# Patient Record
Sex: MALE | Race: WHITE | Employment: OTHER | URBAN - METROPOLITAN AREA
[De-identification: names, ages, dates, MRNs, and addresses within clinical notes are randomized per-mention and may not be internally consistent; named-entity substitution may affect disease eponyms.]

---

## 2017-08-17 PROBLEM — R42 DIZZINESS: Status: ACTIVE | Noted: 2017-08-17

## 2019-05-03 ENCOUNTER — HOSPITAL ENCOUNTER (OUTPATIENT)
Dept: LAB | Age: 69
Discharge: HOME | End: 2019-05-03
Payer: MEDICARE

## 2019-05-03 LAB
APPEARANCE FLD: NORMAL
BASOPHILS # BLD: 0 K/UL (ref 0–0.2)
BASOPHILS NFR BLD: 0 % (ref 0–2)
COLOR FLD: NORMAL
CRP SERPL-MCNC: 2.3 MG/DL (ref 0–0.9)
DIFFERENTIAL METHOD BLD: ABNORMAL
EOSINOPHIL # BLD: 0 K/UL (ref 0–0.8)
EOSINOPHIL NFR BLD: 1 % (ref 0.5–7.8)
ERYTHROCYTE [DISTWIDTH] IN BLOOD BY AUTOMATED COUNT: 14.3 % (ref 11.9–14.6)
ERYTHROCYTE [SEDIMENTATION RATE] IN BLOOD: 28 MM/HR (ref 0–20)
HCT VFR BLD AUTO: 42.7 % (ref 41.1–50.3)
HGB BLD-MCNC: 13.8 G/DL (ref 13.6–17.2)
IMM GRANULOCYTES # BLD AUTO: 0 K/UL (ref 0–0.5)
IMM GRANULOCYTES NFR BLD AUTO: 0 % (ref 0–5)
LYMPHOCYTES # BLD: 1.5 K/UL (ref 0.5–4.6)
LYMPHOCYTES NFR BLD: 30 % (ref 13–44)
LYMPHOCYTES NFR FLD: 3 %
MCH RBC QN AUTO: 25.1 PG (ref 26.1–32.9)
MCHC RBC AUTO-ENTMCNC: 32.3 G/DL (ref 31.4–35)
MCV RBC AUTO: 77.8 FL (ref 79.6–97.8)
MONOCYTES # BLD: 0.4 K/UL (ref 0.1–1.3)
MONOCYTES NFR BLD: 9 % (ref 4–12)
MONOS+MACROS NFR FLD: 2 %
NEUTROPHILS NFR FLD: 95 %
NEUTS SEG # BLD: 2.9 K/UL (ref 1.7–8.2)
NEUTS SEG NFR BLD: 60 % (ref 43–78)
NRBC # BLD: 0 K/UL (ref 0–0.2)
NUC CELL # FLD: NORMAL /CU MM
PLATELET # BLD AUTO: 257 K/UL (ref 150–450)
PMV BLD AUTO: 9.5 FL (ref 9.4–12.3)
RBC # BLD AUTO: 5.49 M/UL (ref 4.23–5.6)
RBC # FLD: NORMAL /CU MM
SPECIMEN SOURCE FLD: NORMAL
WBC # BLD AUTO: 4.8 K/UL (ref 4.3–11.1)

## 2019-05-03 PROCEDURE — 85025 COMPLETE CBC W/AUTO DIFF WBC: CPT

## 2019-05-03 PROCEDURE — 87205 SMEAR GRAM STAIN: CPT

## 2019-05-03 PROCEDURE — 87075 CULTR BACTERIA EXCEPT BLOOD: CPT

## 2019-05-03 PROCEDURE — 85652 RBC SED RATE AUTOMATED: CPT

## 2019-05-03 PROCEDURE — 86140 C-REACTIVE PROTEIN: CPT

## 2019-05-03 PROCEDURE — 89060 EXAM SYNOVIAL FLUID CRYSTALS: CPT

## 2019-05-03 PROCEDURE — 89050 BODY FLUID CELL COUNT: CPT

## 2019-05-03 PROCEDURE — 36415 COLL VENOUS BLD VENIPUNCTURE: CPT

## 2019-05-05 LAB
BACTERIA SPEC CULT: NORMAL
GRAM STN SPEC: NORMAL
GRAM STN SPEC: NORMAL
SERVICE CMNT-IMP: NORMAL

## 2019-05-06 LAB
BODY FLD TYPE: NORMAL
CRYSTALS FLD MICRO: NORMAL

## 2019-05-10 LAB
BACTERIA SPEC CULT: NORMAL
SERVICE CMNT-IMP: NORMAL

## 2019-09-30 ENCOUNTER — HOSPITAL ENCOUNTER (EMERGENCY)
Age: 69
Discharge: HOME | End: 2019-09-30
Attending: EMERGENCY MEDICINE
Payer: MEDICARE

## 2019-09-30 ENCOUNTER — APPOINTMENT (OUTPATIENT)
Dept: GENERAL RADIOLOGY | Age: 69
End: 2019-09-30
Attending: EMERGENCY MEDICINE
Payer: MEDICARE

## 2019-09-30 VITALS
RESPIRATION RATE: 16 BRPM | HEART RATE: 68 BPM | TEMPERATURE: 98.9 F | DIASTOLIC BLOOD PRESSURE: 77 MMHG | OXYGEN SATURATION: 99 % | SYSTOLIC BLOOD PRESSURE: 171 MMHG

## 2019-09-30 DIAGNOSIS — M25.562 PAIN AND SWELLING OF LEFT KNEE: Primary | ICD-10-CM

## 2019-09-30 DIAGNOSIS — M25.462 PAIN AND SWELLING OF LEFT KNEE: Primary | ICD-10-CM

## 2019-09-30 LAB
ALBUMIN SERPL-MCNC: 3.8 G/DL (ref 3.2–4.6)
ALBUMIN/GLOB SERPL: 0.9 {RATIO} (ref 1.2–3.5)
ALP SERPL-CCNC: 75 U/L (ref 50–136)
ALT SERPL-CCNC: 17 U/L (ref 12–65)
ANION GAP SERPL CALC-SCNC: 7 MMOL/L (ref 7–16)
AST SERPL-CCNC: 15 U/L (ref 15–37)
BASOPHILS # BLD: 0 K/UL (ref 0–0.2)
BASOPHILS NFR BLD: 0 % (ref 0–2)
BILIRUB SERPL-MCNC: 0.5 MG/DL (ref 0.2–1.1)
BUN SERPL-MCNC: 14 MG/DL (ref 8–23)
CALCIUM SERPL-MCNC: 9.4 MG/DL (ref 8.3–10.4)
CHLORIDE SERPL-SCNC: 105 MMOL/L (ref 98–107)
CO2 SERPL-SCNC: 29 MMOL/L (ref 21–32)
CREAT SERPL-MCNC: 1.44 MG/DL (ref 0.8–1.5)
DIFFERENTIAL METHOD BLD: ABNORMAL
EOSINOPHIL # BLD: 0.1 K/UL (ref 0–0.8)
EOSINOPHIL NFR BLD: 1 % (ref 0.5–7.8)
ERYTHROCYTE [DISTWIDTH] IN BLOOD BY AUTOMATED COUNT: 15.6 % (ref 11.9–14.6)
GLOBULIN SER CALC-MCNC: 4.1 G/DL (ref 2.3–3.5)
GLUCOSE SERPL-MCNC: 132 MG/DL (ref 65–100)
HCT VFR BLD AUTO: 35.7 % (ref 41.1–50.3)
HGB BLD-MCNC: 11.3 G/DL (ref 13.6–17.2)
IMM GRANULOCYTES # BLD AUTO: 0.1 K/UL (ref 0–0.5)
IMM GRANULOCYTES NFR BLD AUTO: 1 % (ref 0–5)
LYMPHOCYTES # BLD: 1.5 K/UL (ref 0.5–4.6)
LYMPHOCYTES NFR BLD: 16 % (ref 13–44)
MCH RBC QN AUTO: 25.6 PG (ref 26.1–32.9)
MCHC RBC AUTO-ENTMCNC: 31.7 G/DL (ref 31.4–35)
MCV RBC AUTO: 81 FL (ref 79.6–97.8)
MONOCYTES # BLD: 0.6 K/UL (ref 0.1–1.3)
MONOCYTES NFR BLD: 6 % (ref 4–12)
NEUTS SEG # BLD: 7.2 K/UL (ref 1.7–8.2)
NEUTS SEG NFR BLD: 76 % (ref 43–78)
NRBC # BLD: 0 K/UL (ref 0–0.2)
PLATELET # BLD AUTO: 286 K/UL (ref 150–450)
PMV BLD AUTO: 9.1 FL (ref 9.4–12.3)
POTASSIUM SERPL-SCNC: 3.9 MMOL/L (ref 3.5–5.1)
PROT SERPL-MCNC: 7.9 G/DL (ref 6.3–8.2)
RBC # BLD AUTO: 4.41 M/UL (ref 4.23–5.6)
SODIUM SERPL-SCNC: 141 MMOL/L (ref 136–145)
WBC # BLD AUTO: 9.4 K/UL (ref 4.3–11.1)

## 2019-09-30 PROCEDURE — 96376 TX/PRO/DX INJ SAME DRUG ADON: CPT | Performed by: EMERGENCY MEDICINE

## 2019-09-30 PROCEDURE — 99284 EMERGENCY DEPT VISIT MOD MDM: CPT | Performed by: EMERGENCY MEDICINE

## 2019-09-30 PROCEDURE — 80053 COMPREHEN METABOLIC PANEL: CPT

## 2019-09-30 PROCEDURE — 96375 TX/PRO/DX INJ NEW DRUG ADDON: CPT | Performed by: EMERGENCY MEDICINE

## 2019-09-30 PROCEDURE — 96374 THER/PROPH/DIAG INJ IV PUSH: CPT | Performed by: EMERGENCY MEDICINE

## 2019-09-30 PROCEDURE — 74011250636 HC RX REV CODE- 250/636: Performed by: EMERGENCY MEDICINE

## 2019-09-30 PROCEDURE — 85025 COMPLETE CBC W/AUTO DIFF WBC: CPT

## 2019-09-30 PROCEDURE — 73562 X-RAY EXAM OF KNEE 3: CPT

## 2019-09-30 RX ORDER — HYDROMORPHONE HYDROCHLORIDE 2 MG/1
2 TABLET ORAL
Qty: 15 TAB | Refills: 0 | Status: SHIPPED | OUTPATIENT
Start: 2019-09-30 | End: 2019-10-03

## 2019-09-30 RX ORDER — ONDANSETRON 2 MG/ML
4 INJECTION INTRAMUSCULAR; INTRAVENOUS
Status: COMPLETED | OUTPATIENT
Start: 2019-09-30 | End: 2019-09-30

## 2019-09-30 RX ORDER — HYDROMORPHONE HYDROCHLORIDE 1 MG/ML
0.5 INJECTION, SOLUTION INTRAMUSCULAR; INTRAVENOUS; SUBCUTANEOUS
Status: COMPLETED | OUTPATIENT
Start: 2019-09-30 | End: 2019-09-30

## 2019-09-30 RX ORDER — HYDROMORPHONE HYDROCHLORIDE 1 MG/ML
1 INJECTION, SOLUTION INTRAMUSCULAR; INTRAVENOUS; SUBCUTANEOUS
Status: COMPLETED | OUTPATIENT
Start: 2019-09-30 | End: 2019-09-30

## 2019-09-30 RX ADMIN — HYDROMORPHONE HYDROCHLORIDE 0.5 MG: 1 INJECTION, SOLUTION INTRAMUSCULAR; INTRAVENOUS; SUBCUTANEOUS at 22:29

## 2019-09-30 RX ADMIN — ONDANSETRON 4 MG: 2 INJECTION INTRAMUSCULAR; INTRAVENOUS at 21:40

## 2019-09-30 RX ADMIN — HYDROMORPHONE HYDROCHLORIDE 1 MG: 1 INJECTION, SOLUTION INTRAMUSCULAR; INTRAVENOUS; SUBCUTANEOUS at 21:40

## 2019-09-30 NOTE — ED TRIAGE NOTES
Pt arrives via EMS with L knee pain. Pt had knee replaced 2 weeks ago. Pt states pain started this afternoon, unsure cause. Denies any new injury or trauma. Has his knee replaced in [place] due to staph infection complication. Swelling noted, no increased warmth or redness. Pt already had artificial knee prior.

## 2019-10-01 NOTE — ED PROVIDER NOTES
Per nurse's notes: \"Pt arrives via EMS with L knee pain. Pt had knee replaced 2 weeks ago. Pt states pain started this afternoon, unsure cause. Denies any new injury or trauma. Has his knee replaced in [place] due to staph infection complication. Swelling noted, no increased warmth or redness. Pt already had artificial knee prior. \"    Patient states he was just sitting on the couch when it spontaneously started to hurt and swell and felt like something was pouring into it. Patient is on multiple blood thinners for prior stroke and sick sinus syndrome. The history is provided by the patient and the spouse. Knee Pain    This is a new problem. The current episode started 3 to 5 hours ago. The problem occurs constantly. The problem has not changed since onset. The pain is present in the left knee. The quality of the pain is described as aching. The pain is severe. Associated symptoms include limited range of motion and stiffness. Pertinent negatives include no numbness, no tingling, no itching, no back pain and no neck pain. The symptoms are aggravated by movement and palpation. He has tried rest for the symptoms. The treatment provided no relief. There has been no history of extremity trauma. Past Medical History:   Diagnosis Date    Acute kidney failure, unspecified ([address] 9/17/2010    Anxiety state, unspecified 11/14/2013    Arteriosclerosis of bypass graft of coronary artery 8/27/2015    CAD (coronary artery disease)     Cath on 3- showed 5/5 patent bypass grafts with LV EF=60% and a 90% stenosis in native LAD distal to LIMA graft anastomosis. He received a Xience ZAK (2.25 x 18) to LAD.  Carotid artery disease ([address] 03/28/2019    Bilateral ICA:50-69% on carotid ultrasound.     Carotid artery stenosis without cerebral infarction 07/07/2008    CVA (cerebral infarction) 5/25/2011    Diabetes ([address]     Diabetes mellitus ([address] 5/4/2009    Dilated Cardiomyopathy,Ischemic 2/26/2010    Dyslipidemia 5/4/2009    Elevated prostate specific antigen (PSA) 11/14/2013    Essential hypertension 11/14/2013    GERD (gastroesophageal reflux disease)     Hypertension     Nocturia     Other and unspecified hyperlipidemia     Pacemaker 6/21/2014    Palpitations 2006    Paroxysmal atrial fibrillation ([address] 11/30/2010    Paroxysmal ventricular tachycardia ([address] 11/30/2010    Post PTCA 5/4/2009    stent to left main     Psychiatric disorder     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    S/P CABG (coronary artery bypass graft) 5/4/2009    LIMA TO LAD, SV TO OM, SV TO RCA, SV TO DIAG     S/P PTCA (percutaneous transluminal coronary angioplasty) 3/11/2016    Sick sinus syndrome ([address] 09/06/2007    Snoring, Possible sleep apnea 2/26/2010    SSS (sick sinus syndrome) ([address] 6/21/2014    Stroke [facility]cerebrum[facility])     TIA 2/2011    Stroke, 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    Syncope and collapse 8/19/2011    Unspecified malignant neoplasm of skin, site unspecified        Past Surgical History:   Procedure Laterality Date    CARDIAC SURG PROCEDURE UNLIST      bypass x 5; 2 stents    COLONOSCOPY  1990    EGD  2010    esophageal ulcer    HX CHOLECYSTECTOMY      biliary dyskinesia    HX CORONARY ARTERY BYPASS GRAFT      HX CORONARY STENT PLACEMENT      HX HEART CATHETERIZATION  4/30/2013    no intervention    HX HEART CATHETERIZATION  6/23/2014    no intervention    HX HEART CATHETERIZATION      MULTIPLE ([place])    HX ORTHOPAEDIC      knee surgery x 2 left    HX OTHER SURGICAL      nerve in back    HX PACEMAKER      OH LEFT HEART CATH,PERCUTANEOUS  11/30/2010    native circ x 1         Family History:   Problem Relation Age of Onset    Diabetes Mother     Heart Disease Mother     Heart Disease Sister     Cancer Brother         Eye    Heart Disease Brother     Migraines Brother        Social History     Socioeconomic History    Marital status:      Spouse name: Not on file    Number of children: Not on file    Years of education: Not on file    Highest education level: Not on file   Occupational History    Not on file   Social Needs    Financial resource strain: Not on file    Food insecurity:     Worry: Not on file     Inability: Not on file    Transportation needs:     Medical: Not on file     Non-medical: Not on file   Tobacco Use    Smoking status: Former Smoker     Packs/day: 3.00     Years: 40.00     Pack years: 120.00     Last attempt to quit: 1999     Years since quittin.7    Smokeless tobacco: Never Used   Substance and Sexual Activity    Alcohol use: No    Drug use: No    Sexual activity: Not on file   Lifestyle    Physical activity:     Days per week: Not on file     Minutes per session: Not on file    Stress: Not on file   Relationships    Social connections:     Talks on phone: Not on file     Gets together: Not on file     Attends Evangelical service: Not on file     Active member of club or organization: Not on file     Attends meetings of clubs or organizations: Not on file     Relationship status: Not on file    Intimate partner violence:     Fear of current or ex partner: Not on file     Emotionally abused: Not on file     Physically abused: Not on file     Forced sexual activity: Not on file   Other Topics Concern    Caffeine Concern Not Asked    Back Care Not Asked    Exercise Not Asked    Occupational Exposure Not Asked    Sleep Concern Not Asked    Stress Concern Not Asked    Weight Concern Not Asked   Social History Narrative    Not on file         ALLERGIES: Beta-blockers (beta-adrenergic blocking agts); Shellfish derived; and Xanax [alprazolam]    Review of Systems   Constitutional: Negative for chills and fever. Musculoskeletal: Positive for arthralgias and stiffness. Negative for back pain and neck pain. Skin: Negative for itching. Neurological: Negative for tingling and numbness.    All other systems reviewed and are negative. There were no vitals filed for this visit. Physical Exam   Constitutional: He is oriented to person, place, and time. He appears well-developed and well-nourished. He appears distressed. HENT:   Head: Normocephalic and atraumatic. Right Ear: External ear normal.   Left Ear: External ear normal.   Mouth/Throat: Oropharynx is clear and moist.   Eyes: Pupils are equal, round, and reactive to light. Conjunctivae and EOM are normal.   Neck: Normal range of motion. Neck supple. Cardiovascular: Normal rate, regular rhythm, normal heart sounds and intact distal pulses. Pulmonary/Chest: Effort normal and breath sounds normal.   Musculoskeletal: He exhibits no edema. Left knee: He exhibits decreased range of motion, swelling, effusion and laceration (Surgical incision site appears to be healing without evidence of infection. ). He exhibits no ecchymosis and no erythema. Tenderness (Generalized) found. Neurological: He is alert and oriented to person, place, and time. Skin: Skin is warm and dry. Capillary refill takes less than 2 seconds. He is not diaphoretic. Psychiatric: He has a normal mood and affect. Nursing note and vitals reviewed.        MDM  Number of Diagnoses or Management Options     Amount and/or Complexity of Data Reviewed  Clinical lab tests: ordered and reviewed  Tests in the radiology section of CPT®: ordered and reviewed  Review and summarize past medical records: yes    Risk of Complications, Morbidity, and/or Mortality  Presenting problems: moderate  Diagnostic procedures: moderate  Management options: moderate    Patient Progress  Patient progress: improved         Procedures    Results Reviewed:      Recent Results (from the past 24 hour(s))   CBC WITH AUTOMATED DIFF    Collection Time: 09/30/19  7:00 PM   Result Value Ref Range    WBC 9.4 4.3 - 11.1 K/uL    RBC 4.41 4.23 - 5.6 M/uL    HGB 11.3 (L) 13.6 - 17.2 g/dL    HCT 35.7 (L) 41.1 - 50.3 % MCV 81.0 79.6 - 97.8 FL    MCH 25.6 (L) 26.1 - 32.9 PG    MCHC 31.7 31.4 - 35.0 g/dL    RDW 15.6 (H) 11.9 - 14.6 %    PLATELET 774 072 - 562 K/uL    MPV 9.1 (L) 9.4 - 12.3 FL    ABSOLUTE NRBC 0.00 0.0 - 0.2 K/uL    DF AUTOMATED      NEUTROPHILS 76 43 - 78 %    LYMPHOCYTES 16 13 - 44 %    MONOCYTES 6 4.0 - 12.0 %    EOSINOPHILS 1 0.5 - 7.8 %    BASOPHILS 0 0.0 - 2.0 %    IMMATURE GRANULOCYTES 1 0.0 - 5.0 %    ABS. NEUTROPHILS 7.2 1.7 - 8.2 K/UL    ABS. LYMPHOCYTES 1.5 0.5 - 4.6 K/UL    ABS. MONOCYTES 0.6 0.1 - 1.3 K/UL    ABS. EOSINOPHILS 0.1 0.0 - 0.8 K/UL    ABS. BASOPHILS 0.0 0.0 - 0.2 K/UL    ABS. IMM. GRANS. 0.1 0.0 - 0.5 K/UL   METABOLIC PANEL, COMPREHENSIVE    Collection Time: 09/30/19  7:00 PM   Result Value Ref Range    Sodium 141 136 - 145 mmol/L    Potassium 3.9 3.5 - 5.1 mmol/L    Chloride 105 98 - 107 mmol/L    CO2 29 21 - 32 mmol/L    Anion gap 7 7 - 16 mmol/L    Glucose 132 (H) 65 - 100 mg/dL    BUN 14 8 - 23 MG/DL    Creatinine 1.44 0.8 - 1.5 MG/DL    GFR est AA >60 >60 ml/min/1.73m2    GFR est non-AA 52 (L) >60 ml/min/1.73m2    Calcium 9.4 8.3 - 10.4 MG/DL    Bilirubin, total 0.5 0.2 - 1.1 MG/DL    ALT (SGPT) 17 12 - 65 U/L    AST (SGOT) 15 15 - 37 U/L    Alk. phosphatase 75 50 - 136 U/L    Protein, total 7.9 6.3 - 8.2 g/dL    Albumin 3.8 3.2 - 4.6 g/dL    Globulin 4.1 (H) 2.3 - 3.5 g/dL    A-G Ratio 0.9 (L) 1.2 - 3.5       XR KNEE LT 3 V   Final Result   Impression:      Total knee prosthesis in place with joint effusion. I discussed the results of all labs, procedures, radiographs, and treatments with the patient and available family. Treatment plan is agreed upon and the patient is ready for discharge. All voiced understanding of the discharge plan and medication instructions or changes as appropriate. Questions about treatment in the ED were answered. All were encouraged to return should symptoms worsen or new problems develop.

## 2019-10-01 NOTE — DISCHARGE INSTRUCTIONS

## 2019-10-01 NOTE — ED NOTES
I have reviewed discharge instructions with the patient. The patient verbalized understanding. Patient left ED via Discharge Method: wheelchair to Home with (insert name of family/friend, self, transport family). Opportunity for questions and clarification provided. Patient given 1 scripts. To continue your aftercare when you leave the hospital, you may receive an automated call from our care team to check in on how you are doing. This is a free service and part of our promise to provide the best care and service to meet your aftercare needs.  If you have questions, or wish to unsubscribe from this service please call [phone]. Thank you for Choosing our [address] [place] [address] Emergency Department.

## 2020-01-23 PROBLEM — R00.1 BRADYCARDIA: Status: ACTIVE | Noted: 2020-01-23

## 2020-04-10 PROBLEM — R06.09 DOE (DYSPNEA ON EXERTION): Status: ACTIVE | Noted: 2020-04-10

## 2020-04-10 PROBLEM — R53.82 CHRONIC FATIGUE: Status: ACTIVE | Noted: 2020-04-10

## 2020-04-21 PROBLEM — N18.30 CHRONIC RENAL FAILURE, STAGE 3 (MODERATE) (HCC): Status: ACTIVE | Noted: 2020-04-21

## 2020-08-03 ENCOUNTER — HOSPITAL ENCOUNTER (OUTPATIENT)
Dept: LAB | Age: 70
Discharge: HOME | End: 2020-08-03
Payer: MEDICARE

## 2020-08-03 DIAGNOSIS — Z95.1 S/P CABG (CORONARY ARTERY BYPASS GRAFT): Chronic | ICD-10-CM

## 2020-08-03 DIAGNOSIS — N18.30 CHRONIC RENAL FAILURE, STAGE 3 (MODERATE) (HCC): ICD-10-CM

## 2020-08-03 DIAGNOSIS — I10 ESSENTIAL HYPERTENSION: ICD-10-CM

## 2020-08-03 DIAGNOSIS — I42.0 DILATED CARDIOMYOPATHY (HCC): ICD-10-CM

## 2020-08-03 DIAGNOSIS — R06.09 DOE (DYSPNEA ON EXERTION): ICD-10-CM

## 2020-08-03 DIAGNOSIS — I25.810 CORONARY ARTERY DISEASE INVOLVING AUTOLOGOUS ARTERY CORONARY BYPASS GRAFT, ANGINA PRESENCE UNSPECIFIED: Chronic | ICD-10-CM

## 2020-08-03 DIAGNOSIS — R07.9 CHEST PAIN, UNSPECIFIED TYPE: ICD-10-CM

## 2020-08-03 DIAGNOSIS — R53.82 CHRONIC FATIGUE: ICD-10-CM

## 2020-08-03 LAB
ANION GAP SERPL CALC-SCNC: 5 MMOL/L (ref 7–16)
BASOPHILS # BLD: 0 K/UL (ref 0–0.2)
BASOPHILS NFR BLD: 0 % (ref 0–2)
BNP SERPL-MCNC: 111 PG/ML (ref 5–125)
BUN SERPL-MCNC: 24 MG/DL (ref 8–23)
CALCIUM SERPL-MCNC: 9.2 MG/DL (ref 8.3–10.4)
CHLORIDE SERPL-SCNC: 111 MMOL/L (ref 98–107)
CO2 SERPL-SCNC: 25 MMOL/L (ref 21–32)
CREAT SERPL-MCNC: 1.63 MG/DL (ref 0.8–1.5)
DIFFERENTIAL METHOD BLD: ABNORMAL
EOSINOPHIL # BLD: 0.1 K/UL (ref 0–0.8)
EOSINOPHIL NFR BLD: 1 % (ref 0.5–7.8)
ERYTHROCYTE [DISTWIDTH] IN BLOOD BY AUTOMATED COUNT: 14.7 % (ref 11.9–14.6)
GLUCOSE SERPL-MCNC: 342 MG/DL (ref 65–100)
HCT VFR BLD AUTO: 37.3 % (ref 41.1–50.3)
HGB BLD-MCNC: 12.1 G/DL (ref 13.6–17.2)
IMM GRANULOCYTES # BLD AUTO: 0 K/UL (ref 0–0.5)
IMM GRANULOCYTES NFR BLD AUTO: 0 % (ref 0–5)
LYMPHOCYTES # BLD: 1.3 K/UL (ref 0.5–4.6)
LYMPHOCYTES NFR BLD: 26 % (ref 13–44)
MCH RBC QN AUTO: 27.8 PG (ref 26.1–32.9)
MCHC RBC AUTO-ENTMCNC: 32.4 G/DL (ref 31.4–35)
MCV RBC AUTO: 85.6 FL (ref 79.6–97.8)
MONOCYTES # BLD: 0.4 K/UL (ref 0.1–1.3)
MONOCYTES NFR BLD: 7 % (ref 4–12)
NEUTS SEG # BLD: 3.3 K/UL (ref 1.7–8.2)
NEUTS SEG NFR BLD: 66 % (ref 43–78)
NRBC # BLD: 0 K/UL (ref 0–0.2)
PLATELET # BLD AUTO: 180 K/UL (ref 150–450)
PMV BLD AUTO: 10.7 FL (ref 9.4–12.3)
POTASSIUM SERPL-SCNC: 4.5 MMOL/L (ref 3.5–5.1)
RBC # BLD AUTO: 4.36 M/UL (ref 4.23–5.6)
SODIUM SERPL-SCNC: 141 MMOL/L (ref 136–145)
WBC # BLD AUTO: 5 K/UL (ref 4.3–11.1)

## 2020-08-03 PROCEDURE — 85025 COMPLETE CBC W/AUTO DIFF WBC: CPT

## 2020-08-03 PROCEDURE — 83880 ASSAY OF NATRIURETIC PEPTIDE: CPT

## 2020-08-03 PROCEDURE — 36415 COLL VENOUS BLD VENIPUNCTURE: CPT

## 2020-08-03 PROCEDURE — 80048 BASIC METABOLIC PNL TOTAL CA: CPT

## 2020-08-07 NOTE — PROGRESS NOTES
Do you currently have any signs or symptoms of respiratory infection, such as fever, cough, shortness of breath, or sore throat? no    In the last 14 days have you had contact with someone with confirmed or presumptive diagnosis of COVID-19 or someone under investigation of COVID-19?  no    In the last 14 days have you traveled or have someone in your home who has traveled to [place], [facility], [facility], [place], [place], [place], [place], or [place]? no

## 2020-08-07 NOTE — PROGRESS NOTES
Patient pre-assessment complete for Jaspreet Melendez scheduled for [facility], arrival time 0600. Patient verified using . Patient instructed to bring all home medications in labeled bottles on the day of procedure. NPO status reinforced. Patient informed to take a full dose aspirin 325mg  or 81 mg x 4 on the day of procedure. Patient instructed to take plavix 75mg the morning of the procedure. Patient instructed to [facility] starting saturday. Instructed they can take all other medications excluding vitamins & supplements. Patient verbalizes understanding of all instructions & denies any questions at this time.

## 2020-08-10 ENCOUNTER — APPOINTMENT (OUTPATIENT)
Dept: CT IMAGING | Age: 70
End: 2020-08-10
Attending: NURSE PRACTITIONER
Payer: MEDICARE

## 2020-08-10 ENCOUNTER — HOSPITAL ENCOUNTER (OUTPATIENT)
Dept: CARDIAC CATH/INVASIVE PROCEDURES | Age: 70
Setting detail: OBSERVATION
Discharge: HOME HEALTH | End: 2020-08-13
Attending: INTERNAL MEDICINE | Admitting: INTERNAL MEDICINE
Payer: MEDICARE

## 2020-08-10 ENCOUNTER — APPOINTMENT (OUTPATIENT)
Dept: CT IMAGING | Age: 70
End: 2020-08-10
Attending: INTERNAL MEDICINE
Payer: MEDICARE

## 2020-08-10 DIAGNOSIS — R55 SYNCOPE AND COLLAPSE: ICD-10-CM

## 2020-08-10 DIAGNOSIS — I25.810 ATHEROSCLEROSIS OF CORONARY ARTERY BYPASS GRAFT WITHOUT ANGINA PECTORIS, UNSPECIFIED WHETHER NATIVE OR TRANSPLANTED HEART: ICD-10-CM

## 2020-08-10 DIAGNOSIS — N18.30 CHRONIC RENAL FAILURE, STAGE 3 (MODERATE) (HCC): ICD-10-CM

## 2020-08-10 DIAGNOSIS — R29.90 STROKE-LIKE SYMPTOMS: Primary | ICD-10-CM

## 2020-08-10 DIAGNOSIS — I25.810 CORONARY ARTERY DISEASE INVOLVING AUTOLOGOUS ARTERY CORONARY BYPASS GRAFT, ANGINA PRESENCE UNSPECIFIED: Chronic | ICD-10-CM

## 2020-08-10 LAB
ANION GAP SERPL CALC-SCNC: 7 MMOL/L (ref 7–16)
ATRIAL RATE: 97 BPM
BUN SERPL-MCNC: 14 MG/DL (ref 8–23)
CALCIUM SERPL-MCNC: 9 MG/DL (ref 8.3–10.4)
CALCULATED P AXIS, ECG09: 67 DEGREES
CALCULATED R AXIS, ECG10: -88 DEGREES
CALCULATED T AXIS, ECG11: 92 DEGREES
CHLORIDE SERPL-SCNC: 107 MMOL/L (ref 98–107)
CO2 SERPL-SCNC: 26 MMOL/L (ref 21–32)
CREAT SERPL-MCNC: 1.14 MG/DL (ref 0.8–1.5)
DIAGNOSIS, 93000: NORMAL
ERYTHROCYTE [DISTWIDTH] IN BLOOD BY AUTOMATED COUNT: 14.2 % (ref 11.9–14.6)
GLUCOSE BLD STRIP.AUTO-MCNC: 132 MG/DL (ref 65–100)
GLUCOSE BLD STRIP.AUTO-MCNC: 140 MG/DL (ref 65–100)
GLUCOSE BLD STRIP.AUTO-MCNC: 248 MG/DL (ref 65–100)
GLUCOSE BLD STRIP.AUTO-MCNC: 292 MG/DL (ref 65–100)
GLUCOSE SERPL-MCNC: 319 MG/DL (ref 65–100)
HCT VFR BLD AUTO: 37.2 % (ref 41.1–50.3)
HGB BLD-MCNC: 12 G/DL (ref 13.6–17.2)
INR PPP: 1
MAGNESIUM SERPL-MCNC: 2 MG/DL (ref 1.8–2.4)
MCH RBC QN AUTO: 27.2 PG (ref 26.1–32.9)
MCHC RBC AUTO-ENTMCNC: 32.3 G/DL (ref 31.4–35)
MCV RBC AUTO: 84.4 FL (ref 79.6–97.8)
NRBC # BLD: 0 K/UL (ref 0–0.2)
P-R INTERVAL, ECG05: 228 MS
P2Y12 PLT RESPONSE,PPPR: 227 PRU
PLATELET # BLD AUTO: 158 K/UL (ref 150–450)
PMV BLD AUTO: 10.5 FL (ref 9.4–12.3)
POTASSIUM SERPL-SCNC: 4.3 MMOL/L (ref 3.5–5.1)
PROTHROMBIN TIME: 13.8 SEC (ref 12–14.7)
Q-T INTERVAL, ECG07: 336 MS
QRS DURATION, ECG06: 128 MS
QTC CALCULATION (BEZET), ECG08: 426 MS
RBC # BLD AUTO: 4.41 M/UL (ref 4.23–5.6)
SODIUM SERPL-SCNC: 140 MMOL/L (ref 136–145)
TROPONIN-HIGH SENSITIVITY: 21.9 PG/ML (ref 0–14)
VENTRICULAR RATE, ECG03: 97 BPM
WBC # BLD AUTO: 7.2 K/UL (ref 4.3–11.1)

## 2020-08-10 PROCEDURE — 93005 ELECTROCARDIOGRAM TRACING: CPT | Performed by: INTERNAL MEDICINE

## 2020-08-10 PROCEDURE — 74011000258 HC RX REV CODE- 258: Performed by: EMERGENCY MEDICINE

## 2020-08-10 PROCEDURE — 74011000258 HC RX REV CODE- 258: Performed by: INTERNAL MEDICINE

## 2020-08-10 PROCEDURE — 36415 COLL VENOUS BLD VENIPUNCTURE: CPT

## 2020-08-10 PROCEDURE — 99221 1ST HOSP IP/OBS SF/LOW 40: CPT | Performed by: PHYSICAL MEDICINE & REHABILITATION

## 2020-08-10 PROCEDURE — 80048 BASIC METABOLIC PNL TOTAL CA: CPT

## 2020-08-10 PROCEDURE — 74011636320 HC RX REV CODE- 636/320: Performed by: INTERNAL MEDICINE

## 2020-08-10 PROCEDURE — 74011250637 HC RX REV CODE- 250/637: Performed by: INTERNAL MEDICINE

## 2020-08-10 PROCEDURE — 74011250636 HC RX REV CODE- 250/636: Performed by: INTERNAL MEDICINE

## 2020-08-10 PROCEDURE — 99218 HC RM OBSERVATION: CPT

## 2020-08-10 PROCEDURE — 74011250636 HC RX REV CODE- 250/636: Performed by: EMERGENCY MEDICINE

## 2020-08-10 PROCEDURE — 95816 EEG AWAKE AND DROWSY: CPT | Performed by: INTERNAL MEDICINE

## 2020-08-10 PROCEDURE — 82962 GLUCOSE BLOOD TEST: CPT

## 2020-08-10 PROCEDURE — 85027 COMPLETE CBC AUTOMATED: CPT

## 2020-08-10 PROCEDURE — 70450 CT HEAD/BRAIN W/O DYE: CPT

## 2020-08-10 PROCEDURE — 96376 TX/PRO/DX INJ SAME DRUG ADON: CPT

## 2020-08-10 PROCEDURE — 87635 SARS-COV-2 COVID-19 AMP PRB: CPT

## 2020-08-10 PROCEDURE — 85576 BLOOD PLATELET AGGREGATION: CPT

## 2020-08-10 PROCEDURE — 70496 CT ANGIOGRAPHY HEAD: CPT

## 2020-08-10 PROCEDURE — 96374 THER/PROPH/DIAG INJ IV PUSH: CPT

## 2020-08-10 PROCEDURE — 74011636637 HC RX REV CODE- 636/637: Performed by: INTERNAL MEDICINE

## 2020-08-10 PROCEDURE — 99285 EMERGENCY DEPT VISIT HI MDM: CPT

## 2020-08-10 PROCEDURE — 74011250637 HC RX REV CODE- 250/637: Performed by: NURSE PRACTITIONER

## 2020-08-10 PROCEDURE — 84484 ASSAY OF TROPONIN QUANT: CPT

## 2020-08-10 PROCEDURE — 96375 TX/PRO/DX INJ NEW DRUG ADDON: CPT

## 2020-08-10 PROCEDURE — 83735 ASSAY OF MAGNESIUM: CPT

## 2020-08-10 PROCEDURE — 77030040361 HC SLV COMPR DVT MDII -B

## 2020-08-10 PROCEDURE — 85610 PROTHROMBIN TIME: CPT

## 2020-08-10 RX ORDER — INSULIN LISPRO 100 [IU]/ML
15 INJECTION, SOLUTION INTRAVENOUS; SUBCUTANEOUS
Status: DISCONTINUED | OUTPATIENT
Start: 2020-08-10 | End: 2020-08-10

## 2020-08-10 RX ORDER — TRAZODONE HYDROCHLORIDE 50 MG/1
100 TABLET ORAL
Status: DISCONTINUED | OUTPATIENT
Start: 2020-08-10 | End: 2020-08-13 | Stop reason: HOSPADM

## 2020-08-10 RX ORDER — RANOLAZINE 500 MG/1
500 TABLET, EXTENDED RELEASE ORAL 2 TIMES DAILY
Status: DISCONTINUED | OUTPATIENT
Start: 2020-08-10 | End: 2020-08-13 | Stop reason: HOSPADM

## 2020-08-10 RX ORDER — DIPHENHYDRAMINE HYDROCHLORIDE 50 MG/ML
50 INJECTION, SOLUTION INTRAMUSCULAR; INTRAVENOUS ONCE
Status: DISCONTINUED | OUTPATIENT
Start: 2020-08-10 | End: 2020-08-10

## 2020-08-10 RX ORDER — NALOXONE HYDROCHLORIDE 0.4 MG/ML
0.4 INJECTION, SOLUTION INTRAMUSCULAR; INTRAVENOUS; SUBCUTANEOUS ONCE
Status: COMPLETED | OUTPATIENT
Start: 2020-08-10 | End: 2020-08-10

## 2020-08-10 RX ORDER — SODIUM CHLORIDE 0.9 % (FLUSH) 0.9 %
5-40 SYRINGE (ML) INJECTION EVERY 8 HOURS
Status: DISCONTINUED | OUTPATIENT
Start: 2020-08-10 | End: 2020-08-13 | Stop reason: HOSPADM

## 2020-08-10 RX ORDER — ATORVASTATIN CALCIUM 40 MG/1
40 TABLET, FILM COATED ORAL DAILY
Status: DISCONTINUED | OUTPATIENT
Start: 2020-08-10 | End: 2020-08-13 | Stop reason: HOSPADM

## 2020-08-10 RX ORDER — ISOSORBIDE MONONITRATE 60 MG/1
60 TABLET, EXTENDED RELEASE ORAL 2 TIMES DAILY
Status: DISCONTINUED | OUTPATIENT
Start: 2020-08-10 | End: 2020-08-13 | Stop reason: HOSPADM

## 2020-08-10 RX ORDER — INSULIN LISPRO 100 [IU]/ML
INJECTION, SOLUTION INTRAVENOUS; SUBCUTANEOUS
Status: DISCONTINUED | OUTPATIENT
Start: 2020-08-10 | End: 2020-08-13 | Stop reason: HOSPADM

## 2020-08-10 RX ORDER — HYDROCODONE BITARTRATE AND ACETAMINOPHEN 10; 325 MG/1; MG/1
1 TABLET ORAL
Status: DISCONTINUED | OUTPATIENT
Start: 2020-08-10 | End: 2020-08-10

## 2020-08-10 RX ORDER — PANTOPRAZOLE SODIUM 40 MG/1
40 TABLET, DELAYED RELEASE ORAL
Status: DISCONTINUED | OUTPATIENT
Start: 2020-08-11 | End: 2020-08-13 | Stop reason: HOSPADM

## 2020-08-10 RX ORDER — SODIUM CHLORIDE 9 MG/ML
75 INJECTION, SOLUTION INTRAVENOUS CONTINUOUS
Status: DISCONTINUED | OUTPATIENT
Start: 2020-08-10 | End: 2020-08-12

## 2020-08-10 RX ORDER — GUAIFENESIN 100 MG/5ML
324 LIQUID (ML) ORAL
Status: DISCONTINUED | OUTPATIENT
Start: 2020-08-10 | End: 2020-08-10

## 2020-08-10 RX ORDER — GUAIFENESIN 100 MG/5ML
81 LIQUID (ML) ORAL DAILY
Status: DISCONTINUED | OUTPATIENT
Start: 2020-08-11 | End: 2020-08-12

## 2020-08-10 RX ORDER — INSULIN GLARGINE 100 [IU]/ML
30 INJECTION, SOLUTION SUBCUTANEOUS DAILY
Status: DISCONTINUED | OUTPATIENT
Start: 2020-08-10 | End: 2020-08-13 | Stop reason: HOSPADM

## 2020-08-10 RX ORDER — FINASTERIDE 5 MG/1
5 TABLET, FILM COATED ORAL DAILY
Status: DISCONTINUED | OUTPATIENT
Start: 2020-08-10 | End: 2020-08-13 | Stop reason: HOSPADM

## 2020-08-10 RX ORDER — ACETAMINOPHEN 650 MG/1
650 SUPPOSITORY RECTAL
Status: DISCONTINUED | OUTPATIENT
Start: 2020-08-10 | End: 2020-08-13 | Stop reason: HOSPADM

## 2020-08-10 RX ORDER — LOSARTAN POTASSIUM 50 MG/1
50 TABLET ORAL DAILY
Status: DISCONTINUED | OUTPATIENT
Start: 2020-08-10 | End: 2020-08-13 | Stop reason: HOSPADM

## 2020-08-10 RX ORDER — PREGABALIN 100 MG/1
300 CAPSULE ORAL 2 TIMES DAILY
Status: DISCONTINUED | OUTPATIENT
Start: 2020-08-10 | End: 2020-08-13 | Stop reason: HOSPADM

## 2020-08-10 RX ORDER — ONDANSETRON 2 MG/ML
4 INJECTION INTRAMUSCULAR; INTRAVENOUS ONCE
Status: COMPLETED | OUTPATIENT
Start: 2020-08-10 | End: 2020-08-10

## 2020-08-10 RX ORDER — HYDROCORTISONE SODIUM SUCCINATE 100 MG/2ML
100 INJECTION, POWDER, FOR SOLUTION INTRAMUSCULAR; INTRAVENOUS ONCE
Status: DISCONTINUED | OUTPATIENT
Start: 2020-08-10 | End: 2020-08-10

## 2020-08-10 RX ORDER — HYDROCODONE BITARTRATE AND ACETAMINOPHEN 10; 325 MG/1; MG/1
1 TABLET ORAL
Status: DISCONTINUED | OUTPATIENT
Start: 2020-08-10 | End: 2020-08-13 | Stop reason: HOSPADM

## 2020-08-10 RX ORDER — CLOPIDOGREL BISULFATE 75 MG/1
75 TABLET ORAL DAILY
Status: DISCONTINUED | OUTPATIENT
Start: 2020-08-10 | End: 2020-08-12

## 2020-08-10 RX ORDER — SODIUM CHLORIDE 0.9 % (FLUSH) 0.9 %
10 SYRINGE (ML) INJECTION
Status: COMPLETED | OUTPATIENT
Start: 2020-08-10 | End: 2020-08-10

## 2020-08-10 RX ORDER — NITROGLYCERIN 0.4 MG/1
0.4 TABLET SUBLINGUAL AS NEEDED
Status: DISCONTINUED | OUTPATIENT
Start: 2020-08-10 | End: 2020-08-13 | Stop reason: HOSPADM

## 2020-08-10 RX ORDER — SODIUM CHLORIDE 0.9 % (FLUSH) 0.9 %
5-40 SYRINGE (ML) INJECTION AS NEEDED
Status: DISCONTINUED | OUTPATIENT
Start: 2020-08-10 | End: 2020-08-13 | Stop reason: HOSPADM

## 2020-08-10 RX ORDER — TAMSULOSIN HYDROCHLORIDE 0.4 MG/1
0.4 CAPSULE ORAL DAILY
Status: DISCONTINUED | OUTPATIENT
Start: 2020-08-10 | End: 2020-08-13 | Stop reason: HOSPADM

## 2020-08-10 RX ADMIN — SODIUM CHLORIDE 1000 MG: 900 INJECTION, SOLUTION INTRAVENOUS at 10:13

## 2020-08-10 RX ADMIN — TRAZODONE HYDROCHLORIDE 100 MG: 50 TABLET ORAL at 21:56

## 2020-08-10 RX ADMIN — SODIUM CHLORIDE 100 ML: 900 INJECTION, SOLUTION INTRAVENOUS at 09:25

## 2020-08-10 RX ADMIN — NITROGLYCERIN 0.4 MG: 0.4 TABLET SUBLINGUAL at 06:45

## 2020-08-10 RX ADMIN — INSULIN GLARGINE 30 UNITS: 100 INJECTION, SOLUTION SUBCUTANEOUS at 13:06

## 2020-08-10 RX ADMIN — Medication 10 ML: at 09:25

## 2020-08-10 RX ADMIN — HYDROCODONE BITARTRATE AND ACETAMINOPHEN 1 TABLET: 10; 325 TABLET ORAL at 20:40

## 2020-08-10 RX ADMIN — ACETAMINOPHEN 650 MG: 650 SUPPOSITORY RECTAL at 13:08

## 2020-08-10 RX ADMIN — SODIUM CHLORIDE 75 ML/HR: 900 INJECTION, SOLUTION INTRAVENOUS at 12:42

## 2020-08-10 RX ADMIN — ONDANSETRON 4 MG: 2 INJECTION INTRAMUSCULAR; INTRAVENOUS at 06:55

## 2020-08-10 RX ADMIN — SODIUM CHLORIDE 1000 MG: 900 INJECTION, SOLUTION INTRAVENOUS at 21:57

## 2020-08-10 RX ADMIN — IOPAMIDOL 50 ML: 755 INJECTION, SOLUTION INTRAVENOUS at 09:25

## 2020-08-10 RX ADMIN — Medication 10 ML: at 21:58

## 2020-08-10 RX ADMIN — INSULIN LISPRO 4 UNITS: 100 INJECTION, SOLUTION INTRAVENOUS; SUBCUTANEOUS at 13:00

## 2020-08-10 RX ADMIN — NALOXONE HYDROCHLORIDE 0.4 MG: 0.4 INJECTION, SOLUTION INTRAMUSCULAR; INTRAVENOUS; SUBCUTANEOUS at 06:52

## 2020-08-10 NOTE — PROGRESS NOTES
Axillary temperature 101. PRN Tylenol 650 mg suppository given per MD order. Will continue to monitor.

## 2020-08-10 NOTE — PROGRESS NOTES
Patient received to [address] [place] [address] room # 11  Ambulatory from [facility]. Patient scheduled for [facility] today with Dr Jovani Cedillo. Will prep patient per orders. The patient has a fraility score of 5-MILDLY FRAIL, based on use of wheelchair and unable to bear own weight. Upon arrival patient disoriented with delayed responses. Pt states chest pain 8/10. 1 SL nitro given. Pt states chest pain 2/10. Son at bedside and states patient is acting differently when he picked him up this AM. SQBS 292 at [id number]. Code S called.

## 2020-08-10 NOTE — PROGRESS NOTES
Problem: Falls - Risk of  Goal: *Absence of Falls  Description: Document Kelsie Church Fall Risk and appropriate interventions in the flowsheet. Outcome: Progressing Towards Goal  Note: Fall Risk Interventions:  Mobility Interventions: OT consult for ADLs, Patient to call before getting OOB, PT Consult for mobility concerns         Medication Interventions: Assess postural VS orthostatic hypotension, Bed/chair exit alarm, Patient to call before getting OOB, Teach patient to arise slowly, Utilize gait belt for transfers/ambulation    Elimination Interventions: Bed/chair exit alarm, Call light in reach, Toileting schedule/hourly rounds, Urinal in reach    History of Falls Interventions: Bed/chair exit alarm, Door open when patient unattended, Room close to nurse's station         Problem: Patient Education: Go to Patient Education Activity  Goal: Patient/Family Education  Outcome: Progressing Towards Goal     Problem: Risk for Spread of Infection  Goal: Prevent transmission of infectious organism to others  Description: Prevent the transmission of infectious organisms to other patients, staff members, and visitors.   Outcome: Progressing Towards Goal     Problem: Patient Education:  Go to Education Activity  Goal: Patient/Family Education  Outcome: Progressing Towards Goal

## 2020-08-10 NOTE — PROGRESS NOTES
TRANSFER - IN REPORT:    Verbal report received from [facility] [email or web address] (name) on Josiane Coronado  being received from ED (unit) for routine progression of care      Report consisted of patients Situation, Background, Assessment and   Recommendations(SBAR). Information from the following report(s) SBAR, Kardex, ED Summary, Intake/Output, MAR and Recent Results was reviewed with the receiving nurse. Opportunity for questions and clarification was provided. Assessment completed upon patients arrival to unit and care assumed.

## 2020-08-10 NOTE — H&P
HOSPITALIST H&P/CONSULT  NAME:  Sloan Quintana   Age:  79 y.o.  :   1950   MRN:   [id number]  PCP: Marvin Prasad MD  Consulting MD:  Treatment Team: Attending Provider: Kathia Sweeney DO; Consulting Provider: Fior Cruz NP; Consulting Provider: Rosalie Niño MD; Consulting Provider: Antionette Arauz MD; Speech Language Pathologist: GARY Palma  HPI:   78 yo CM with past history of SSS s/p PPM, 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 Patient was supposed to have a scheduled outpatient cardiac cath this morning. His son, at bedside, came to pick him up this morning and says Stiven Hole was just off, I saw that his balance was off and he wasn't acting himself. \" Patient went to bed last night in his usual state of health. He did not take \"my long-acting insulin or my Eliquis because of my heart cath. I was supposed to take 4 baby aspirin but I forgot to take it today. \" When he got to cath lab he was noted to be encephalopathic, his blood sugar was ~300. He was given Narcan with no improvement. Code S called. ED Course: Code S called. CT head unremarkable. STAT teleneurology consulted, concern for seizure-like activity. He was loaded with Keppra. Deemed not a candidate for thrombolytics. Improved interval left-sided weakness afterwards. Hospitalist called for admission. Complete ROS done and is as stated in HPI or otherwise negative  Past Medical History:   Diagnosis Date    Acute kidney failure, unspecified ([address] 2010    Anxiety state, unspecified 2013    Arteriosclerosis of bypass graft of coronary artery 2015    CAD (coronary artery disease)     Cath on 3- showed 5/5 patent bypass grafts with LV EF=60% and a 90% stenosis in native LAD distal to LIMA graft anastomosis. He received a Xience ZAK (2.25 x 18) to LAD.     Carotid artery disease ([address] 03/28/2019    Bilateral ICA:50-69% on carotid ultrasound.     Carotid artery stenosis without cerebral infarction 07/07/2008    CVA (cerebral infarction) 5/25/2011    Diabetes ([address]     Diabetes mellitus ([address] 5/4/2009    Dilated Cardiomyopathy,Ischemic 2/26/2010    Dyslipidemia 5/4/2009    Elevated prostate specific antigen (PSA) 11/14/2013    Essential hypertension 11/14/2013    GERD (gastroesophageal reflux disease)     Hypertension     Nocturia     Other and unspecified hyperlipidemia     Pacemaker 6/21/2014    Palpitations 2006    Paroxysmal atrial fibrillation ([address] 11/30/2010    Paroxysmal ventricular tachycardia ([address] 11/30/2010    Post PTCA 5/4/2009    stent to left main     Psychiatric disorder     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    S/P CABG (coronary artery bypass graft) 5/4/2009    LIMA TO LAD, SV TO OM, SV TO RCA, SV TO DIAG     S/P PTCA (percutaneous transluminal coronary angioplasty) 3/11/2016    Sick sinus syndrome ([address] 09/06/2007    Snoring, Possible sleep apnea 2/26/2010    SSS (sick sinus syndrome) ([address] 6/21/2014    Stroke [facility]cerebrum) [facility])     TIA 2/2011    Stroke, embolic ([address] 0/47/9207    Syncope and collapse 8/19/2011    Unspecified malignant neoplasm of skin, site unspecified       Past Surgical History:   Procedure Laterality Date    CARDIAC SURG PROCEDURE UNLIST      bypass x 5; 2 stents    COLONOSCOPY  1990    EGD  2010    esophageal ulcer    HX CHOLECYSTECTOMY      biliary dyskinesia    HX CORONARY ARTERY BYPASS GRAFT      HX CORONARY STENT PLACEMENT      HX HEART CATHETERIZATION  4/30/2013    no intervention    HX HEART CATHETERIZATION  6/23/2014    no intervention    HX HEART CATHETERIZATION      MULTIPLE ([address])    HX ORTHOPAEDIC      knee surgery x 2 left    HX OTHER SURGICAL      nerve in back    HX PACEMAKER      AK LEFT HEART CATH,PERCUTANEOUS  11/30/2010    native circ x 1      Prior to Admission Medications   Prescriptions Last Dose Informant Patient Reported? Taking? HYDROcodone-acetaminophen (NORCO)  mg tablet 2020 at Unknown time  Yes Yes   Sig: Take 1 Tab by mouth four (4) times daily as needed. apixaban (ELIQUIS) 5 mg tablet 2020 at pm  No Yes   Sig: Take 1 Tab by mouth two (2) times a day. carvediloL (Coreg) 12.5 mg tablet 2020 at Unknown time  No Yes   Sig: Take 1 Tab by mouth two (2) times daily (with meals). clopidogreL (PLAVIX) 75 mg tab 2020 at Unknown time  No Yes   Sig: Take 1 Tab by mouth daily. cyclobenzaprine (FLEXERIL) 10 mg tablet 2020 at Unknown time  Yes Yes   Sig: Take 10 mg by mouth two (2) times daily as needed for Muscle Spasm(s). esomeprazole (NEXIUM) 40 mg capsule 2020 at Unknown time  Yes Yes   Sig: Take  by mouth daily. finasteride (PROSCAR) 5 mg tablet 2020 at Unknown time  No Yes   Sig: TAKE 1 TABLET BY MOUTH DAILY   fluticasone (FLONASE) 50 mcg/actuation nasal spray 2020 at Unknown time  Yes Yes   Si Sprays by Both Nostrils route once. insulin aspart (NOVOLOG) 100 unit/mL injection   Yes No   Si Units by SubCUTAneous route Before breakfast, lunch, and dinner. insulin detemir U-100 (Levemir U-100 Insulin) 100 unit/mL injection   Yes No   Si Units by SubCUTAneous route two (2) times a day. isosorbide mononitrate ER (Imdur) 60 mg CR tablet 2020 at Unknown time  No Yes   Sig: Take 1 Tab by mouth two (2) times a day. losartan (COZAAR) 50 mg tablet 2020 at Unknown time  No Yes   Sig: Take 1 Tab by mouth daily. nitroglycerin (NITROSTAT) 0.4 mg SL tablet   No No   Si Tab by SubLINGual route every five (5) minutes as needed for Chest Pain. Up to 3 doses. pravastatin (PRAVACHOL) 80 mg tablet 2020 at Unknown time  No Yes   Sig: Take 1 Tab by mouth nightly. pregabalin (LYRICA) 300 mg capsule 2020 at Unknown time  Yes Yes   Sig: Take 300 mg by mouth two (2) times a day.    ranolazine ER (RANEXA) 500 mg SR tablet 2020 at Unknown time  No Yes   Sig: Take 1 Tab by mouth two (2) times a day. tamsulosin (FLOMAX) 0.4 mg capsule 2020 at Unknown time  No Yes   Sig: TAKE 1 CAPSULE BY MOUTH DAILY   trazodone (DESYREL) 100 mg tablet 2020 at Unknown time Self Yes Yes   Sig: take 100 mg by mouth nightly. Facility-Administered Medications: None     Allergies   Allergen Reactions    Beta-Blockers (Beta-Adrenergic Blocking Agts) Other (comments)     \"Very low blood pressures with every one they tried\"    Shellfish Derived Angioedema     Only shrimp causes reaction. Can eat all other shellfish    Xanax [Alprazolam] Other (comments)     Totally changes his personality      Social History     Tobacco Use    Smoking status: Former Smoker     Packs/day: 3.00     Years: 40.00     Pack years: 120.00     Last attempt to quit: 1999     Years since quittin.6    Smokeless tobacco: Never Used   Substance Use Topics    Alcohol use: No      Family History   Problem Relation Age of Onset    Diabetes Mother     Heart Disease Mother     Heart Disease Sister     Cancer Brother         Eye    Heart Disease Brother     Migraines Brother       Objective:     Visit Vitals  /63 (BP 1 Location: Right arm, BP Patient Position: At rest)   Pulse 72   Temp 98.2 °F (36.8 °C)   Resp 18   Ht 5' 7\" (1.702 m)   Wt 100.2 kg (221 lb)   SpO2 97%   BMI 34.61 kg/m²      Temp (24hrs), Av.6 °F (37.6 °C), Min:98.2 °F (36.8 °C), Max:101 °F (38.3 °C)    Oxygen Therapy  O2 Sat (%): 97 % (08/10/20 1530)  Pulse via Oximetry: 74 beats per minute (08/10/20 1030)  O2 Device: Nasal cannula (08/10/20 0654)  O2 Flow Rate (L/min): 2 l/min (08/10/20 0654)  Physical Exam:  General:    Alert, cooperative, no distress, appears stated age. Head:   Normocephalic, without obvious abnormality, atraumatic. Nose:  Nares normal. No drainage or sinus tenderness. Lungs:   Clear to auscultation bilaterally.   No Wheezing or Rhonchi. No rales. Heart:   Regular rate and rhythm,  no murmur, rub or gallop. Abdomen:   Soft, non-tender. Not distended. Bowel sounds normal.   Extremities: No cyanosis. No edema. No clubbing  Skin:     Texture, turgor normal. No rashes or lesions. Not Jaundiced  Neurologic: +4/5 muscle strength of LUE and LLE compared to contralateral side. Decreased sensation to light touch of LLE. CN II-XII intact. No pronator drift.    Data Review:   Recent Results (from the past 24 hour(s))   GLUCOSE, POC    Collection Time: 08/10/20  6:16 AM   Result Value Ref Range    Glucose (POC) 292 (H) 65 - 100 mg/dL   EKG, 12 LEAD, INITIAL    Collection Time: 08/10/20  6:33 AM   Result Value Ref Range    Ventricular Rate 97 BPM    Atrial Rate 97 BPM    P-R Interval 228 ms    QRS Duration 128 ms    Q-T Interval 336 ms    QTC Calculation (Bezet) 426 ms    Calculated P Axis 67 degrees    Calculated R Axis -88 degrees    Calculated T Axis 92 degrees    Diagnosis       Atrial-sensed ventricular-paced rhythm with prolonged AV conduction  Abnormal ECG  When compared with ECG of 09-AUG-2020 10:07,  Current rhythm paced  Confirmed by Areli Rodríguez MD (), [place] ([id number]) on 8/10/2020 11:00:27 AM     CBC W/O DIFF    Collection Time: 08/10/20  6:49 AM   Result Value Ref Range    WBC 7.2 4.3 - 11.1 K/uL    RBC 4.41 4.23 - 5.6 M/uL    HGB 12.0 (L) 13.6 - 17.2 g/dL    HCT 37.2 (L) 41.1 - 50.3 %    MCV 84.4 79.6 - 97.8 FL    MCH 27.2 26.1 - 32.9 PG    MCHC 32.3 31.4 - 35.0 g/dL    RDW 14.2 11.9 - 14.6 %    PLATELET 357 242 - 195 K/uL    MPV 10.5 9.4 - 12.3 FL    ABSOLUTE NRBC 0.00 0.0 - 0.2 K/uL   MAGNESIUM    Collection Time: 08/10/20  6:49 AM   Result Value Ref Range    Magnesium 2.0 1.8 - 2.4 mg/dL   METABOLIC PANEL, BASIC    Collection Time: 08/10/20  6:49 AM   Result Value Ref Range    Sodium 140 136 - 145 mmol/L    Potassium 4.3 3.5 - 5.1 mmol/L    Chloride 107 98 - 107 mmol/L    CO2 26 21 - 32 mmol/L    Anion gap 7 7 - 16 mmol/L    Glucose 319 (H) 65 - 100 mg/dL    BUN 14 8 - 23 MG/DL    Creatinine 1.14 0.8 - 1.5 MG/DL    GFR est AA >60 >60 ml/min/1.73m2    GFR est non-AA >60 >60 ml/min/1.73m2    Calcium 9.0 8.3 - 10.4 MG/DL   PROTHROMBIN TIME + INR    Collection Time: 08/10/20  6:49 AM   Result Value Ref Range    Prothrombin time 13.8 12.0 - 14.7 sec    INR 1.0     TROPONIN-HIGH SENSITIVITY    Collection Time: 08/10/20  6:49 AM   Result Value Ref Range    Troponin-High Sensitivity 21.9 (H) 0 - 14 pg/mL   SARS-COV-2    Collection Time: 08/10/20 11:15 AM   Result Value Ref Range    Specimen source Nasopharyngeal      SARS CoV-2 PENDING    GLUCOSE, POC    Collection Time: 08/10/20 12:17 PM   Result Value Ref Range    Glucose (POC) 248 (H) 65 - 100 mg/dL   PLATELET FUNCTION, VERIFY NOW P2Y12    Collection Time: 08/10/20  1:43 PM   Result Value Ref Range    P2Y12 Plt response 227 PRU     Imaging /Procedures /Studies     Assessment and Plan:      Active Hospital Problems    Diagnosis Date Noted    Stroke-like symptoms 08/10/2020    Chronic renal failure, stage 3 (moderate) (HCC) 04/21/2020    Diabetes ([address]     GERD (gastroesophageal reflux disease)     Arteriosclerosis of bypass graft of coronary artery 08/27/2015    Paroxysmal atrial fibrillation ([address] 11/30/2010    Dilated Cardiomyopathy,Ischemic 02/26/2010    CAD (coronary artery disease) 05/04/2009    Dyslipidemia 05/04/2009    Diabetes mellitus ([address] 05/04/2009    Sick sinus syndrome ([address] 09/06/2007       PLAN    # Stroke-like symptoms in patient with previous CVAs at very high risk for ischemic stroke  - patient has been holding his Eliquis for the past 3 days in anticipation for elective LHC  - not a candidate for tPA  - will continue with Plavix and check platelet function test  - start on ASA  - continue with Plavix  - CT head and CTA head both negative for acute intracranial process  - CTA does show very significant atherosclerotic burden in the great vessels, will consult vascular surgery to see if patient is surgical candidate for intervention  - cannot get MRI of head because pacemaker is not compatible  - reconsult neurology tomorrow, ?should patient restart anticoagulation  - check lipid panel and HgbA1c  - switch from pravastatin to high-intensity atorvastatin 40 mg po qdaily  - PT/OT/speech therapy consults  - ordered telemetry  - ordered EEG  - continue with Keppra now, defer to neurology regarding whether to continue or not  - ordered TTE    # MVCAD s/p CABG  - patient scheduled for [facility]  - will consult cardiology for continuity and for timing of procedure  - start ASA as above  - continue Plavix  - statin as above  - continue with Ranexa  - continue Imdur  - documented allergy to beta-blockers    # Bilateral upper lobe opacities on CTA imaging  - more than likely this is aspiration as opposed to viral etiology (COVID); however, given atypical presentations will swab patient and place on contact plus precautions    # Paroxysmal Afib  - holding Eliquis as above  - will need input from both cardiology and from neurology regarding recommendations for restarting anticoagulation in patient with indeterminate infarct   - currently rate controlled    # DM type II  - decrease basal/bolus regimen given lethargy  - switch from scheduled Humalog to SSI   - continue with serial CBGs  - goal CBGs are 140-180 while inpatient     # GERD  - continue PPI    F/E/N: IVF infusion, replete electrolytes as needed, NPO pending speech evaluation    Ppx: SCDs for VTE    Code Status: FULL CODE    Disposition: Admit to Inpatient to COVID rule-out unit with plan as above. PT/OT consults as condition improves. Discussed with patient and son at bedside. All questions answered.      Signed By: Elsa Quiñones DO     August 10, 2020

## 2020-08-10 NOTE — CONSULTS
Nikole Gonzales   [facility], [address] [place] [address]. . k 125 FAX: [phone]    Vascular Surgery Consult    Subjective:      Woody Schirmer is a 79 y.o. male who presented to the hospital for a cardiac cath. Per documentation per the pt's son he wasn't acting like himself. He is c/o left sided weakness, however, this his LUE weakness has been present for some time, however, he states it is worse. He denies any new medications. Apparently his pupils were pinpoint upon arrival and he was lethargic. He is on Eliquis but did not take today d/t cardiac cath. He was supposed to take 4 ASA but forgot to take them today. ED Course: Code S called. CT head unremarkable. STAT teleneurology consulted, concern for seizure-like activity. He was loaded with Keppra. Deemed not a candidate for thrombolytics. Improved interval left-sided weakness afterwards. Hospitalist called for admission. PMH: CAD, HLD, CM, DM, A.Fib, CKD, GERD, CVA, HTN, GERD, pacemaker placement, nocturia, SSS, ventricular tachycardia and embolic stroke. Past Medical History:   Diagnosis Date    Acute kidney failure, unspecified ([address] 9/17/2010    Anxiety state, unspecified 11/14/2013    Arteriosclerosis of bypass graft of coronary artery 8/27/2015    CAD (coronary artery disease)     Cath on 3- showed 5/5 patent bypass grafts with LV EF=60% and a 90% stenosis in native LAD distal to LIMA graft anastomosis. He received a Xience ZAK (2.25 x 18) to LAD.  Carotid artery disease ([address] 03/28/2019    Bilateral ICA:50-69% on carotid ultrasound.     Carotid artery stenosis without cerebral infarction 07/07/2008    CVA (cerebral infarction) 5/25/2011    Diabetes ([address]     Diabetes mellitus ([address] 5/4/2009    Dilated Cardiomyopathy,Ischemic 2/26/2010    Dyslipidemia 5/4/2009    Elevated prostate specific antigen (PSA) 11/14/2013    Essential hypertension 11/14/2013    GERD (gastroesophageal reflux disease)     Hypertension  Nocturia     Other and unspecified hyperlipidemia     Pacemaker 6/21/2014    Palpitations 2006    Paroxysmal atrial fibrillation ([address] 11/30/2010    Paroxysmal ventricular tachycardia ([address] 11/30/2010    Post PTCA 5/4/2009    stent to left main     Psychiatric disorder     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    S/P CABG (coronary artery bypass graft) 5/4/2009    LIMA TO LAD, SV TO OM, SV TO RCA, SV TO DIAG     S/P PTCA (percutaneous transluminal coronary angioplasty) 3/11/2016    Sick sinus syndrome ([address] 09/06/2007    Snoring, Possible sleep apnea 2/26/2010    SSS (sick sinus syndrome) ([facility] 6/21/2014    Stroke [facility]cerebrum) [facility]     TIA 2/2011    Stroke, embolic ([facility] 5/58/9807    Syncope and collapse 8/19/2011    Unspecified malignant neoplasm of skin, site unspecified      Past Surgical History:   Procedure Laterality Date    CARDIAC SURG PROCEDURE UNLIST      bypass x 5; 2 stents    COLONOSCOPY  1990    EGD  2010    esophageal ulcer    HX CHOLECYSTECTOMY      biliary dyskinesia    HX CORONARY ARTERY BYPASS GRAFT      HX CORONARY STENT PLACEMENT      HX HEART CATHETERIZATION  4/30/2013    no intervention    HX HEART CATHETERIZATION  6/23/2014    no intervention    HX HEART CATHETERIZATION      MULTIPLE ([place])    HX ORTHOPAEDIC      knee surgery x 2 left    HX OTHER SURGICAL      nerve in back    HX PACEMAKER      DE LEFT HEART CATH,PERCUTANEOUS  11/30/2010    native circ x 1      Family History   Problem Relation Age of Onset    Diabetes Mother     Heart Disease Mother     Heart Disease Sister     Cancer Brother         Eye    Heart Disease Brother     Migraines Brother      Social History     Socioeconomic History    Marital status:      Spouse name: Not on file    Number of children: Not on file    Years of education: Not on file    Highest education level: Not on file   Tobacco Use    Smoking status: Former Smoker Packs/day: 3.00     Years: 40.00     Pack years: 120.00     Last attempt to quit: 1999     Years since quittin.6    Smokeless tobacco: Never Used   Substance and Sexual Activity    Alcohol use: No    Drug use: No   Other Topics Concern      Current Facility-Administered Medications   Medication Dose Route Frequency    0.9% sodium chloride infusion  75 mL/hr IntraVENous CONTINUOUS    famotidine (PF) (PEPCID) 20 mg in 0.9% sodium chloride 10 mL injection  20 mg IntraVENous ONCE    hydrocortisone Sod Succ (PF) (SOLU-CORTEF) injection 100 mg  100 mg IntraVENous ONCE    diphenhydrAMINE (BENADRYL) injection 50 mg  50 mg IntraVENous ONCE    nitroglycerin (NITROSTAT) tablet 0.4 mg  0.4 mg SubLINGual PRN    levETIRAcetam (KEPPRA) 1,000 mg in 0.9% sodium chloride 100 mL IVPB  1,000 mg IntraVENous Q12H    sodium chloride (NS) flush 5-40 mL  5-40 mL IntraVENous Q8H    sodium chloride (NS) flush 5-40 mL  5-40 mL IntraVENous PRN    clopidogreL (PLAVIX) tablet 75 mg  75 mg Oral DAILY    [START ON 2020] pantoprazole (PROTONIX) tablet 40 mg  40 mg Oral ACB    finasteride (PROSCAR) tablet 5 mg  5 mg Oral DAILY    insulin glargine (LANTUS) injection 30 Units  30 Units SubCUTAneous DAILY    isosorbide mononitrate ER (IMDUR) tablet 60 mg  60 mg Oral BID    losartan (COZAAR) tablet 50 mg  50 mg Oral DAILY    atorvastatin (LIPITOR) tablet 40 mg  40 mg Oral DAILY    pregabalin (LYRICA) capsule 300 mg  300 mg Oral BID    ranolazine ER (RANEXA) tablet 500 mg  500 mg Oral BID    tamsulosin (FLOMAX) capsule 0.4 mg  0.4 mg Oral DAILY    traZODone (DESYREL) tablet 100 mg  100 mg Oral QHS    insulin lispro (HUMALOG) injection   SubCUTAneous TIDAC    acetaminophen (TYLENOL) suppository 650 mg  650 mg Rectal Q4H PRN    [START ON 2020] aspirin chewable tablet 81 mg  81 mg Oral DAILY      Allergies   Allergen Reactions    Beta-Blockers (Beta-Adrenergic Blocking Agts) Other (comments)     \"Very low blood pressures with every one they tried\"    Shellfish Derived Angioedema     Only shrimp causes reaction. Can eat all other shellfish    Xanax [Alprazolam] Other (comments)     Totally changes his personality       Review of Systems:  A comprehensive review of systems was negative except for that written in the History of Present Illness. Objective:     Patient Vitals for the past 8 hrs:   BP Temp Pulse Resp SpO2   08/10/20 1530 120/63 98.2 °F (36.8 °C) 72 18 97 %   08/10/20 1135 122/67 (!) 101 °F (38.3 °C) 76 18 94 %   08/10/20 1053   69 18    08/10/20 1030 101/55    94 %   08/10/20 1013   76 15 94 %   08/10/20 1000 110/56  78 23 93 %   08/10/20 0912   80 17 93 %     Temp (24hrs), Av.6 °F (37.6 °C), Min:98.2 °F (36.8 °C), Max:101 °F (38.3 °C)      Physical Exam:  GENERAL: cooperative, LUNG: clear to auscultation bilaterally, HEART: regular rate and rhythm, S1, S2 normal, no murmur, click, rub or gallop, EXTREMITIES:  extremities normal, atraumatic, no cyanosis or edema    Title:  CT arteriogram of the neck and head.       Indication: Unresponsive. Altered mental status. .     Technique: Axial images of the neck and head were obtained after the uneventful   administration of intravenous iodinated contrast media. Contrast was used to  best identify the arterial structures. Images were reviewed on a separate, free  standing, three-dimensional workstation as per the referring physicians request.        All stenosis percentages derived by comparing the narrowest segment with the  distal Internal Carotid Artery luminal diameter, as described in the [employer]  American Symptomatic Carotid Endarterectomy Trial (NASCET) criteria.      All CT scans at this facility are performed using dose reduction/dose modulation  techniques, as appropriate the performed exam, including the following:   Automated Exposure Control;  Adjustment of the mA and/or kV according to patient  size (this includes techniques or standardized protocols for targeted exams  where dose is matched to indication/reason for exam); and Use of Iterative  Reconstruction Technique. Comparison: CTA 2013. .     Findings:      Lungs:  Patchy groundglass opacities in the bilateral upper lobes, right much  greater than left. No pleural effusions. No pneumothorax. No suspicious  pulmonary nodules. .    Bones:  No osseous destruction. . Mild degenerative changes in the cervical  spine. Paranasal sinuses:  Paranasal sinuses are clear. .    Brain:  No midline shift. No enhancing mass lesion or hydrocephalus. .    Soft tissues: The esophagus appears patulous. Small amount of debris present  within the esophagus. Other: Prior median sternotomy.     Dural venous sinuses:  Patent.      Aortic arch:  Mild to moderate stenosis at the origin of the right innominate  artery. .    Right brachiocephalic artery:  No significant stenosis or occlusion. .  . Right subclavian artery:  No significant stenosis or occlusion. .  . Left subclavian artery:  No significant stenosis or occlusion. .  .       Right common carotid artery:  There is a 60% stenosis of the distal right common  carotid artery as it approaches the carotid bulb. .  . Right external carotid artery:  No significant stenosis or occlusion. .  . Right internal carotid artery:  There is an approximately 50% stenosis at the  origin of the right internal carotid artery with associated dense calcific  atherosclerosis. Vascular calcifications are present along the carotid siphon. .   .      Left common carotid artery: Moderate to high-grade stenosis at the origin of the  left common carotid artery. The degree of stenosis is difficult to quantify due  to the presence of dense calcifications. Left external carotid artery:  No significant stenosis or occlusion. .  . Left internal carotid artery:  Mild, approximate 40-50%, stenosis at the origin  of the left internal carotid artery.  Vascular ossification along the carotid  siphon. .       Right vertebral artery:  Mild stenosis at the origin of the right vertebral  artery. .. Left vertebral artery:  There is a 50% stenosis at the origin of the left  vertebral artery. . Left vertebral artery is dominant. Basilar artery:  No significant stenosis, occlusion, or aneurysm. .       Right middle cerebral artery:  No significant stenosis, occlusion, or aneurysm. Right anterior cerebral artery:  No significant stenosis, occlusion, or  aneurysm. Indra Rook Anterior communicating artery: No significant stenosis, occlusion, or aneurysm. .        Left middle cerebral artery:  No significant stenosis, occlusion, or aneurysm. .      Left anterior cerebral artery:  No significant stenosis, occlusion, or  aneurysm. .       Right posterior communicating artery: No significant stenosis, occlusion, or  aneurysm. Indra Rook Left posterior communicating artery:  No significant stenosis, occlusion, or  aneurysm. .       Right posterior cerebral artery:  No significant stenosis, occlusion, or  aneurysm. Indra Rook Left posterior cerebral artery:  No significant stenosis, occlusion, or  aneurysm. .       IMPRESSION  Impression:      1. No acute large vessel occlusion. 2.  Moderate to high-grade stenosis at the origin of the left common carotid  artery. The degree of stenosis is difficult to quantify due to the presence of  dense calcifications. 3.  Bilateral ICA origin stenoses, approximately 50%. 4.  There is a 60% stenosis of the distal right common carotid artery   5. There is a 50% stenosis at the origin of the left vertebral artery. Mild  stenosis at the origin of the right vertebral artery. Left vertebral artery is  dominant. 6.  Mild to moderate stenosis at the origin of the right innominate artery. 7.  Patchy groundglass opacities in the bilateral upper lobes, right much  greater than left. These findings are favored to represent aspiration. A small  amount of debris is present within the esophagus. Viral pneumonia (COVID) is not  excluded.     Findings were called to Dr. Jl Colbert by Dr. Alisha Maddox on 8/10/2020 at 1003 hours  via telephone    Head CT     INDICATION: Difficulty speaking, back pain     Multiple axial images obtained through the brain without intravenous contrast.   Radiation dose reduction techniques were used for this study: All CT scans  performed at this facility use one or all of the following: Automated exposure  control, adjustment of the mA and/or kVp according to patient's size, iterative  reconstruction.     FINDINGS: There is intracranial atherosclerosis. There is no CT evidence of  acute hemorrhage or infarction. The ventricles are normal in size. There are no  extra-axial fluid collections. No masses are seen. The sinuses are clear. There  are no bony lesions.     IMPRESSION  IMPRESSION: No CT evidence of acute intracranial abnormality.       Assessment/Plan:      Patient is a 79year old male who presented for a cardiac cath, however d/t acting \"off\" a code stroke was called. CTA 1. No acute large vessel occlusion. 2.  Moderate to high-grade stenosis at the origin of the left common carotid  artery. The degree of stenosis is difficult to quantify due to the presence of  dense calcifications. 3.  Bilateral ICA origin stenoses, approximately 50%. 4.  There is a 60% stenosis of the distal right common carotid artery   5. There is a 50% stenosis at the origin of the left vertebral artery. Mild  stenosis at the origin of the right vertebral artery. Left vertebral artery is  Dominant. 6.  Mild to moderate stenosis at the origin of the right innominate artery. 7.  Patchy groundglass opacities in the bilateral upper lobes, right much  greater than left. These findings are favored to represent aspiration. A small  amount of debris is present within the esophagus. Viral pneumonia (COVID) is not  Excluded.  When talking with the patient it is hard to tell if he is having any new, lateralizing neurological symptoms. Will await neurology recommendations. Continue ASA, Plavix and atorvastatin. We will continue to follow along with you. Patient seen and examined with Dr. Shahid Wynne who is in agreement with the current treatment plan. Signed By: Seda Hoffman NP     August 10, 2020       Elements of this note have been dictated using speech recognition software. As a result, errors of speech recognition may have occurred.

## 2020-08-10 NOTE — PROGRESS NOTES
Washington Ruggiero at bedside - medication list reviewed, 0.4mg narcan ordered and given with zofran 4mg. No response to narcan. Pt taken to CT with RNs.

## 2020-08-10 NOTE — ED PROVIDER NOTES
51-year-old  male presents the emergency department from cardiac Cath Lab. Apparently the patient was scheduled for cardiac catheterization this morning, stated he felt poorly all night long, and when his son arrived to pick him up he was having of a lot of difficulty getting to the car but did ambulate there on his own. In route on the 20-minute drive to the hospital, the son noted an acute mental status change as well as a difficulty with speech for the patient. When he arrived he had to put the patient in a wheelchair and wheeled him into the cardiac cath preop. There he was found to be a bit altered, and a code stroke was called. Patient was given a dose of Narcan in cardiac Cath Lab with mild improvement in his mental status, but continues to complain of weakness and not feeling right. Patient does have a history of a stroke in the past, denies residual weakness. The history is provided by the patient and a relative. The history is limited by the condition of the patient. Altered mental status    This is a new problem. The current episode started less than 1 hour ago. The problem has been gradually improving. Associated symptoms include confusion and weakness. Pertinent negatives include no somnolence, no seizures, no unresponsiveness, no agitation, no delusions, no hallucinations, no self-injury, no violence, no tingling and no numbness. Mental status baseline is normal.  His past medical history is significant for diabetes, CVA, hypertension and heart disease. His past medical history does not include seizures, liver disease, AIDS, COPD, depression, dementia, psychotropic medication treatment or head trauma.         Past Medical History:   Diagnosis Date    Acute kidney failure, unspecified ([facility] [address] 9/17/2010    Anxiety state, unspecified 11/14/2013    Arteriosclerosis of bypass graft of coronary artery 8/27/2015    CAD (coronary artery disease)     Cath on 3- showed 5/5 patent bypass grafts with LV EF=60% and a 90% stenosis in native LAD distal to LIMA graft anastomosis. He received a Xience ZAK (2.25 x 18) to LAD.  Carotid artery disease ([address] 03/28/2019    Bilateral ICA:50-69% on carotid ultrasound.     Carotid artery stenosis without cerebral infarction 07/07/2008    CVA (cerebral infarction) 5/25/2011    Diabetes ([address]     Diabetes mellitus ([address] 5/4/2009    Dilated Cardiomyopathy,Ischemic 2/26/2010    Dyslipidemia 5/4/2009    Elevated prostate specific antigen (PSA) 11/14/2013    Essential hypertension 11/14/2013    GERD (gastroesophageal reflux disease)     Hypertension     Nocturia     Other and unspecified hyperlipidemia     Pacemaker 6/21/2014    Palpitations 2006    Paroxysmal atrial fibrillation ([address] 11/30/2010    Paroxysmal ventricular tachycardia ([address] 11/30/2010    Post PTCA 5/4/2009    stent to left main     Psychiatric disorder     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    S/P CABG (coronary artery bypass graft) 5/4/2009    LIMA TO LAD, SV TO OM, SV TO RCA, SV TO DIAG     S/P PTCA (percutaneous transluminal coronary angioplasty) 3/11/2016    Sick sinus syndrome ([address] 09/06/2007    Snoring, Possible sleep apnea 2/26/2010    SSS (sick sinus syndrome) ([address] 6/21/2014    Stroke (cerebrum) ([address]     TIA 2/2011    Stroke, embolic ([address] 8/94/8267    Syncope and collapse 8/19/2011    Unspecified malignant neoplasm of skin, site unspecified        Past Surgical History:   Procedure Laterality Date    CARDIAC SURG PROCEDURE UNLIST      bypass x 5; 2 stents    COLONOSCOPY  1990    EGD  2010    esophageal ulcer    HX CHOLECYSTECTOMY      biliary dyskinesia    HX CORONARY ARTERY BYPASS GRAFT      HX CORONARY STENT PLACEMENT      HX HEART CATHETERIZATION  4/30/2013    no intervention    HX HEART CATHETERIZATION  6/23/2014    no intervention    HX HEART CATHETERIZATION      MULTIPLE ([facility])    HX ORTHOPAEDIC knee surgery x 2 left    HX OTHER SURGICAL      nerve in back    HX PACEMAKER      ND LEFT HEART CATH,PERCUTANEOUS  2010    native circ x 1         Family History:   Problem Relation Age of Onset    Diabetes Mother     Heart Disease Mother     Heart Disease Sister     Cancer Brother         Eye    Heart Disease Brother     Migraines Brother        Social History     Socioeconomic History    Marital status:      Spouse name: Not on file    Number of children: Not on file    Years of education: Not on file    Highest education level: Not on file   Occupational History    Not on file   Social Needs    Financial resource strain: Not on file    Food insecurity     Worry: Not on file     Inability: Not on file    Transportation needs     Medical: Not on file     Non-medical: Not on file   Tobacco Use    Smoking status: Former Smoker     Packs/day: 3.00     Years: 40.00     Pack years: 120.00     Last attempt to quit: 1999     Years since quittin.6    Smokeless tobacco: Never Used   Substance and Sexual Activity    Alcohol use: No    Drug use: No    Sexual activity: Not on file   Lifestyle    Physical activity     Days per week: Not on file     Minutes per session: Not on file    Stress: Not on file   Relationships    Social connections     Talks on phone: Not on file     Gets together: Not on file     Attends Islam service: Not on file     Active member of club or organization: Not on file     Attends meetings of clubs or organizations: Not on file     Relationship status: Not on file    Intimate partner violence     Fear of current or ex partner: Not on file     Emotionally abused: Not on file     Physically abused: Not on file     Forced sexual activity: Not on file   Other Topics Concern    Caffeine Concern Not Asked    Back Care Not Asked    Exercise Not Asked    Occupational Exposure Not Asked    Sleep Concern Not Asked    Stress Concern Not Asked    Weight Concern Not Asked   Social History Narrative    Not on file         ALLERGIES: Beta-blockers (beta-adrenergic blocking agts); Shellfish derived; and Xanax [alprazolam]    Review of Systems   Unable to perform ROS: Acuity of condition   Neurological: Positive for weakness. Negative for tingling, seizures and numbness. Psychiatric/Behavioral: Positive for confusion. Negative for agitation, hallucinations and self-injury. Vitals:    08/10/20 0654 08/10/20 0700 08/10/20 0714 08/10/20 0718   BP: 151/71 152/80  162/75   Pulse:   95 95   Resp:   16 19   SpO2: 97%  95% 94%   Weight:       Height:                Physical Exam  Vitals signs and nursing note reviewed. Constitutional:       General: He is not in acute distress. Appearance: He is well-developed. HENT:      Head: Normocephalic and atraumatic. Right Ear: External ear normal.      Left Ear: External ear normal.      Nose: Nose normal.      Mouth/Throat:      Mouth: Mucous membranes are moist.   Eyes:      Extraocular Movements: Extraocular movements intact. Conjunctiva/sclera: Conjunctivae normal.      Pupils: Pupils are equal, round, and reactive to light. Neck:      Musculoskeletal: Normal range of motion and neck supple. Cardiovascular:      Rate and Rhythm: Normal rate and regular rhythm. Heart sounds: Normal heart sounds. Pulmonary:      Effort: Pulmonary effort is normal.      Breath sounds: Normal breath sounds. Abdominal:      General: Bowel sounds are normal.      Palpations: Abdomen is soft. Tenderness: There is no abdominal tenderness. Musculoskeletal: Normal range of motion. Skin:     General: Skin is warm and dry. Capillary Refill: Capillary refill takes less than 2 seconds. Neurological:      Mental Status: He is oriented to person, place, and time. He is lethargic. Comments: Minimal left facial droop noted. Does not include the forehead.   Strength 4/5 left upper and 3/5 lower extremity, 5/5 right upper extremity and 4/5 right lower extremity. Patient with left-sided homonymous hemianopsia   Psychiatric:         Mood and Affect: Mood is depressed. Speech: Speech normal.         Behavior: Behavior is slowed. Thought Content: Thought content normal.         Cognition and Memory: Cognition and memory normal.          MDM  Number of Diagnoses or Management Options     Amount and/or Complexity of Data Reviewed  Clinical lab tests: reviewed and ordered  Tests in the radiology section of CPT®: reviewed and ordered  Tests in the medicine section of CPT®: reviewed  Review and summarize past medical records: yes  Discuss the patient with other providers: yes  Independent visualization of images, tracings, or specimens: yes    Risk of Complications, Morbidity, and/or Mortality  Presenting problems: high  Diagnostic procedures: high  Management options: high    Critical Care  Total time providing critical care: (CRITICAL CARE Documentation: This patient is critically ill and there is a high probability of of imminent or life threatening deterioration in the patient's condition without immediate management. The nature of the patient's clinical problem is: Acute mental status change, CVA versus TIA versus seizure    I have spent 45 minutes in direct patient care, documentation, review of labs/xrays/old records, discussion with Family, Staff, Colleague, Nursing . The time involved in the performance of separately reportable procedures was not counted toward critical care time. Leslye Anne MD; 8/10/2020 @8:50 AM    )    Patient Progress  Patient progress: improved    ED Course as of Aug 10 0849   Mon Aug 10, 2020   [id number] Neurologist evaluated patient, much concerned about shaking and his loss of consciousness may be related to seizure. Recommends Keppra 1 g IV, admission for repeat CT tomorrow as well as EEG and carotid Dopplers.     [BB]      ED Course User Index  [BB] Addy Verito Vernon MD       Procedures      The patient was observed in the ED. Symptoms slowly improving during his stay here in the emergency department. Case discussed with the hospitalist who will admit.     Results Reviewed:      Recent Results (from the past 24 hour(s))   GLUCOSE, POC    Collection Time: 08/10/20  6:16 AM   Result Value Ref Range    Glucose (POC) 292 (H) 65 - 100 mg/dL   EKG, 12 LEAD, INITIAL    Collection Time: 08/10/20  6:33 AM   Result Value Ref Range    Ventricular Rate 97 BPM    Atrial Rate 97 BPM    P-R Interval 228 ms    QRS Duration 128 ms    Q-T Interval 336 ms    QTC Calculation (Bezet) 426 ms    Calculated P Axis 67 degrees    Calculated R Axis -88 degrees    Calculated T Axis 92 degrees    Diagnosis       Atrial-sensed ventricular-paced rhythm with prolonged AV conduction  Abnormal ECG  When compared with ECG of 09-AUG-2020 10:07,  Electronic ventricular pacemaker has replaced Sinus rhythm     CBC W/O DIFF    Collection Time: 08/10/20  6:49 AM   Result Value Ref Range    WBC 7.2 4.3 - 11.1 K/uL    RBC 4.41 4.23 - 5.6 M/uL    HGB 12.0 (L) 13.6 - 17.2 g/dL    HCT 37.2 (L) 41.1 - 50.3 %    MCV 84.4 79.6 - 97.8 FL    MCH 27.2 26.1 - 32.9 PG    MCHC 32.3 31.4 - 35.0 g/dL    RDW 14.2 11.9 - 14.6 %    PLATELET 339 472 - 722 K/uL    MPV 10.5 9.4 - 12.3 FL    ABSOLUTE NRBC 0.00 0.0 - 0.2 K/uL   MAGNESIUM    Collection Time: 08/10/20  6:49 AM   Result Value Ref Range    Magnesium 2.0 1.8 - 2.4 mg/dL   METABOLIC PANEL, BASIC    Collection Time: 08/10/20  6:49 AM   Result Value Ref Range    Sodium 140 136 - 145 mmol/L    Potassium 4.3 3.5 - 5.1 mmol/L    Chloride 107 98 - 107 mmol/L    CO2 26 21 - 32 mmol/L    Anion gap 7 7 - 16 mmol/L    Glucose 319 (H) 65 - 100 mg/dL    BUN 14 8 - 23 MG/DL    Creatinine 1.14 0.8 - 1.5 MG/DL    GFR est AA >60 >60 ml/min/1.73m2    GFR est non-AA >60 >60 ml/min/1.73m2    Calcium 9.0 8.3 - 10.4 MG/DL   PROTHROMBIN TIME + INR    Collection Time: 08/10/20  6:49 AM   Result Value Ref Range    Prothrombin time 13.8 12.0 - 14.7 sec    INR 1.0     TROPONIN-HIGH SENSITIVITY    Collection Time: 08/10/20  6:49 AM   Result Value Ref Range    Troponin-High Sensitivity 21.9 (H) 0 - 14 pg/mL       CT CODE NEURO HEAD WO CONTRAST   Final Result   IMPRESSION: No CT evidence of acute intracranial abnormality.          CTA CODE NEURO HEAD AND NECK W CONT    (Results Pending)

## 2020-08-10 NOTE — ED NOTES
TRANSFER - OUT REPORT:    Verbal report given to Andrea Castorena on [facility]  being transferred to  8th floor for routine progression of care       Report consisted of patients Situation, Background, Assessment and   Recommendations(SBAR). Information from the following report(s) ED Summary was reviewed with the receiving nurse. Lines:   Peripheral IV 08/10/20 Right Antecubital (Active)   Site Assessment Clean, dry, & intact 08/10/20 0742   Phlebitis Assessment 0 08/10/20 0742   Infiltration Assessment 0 08/10/20 0742   Dressing Status Clean, dry, & intact 08/10/20 0742   Dressing Type Transparent 08/10/20 0742   Hub Color/Line Status Pink 08/10/20 3238        Opportunity for questions and clarification was provided.       Patient transported with:   [email or web address]

## 2020-08-10 NOTE — PROGRESS NOTES
Received pt from ED in stable condition. Admission assessment complete, see flows sheets. NIH assessment also complete, see flow sheets for full assessment. Total NIH score 6 with left sided deficits noted. IV patent and capped. All needs met at this time. Safety measures in place, call light within reach, side rails x3. Will continue to monitor.

## 2020-08-10 NOTE — CONSULTS
Teresa Louis MD  Medical Director  [address] [facility], [address] [facility]  Tel: [phone]       Physical Medicine & Rehabilitation Consult    Subjective:     Date of Consultation:  August 10, 2020  Referring Physician: Dr Clarkeie Idania is a 79 y.o. male who is being evaluated at the request of the Hospitalist service for rehabilitation evaluation following admission for suspected CVA    HPI: Mr. Caro Cope is a 69yo right hand dominant gentleman with a PMH of CAD, HLD, ischemic dilated CM, DM2, arteriosclerosis of bypass graft of CAD, paroximal Afib, CKD stg 3, GERD who was scheduled for a cardiac cath this morning who arrived via son. Son reported that when he picked his father up, he was not acting normally. Staff in cathlab state pt was unresponsive in a wheelchair when got to him. They had to pick him up and put him in a bed. Pt slowly started to respond by the time he got to the ER after going to CT first. Pt was given 0.4mg Narcan and 4mg Zofran by cathlab staff. Pts  by cathlab staff. Son at bedside stated pt has had multple strokes but was left with no deficits. Pt was to have a heart cath due to increased shortness of breath with any exertion especially ambulating which he has had to start using a cane. By the time on the way back to the ER after CT pt was able to say his name and his age and that he started to feel bad around 0400 this AM. Head CT neg for acute changes. CTA incidentally noted groundglass opacities bilateral upper lobes, R> L. The patient was swabbed for covid with these findings since COVID could not be ruled out by imaging and sent. Pt noted to have 60% stenosis of the distal right common carotid artery, 50% stenosis at the origin of the right ICA with atherosclerosis. Hi grade stenosis of the Left Common carotid, L ICA stenosis with 60% stenosis. 50% stenosis of the Left vertebral artery as well.    Echo: Mild LV dysfunction, Mild MR, AR, TR and LA enlargment  EKG: Paced  Lipid profile pending. PT/OT/ST pending. Admitted to 8th floor for Covid r/o . Neuro consult pending      Principal Problem:    Stroke-like symptoms (8/10/2020)    Active Problems:    CAD (coronary artery disease) (5/4/2009)      Dyslipidemia (5/4/2009)      Diabetes mellitus ([address] (5/4/2009)      Dilated Cardiomyopathy,Ischemic (2/26/2010)      Paroxysmal atrial fibrillation ([address] (11/30/2010)      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)      Diabetes ([address] ()      GERD (gastroesophageal reflux disease) ()      Sick sinus syndrome ([address] (9/6/2007)      Chronic renal failure, stage 3 (moderate) ([address] (4/21/2020)      Past Medical History:   Diagnosis Date    Acute kidney failure, unspecified ([address] 9/17/2010    Anxiety state, unspecified 11/14/2013    Arteriosclerosis of bypass graft of coronary artery 8/27/2015    CAD (coronary artery disease)     Cath on 3- showed 5/5 patent bypass grafts with LV EF=60% and a 90% stenosis in native LAD distal to LIMA graft anastomosis. He received a Xience ZAK (2.25 x 18) to LAD.  Carotid artery disease ([address] 03/28/2019    Bilateral ICA:50-69% on carotid ultrasound.     Carotid artery stenosis without cerebral infarction 07/07/2008    CVA (cerebral infarction) 5/25/2011    Diabetes ([address]     Diabetes mellitus ([address] 5/4/2009    Dilated Cardiomyopathy,Ischemic 2/26/2010    Dyslipidemia 5/4/2009    Elevated prostate specific antigen (PSA) 11/14/2013    Essential hypertension 11/14/2013    GERD (gastroesophageal reflux disease)     Hypertension     Nocturia     Other and unspecified hyperlipidemia     Pacemaker 6/21/2014    Palpitations 2006    Paroxysmal atrial fibrillation ([address] 11/30/2010    Paroxysmal ventricular tachycardia ([address] 11/30/2010    Post PTCA 5/4/2009    stent to left main     Psychiatric disorder     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    S/P CABG (coronary artery bypass graft) 2009    LIMA TO LAD, SV TO OM, SV TO RCA, SV TO DIAG     S/P PTCA (percutaneous transluminal coronary angioplasty) 3/11/2016    Sick [facility] syndrome ([facility] 2007    Snoring, Possible sleep apnea 2010    SSS (sick sinus syndrome) ([facility] 2014    Stroke (cerebrum) ([facility])     TIA 2011    Stroke, embolic ([facility]     Syncope and collapse 2011    Unspecified malignant neoplasm of skin, site unspecified       Past Surgical History:   Procedure Laterality Date    CARDIAC SURG PROCEDURE UNLIST      bypass x 5; 2 stents    COLONOSCOPY      EGD      esophageal ulcer    HX CHOLECYSTECTOMY      biliary dyskinesia    HX CORONARY ARTERY BYPASS GRAFT      HX CORONARY STENT PLACEMENT      HX HEART CATHETERIZATION  2013    no intervention    HX HEART CATHETERIZATION  2014    no intervention    HX HEART CATHETERIZATION      MULTIPLE (Tonya Alvarado)    HX ORTHOPAEDIC      knee surgery x 2 left    HX OTHER SURGICAL      nerve in back    HX PACEMAKER      DC LEFT HEART CATH,PERCUTANEOUS  2010    native circ x 1      Family History   Problem Relation Age of Onset    Diabetes Mother     Heart Disease Mother     Heart Disease Sister     Cancer Brother         Eye    Heart Disease Brother     Migraines Brother       Social History     Tobacco Use    Smoking status: Former Smoker     Packs/day: 3.00     Years: 40.00     Pack years: 120.00     Last attempt to quit: 1999     Years since quittin.6    Smokeless tobacco: Never Used   Substance Use Topics    Alcohol use: No     Prior to Admission medications    Medication Sig Start Date End Date Taking? Authorizing Provider   isosorbide mononitrate ER (Imdur) 60 mg CR tablet Take 1 Tab by mouth two (2) times a day. 6/3/20  Yes Shukri Doll MD   pravastatin (PRAVACHOL) 80 mg tablet Take 1 Tab by mouth nightly.  20  Yes Shukri Doll MD losartan (COZAAR) 50 mg tablet Take 1 Tab by mouth daily. 4/21/20  Yes Zachery Machado MD   carvediloL (Coreg) 12.5 mg tablet Take 1 Tab by mouth two (2) times daily (with meals). 3/19/20  Yes Zachery Machado MD   clopidogreL (PLAVIX) 75 mg tab Take 1 Tab by mouth daily. 3/19/20  Yes Zachery Machado MD   ranolazine ER (RANEXA) 500 mg SR tablet Take 1 Tab by mouth two (2) times a day. 11/7/19  Yes Zachery Machado MD   apixaban (ELIQUIS) 5 mg tablet Take 1 Tab by mouth two (2) times a day. 8/23/19  Yes Zachery Machado MD   tamsulosin (FLOMAX) 0.4 mg capsule TAKE 1 CAPSULE BY MOUTH DAILY 11/5/18  Yes Ximena Shah MD   finasteride (PROSCAR) 5 mg tablet TAKE 1 TABLET BY MOUTH DAILY 11/5/18  Yes Ximena Shah MD   cyclobenzaprine (FLEXERIL) 10 mg tablet Take 10 mg by mouth two (2) times daily as needed for Muscle Spasm(s). Yes Provider, Historical   esomeprazole (NEXIUM) 40 mg capsule Take  by mouth daily. Yes Provider, Historical   pregabalin (LYRICA) 300 mg capsule Take 300 mg by mouth two (2) times a day. Yes Provider, Historical   fluticasone (FLONASE) 50 mcg/actuation nasal spray 2 Sprays by Both Nostrils route once. Yes Provider, Historical   HYDROcodone-acetaminophen (NORCO)  mg tablet Take 1 Tab by mouth four (4) times daily as needed. Yes Provider, Historical   trazodone (DESYREL) 100 mg tablet take 100 mg by mouth nightly. Yes Other, MD Ana   nitroglycerin (NITROSTAT) 0.4 mg SL tablet 1 Tab by SubLINGual route every five (5) minutes as needed for Chest Pain. Up to 3 doses. 6/10/20   Zachery Machado MD   insulin detemir U-100 (Levemir U-100 Insulin) 100 unit/mL injection 60 Units by SubCUTAneous route two (2) times a day. Provider, Historical   insulin aspart (NOVOLOG) 100 unit/mL injection 25 Units by SubCUTAneous route Before breakfast, lunch, and dinner.     Provider, Historical     Allergies   Allergen Reactions    Beta-Blockers (Beta-Adrenergic Blocking Agts) Other (comments)     \"Very low blood pressures with every one they tried\"    Shellfish Derived Angioedema     Only shrimp causes reaction. Can eat all other shellfish    Xanax [Alprazolam] Other (comments)     Totally changes his personality        Review of Systems:  Review of systems not obtained due to patient factors. Very drowsy but does note back pain  Objective:     Vitals:  Blood pressure 122/67, pulse 76, temperature (!) 101 °F (38.3 °C), resp. rate 18, height 5' 7\" (1.702 m), weight 221 lb (100.2 kg), SpO2 94 %. Temp (24hrs), Av °F (38.3 °C), Min:101 °F (38.3 °C), Max:101 °F (38.3 °C)      Intake and Output:  No intake/output data recorded. Physical Exam:  Drowsy, speech not slurred or dysarthric  CV rrr no m  LUNGS cta b  ABD soft ntnd bs +  EXT no edema   NEURO;  symm, prox>distal weakness, not full effort  Sensation intact.    Face symm, PERRLA, EOMI  No pathological reflexes    Labs/Studies:  Recent Results (from the past 72 hour(s))   GLUCOSE, POC    Collection Time: 08/10/20  6:16 AM   Result Value Ref Range    Glucose (POC) 292 (H) 65 - 100 mg/dL   EKG, 12 LEAD, INITIAL    Collection Time: 08/10/20  6:33 AM   Result Value Ref Range    Ventricular Rate 97 BPM    Atrial Rate 97 BPM    P-R Interval 228 ms    QRS Duration 128 ms    Q-T Interval 336 ms    QTC Calculation (Bezet) 426 ms    Calculated P Axis 67 degrees    Calculated R Axis -88 degrees    Calculated T Axis 92 degrees    Diagnosis       Atrial-sensed ventricular-paced rhythm with prolonged AV conduction  Abnormal ECG  When compared with ECG of 09-AUG-2020 10:07,  Current rhythm paced  Confirmed by Annabella Escudero MD (), CONRADO ([id number]) on 8/10/2020 11:00:27 AM     CBC W/O DIFF    Collection Time: 08/10/20  6:49 AM   Result Value Ref Range    WBC 7.2 4.3 - 11.1 K/uL    RBC 4.41 4.23 - 5.6 M/uL    HGB 12.0 (L) 13.6 - 17.2 g/dL    HCT 37.2 (L) 41.1 - 50.3 %    MCV 84.4 79.6 - 97.8 FL    MCH 27.2 26.1 - 32.9 PG    MCHC 32.3 31.4 - 35.0 g/dL RDW 14.2 11.9 - 14.6 %    PLATELET 126 121 - 058 K/uL    MPV 10.5 9.4 - 12.3 FL    ABSOLUTE NRBC 0.00 0.0 - 0.2 K/uL   MAGNESIUM    Collection Time: 08/10/20  6:49 AM   Result Value Ref Range    Magnesium 2.0 1.8 - 2.4 mg/dL   METABOLIC PANEL, BASIC    Collection Time: 08/10/20  6:49 AM   Result Value Ref Range    Sodium 140 136 - 145 mmol/L    Potassium 4.3 3.5 - 5.1 mmol/L    Chloride 107 98 - 107 mmol/L    CO2 26 21 - 32 mmol/L    Anion gap 7 7 - 16 mmol/L    Glucose 319 (H) 65 - 100 mg/dL    BUN 14 8 - 23 MG/DL    Creatinine 1.14 0.8 - 1.5 MG/DL    GFR est AA >60 >60 ml/min/1.73m2    GFR est non-AA >60 >60 ml/min/1.73m2    Calcium 9.0 8.3 - 10.4 MG/DL   PROTHROMBIN TIME + INR    Collection Time: 08/10/20  6:49 AM   Result Value Ref Range    Prothrombin time 13.8 12.0 - 14.7 sec    INR 1.0     TROPONIN-HIGH SENSITIVITY    Collection Time: 08/10/20  6:49 AM   Result Value Ref Range    Troponin-High Sensitivity 21.9 (H) 0 - 14 pg/mL   SARS-COV-2    Collection Time: 08/10/20 11:15 AM   Result Value Ref Range    Specimen source Nasopharyngeal      SARS CoV-2 PENDING    GLUCOSE, POC    Collection Time: 08/10/20 12:17 PM   Result Value Ref Range    Glucose (POC) 248 (H) 65 - 100 mg/dL   PLATELET FUNCTION, VERIFY NOW P2Y12    Collection Time: 08/10/20  1:43 PM   Result Value Ref Range    P2Y12 Plt response 227 PRU           Speech Assessment:  Dysphagia Screening  Vocal Quality/Secretions: Normal  History of Dysphagia: No  O2 Saturation: Normal  Alertness: (!) Abnormal  Pre-Swallow Assessment Score: 1                Ambulation:  Activity and Safety  Activity Level: Bed Rest  Ambulate: No (Comment)  Activity: In bed  Activity Assistance: Complete care  Weight Bearing Status: WBAT (Weight Bearing as Tolerated)  Mode of Transportation: Wheelchair  Repositioned: Head of bed elevated (degrees)  Patient Turned: Turns self  Head of Bed Elevated: Self regulated  Safety Measures: Bed/Chair alarm on, Bed/Chair-Wheels locked, Bed in low position, Call light within reach, Fall prevention (comment), Gripper socks, Parent at bedside, Side rails X 3     Impression / Assessment:     Principal Problem:    Stroke-like symptoms (8/10/2020)    Active Problems:    CAD (coronary artery disease) (5/4/2009)      Dyslipidemia (5/4/2009)      Diabetes mellitus ([facility] (5/4/2009)      Dilated Cardiomyopathy,Ischemic (2/26/2010)      Paroxysmal atrial fibrillation ([facility] (11/30/2010)      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)      Diabetes (HCC) ()      GERD (gastroesophageal reflux disease) ()      Sick sinus syndrome ([facility] (9/6/2007)      Chronic renal failure, stage 3 (moderate) ([facility] (4/21/2020)    Multiple risk factors for stroke with noted CV and cerebrovascular dz  With fever and abl finding on CTA noted in upper chest    Plan / Recommendations / Medical Decision Making:     Recommendations:   Continue Acute Rehab Program  Coordination of rehab / medical care  Counseling of PM&R care issues management  Monitoring and management of medical conditions per plan of care / orders  -pt febrile. T 101. BP and HR nl. WBC nl. Covid pending  -PT/OT/ST pending  -pt currently under OBS status  -cont ASA, statin, plavix  -hgbA1c, lipid panel pending    Discussion with Family / Caregiver / Staff  Reviewed Therapies / Debbie Dill / Yaima Guzmán / Records      Thank you very much for this referral. We appreciate the opportunity to participate in this patient's care. We will continue to follow.   Raquel Pichardo MD

## 2020-08-10 NOTE — CONSULTS
[facility] Cardiology Consult                Date of  Admission: 8/10/2020  6:15 AM     Primary Care Physician: Gail Pantoja MD  Primary Cardiologist: Dr Jose Luis Santillan  Referring Physician: Dr Jeremy Hope Physician: Dr Zaire Kaba    CC/Reason for consult: Lore Schneider is a 79 y.o. male with hx of CAD s/p CABG, HTN, ICM, DCM, HLD, SSS s/p PPM, GERD, CKD stage III,CVA, and elevated PSA. The patient arrived this am with his Son for a plan LHC by Dr Zaire Kaba. Per his son the last time he was acting normal was last night and this morning he was not acting himself. When he came into [facility] he had to be helped to the bed and was none verbal.  His son stated this is an even greater changes than from when he picked him up this morning. A code S was called by staff. He had pin point pupils at the time and was given narcan IV with BGL of 292. CT of the head showed no acute abnormality. CTA showed high grade stenosis of the left common carotid, bilat ICA stenosis, 60% stenosis of the left carotid, 50% of the left vertebral, with patchy ground glass opacities found bilaterally upper lobes. The patient was swabbed for covid with these findings since COVID could not be ruled out by imaging and sent. Recent Cardiac Synopsis w/ Labs  LHC/NST: 2016 Successful percutaneous coronary intervention and stenting of  the distal left anterior descending via the LIMA graft with a 2.25 x 18  mm Xience Alpine drug-eluting stent. Echo: Mild LV dysfunction, Mild MR, AR, TR and LA enlargment  EKG: Paced  PPM Interrogation:     Past Medical History:   Diagnosis Date    Acute kidney failure, unspecified ([facility] 9/17/2010    Anxiety state, unspecified 11/14/2013    Arteriosclerosis of bypass graft of coronary artery 8/27/2015    CAD (coronary artery disease)     Cath on 3- showed 5/5 patent bypass grafts with LV EF=60% and a 90% stenosis in native LAD distal to LIMA graft anastomosis. He received a Xience ZAK (2.25 x 18) to LAD.  Carotid artery disease ([address] 03/28/2019    Bilateral ICA:50-69% on carotid ultrasound.     Carotid artery stenosis without cerebral infarction 07/07/2008    CVA (cerebral infarction) 5/25/2011    Diabetes ([address]     Diabetes mellitus ([address] 5/4/2009    Dilated Cardiomyopathy,Ischemic 2/26/2010    Dyslipidemia 5/4/2009    Elevated prostate specific antigen (PSA) 11/14/2013    Essential hypertension 11/14/2013    GERD (gastroesophageal reflux disease)     Hypertension     Nocturia     Other and unspecified hyperlipidemia     Pacemaker 6/21/2014    Palpitations 2006    Paroxysmal atrial fibrillation ([address] 11/30/2010    Paroxysmal ventricular tachycardia ([address] 11/30/2010    Post PTCA 5/4/2009    stent to left main     Psychiatric disorder     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    S/P CABG (coronary artery bypass graft) 5/4/2009    LIMA TO LAD, SV TO OM, SV TO RCA, SV TO DIAG     S/P PTCA (percutaneous transluminal coronary angioplasty) 3/11/2016    Sick sinus syndrome ([address] 09/06/2007    Snoring, Possible sleep apnea 2/26/2010    SSS (sick sinus syndrome) ([address] 6/21/2014    Stroke (cerebrum) ([address]     TIA 2/2011    Stroke, embolic ([address] 2/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    Syncope and collapse 8/19/2011    Unspecified malignant neoplasm of skin, site unspecified       Past Surgical History:   Procedure Laterality Date    CARDIAC SURG PROCEDURE UNLIST      bypass x 5; 2 stents    COLONOSCOPY  1990    EGD  2010    esophageal ulcer    HX CHOLECYSTECTOMY      biliary dyskinesia    HX CORONARY ARTERY BYPASS GRAFT      HX CORONARY STENT PLACEMENT      HX HEART CATHETERIZATION  4/30/2013    no intervention    HX HEART CATHETERIZATION  6/23/2014    no intervention    HX HEART CATHETERIZATION      MULTIPLE ([address]    HX ORTHOPAEDIC      knee surgery x 2 left    HX OTHER SURGICAL      nerve in back    HX PACEMAKER      TX LEFT HEART CATH,PERCUTANEOUS  11/30/2010 native circ x 1     Allergies   Allergen Reactions    Beta-Blockers (Beta-Adrenergic Blocking Agts) Other (comments)     \"Very low blood pressures with every one they tried\"    Shellfish Derived Angioedema     Only shrimp causes reaction.   Can eat all other shellfish    Xanax [Alprazolam] Other (comments)     Totally changes his personality      Family History   Problem Relation Age of Onset    Diabetes Mother     Heart Disease Mother     Heart Disease Sister     Cancer Brother         Eye    Heart Disease Brother     Migraines Brother       Social History     Socioeconomic History    Marital status:      Spouse name: Not on file    Number of children: Not on file    Years of education: Not on file    Highest education level: Not on file   Occupational History    Not on file   Social Needs    Financial resource strain: Not on file    Food insecurity     Worry: Not on file     Inability: Not on file    Transportation needs     Medical: Not on file     Non-medical: Not on file   Tobacco Use    Smoking status: Former Smoker     Packs/day: 3.00     Years: 40.00     Pack years: 120.00     Last attempt to quit: 1999     Years since quittin.6    Smokeless tobacco: Never Used   Substance and Sexual Activity    Alcohol use: No    Drug use: No    Sexual activity: Not on file   Lifestyle    Physical activity     Days per week: Not on file     Minutes per session: Not on file    Stress: Not on file   Relationships    Social connections     Talks on phone: Not on file     Gets together: Not on file     Attends Voodoo service: Not on file     Active member of club or organization: Not on file     Attends meetings of clubs or organizations: Not on file     Relationship status: Not on file    Intimate partner violence     Fear of current or ex partner: Not on file     Emotionally abused: Not on file     Physically abused: Not on file     Forced sexual activity: Not on file   Other Topics Concern    Caffeine Concern Not Asked    Back Care Not Asked    Exercise Not Asked    Occupational Exposure Not Asked    Sleep Concern Not Asked    Stress Concern Not Asked    Weight Concern Not Asked   Social History Narrative    Not on file       Current Facility-Administered Medications   Medication Dose Route Frequency    0.9% sodium chloride infusion  75 mL/hr IntraVENous CONTINUOUS    aspirin chewable tablet 324 mg  324 mg Oral NOW    famotidine (PF) (PEPCID) 20 mg in 0.9% sodium chloride 10 mL injection  20 mg IntraVENous ONCE    hydrocortisone Sod Succ (PF) (SOLU-CORTEF) injection 100 mg  100 mg IntraVENous ONCE    diphenhydrAMINE (BENADRYL) injection 50 mg  50 mg IntraVENous ONCE    nitroglycerin (NITROSTAT) tablet 0.4 mg  0.4 mg SubLINGual PRN    levETIRAcetam (KEPPRA) 1,000 mg in 0.9% sodium chloride 100 mL IVPB  1,000 mg IntraVENous Q12H    sodium chloride (NS) flush 5-40 mL  5-40 mL IntraVENous Q8H    sodium chloride (NS) flush 5-40 mL  5-40 mL IntraVENous PRN    clopidogreL (PLAVIX) tablet 75 mg  75 mg Oral DAILY    [START ON 8/11/2020] pantoprazole (PROTONIX) tablet 40 mg  40 mg Oral ACB    finasteride (PROSCAR) tablet 5 mg  5 mg Oral DAILY    insulin glargine (LANTUS) injection 30 Units  30 Units SubCUTAneous DAILY    isosorbide mononitrate ER (IMDUR) tablet 60 mg  60 mg Oral BID    losartan (COZAAR) tablet 50 mg  50 mg Oral DAILY    atorvastatin (LIPITOR) tablet 40 mg  40 mg Oral DAILY    pregabalin (LYRICA) capsule 300 mg  300 mg Oral BID    ranolazine ER (RANEXA) tablet 500 mg  500 mg Oral BID    tamsulosin (FLOMAX) capsule 0.4 mg  0.4 mg Oral DAILY    traZODone (DESYREL) tablet 100 mg  100 mg Oral QHS    insulin lispro (HUMALOG) injection   SubCUTAneous TIDAC    acetaminophen (TYLENOL) suppository 650 mg  650 mg Rectal Q4H PRN       Review of Systems   Unable to perform ROS: acuity of condition        Physical Exam  Vitals:    08/10/20 1013 08/10/20 1030 08/10/20 1053 08/10/20 1135   BP:  101/55  122/67   Pulse: 76  69 76   Resp: 15  18 18   Temp:    (!) 101 °F (38.3 °C)   SpO2: 94% 94%  94%   Weight:       Height:           Last 3 Recorded Weights in this Encounter    08/07/20 1410   Weight: 100.2 kg (221 lb)         Physical Exam:  General: Well Developed, Well Nourished, No Acute Distress  HEENT: pupils equal and round, no abnormalities noted  Neck: supple, no JVD, no carotid bruits  Heart: S1S2 with RRR without murmurs or gallops  Lungs: Clear throughout auscultation bilaterally without adventitious sounds  Abd: soft, nontender, nondistended, with good bowel sounds  Ext: warm, no edema, calves supple/nontender, pulses 2+ bilaterally  Skin: warm and dry  Psychiatric: Normal mood and affect  Neurologic: Follows simple commands, weak but equal  strength, pin point pupils,no facial droop, forced left sided leg drop but free drop of left hand. Labs:   Recent Labs     08/10/20  0649      K 4.3   MG 2.0   BUN 14   CREA 1.14   *   WBC 7.2   HGB 12.0*   HCT 37.2*      INR 1.0       Cta Code Neuro Head And Neck W Cont    Result Date: 8/10/2020  Impression: 1. No acute large vessel occlusion. 2.  Moderate to high-grade stenosis at the origin of the left common carotid artery. The degree of stenosis is difficult to quantify due to the presence of dense calcifications. 3.  Bilateral ICA origin stenoses, approximately 50%. 4.  There is a 60% stenosis of the distal right common carotid artery 5. There is a 50% stenosis at the origin of the left vertebral artery. Mild stenosis at the origin of the right vertebral artery. Left vertebral artery is dominant. 6.  Mild to moderate stenosis at the origin of the right innominate artery. 7.  Patchy groundglass opacities in the bilateral upper lobes, right much greater than left. These findings are favored to represent aspiration. A small amount of debris is present within the esophagus.  Viral pneumonia (COVID) is not excluded. Findings were called to Dr. Sayra Gimenez by Dr. Jocelyn Harley on 8/10/2020 at 1003 hours via telephone    Ct Code Neuro Head Wo Contrast    Result Date: 8/10/2020  IMPRESSION: No CT evidence of acute intracranial abnormality. Assessment/Plan:     Assessment:      Principal Problem:    Stroke-like symptoms (8/10/2020) Per Primary Team Code S Called further work up per primary team    Active Problems:    CAD (coronary artery disease) (5/4/2009) Paced EKG, Trop Elevated but with Covid like CT findings,  Covid swab pending, and CVA workup pending. Now High Temp 101. Pt will continue statin, ARB, BB, renexa, Plavix, consider adding ASA if ok with Neurology while off Eliquis. Further cardiac workup pending COVID and CVA findings. Dyslipidemia (5/4/2009) see above      Diabetes mellitus ([facility] (5/4/2009) Per Primary Team      Dilated Cardiomyopathy,Ischemic (2/26/2010)      Paroxysmal atrial fibrillation ([facility] (11/30/2010) Pt is on Eliquis at home, will defer to Neurology at this time. 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) [facility] but will defer until after COVID and CVA workup. See above      Diabetes [facility]) Per Primary Team      GERD (gastroesophageal reflux disease) Per Primary Team      Sick sinus syndrome ([facility] (9/6/2007) s/p PPM that is not MRI compatable      Chronic renal failure, stage 3 (moderate) ([facility] [facility] (4/21/2020) Per Primary Team    Thank you very much for this referral. We appreciate the opportunity to participate in this patient's care. We will follow along with above stated plan.     Shireen Mora, NP AGACNP-BC  Consulting MD: Dr Zan Tomlinson

## 2020-08-10 NOTE — PROGRESS NOTES
The implanted [employer] pacemaker is not MRI compatible per the representative Lm. Pending MRI order will be discontinued.

## 2020-08-10 NOTE — ED NOTES
CODE S called on this pt in cathlab prep and recovery as pt came in for an outpatient cath. Son who was with pt states when he picked up pt was not acting his normal self. Staff in cathlab state pt was unresponsive in a wheelchair when got to him. They had to pick him up and put him in a bed. Pt slowly started to respond by the time he got to the ER after going to CT first. Pt was givne 0.4mg Narcan and 4mg Zofran by cathlab staff. Pts  by cathlab staff. Son at bedside states pt has had multple strokes but was left with no deficits. Pt having cath due to increased shortness of breath with any exertion especially ambulating which he has had to start using a cane. By the time on the way back to the ER after CT pt was able to say his name and his age and that he started to feel back around 0400 this AM. Pt complains of significant back pain at this time.

## 2020-08-10 NOTE — PROGRESS NOTES
SPEECH PATHOLOGY NOTE:    Speech therapy consult received and appreciated. Dysphagia evaluation attempted, but too drowsy for participation. Will continue to follow for evaluation of swallow function once alertness improves.      Orvis Hashimoto, [address] CCC-SLP

## 2020-08-11 LAB
ANION GAP SERPL CALC-SCNC: 6 MMOL/L (ref 7–16)
BUN SERPL-MCNC: 14 MG/DL (ref 8–23)
CALCIUM SERPL-MCNC: 8.6 MG/DL (ref 8.3–10.4)
CHLORIDE SERPL-SCNC: 111 MMOL/L (ref 98–107)
CHOLEST SERPL-MCNC: 125 MG/DL
CO2 SERPL-SCNC: 28 MMOL/L (ref 21–32)
CREAT SERPL-MCNC: 0.93 MG/DL (ref 0.8–1.5)
ERYTHROCYTE [DISTWIDTH] IN BLOOD BY AUTOMATED COUNT: 14.5 % (ref 11.9–14.6)
EST. AVERAGE GLUCOSE BLD GHB EST-MCNC: 214 MG/DL
GLUCOSE BLD STRIP.AUTO-MCNC: 141 MG/DL (ref 65–100)
GLUCOSE BLD STRIP.AUTO-MCNC: 182 MG/DL (ref 65–100)
GLUCOSE BLD STRIP.AUTO-MCNC: 199 MG/DL (ref 65–100)
GLUCOSE BLD STRIP.AUTO-MCNC: 216 MG/DL (ref 65–100)
GLUCOSE SERPL-MCNC: 120 MG/DL (ref 65–100)
HBA1C MFR BLD: 9.1 % (ref 4.8–6)
HCT VFR BLD AUTO: 33 % (ref 41.1–50.3)
HDLC SERPL-MCNC: 43 MG/DL (ref 40–60)
HDLC SERPL: 2.9 {RATIO}
HGB BLD-MCNC: 10.6 G/DL (ref 13.6–17.2)
LDLC SERPL CALC-MCNC: 64.2 MG/DL
LIPID PROFILE,FLP: NORMAL
MCH RBC QN AUTO: 27.2 PG (ref 26.1–32.9)
MCHC RBC AUTO-ENTMCNC: 32.1 G/DL (ref 31.4–35)
MCV RBC AUTO: 84.8 FL (ref 79.6–97.8)
NRBC # BLD: 0 K/UL (ref 0–0.2)
PLATELET # BLD AUTO: 146 K/UL (ref 150–450)
PMV BLD AUTO: 10.5 FL (ref 9.4–12.3)
POTASSIUM SERPL-SCNC: 3.8 MMOL/L (ref 3.5–5.1)
RBC # BLD AUTO: 3.89 M/UL (ref 4.23–5.6)
SARS COV-2, XPGCVT: NEGATIVE
SODIUM SERPL-SCNC: 145 MMOL/L (ref 136–145)
SOURCE, COVRS: NORMAL
TRIGL SERPL-MCNC: 89 MG/DL (ref 35–150)
VLDLC SERPL CALC-MCNC: 17.8 MG/DL (ref 6–23)
WBC # BLD AUTO: 8.7 K/UL (ref 4.3–11.1)

## 2020-08-11 PROCEDURE — 85027 COMPLETE CBC AUTOMATED: CPT

## 2020-08-11 PROCEDURE — 99232 SBSQ HOSP IP/OBS MODERATE 35: CPT | Performed by: INTERNAL MEDICINE

## 2020-08-11 PROCEDURE — 96376 TX/PRO/DX INJ SAME DRUG ADON: CPT

## 2020-08-11 PROCEDURE — 95819 EEG AWAKE AND ASLEEP: CPT | Performed by: PSYCHIATRY & NEUROLOGY

## 2020-08-11 PROCEDURE — 74011000258 HC RX REV CODE- 258: Performed by: EMERGENCY MEDICINE

## 2020-08-11 PROCEDURE — 74011250636 HC RX REV CODE- 250/636: Performed by: INTERNAL MEDICINE

## 2020-08-11 PROCEDURE — 99218 HC RM OBSERVATION: CPT

## 2020-08-11 PROCEDURE — 74011250636 HC RX REV CODE- 250/636: Performed by: EMERGENCY MEDICINE

## 2020-08-11 PROCEDURE — 92610 EVALUATE SWALLOWING FUNCTION: CPT

## 2020-08-11 PROCEDURE — 80048 BASIC METABOLIC PNL TOTAL CA: CPT

## 2020-08-11 PROCEDURE — 82962 GLUCOSE BLOOD TEST: CPT

## 2020-08-11 PROCEDURE — 74011250637 HC RX REV CODE- 250/637: Performed by: INTERNAL MEDICINE

## 2020-08-11 PROCEDURE — 74011636637 HC RX REV CODE- 636/637: Performed by: INTERNAL MEDICINE

## 2020-08-11 PROCEDURE — 83036 HEMOGLOBIN GLYCOSYLATED A1C: CPT

## 2020-08-11 PROCEDURE — 80061 LIPID PANEL: CPT

## 2020-08-11 RX ADMIN — ATORVASTATIN CALCIUM 40 MG: 40 TABLET, FILM COATED ORAL at 10:18

## 2020-08-11 RX ADMIN — Medication 10 ML: at 05:56

## 2020-08-11 RX ADMIN — ISOSORBIDE MONONITRATE 60 MG: 60 TABLET, EXTENDED RELEASE ORAL at 17:25

## 2020-08-11 RX ADMIN — TRAZODONE HYDROCHLORIDE 100 MG: 50 TABLET ORAL at 21:05

## 2020-08-11 RX ADMIN — PANTOPRAZOLE SODIUM 40 MG: 40 TABLET, DELAYED RELEASE ORAL at 10:18

## 2020-08-11 RX ADMIN — PREGABALIN 300 MG: 150 CAPSULE ORAL at 10:18

## 2020-08-11 RX ADMIN — SODIUM CHLORIDE 75 ML/HR: 900 INJECTION, SOLUTION INTRAVENOUS at 21:05

## 2020-08-11 RX ADMIN — FINASTERIDE 5 MG: 5 TABLET, FILM COATED ORAL at 10:18

## 2020-08-11 RX ADMIN — RANOLAZINE 500 MG: 500 TABLET, FILM COATED, EXTENDED RELEASE ORAL at 17:27

## 2020-08-11 RX ADMIN — Medication 10 ML: at 13:01

## 2020-08-11 RX ADMIN — ISOSORBIDE MONONITRATE 60 MG: 60 TABLET, EXTENDED RELEASE ORAL at 10:36

## 2020-08-11 RX ADMIN — HYDROCODONE BITARTRATE AND ACETAMINOPHEN 1 TABLET: 10; 325 TABLET ORAL at 10:36

## 2020-08-11 RX ADMIN — PREGABALIN 300 MG: 150 CAPSULE ORAL at 17:25

## 2020-08-11 RX ADMIN — HYDROCODONE BITARTRATE AND ACETAMINOPHEN 1 TABLET: 10; 325 TABLET ORAL at 17:25

## 2020-08-11 RX ADMIN — CLOPIDOGREL BISULFATE 75 MG: 75 TABLET ORAL at 10:17

## 2020-08-11 RX ADMIN — Medication 10 ML: at 21:06

## 2020-08-11 RX ADMIN — SODIUM CHLORIDE 1000 MG: 900 INJECTION, SOLUTION INTRAVENOUS at 10:38

## 2020-08-11 RX ADMIN — SODIUM CHLORIDE 1000 MG: 900 INJECTION, SOLUTION INTRAVENOUS at 21:05

## 2020-08-11 RX ADMIN — HYDROCODONE BITARTRATE AND ACETAMINOPHEN 1 TABLET: 10; 325 TABLET ORAL at 04:21

## 2020-08-11 RX ADMIN — ASPIRIN 81 MG: 81 TABLET, CHEWABLE ORAL at 10:17

## 2020-08-11 RX ADMIN — TAMSULOSIN HYDROCHLORIDE 0.4 MG: 0.4 CAPSULE ORAL at 10:18

## 2020-08-11 RX ADMIN — INSULIN GLARGINE 30 UNITS: 100 INJECTION, SOLUTION SUBCUTANEOUS at 10:36

## 2020-08-11 RX ADMIN — RANOLAZINE 500 MG: 500 TABLET, FILM COATED, EXTENDED RELEASE ORAL at 10:18

## 2020-08-11 RX ADMIN — INSULIN LISPRO 2 UNITS: 100 INJECTION, SOLUTION INTRAVENOUS; SUBCUTANEOUS at 17:25

## 2020-08-11 RX ADMIN — INSULIN LISPRO 2 UNITS: 100 INJECTION, SOLUTION INTRAVENOUS; SUBCUTANEOUS at 13:00

## 2020-08-11 RX ADMIN — LOSARTAN POTASSIUM 50 MG: 50 TABLET, FILM COATED ORAL at 10:18

## 2020-08-11 NOTE — PROGRESS NOTES
MSN, CM:  Spoke with patient this AM about discharge planning. Patient lives alone in a camper on his daughter's \"[place] property right behind her home. Patient also has two grandchildren Comfort (20 y/o) and \"Dorys\" (26 y/o) that are lives in [facility]. Patient states they all will help if needed. Patient also has a son \"Burak\" that lives at [place]. Patient is independent with all ADL's but requires the use of a cane in the last few mts. Patient denies any home oxygen, HH or rehab in the past.  Patient confirms PCP is Dr. Castillo Beasley and his son Conrad Mas drives him to all appointments. PT and OT consulted for evaluation and recommendations. Case Management will continue to follow for discharge needs. Care Management Interventions  PCP Verified by CM: Yes(Dr. Castillo Beasley)  Mode of Transport at Discharge: Other (see comment)(family to transport)  Transition of Care Consult (CM Consult): Discharge Planning  Physical Therapy Consult: Yes  Occupational Therapy Consult: Yes  Current Support Network:  Other(patient lives in a camper )  Confirm Follow Up Transport: Family  Freedom of Choice List was Provided with Basic Dialogue that Supports the Patient's Individualized Plan of Care/Goals, Treatment Preferences and Shares the Quality Data Associated with the Providers?: Yes  Discharge Location  Discharge Placement: Home

## 2020-08-11 NOTE — PROCEDURES
Routine EEG Report    Reason for EEG: Weakness and altered mental status      Current Facility-Administered Medications:     0.9% sodium chloride infusion, 75 mL/hr, IntraVENous, CONTINUOUS, Haylee Rea MD, Last Rate: 75 mL/hr at 08/10/20 1242, 75 mL/hr at 08/10/20 1242    nitroglycerin (NITROSTAT) tablet 0.4 mg, 0.4 mg, SubLINGual, PRN, Alexia MORENO NP, 0.4 mg at 08/10/20 0645    levETIRAcetam (KEPPRA) 1,000 mg in 0.9% sodium chloride 100 mL IVPB, 1,000 mg, IntraVENous, Q12H, Leo Daly MD, 1,000 mg at 08/11/20 1038    sodium chloride (NS) flush 5-40 mL, 5-40 mL, IntraVENous, Q8H, Ciesco, Cecilio, DO, 10 mL at 08/11/20 0556    sodium chloride (NS) flush 5-40 mL, 5-40 mL, IntraVENous, PRN, Ciesco, Cecilio, DO    clopidogreL (PLAVIX) tablet 75 mg, 75 mg, Oral, DAILY, Ciesco, Cecilio, DO, 75 mg at 08/11/20 1017    pantoprazole (PROTONIX) tablet 40 mg, 40 mg, Oral, ACB, Ciesco, Cecilio, DO, 40 mg at 08/11/20 1018    finasteride (PROSCAR) tablet 5 mg, 5 mg, Oral, DAILY, Ciesco, Cecilio, DO, 5 mg at 08/11/20 1018    insulin glargine (LANTUS) injection 30 Units, 30 Units, SubCUTAneous, DAILY, Ciesco, Cecilio, DO, 30 Units at 08/11/20 1036    isosorbide mononitrate ER (IMDUR) tablet 60 mg, 60 mg, Oral, BID, Ciesco, Cecilio, DO, 60 mg at 08/11/20 1036    losartan (COZAAR) tablet 50 mg, 50 mg, Oral, DAILY, Ciesco, Cecilio, DO, 50 mg at 08/11/20 1018    atorvastatin (LIPITOR) tablet 40 mg, 40 mg, Oral, DAILY, Ciesco, Cecilio, DO, 40 mg at 08/11/20 1018    pregabalin (LYRICA) capsule 300 mg, 300 mg, Oral, BID, Ciesco, Cecilio, DO, 300 mg at 08/11/20 1018    ranolazine ER (RANEXA) tablet 500 mg, 500 mg, Oral, BID, Ciesco, Cecilio, DO, 500 mg at 08/11/20 1018    tamsulosin (FLOMAX) capsule 0.4 mg, 0.4 mg, Oral, DAILY, Cirita, Cecilio, DO, 0.4 mg at 08/11/20 1018    traZODone (DESYREL) tablet 100 mg, 100 mg, Oral, QHS, Ciesco, Cecilio, DO, 100 mg at 08/10/20 2156    insulin lispro (HUMALOG) injection, , SubCUTAneous, TIDAC, [place], George eWlch at 08/10/20 1651    acetaminophen (TYLENOL) suppository 650 mg, 650 mg, Rectal, Q4H PRN, Ciesco, Cecilio, DO, 650 mg at 08/10/20 1308    aspirin chewable tablet 81 mg, 81 mg, Oral, DAILY, Ciesco, Cecilio, DO, 81 mg at 08/11/20 1017    HYDROcodone-acetaminophen (NORCO)  mg tablet 1 Tab, 1 Tab, Oral, Q6H PRN, Karina More MD, 1 Tab at 08/11/20 1036      Technical Summary: This EEG was performed with a 32-channel digital EEG machine with electrodes placed according to the international 10-20 system of placement. Background:   During the maximal awake state a posterior dominant rhythm of 10 Hz was seen. It was well regulated and well sustained, symmetric, and reactive to eye opening. Anterior background consisted of medium amplitude activity in the alpha range with occasional intermixed beta frequencies. Hyperventilation: Hyperventilation was not performed due to medical condition    Photic Stimulation: Photic stimulation was not performed due to medical condition    Sleep: Stage II non-REM sleep was characterized by symmetric vertex sharp transients and symmetric sleep spindles. Impression: This EEG is normal in the awake and sleep states. No interictal epileptiform discharges or lateralizing features were seen.

## 2020-08-11 NOTE — PROGRESS NOTES
[facility] CARDIOLOGY PROGRESS NOTE           8/11/2020 10:55 AM    Admit Date: 8/10/2020      Subjective:   Patient has persistent mild CP and dyspnea which has been present for months. No change. Mental status improved but notes mild left sided weakness. Had EEG earlier today. ROS:  Cardiovascular:  As noted above    Objective:      Vitals:    08/10/20 2317 08/11/20 0053 08/11/20 0332 08/11/20 0727   BP: 91/54 101/59 115/65 119/58   Pulse: 70  71 70   Resp: 14  18 17   Temp: 98.9 °F (37.2 °C)  97.7 °F (36.5 °C) 97.5 °F (36.4 °C)   SpO2: 93%  93% 94%   Weight:       Height:           Physical Exam:  General-No Acute Distress  Neck- supple, no JVD  CV- regular rate and rhythm no MRG  Lung- clear bilaterally  Abd- soft, nontender, nondistended  Ext- no edema bilaterally. Skin- warm and dry      Data Review:   Recent Labs     08/11/20  0432 08/10/20  0649    140   K 3.8 4.3   MG  --  2.0   BUN 14 14   CREA 0.93 1.14   * 319*   WBC 8.7 7.2   HGB 10.6* 12.0*   HCT 33.0* 37.2*   * 158   INR  --  1.0   TRIGL 89  --    HDL 43  --       No results found for: Alec Olguin, TNIPOC    Assessment/Plan:     Principal Problem:    Stroke-like symptoms (8/10/2020)    Neurology evaluation ongoing. Active Problems:    CAD (coronary artery disease) (5/4/2009)    Chest pain and dyspnea have been chronic compliants. Originally presented for cardiac cath as outpatient but this has been postponed due to neurologic event. Would plan to reschedule in 4-6 weeks if had recent CVA. Will await neurologic evaluation. Dyslipidemia (5/4/2009)    Continue Lipitor. Diabetes mellitus ([facility] (5/4/2009)    Continue medications. Paroxysmal atrial fibrillation (HCC) (11/30/2010)    Resume Eliquis when OK with neurology. Diabetes [facility]) ()    Defer management to primary team.        Sick sinus syndrome ([facility] (9/6/2007)    S/P pacemaker.                      Mal Billow MD Rona  8/11/2020 10:55 AM

## 2020-08-11 NOTE — PROGRESS NOTES
SPEECH LANGUAGE PATHOLOGY: DYSPHAGIA- Initial Assessment    NAME/AGE/GENDER: Skyler Sommer is a 79 y.o. male  DATE: 8/11/2020  PRIMARY DIAGNOSIS: Chest pain [R07.9]  Stroke-like symptoms [R29.90]      ICD-10: Treatment Diagnosis: R13.12 Dysphagia, Oropharyngeal Phase    RECOMMENDATIONS   DIET:    PO:  Regular   Liquids:  regular thin    MEDICATIONS: With liquid     ASPIRATION PRECAUTIONS  · Slow rate of intake  · Small bites/sips  · Upright at 90 degrees during meal     COMPENSATORY STRATEGIES/MODIFICATIONS  · None     EDUCATION:  · Recommendations discussed with Nursing  · Patient     RECOMMENDATIONS for CONTINUED SPEECH THERAPY:   YES: Anticipate need for ongoing speech therapy during this hospitalization. ASSESSMENT   Patient presents with oropharyngeal swallow function that is within normal limits. No s/sx of airway compromise observed. Recommend regular diet/thin liquids. Medications whole with liquid wash. No additional dysphagia treatment indicated. Patient with suspected new CVA. He reports he is independent at baseline including medication and money management, and driving. He denies any apparent issues with cognitive-linguistic function aside from decreased recall of yesterdays events. Plan for full cognitive-linguistic evaluation at next visit. REHABILITATION POTENTIAL FOR STATED GOALS: Excellent    PLAN    FREQUENCY/DURATION: Speech therapy to follow up for assessment of cognitive-linguistic function.     - Recommendations for next treatment session: Next treatment will address cognitive-linguistic function    SUBJECTIVE   Patient alert. Disoriented to time.    History of Present Injury/Illness: Mr. Da Villa  has a past medical history of Acute kidney failure, unspecified ([facility] [address] (9/17/2010), Anxiety state, unspecified (11/14/2013), 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), CAD (coronary artery disease), Carotid artery disease ([facility] [address] (03/28/2019), Carotid artery stenosis without cerebral infarction (07/07/2008), CVA (cerebral infarction) (5/25/2011), Diabetes ([facility] [address] Diabetes mellitus ([address] (5/4/2009), Dilated Cardiomyopathy,Ischemic (2/26/2010), Dyslipidemia (5/4/2009), Elevated prostate specific antigen (PSA) (11/14/2013), Essential hypertension (11/14/2013), GERD (gastroesophageal reflux disease), Hypertension, Nocturia, Other and unspecified hyperlipidemia, Pacemaker (6/21/2014), Palpitations (2006), Paroxysmal atrial fibrillation ([address] (11/30/2010), Paroxysmal ventricular tachycardia ([address] (11/30/2010), Post PTCA (5/4/2009), Psychiatric disorder, 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 Evelia Monsalve He also  has a past surgical history that includes hx pacemaker; hx orthopaedic; egd (2010); hx cholecystectomy; colonoscopy (1990); pr cardiac surg procedure unlist; pr left heart cath,percutaneous (11/30/2010); hx heart catheterization (4/30/2013); hx heart catheterization (6/23/2014); hx heart catheterization; hx other surgical; hx coronary stent placement; and hx coronary artery bypass graft. Problem List:  (Impairments causing functional limitations):  1. Oropharyngeal dysphagia- No symptoms identified  2. Previous Dysphagia: YES He endorses dysphagia following prior stroke ~15 years ago. He reports all symptoms have since resolved.  Regular diet/thin liquids at prior to arrival.   Diet Prior to Evaluation: NPO    Orientation:   Person  Place    Pain: Pain Scale 1: Numeric (0 - 10)  Pain Intensity 1: 0  Pain Location 1: Back  Pain Intervention(s) 1: Medication (see MAR)    Cognitive-Linguistic Screen:   Speech Production:   o WNL   Expressive Language:  o Needs further assessment   o Possible mild hesitation with word finding   Receptive Language:  o Needs further assessment   Cognition:   o Needs further assessment      Patient independent at baseline. Denies any acute changes aside from decreased recall of yesterdays events. CT negative for acute changes, but unable to participate in MRI. Will plan for cognitive-linguistic evaluation at next visit. OBJECTIVE   Oral Motor:   · Labial: No impairment  · Dentition: Intact  · Oral Hygiene: Adequate  · Lingual: No impairment    Swallow evaluation:  Patient presented with thin liquid via cup and straw, puree, mixed, and solid consistencies. Appropriate oral prep with all textures. Timely swallow initiation, and single swallows upon palpation. Adequate oral clearing. No overt signs or symptoms of airway compromise observed with liquid or solid textures. INTERDISCIPLINARY COLLABORATION: Registered Nurse  PRECAUTIONS/ALLERGIES: Beta-blockers (beta-adrenergic blocking agts);  Shellfish derived; and Xanax [alprazolam]     Tool Used: Dysphagia Outcome and Severity Scale (SHOBHA)    Score Comments   Normal Diet  [] 7 With no strategies or extra time needed   Functional Swallow  [] 6 May have mild oral or pharyngeal delay   Mild Dysphagia  [] 5 Which may require one diet consistency restricted    Mild-Moderate Dysphagia  [] 4 With 1-2 diet consistencies restricted   Moderate Dysphagia  [] 3 With 2 or more diet consistencies restricted   Moderate-Severe Dysphagia  [] 2 With partial PO strategies (trials with ST only)   Severe Dysphagia  [] 1 With inability to tolerate any PO safely      Score:  Initial: 7 Most Recent: x (Date 08/11/20 )   Interpretation of Tool: The Dysphagia Outcome and Severity Scale (SHOBHA) is a simple, easy-to-use, 7-point scale developed to systematically rate the functional severity of dysphagia based on objective assessment and make recommendations for diet level, independence level, and type of nutrition. Current Medications:   No current facility-administered medications on file prior to encounter. Current Outpatient Medications on File Prior to Encounter   Medication Sig Dispense Refill    isosorbide mononitrate ER (Imdur) 60 mg CR tablet Take 1 Tab by mouth two (2) times a day. 180 Tab 3    pravastatin (PRAVACHOL) 80 mg tablet Take 1 Tab by mouth nightly. 90 Tab 3    losartan (COZAAR) 50 mg tablet Take 1 Tab by mouth daily. 30 Tab 5    carvediloL (Coreg) 12.5 mg tablet Take 1 Tab by mouth two (2) times daily (with meals). 180 Tab 3    clopidogreL (PLAVIX) 75 mg tab Take 1 Tab by mouth daily. 90 Tab 3    ranolazine ER (RANEXA) 500 mg SR tablet Take 1 Tab by mouth two (2) times a day. 180 Tab 3    apixaban (ELIQUIS) 5 mg tablet Take 1 Tab by mouth two (2) times a day. 180 Tab 3    tamsulosin (FLOMAX) 0.4 mg capsule TAKE 1 CAPSULE BY MOUTH DAILY 90 Cap 11    finasteride (PROSCAR) 5 mg tablet TAKE 1 TABLET BY MOUTH DAILY 90 Tab 11    cyclobenzaprine (FLEXERIL) 10 mg tablet Take 10 mg by mouth two (2) times daily as needed for Muscle Spasm(s).  esomeprazole (NEXIUM) 40 mg capsule Take  by mouth daily.  pregabalin (LYRICA) 300 mg capsule Take 300 mg by mouth two (2) times a day.  fluticasone (FLONASE) 50 mcg/actuation nasal spray 2 Sprays by Both Nostrils route once.  HYDROcodone-acetaminophen (NORCO)  mg tablet Take 1 Tab by mouth four (4) times daily as needed.  trazodone (DESYREL) 100 mg tablet take 100 mg by mouth nightly.  nitroglycerin (NITROSTAT) 0.4 mg SL tablet 1 Tab by SubLINGual route every five (5) minutes as needed for Chest Pain. Up to 3 doses. 25 Tab 2    insulin detemir U-100 (Levemir U-100 Insulin) 100 unit/mL injection 60 Units by SubCUTAneous route two (2) times a day.       insulin aspart (NOVOLOG) 100 unit/mL injection 25 Units by SubCUTAneous route Before breakfast, lunch, and dinner.          After treatment position/precautions:  · Upright in bed  · RN notified    Total Treatment Duration:   Time In: 2415  Time Out: [address], Alejandra Cad[address] 43., CCC-SLP

## 2020-08-11 NOTE — ACP (ADVANCE CARE PLANNING)
Received consult for Advance Care Plan forms. Attempted contact with patient by phone, as he is in droplet isolation. There was no answer.  will leave a copy of the [address] [place] [address] form for patient with the nursing staff to take to patient. Follow-up, as needed.     Carmen Rodney MDiv, [address] [place] [address]

## 2020-08-11 NOTE — PROGRESS NOTES
PT Note:  Evaluation attempted this AM but pt getting EEG. Will re-attempt pending schedule and pt status.   Thanks,  Jose M Berman, PT, DPT

## 2020-08-11 NOTE — ACP (ADVANCE CARE PLANNING)
Advance Care Planning     Advance Care Planning Activator (Inpatient)  Conversation Note      Date of ACP Conversation: 08/11/20     Conversation Conducted with:   Patient with Decision Making Capacity    ACP Activator: Jose Mccoy RN    *When Decision Maker makes decisions on behalf of the incapacitated patient: Decision Maker is asked to consider and make decisions based on patient values, known preferences, or best interests. Health Care Decision Maker:    Current Designated Health Care Decision Maker:   (If there is a valid [place] named in the [address] [place] [address] Makers\" box in the ACP activity, but it is not visible above, be sure to open that field and then select the health care decision maker relationship (ie \"primary\") in the blank space to the right of the name.) Validate  this information as still accurate & up-to-date; edit [place] field as needed.)    Note: Assess and validate information in current ACP documents, as indicated. If no Decision Maker listed above or available through scanned documents, then:    If no Authorized Decision Maker has previously been identified, then patient chooses [place]:  \"Who would you like to name as your primary health care decision-maker? \"    Name: Pepe Cunningham   Relationship: Son Phone number: [phone]  Yoly Radha this person be reached easily? \" YES  \"Who would you like to name as your back-up decision maker? \"   Name: Mando Rodriguez  Relationship: Daughter Phone number: [phone]  Yoly Radha this person be reached easily? \" YES    Note: If the relationship of these Decision-Makers to the patient does NOT follow your state's Next of Kin hierarchy, recommend that patient complete ACP document that meets state-specific requirements to allow them to act on the patient's behalf when appropriate. Care Preferences    Ventilation:   \"If you were in your present state of health and suddenly became very ill and were unable to breathe on your own, what would your preference be about the use of a ventilator (breathing machine) if it were available to you? \"      If patient would desire the use of a ventilator (breathing machine), answer \"yes\", if not \"no\":yes    \"If your health worsens and it becomes clear that your chance of recovery is unlikely, what would your preference be about the use of a ventilator (breathing machine) if it were available to you? \"     Would the patient desire the use of a ventilator (breathing machine)? YES      Resuscitation  \"CPR works best to restart the heart when there is a sudden event, like a heart attack, in someone who is otherwise healthy. Unfortunately, CPR does not typically restart the heart for people who have serious health conditions or who are very sick. \"    \"In the event your heart stopped as a result of an underlying serious health condition, would you want attempts to be made to restart your heart (answer \"yes\" for attempt to resuscitate) or would you prefer a natural death (answer \"no\" for do not attempt to resuscitate)? \" yes      NOTE: If the patient has a valid advance directive AND now provides care preference(s) that are inconsistent with that prior directive, advise the patient to consider either: creating a new advance directive that complies with state-specific requirements; or, if that is not possible, orally revoking that prior directive in accordance with state-specific requirements, which must be documented in the EHR. [x] Yes  [] No   Educated Patient / Dean Guerin regarding differences between Advance Directives and portable DNR orders.     Length of ACP Conversation in minutes:      Conversation Outcomes:  [x] ACP discussion completed  [] Existing advance directive reviewed with patient; no changes to patient's previously recorded wishes     [] New Advance Directive completed   [] Portable Do Not Resuscitate prepared for Provider review and signature  [] POLST/POST/MOLST/MOST prepared for Provider review and signature      Follow-up plan:    [] Schedule follow-up conversation to continue planning  [] Referred individual to Provider for additional questions/concerns   [] Advised patient/agent/surrogate to review completed ACP document and update if needed with changes in condition, patient preferences or care setting     [x] This note routed to one or more involved healthcare providers

## 2020-08-11 NOTE — PROGRESS NOTES
In beginning of shift pt was yelling at staff for his pain medication. He states he had been asking for it all day and his back pain was 10/10. Pt was calling his daughter and son asking them to come get him because he was not getting his pain medicine here in the hospital. Patients son, Margarita Gerard called nurses station stating his father was upset and needed his scheduled home pain medicine. Pt A/O x 4. STAND was performed and pt had no difficulties swallowing. MD notified and stated it was ok to reorder home pain medicine.

## 2020-08-11 NOTE — PROGRESS NOTES
Interdisciplinary team rounds were held 8/11/2020 with the following team members:Care Management, Outcomes Management and Physician  Plan of care discussed. See clinical pathway and/or care plan for interventions and desired outcomes.

## 2020-08-11 NOTE — PROGRESS NOTES
Received consult for Advance Care Plan forms. Attempted contact with patient by phone, as he is in droplet isolation. There was no answer.  will leave a copy of the [address] form for patient with the nursing staff to take to patient. Follow-up, as needed.     Suki Temple MDiv, [address] [place] [address]

## 2020-08-11 NOTE — PROGRESS NOTES
Hospitalist Note     Admit Date:  8/10/2020  6:15 AM   Name:  Shady Mcconnell   Age:  79 y.o.  :  1950   MRN:  [id number]   PCP:  Jvaan Win MD  Treatment Team: Attending Provider: Holley Way MD; Consulting Provider: Shade Marrufo NP; Consulting Provider: Tonya Trejo MD; Consulting Provider: Cherelle Peres MD; Utilization Review: Felix Ordonez RN; Physical Therapist: Yadi Dye PT, DPT; Care Manager: Nathaniel Ramirez RN    HPI/Subjective:   78 yo CM with past history of SSS s/p PPM, prior CVAs with no residual deficits, MVCAD s/p CABG, carotid artery stenosis, DM type II, HTN, HLD, GERD who presented to ED after code S in the cath lab. Patient was supposed to have a scheduled outpatient cardiac cath 8/10. Reported to be off balance and unlike himself by his son . When patient arrived to cath lab he was noted to be encephalopathic, his blood sugar was ~300. He was given Narcan with no improvement. Of note patient held home dose Eliquis and insulin due to heart cath, and was supposed to take 4 baby aspirin which she forgot to take. Code S called. CT head was unremarkable. Stat tele-neurology consulted for concern for seizure-like activity and patient was loaded with Keppra. He was not deemed a candidate for thrombolytics. Patient has ongoing left-sided weakness worse in his lower extremity.    Patient seen and examined at bedside in no acute distress. Patient reports  Increased strength in his left upper extremity. He reports he cannot move his left lower extremity at all. He reports decreased sensation as well.     Objective:     Patient Vitals for the past 24 hrs:   Temp Pulse Resp BP SpO2   20 0727 97.5 °F (36.4 °C) 70 17 119/58 94 %   20 0332 97.7 °F (36.5 °C) 71 18 115/65 93 %   20 0053    101/59    08/10/20 2317 98.9 °F (37.2 °C) 70 14 91/54 93 %   08/10/20 2000 99.2 °F (37.3 °C) 77 20 109/52 94 %   08/10/20 1530 98.2 °F (36.8 °C) 72 18 120/63 97 %     Oxygen Therapy  O2 Sat (%): 94 % (08/11/20 0727)  Pulse via Oximetry: 74 beats per minute (08/10/20 1030)  O2 Device: Nasal cannula (08/10/20 0654)  O2 Flow Rate (L/min): 2 l/min (08/10/20 0654)    Estimated body mass index is 34.61 kg/m² as calculated from the following:    Height as of this encounter: 5' 7\" (1.702 m). Weight as of this encounter: 100.2 kg (221 lb). Intake/Output Summary (Last 24 hours) at 8/11/2020 1211  Last data filed at 8/11/2020 0810  Gross per 24 hour   Intake 1270.3 ml   Output 800 ml   Net 470.3 ml       *Note that automatically entered I/Os may not be accurate; dependent on patient compliance with collection and accurate  by techs. General:    Male in no acute distress  CV:   RRR. No murmur, rub, or gallop. Lungs:   CTAB. No wheezing, rhonchi, or rales. Abdomen:   Soft, nontender, nondistended. Extremities: Warm and dry. No cyanosis or edema. Skin:     No rashes or jaundice.    Neuro:  Decreased  strength LUE.  0/5 strength LLE; decreased sensation LLE    Data Review:  I have reviewed all labs, meds, and studies from the last 24 hours:    Recent Results (from the past 24 hour(s))   GLUCOSE, POC    Collection Time: 08/10/20 12:17 PM   Result Value Ref Range    Glucose (POC) 248 (H) 65 - 100 mg/dL   PLATELET FUNCTION, VERIFY NOW P2Y12    Collection Time: 08/10/20  1:43 PM   Result Value Ref Range    P2Y12 Plt response 227 PRU   GLUCOSE, POC    Collection Time: 08/10/20  4:49 PM   Result Value Ref Range    Glucose (POC) 140 (H) 65 - 100 mg/dL   GLUCOSE, POC    Collection Time: 08/10/20  9:38 PM   Result Value Ref Range    Glucose (POC) 132 (H) 65 - 100 mg/dL   LIPID PANEL    Collection Time: 08/11/20  4:32 AM   Result Value Ref Range    LIPID PROFILE          Cholesterol, total 125 <200 MG/DL    Triglyceride 89 35 - 150 MG/DL    HDL Cholesterol 43 40 - 60 MG/DL    LDL, calculated 64.2 <100 MG/DL    VLDL, calculated 17.8 6.0 - 23.0 MG/DL    CHOL/HDL Ratio 2.9     HEMOGLOBIN A1C WITH EAG    Collection Time: 08/11/20  4:32 AM   Result Value Ref Range    Hemoglobin A1c 9.1 (H) 4.8 - 6.0 %    Est. average glucose 214 mg/dL   CBC W/O DIFF    Collection Time: 08/11/20  4:32 AM   Result Value Ref Range    WBC 8.7 4.3 - 11.1 K/uL    RBC 3.89 (L) 4.23 - 5.6 M/uL    HGB 10.6 (L) 13.6 - 17.2 g/dL    HCT 33.0 (L) 41.1 - 50.3 %    MCV 84.8 79.6 - 97.8 FL    MCH 27.2 26.1 - 32.9 PG    MCHC 32.1 31.4 - 35.0 g/dL    RDW 14.5 11.9 - 14.6 %    PLATELET 786 (L) 918 - 450 K/uL    MPV 10.5 9.4 - 12.3 FL    ABSOLUTE NRBC 0.00 0.0 - 0.2 K/uL   METABOLIC PANEL, BASIC    Collection Time: 08/11/20  4:32 AM   Result Value Ref Range    Sodium 145 136 - 145 mmol/L    Potassium 3.8 3.5 - 5.1 mmol/L    Chloride 111 (H) 98 - 107 mmol/L    CO2 28 21 - 32 mmol/L    Anion gap 6 (L) 7 - 16 mmol/L    Glucose 120 (H) 65 - 100 mg/dL    BUN 14 8 - 23 MG/DL    Creatinine 0.93 0.8 - 1.5 MG/DL    GFR est AA >60 >60 ml/min/1.73m2    GFR est non-AA >60 >60 ml/min/1.73m2    Calcium 8.6 8.3 - 10.4 MG/DL   GLUCOSE, POC    Collection Time: 08/11/20  7:20 AM   Result Value Ref Range    Glucose (POC) 141 (H) 65 - 100 mg/dL   GLUCOSE, POC    Collection Time: 08/11/20 11:35 AM   Result Value Ref Range    Glucose (POC) 182 (H) 65 - 100 mg/dL        All Micro Results     Procedure Component Value Units Date/Time    EMERGENT DISEASE PANEL [[id number]] Collected:  08/10/20 1115    Order Status:  Canceled           No results found for this visit on 08/10/20.     Current Meds:  Current Facility-Administered Medications   Medication Dose Route Frequency    0.9% sodium chloride infusion  75 mL/hr IntraVENous CONTINUOUS    nitroglycerin (NITROSTAT) tablet 0.4 mg  0.4 mg SubLINGual PRN    levETIRAcetam (KEPPRA) 1,000 mg in 0.9% sodium chloride 100 mL IVPB  1,000 mg IntraVENous Q12H    sodium chloride (NS) flush 5-40 mL  5-40 mL IntraVENous Q8H    sodium chloride (NS) flush 5-40 mL  5-40 mL IntraVENous PRN    clopidogreL (PLAVIX) tablet 75 mg  75 mg Oral DAILY    pantoprazole (PROTONIX) tablet 40 mg  40 mg Oral ACB    finasteride (PROSCAR) tablet 5 mg  5 mg Oral DAILY    insulin glargine (LANTUS) injection 30 Units  30 Units SubCUTAneous DAILY    isosorbide mononitrate ER (IMDUR) tablet 60 mg  60 mg Oral BID    losartan (COZAAR) tablet 50 mg  50 mg Oral DAILY    atorvastatin (LIPITOR) tablet 40 mg  40 mg Oral DAILY    pregabalin (LYRICA) capsule 300 mg  300 mg Oral BID    ranolazine ER (RANEXA) tablet 500 mg  500 mg Oral BID    tamsulosin (FLOMAX) capsule 0.4 mg  0.4 mg Oral DAILY    traZODone (DESYREL) tablet 100 mg  100 mg Oral QHS    insulin lispro (HUMALOG) injection   SubCUTAneous TIDAC    acetaminophen (TYLENOL) suppository 650 mg  650 mg Rectal Q4H PRN    aspirin chewable tablet 81 mg  81 mg Oral DAILY    HYDROcodone-acetaminophen (NORCO)  mg tablet 1 Tab  1 Tab Oral Q6H PRN       Other Studies (last 24 hours):  No results found.     Assessment and Plan:     Hospital Problems as of 8/11/2020 Date Reviewed: 8/3/2020          Codes Class Noted - Resolved POA    * (Principal) Stroke-like symptoms ICD-10-CM: R29.90  ICD-9-CM: 781.99  8/10/2020 - Present Unknown        Chronic renal failure, stage 3 (moderate) (HCC) ICD-10-CM: N18.3  ICD-9-CM: 585.3  4/21/2020 - Present Yes        Diabetes ([facility] ICD-10-CM: E11.9  ICD-9-CM: 250.00  Unknown - Present Yes        GERD (gastroesophageal reflux disease) ICD-10-CM: K21.9  ICD-9-CM: 530.81  Unknown - Present Yes        Arteriosclerosis of bypass graft of coronary artery ICD-10-CM: I25.810  ICD-9-CM: 414.04  8/27/2015 - Present Yes        Paroxysmal atrial fibrillation (HCC) (Chronic) ICD-10-CM: I48.0  ICD-9-CM: 427.31  11/30/2010 - Present Yes        Dilated Cardiomyopathy,Ischemic ICD-10-CM: I42.0  ICD-9-CM: 425.4  2/26/2010 - Present Yes        CAD (coronary artery disease) (Chronic) ICD-10-CM: I25.10  ICD-9-CM: 414.00  5/4/2009 - Present Yes        Dyslipidemia (Chronic) ICD-10-CM: E78.5  ICD-9-CM: 272.4  5/4/2009 - Present Yes        Diabetes mellitus (HCC) (Chronic) ICD-10-CM: E11.9  ICD-9-CM: 250.00  5/4/2009 - Present Yes        Sick sinus syndrome (HCC) ICD-10-CM: I49.5  ICD-9-CM: 427.81  9/6/2007 - Present Yes              CVA/stroke/carotid artery stenosis/seizure-like activity  Symptoms persist    CT head: No acute abnormality  CTA head and neck: Moderate to high-grade stenosis of the origin of the left common carotid artery; bilateral ICA origin stenosis approximately 50%  MRI not possible secondary to incompatible pacemaker  EEG: No abnormality seen  Echo: Pending    Tele-neurology evaluated patient: Not a candidate for TPA  Vascular surgery: Continue ASA, Plavix, and atorvastatin; await neurology recommendations  Cardiology: Resume Eliquis when cleared by neurology    HgbA1c: 9.1    Plan:  -Continue Keppra  -Follow-up neurology recommendations on anticoagulation  -Continue ASA, Plavix, and atorvastatin  -Telemetry monitoring    Bilateral upper lobe opacities on CT imaging  CTA head and neck: Patchy groundglass opacities in bilateral upper lobes, most likely aspiration, COVID not excluded  COVID-19: Pending  8/10 T-max 101  WBC: 8.7    Plan:  -Follow-up COVID-19 testing  -Will hold on antibiotics at this time unless repeat fever    CAD s/p CABG/chest pain  Was originally scheduled for outpatient [facility]  Cardiology following: Reschedule [facility] in 4 to 6 weeks  Documented allergy to beta-blockers    Plan:  -ASA  -Continue Plavix, statin, Ranexa, Imdur    Paroxysmal atrial Fibrillation  Currently Rate Controlled   Anticoagulated with Eliquis which is currently on hold    Plan:  -Resume Eliquis when cleared by neurology  -Continue home medications    Diabetes mellitus  -Hold home medications  -POC before meals and at bedtime  -Insulin sliding scale  -Long-acting insulin    GERD  -Continue home medications      DC planning/Dispo: Pending hospital course      Diet:  DIET REGULAR  DVT ppx: SCDs    Signed:  Edwin Quezada MD

## 2020-08-12 ENCOUNTER — APPOINTMENT (OUTPATIENT)
Dept: CT IMAGING | Age: 70
End: 2020-08-12
Attending: PSYCHIATRY & NEUROLOGY
Payer: MEDICARE

## 2020-08-12 LAB
ANION GAP SERPL CALC-SCNC: 5 MMOL/L (ref 7–16)
BASOPHILS # BLD: 0 K/UL (ref 0–0.2)
BASOPHILS NFR BLD: 0 % (ref 0–2)
BUN SERPL-MCNC: 12 MG/DL (ref 8–23)
CALCIUM SERPL-MCNC: 8.4 MG/DL (ref 8.3–10.4)
CHLORIDE SERPL-SCNC: 109 MMOL/L (ref 98–107)
CO2 SERPL-SCNC: 27 MMOL/L (ref 21–32)
CREAT SERPL-MCNC: 0.92 MG/DL (ref 0.8–1.5)
DIFFERENTIAL METHOD BLD: ABNORMAL
EOSINOPHIL # BLD: 0 K/UL (ref 0–0.8)
EOSINOPHIL NFR BLD: 1 % (ref 0.5–7.8)
ERYTHROCYTE [DISTWIDTH] IN BLOOD BY AUTOMATED COUNT: 14 % (ref 11.9–14.6)
GLUCOSE BLD STRIP.AUTO-MCNC: 145 MG/DL (ref 65–100)
GLUCOSE BLD STRIP.AUTO-MCNC: 237 MG/DL (ref 65–100)
GLUCOSE BLD STRIP.AUTO-MCNC: 268 MG/DL (ref 65–100)
GLUCOSE SERPL-MCNC: 131 MG/DL (ref 65–100)
HCT VFR BLD AUTO: 31.5 % (ref 41.1–50.3)
HGB BLD-MCNC: 10.5 G/DL (ref 13.6–17.2)
IMM GRANULOCYTES # BLD AUTO: 0.1 K/UL (ref 0–0.5)
IMM GRANULOCYTES NFR BLD AUTO: 1 % (ref 0–5)
LYMPHOCYTES # BLD: 0.9 K/UL (ref 0.5–4.6)
LYMPHOCYTES NFR BLD: 15 % (ref 13–44)
MCH RBC QN AUTO: 28.2 PG (ref 26.1–32.9)
MCHC RBC AUTO-ENTMCNC: 33.3 G/DL (ref 31.4–35)
MCV RBC AUTO: 84.5 FL (ref 79.6–97.8)
MONOCYTES # BLD: 0.5 K/UL (ref 0.1–1.3)
MONOCYTES NFR BLD: 8 % (ref 4–12)
NEUTS SEG # BLD: 4.7 K/UL (ref 1.7–8.2)
NEUTS SEG NFR BLD: 76 % (ref 43–78)
NRBC # BLD: 0 K/UL (ref 0–0.2)
PLATELET # BLD AUTO: 150 K/UL (ref 150–450)
PMV BLD AUTO: 10.7 FL (ref 9.4–12.3)
POTASSIUM SERPL-SCNC: 3.8 MMOL/L (ref 3.5–5.1)
RBC # BLD AUTO: 3.73 M/UL (ref 4.23–5.6)
SODIUM SERPL-SCNC: 141 MMOL/L (ref 136–145)
WBC # BLD AUTO: 6.2 K/UL (ref 4.3–11.1)

## 2020-08-12 PROCEDURE — 85025 COMPLETE CBC W/AUTO DIFF WBC: CPT

## 2020-08-12 PROCEDURE — 82962 GLUCOSE BLOOD TEST: CPT

## 2020-08-12 PROCEDURE — 96376 TX/PRO/DX INJ SAME DRUG ADON: CPT

## 2020-08-12 PROCEDURE — 74011250637 HC RX REV CODE- 250/637: Performed by: INTERNAL MEDICINE

## 2020-08-12 PROCEDURE — 74011250636 HC RX REV CODE- 250/636: Performed by: INTERNAL MEDICINE

## 2020-08-12 PROCEDURE — 97166 OT EVAL MOD COMPLEX 45 MIN: CPT

## 2020-08-12 PROCEDURE — 97112 NEUROMUSCULAR REEDUCATION: CPT

## 2020-08-12 PROCEDURE — 99218 HC RM OBSERVATION: CPT

## 2020-08-12 PROCEDURE — 99231 SBSQ HOSP IP/OBS SF/LOW 25: CPT | Performed by: PHYSICAL MEDICINE & REHABILITATION

## 2020-08-12 PROCEDURE — 99213 OFFICE O/P EST LOW 20 MIN: CPT | Performed by: PSYCHIATRY & NEUROLOGY

## 2020-08-12 PROCEDURE — 74011000250 HC RX REV CODE- 250: Performed by: INTERNAL MEDICINE

## 2020-08-12 PROCEDURE — 36415 COLL VENOUS BLD VENIPUNCTURE: CPT

## 2020-08-12 PROCEDURE — 80048 BASIC METABOLIC PNL TOTAL CA: CPT

## 2020-08-12 PROCEDURE — 99232 SBSQ HOSP IP/OBS MODERATE 35: CPT | Performed by: INTERNAL MEDICINE

## 2020-08-12 PROCEDURE — 70450 CT HEAD/BRAIN W/O DYE: CPT

## 2020-08-12 PROCEDURE — 97535 SELF CARE MNGMENT TRAINING: CPT

## 2020-08-12 PROCEDURE — 74011636637 HC RX REV CODE- 636/637: Performed by: INTERNAL MEDICINE

## 2020-08-12 PROCEDURE — 97161 PT EVAL LOW COMPLEX 20 MIN: CPT

## 2020-08-12 PROCEDURE — C8929 TTE W OR WO FOL WCON,DOPPLER: HCPCS

## 2020-08-12 PROCEDURE — 74011000258 HC RX REV CODE- 258: Performed by: INTERNAL MEDICINE

## 2020-08-12 PROCEDURE — 92522 EVALUATE SPEECH PRODUCTION: CPT

## 2020-08-12 RX ORDER — LEVETIRACETAM 500 MG/1
500 TABLET ORAL 2 TIMES DAILY
Status: DISCONTINUED | OUTPATIENT
Start: 2020-08-13 | End: 2020-08-13 | Stop reason: HOSPADM

## 2020-08-12 RX ORDER — LEVETIRACETAM 500 MG/1
1000 TABLET ORAL EVERY 12 HOURS
Status: DISCONTINUED | OUTPATIENT
Start: 2020-08-12 | End: 2020-08-12

## 2020-08-12 RX ADMIN — SODIUM CHLORIDE 1000 MG: 900 INJECTION, SOLUTION INTRAVENOUS at 08:13

## 2020-08-12 RX ADMIN — INSULIN LISPRO 6 UNITS: 100 INJECTION, SOLUTION INTRAVENOUS; SUBCUTANEOUS at 17:09

## 2020-08-12 RX ADMIN — PERFLUTREN 1 ML: 6.52 INJECTION, SUSPENSION INTRAVENOUS at 10:15

## 2020-08-12 RX ADMIN — Medication 5 ML: at 21:41

## 2020-08-12 RX ADMIN — TRAZODONE HYDROCHLORIDE 100 MG: 50 TABLET ORAL at 21:38

## 2020-08-12 RX ADMIN — ISOSORBIDE MONONITRATE 60 MG: 60 TABLET, EXTENDED RELEASE ORAL at 08:11

## 2020-08-12 RX ADMIN — TAMSULOSIN HYDROCHLORIDE 0.4 MG: 0.4 CAPSULE ORAL at 08:11

## 2020-08-12 RX ADMIN — INSULIN GLARGINE 30 UNITS: 100 INJECTION, SOLUTION SUBCUTANEOUS at 08:11

## 2020-08-12 RX ADMIN — ISOSORBIDE MONONITRATE 60 MG: 60 TABLET, EXTENDED RELEASE ORAL at 17:09

## 2020-08-12 RX ADMIN — Medication 5 ML: at 12:41

## 2020-08-12 RX ADMIN — Medication 10 ML: at 05:27

## 2020-08-12 RX ADMIN — ATORVASTATIN CALCIUM 40 MG: 40 TABLET, FILM COATED ORAL at 08:11

## 2020-08-12 RX ADMIN — CLOPIDOGREL BISULFATE 75 MG: 75 TABLET ORAL at 08:11

## 2020-08-12 RX ADMIN — HYDROCODONE BITARTRATE AND ACETAMINOPHEN 1 TABLET: 10; 325 TABLET ORAL at 18:44

## 2020-08-12 RX ADMIN — ASPIRIN 81 MG: 81 TABLET, CHEWABLE ORAL at 08:11

## 2020-08-12 RX ADMIN — APIXABAN 5 MG: 5 TABLET, FILM COATED ORAL at 16:53

## 2020-08-12 RX ADMIN — PREGABALIN 300 MG: 150 CAPSULE ORAL at 08:11

## 2020-08-12 RX ADMIN — INSULIN LISPRO 4 UNITS: 100 INJECTION, SOLUTION INTRAVENOUS; SUBCUTANEOUS at 11:54

## 2020-08-12 RX ADMIN — HYDROCODONE BITARTRATE AND ACETAMINOPHEN 1 TABLET: 10; 325 TABLET ORAL at 00:12

## 2020-08-12 RX ADMIN — RANOLAZINE 500 MG: 500 TABLET, FILM COATED, EXTENDED RELEASE ORAL at 16:51

## 2020-08-12 RX ADMIN — HYDROCODONE BITARTRATE AND ACETAMINOPHEN 1 TABLET: 10; 325 TABLET ORAL at 06:30

## 2020-08-12 RX ADMIN — RANOLAZINE 500 MG: 500 TABLET, FILM COATED, EXTENDED RELEASE ORAL at 08:11

## 2020-08-12 RX ADMIN — PANTOPRAZOLE SODIUM 40 MG: 40 TABLET, DELAYED RELEASE ORAL at 08:11

## 2020-08-12 RX ADMIN — LOSARTAN POTASSIUM 50 MG: 50 TABLET, FILM COATED ORAL at 08:11

## 2020-08-12 RX ADMIN — PREGABALIN 300 MG: 150 CAPSULE ORAL at 16:51

## 2020-08-12 RX ADMIN — SODIUM CHLORIDE 75 ML/HR: 900 INJECTION, SOLUTION INTRAVENOUS at 10:19

## 2020-08-12 RX ADMIN — FINASTERIDE 5 MG: 5 TABLET, FILM COATED ORAL at 08:11

## 2020-08-12 NOTE — PROGRESS NOTES
Problem: Self Care Deficits Care Plan (Adult)  Goal: *Acute Goals and Plan of Care (Insert Text)  Description:   1. Patient will complete lower body bathing and dressing with SBA and adaptive equipment as needed. 2. Patient will complete toileting and toilet transfer with Min A and adaptive equipment as needed. 3. Patient will tolerate 23 minutes of OT treatment with 1-2 rest breaks to increase activity tolerance for ADLs. 4. Patient will complete bed mobility with SBA and no verbal cues for safe transfer technique. 5. Patient will complete functional transfers with Min A and adaptive equipment as needed. 6. Patient will complete seated ADL for at least 5 minutes with good static and good dynamic seated balance. 7. Patient will complete BUE exercises to increase strength and ROM in BUE for performance of ADLs and functional transfers. 8. Patient will complete transfer from bed to chair/BSC with Mod A and fair dynamic standing balance. Timeframe: 7 visits     Outcome: Progressing Towards Goal    OCCUPATIONAL THERAPY: Initial Assessment, Daily Note, and AM 8/12/2020  OBSERVATION: OT Visit Days: 1  Payor: [address] [address],9D / Plan: [address] / Product Type: Managed Care Medicare /      NAME/AGE/GENDER: Katya Garcia is a 79 y.o. male   PRIMARY DIAGNOSIS:  Chest pain [R07.9]  Stroke-like symptoms [R29.90] Stroke-like symptoms Stroke-like symptoms      ICD-10: Treatment Diagnosis:    · Generalized Muscle Weakness (M62.81)  · Difficulty in walking, Not elsewhere classified (R26.2)  · Other abnormalities of gait and mobility (R26.89)   Precautions/Allergies:    Falls Beta-blockers (beta-adrenergic blocking agts); Shellfish derived; and Xanax [alprazolam]      ASSESSMENT:     Mr. Jessa Mary is a 79 y.o. male  admitted for Chest pain and stroke-like symptoms. At baseline, patient lives alone in one story camper on his daughter's property.  Pt is typically Mod I to independent for ADLs and IADLs. Pt uses SPC for functional mobility for household and community distances. Pt with prior CVA history but reports that he had gained most functional use and had full use of his LLE prior to admission. Pt found supine in bed. Alert and agreeable to initial OT/PT evaluation to increase activity tolerance and likelihood of return to baseline. OT worked on self care while PT facilitated functional transfers and LLE exercises. Pt reports no pain prior to or following functional mobility. Patient completes supine to sit with Min A x 2. Seated edge of bed, pt with fair static and fair dynamic seated balance. RUE generally decreased yet functional for ROM, strength, and coordination. LUE WFL for ROM and generally decreased yet functional for strength and coordination. Pt reports R hand dominance. Pt requires Mod A for sock management with pt able to don R sock but requiring Total A to don left sock secondary to decreased ROM and strength in LLE. Pt requires Max A x 2 with use of SPC to complete sit to stand. Upon standing, pt with fair static and fair dynamic standing balance. Pt requires Max A x 2 to \"walk\" from bed to chair with pt sliding LLE. Pt requires Mod A x 2 to complete stand to sit. Once in seated, pt requires SBA to brush teeth. Pt left seated recliner chair with all needs met and within reach. RN notified. At this time, patient is appropriate for Co-treatment with physical therapy due to patient's decreased overall endurance/tolerance levels, as well as need for high level skilled assistance to complete functional transfers/mobility and functional tasks. Kit Salgado is appropriate for a multidisciplinary co-treatment of PT and OT to address goals of both disciplines. Pt is currently functioning below baseline for ADLs and functional mobility and would benefit from acute OT to increase independence and safety with ADLs and functional mobility.  Will follow for duration of hospital stay to address the above goals. Patient was educated on role and plan of care of occupational therapy. This section established at most recent assessment   PROBLEM LIST (Impairments causing functional limitations):  1. Decreased Strength  2. Decreased ADL/Functional Activities  3. Decreased Transfer Abilities  4. Decreased Ambulation Ability/Technique  5. Decreased Balance  6. Decreased Activity Tolerance  7. Increased Fatigue  8. Decreased Flexibility/Joint Mobility  9. Decreased [place] with Home Exercise Program   INTERVENTIONS PLANNED: (Benefits and precautions of occupational therapy have been discussed with the patient.)  1. Activities of daily living training  2. Adaptive equipment training  3. Balance training  4. Clothing management  5. Neuromuscular re-eduation  6. Re-evaluation  7. Therapeutic activity  8. Therapeutic exercise     TREATMENT PLAN: Frequency/Duration: Follow patient 3x/week to address above goals. Rehabilitation Potential For Stated Goals: [facility] (at time of discharge pending progress):    Placement: It is my opinion, based on this patient's performance to date, that Mr. Jessa Mary may benefit from intensive therapy at an [address] [place] [address] after discharge due to a probable need for close medical supervision by a rehab physician, a probable need for multiple therapy disciplines, and potential to make ongoing and sustainable functional improvement that is of practical value. .  Equipment:    TBD              OCCUPATIONAL PROFILE AND HISTORY:   History of Present Injury/Illness (Reason for Referral):  See H & P  Past Medical History/Comorbidities:   Mr. Jessa Mary  has a past medical history of Acute kidney failure, unspecified ([address] (9/17/2010), Anxiety state, unspecified (11/14/2013), 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), CAD (coronary artery disease), Carotid artery disease ([address] (03/28/2019), Carotid artery stenosis without cerebral infarction (07/07/2008), CVA (cerebral infarction) (5/25/2011), Diabetes ([address] Diabetes mellitus ([address] (5/4/2009), Dilated Cardiomyopathy,Ischemic (2/26/2010), Dyslipidemia (5/4/2009), Elevated prostate specific antigen (PSA) (11/14/2013), Essential hypertension (11/14/2013), GERD (gastroesophageal reflux disease), Hypertension, Nocturia, Other and unspecified hyperlipidemia, Pacemaker (6/21/2014), Palpitations (2006), Paroxysmal atrial fibrillation ([address] (11/30/2010), Paroxysmal ventricular tachycardia ([address] (11/30/2010), Post PTCA (5/4/2009), Psychiatric disorder, 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 Mr. Sherlyn Horton  has a past surgical history that includes hx pacemaker; hx orthopaedic; egd (2010); hx cholecystectomy; colonoscopy (1990); pr cardiac surg procedure unlist; pr left heart cath,percutaneous (11/30/2010); hx heart catheterization (4/30/2013); hx heart catheterization (6/23/2014); hx heart catheterization; hx other surgical; hx coronary stent placement; and hx coronary artery bypass graft. Social History/Living Environment:   Home Environment: Other (comment)(Camper on daughter's property)  One/Two Story Residence: One story  Living Alone: Yes  Support Systems: Family member(s)  Patient Expects to be Discharged to[de-identified] Rehabilitation facility  Current DME Used/Available at Home: Cane, straight  Tub or Shower Type: Shower  Prior Level of Function/Work/Activity:  At baseline, patient lives alone in one story camper on his daughter's property.  Pt is typically Mod I to independent for ADLs and IADLs. Pt uses SPC for functional mobility for household and community distances. Pt with prior CVA history but reports that he had gained most functional use and had full use of his LLE prior to admission. Personal Factors:          Sex:  male        Age:  79 y.o. Number of Personal Factors/Comorbidities that affect the Plan of Care: Extensive review of physical, cognitive, and psychosocial performance (3+):  HIGH COMPLEXITY   ASSESSMENT OF OCCUPATIONAL PERFORMANCE[de-identified]   Activities of Daily Living:   Basic ADLs (From Assessment) Complex ADLs (From Assessment)   Feeding: Modified independent  Oral Facial Hygiene/Grooming: Stand-by assistance  Bathing: Moderate assistance  Upper Body Dressing: Stand-by assistance  Lower Body Dressing: Maximum assistance  Toileting: Maximum assistance Instrumental ADL  Meal Preparation: Total assistance  Homemaking: Total assistance   Grooming/Bathing/Dressing Activities of Daily Living   Grooming  Grooming Assistance: Stand-by assistance  Position Performed: Seated in chair  Brushing Teeth: Stand-by assistance                       Lower Body Dressing Assistance  Socks:  Moderate assistance(Assist for L sock only) Bed/Mat Mobility  Rolling: Minimum assistance;Assist x2  Supine to Sit: Minimum assistance;Assist x2  Sit to Supine: Minimum assistance;Assist x2  Sit to Stand: Maximum assistance  Stand to Sit: Maximum assistance;Assist x2  Bed to Chair: Maximum assistance;Assist x2  Scooting: Minimum assistance;Assist x2     Most Recent Physical Functioning:   Gross Assessment:  AROM: Generally decreased, functional(Decreased R > L)  Strength: Generally decreased, functional(Decreased L > R)  Coordination: Within functional limits               Posture:  Posture (WDL): Exceptions to WDL  Posture Assessment: Cervical, Forward head  Balance:  Sitting: Impaired  Sitting - Static: Fair (occasional)  Sitting - Dynamic: Fair (occasional)  Standing: Impaired  Standing - Static: Poor  Standing - Dynamic : Poor Bed Mobility:  Rolling: Minimum assistance;Assist x2  Supine to Sit: Minimum assistance;Assist x2  Sit to Supine: Minimum assistance;Assist x2  Scooting: Minimum assistance;Assist x2  Wheelchair Mobility:     Transfers:  Sit to Stand: Maximum assistance  Stand to Sit: Maximum assistance;Assist x2  Bed to Chair: Maximum assistance;Assist x2            Patient Vitals for the past 6 hrs:   BP BP Patient Position SpO2 Pulse   20 0707 125/64 At rest 95 % 70   20 0800    71   20 1054 101/55 At rest 95 % 70   20 1200    70       Mental Status  Neurologic State: Alert  Orientation Level: Oriented X4  Cognition: Follows commands, Appropriate decision making, Appropriate safety awareness, Appropriate for age attention/concentration                          Physical Skills Involved:  1. Range of Motion  2. Balance  3. Strength  4. Activity Tolerance  5. Gross Motor Control Cognitive Skills Affected (resulting in the inability to perform in a timely and safe manner):  1. Perception  2. Sustained Attention Psychosocial Skills Affected:  1. Habits/Routines  2. Environmental Adaptation  3. Social Interaction   Number of elements that affect the Plan of Care: 5+:  HIGH COMPLEXITY   CLINICAL DECISION MAKIN [facility] [address] [id number] AM-PAC 6 Clicks   Daily Activity Inpatient Short Form  How much help from another person does the patient currently need. .. Total A Lot A Little None   1. Putting on and taking off regular lower body clothing? [] 1   [x] 2   [] 3   [] 4   2. Bathing (including washing, rinsing, drying)? [] 1   [x] 2   [] 3   [] 4   3. Toileting, which includes using toilet, bedpan or urinal?   [] 1   [x] 2   [] 3   [] 4   4. Putting on and taking off regular upper body clothing? [] 1   [] 2   [x] 3   [] 4   5. Taking care of personal grooming such as brushing teeth? [] 1   [] 2   [x] 3   [] 4   6. Eating meals? [] 1   [] 2   [] 3   [x] 4   © 2007, Trustees of [address] [place] [address] [address] [id number], under license to [facility]. All rights reserved      Score:  Initial: 16, completed, 8/12/2020 Most Recent: X (Date: -- )    Interpretation of Tool:  Represents activities that are increasingly more difficult (i.e. Bed mobility, Transfers, Gait). Medical Necessity:     · Skilled intervention continues to be required due to decreased independence, safety, balance, strength, ROM, and activity tolerance for performance of ADLs and functional mobility. Reason for Services/Other Comments:  · Patient continues to require skilled intervention due to   · medical complications and patient unable to attend/participate in therapy as expected  · . Use of outcome tool(s) and clinical judgement create a POC that gives a: MODERATE COMPLEXITY         TREATMENT:   (In addition to Assessment/Re-Assessment sessions the following treatments were rendered)     Pre-treatment Symptoms/Complaints:  Pt with no pain at rest.  Pain: Initial:   Pain Intensity 1: 0  Post Session:  Pt does report increased pain with functional mobility although he does not provide a rating. Today's treatment session addressed Decreased Strength, Decreased ADL/Functional Activities, Decreased Activity Tolerance, Increased Fatigue, and Decreased Flexibility/Joint Mobility to progress towards achieving goal(s) listed above. During this session, Physical Therapy addressed  Functional Transfers and LLE exercises to progress towards their discipline specific goal(s). Co-treatment was necessary to improve patient's cognitive participation, ability to follow higher level commands, ability to increase activity demands, and ability to return to normal functional activity. Self Care: (8 minutes): Procedure(s) utilized to improve and/or restore self-care/home management as related to dressing and grooming.  Required minimal visual and verbal cueing and minimal to moderate manual cueing to facilitate activities of daily living skills. Pt requires Mod A for sock management with pt able to don R sock but requiring Total A to don left sock secondary to decreased ROM and strength in LLE. Pt requires SBA to brush teeth in seated position. Braces/Orthotics/Lines/Etc:   · IV  · O2 Device: Room air  Treatment/Session Assessment:    · Response to Treatment:  Pt good participant. Presents with decreased ROM and strength in LLE and decreased strength in LUE. · Interdisciplinary Collaboration:   o Physical Therapist  o Occupational Therapist  o Registered Nurse  · After treatment position/precautions:   o Up in chair  o Bed/Chair-wheels locked  o Bed in low position  o Call light within reach  o RN notified  o Posey Chair Alarm in Place    · Compliance with Program/Exercises: Compliant most of the time, Will assess as treatment progresses. · Recommendations/Intent for next treatment session: \"Next visit will focus on advancements to more challenging activities and reduction in assistance provided\".   Total Treatment Duration:  OT Patient Time In/Time Out  Time In: 1020  Time Out: 1043     TRACY Johnston/L

## 2020-08-12 NOTE — CONSULTS
Consult    Patient: Alex Rucker MRN: [id number]     YOB: 1950  Age: 79 y.o. Sex: male      Subjective:      Alex Rucker is a 79 y.o. male who is being seen for left-sided weakness. The patient has already been seen during this hospitalization by neurology via the computer. The patient has multiple cardiovascular risk factors and significant arterial pathology. He has a history of paroxysmal atrial fibrillation. He was a code stroke in the Cath Lab. At that time, his blood sugar was around 300 and he was encephalopathic. He was taking Eliquis at home but this was held. Apparently the patient was loaded with Keppra because there was concern that he may have had a seizure. I do not know the details regarding the events that occurred and if the semiology of this event was consistent with a seizure. I did read an EEG earlier during his hospitalization which was normal.    During our encounter the patient reports left upper limb weakness and left lower limb paralysis as well as sensory loss. Past Medical History:   Diagnosis Date    Acute kidney failure, unspecified ([address] 9/17/2010    Anxiety state, unspecified 11/14/2013    Arteriosclerosis of bypass graft of coronary artery 8/27/2015    CAD (coronary artery disease)     Cath on 3- showed 5/5 patent bypass grafts with LV EF=60% and a 90% stenosis in native LAD distal to LIMA graft anastomosis. He received a Xience ZAK (2.25 x 18) to LAD.  Carotid artery disease ([address] 03/28/2019    Bilateral ICA:50-69% on carotid ultrasound.     Carotid artery stenosis without cerebral infarction 07/07/2008    CVA (cerebral infarction) 5/25/2011    Diabetes ([address]     Diabetes mellitus ([address] 5/4/2009    Dilated Cardiomyopathy,Ischemic 2/26/2010    Dyslipidemia 5/4/2009    Elevated prostate specific antigen (PSA) 11/14/2013    Essential hypertension 11/14/2013    GERD (gastroesophageal reflux disease)     Hypertension     Nocturia     Other and unspecified hyperlipidemia     Pacemaker 2014    Palpitations     Paroxysmal atrial fibrillation ([address] 2010    Paroxysmal ventricular tachycardia ([address] 2010    Post PTCA 2009    stent to left main     Psychiatric disorder     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     S/P CABG (coronary artery bypass graft) 2009    LIMA TO LAD, SV TO OM, SV TO RCA, SV TO DIAG     S/P PTCA (percutaneous transluminal coronary angioplasty) 3/11/2016    Sick sinus syndrome ([address] 2007    Snoring, Possible sleep apnea 2010    SSS (sick sinus syndrome) ([address] 2014    Stroke (cerebrum) ([facility]     TIA 2011    Stroke, embolic ([address]     Syncope and collapse 2011    Unspecified malignant neoplasm of skin, site unspecified      Past Surgical History:   Procedure Laterality Date    CARDIAC SURG PROCEDURE UNLIST      bypass x 5; 2 stents    COLONOSCOPY      EGD      esophageal ulcer    HX CHOLECYSTECTOMY      biliary dyskinesia    HX CORONARY ARTERY BYPASS GRAFT      HX CORONARY STENT PLACEMENT      HX HEART CATHETERIZATION  2013    no intervention    HX HEART CATHETERIZATION  2014    no intervention    HX HEART CATHETERIZATION      MULTIPLE ([place])    HX ORTHOPAEDIC      knee surgery x 2 left    HX OTHER SURGICAL      nerve in back    HX PACEMAKER      OK LEFT HEART CATH,PERCUTANEOUS  2010    native circ x 1      Family History   Problem Relation Age of Onset    Diabetes Mother     Heart Disease Mother     Heart Disease Sister     Cancer Brother         Eye    Heart Disease Brother     Migraines Brother      Social History     Tobacco Use    Smoking status: Former Smoker     Packs/day: 3.00     Years: 40.00     Pack years: 120.00     Last attempt to quit: 1999     Years since quittin.6    Smokeless tobacco: Never Used   Substance Use Topics    Alcohol use:  No Current Facility-Administered Medications   Medication Dose Route Frequency Provider Last Rate Last Dose    levETIRAcetam (KEPPRA) tablet 1,000 mg  1,000 mg Oral Q12H Miley Shaver MD        0.9% sodium chloride infusion  75 mL/hr IntraVENous CONTINUOUS Chuck Rea MD 75 mL/hr at 08/12/20 1019 75 mL/hr at 08/12/20 1019    nitroglycerin (NITROSTAT) tablet 0.4 mg  0.4 mg SubLINGual PRN Sami Ramirez NP   0.4 mg at 08/10/20 0645    sodium chloride (NS) flush 5-40 mL  5-40 mL IntraVENous Q8H Cecilio Donato, DO   10 mL at 08/12/20 0527    sodium chloride (NS) flush 5-40 mL  5-40 mL IntraVENous PRN Cecilio Donato, DO        clopidogreL (PLAVIX) tablet 75 mg  75 mg Oral DAILY Kinga Cecilio, DO   75 mg at 08/12/20 0811    pantoprazole (PROTONIX) tablet 40 mg  40 mg Oral ACB Ceciilo Donato, DO   40 mg at 08/12/20 0811    finasteride (PROSCAR) tablet 5 mg  5 mg Oral DAILY Matiasco Cecilio, DO   5 mg at 08/12/20 0811    insulin glargine (LANTUS) injection 30 Units  30 Units SubCUTAneous DAILY [place]iasco Cecilio, DO   30 Units at 08/12/20 9853    isosorbide mononitrate ER (IMDUR) tablet 60 mg  60 mg Oral BID Kinga Cecilio, DO   60 mg at 08/12/20 0811    losartan (COZAAR) tablet 50 mg  50 mg Oral DAILY Ciesco, Cecilio, DO   50 mg at 08/12/20 0811    atorvastatin (LIPITOR) tablet 40 mg  40 mg Oral DAILY Ciesco Cecilio, DO   40 mg at 08/12/20 0811    pregabalin (LYRICA) capsule 300 mg  300 mg Oral BID Ciesco Cecilio, DO   300 mg at 08/12/20 7579    ranolazine ER (RANEXA) tablet 500 mg  500 mg Oral BID Ciesco Cecilio, DO   500 mg at 08/12/20 0811    tamsulosin (FLOMAX) capsule 0.4 mg  0.4 mg Oral DAILY Meetaesco, Cecilio, DO   0.4 mg at 08/12/20 0811    traZODone (DESYREL) tablet 100 mg  100 mg Oral QHS Cecilio Donaot DO   100 mg at 08/11/20 2105    insulin lispro (HUMALOG) injection   SubCUTAneous TIDAC Cecilio Donaot DO   Stopped at 08/12/20 0730    acetaminophen (TYLENOL) suppository 650 mg  650 mg Rectal Q4H PRN Tanesha Donato DO 650 mg at 08/10/20 1308    aspirin chewable tablet 81 mg  81 mg Oral DAILY Cecilio Donato DO   81 mg at 08/12/20 4421    HYDROcodone-acetaminophen (NORCO)  mg tablet 1 Tab  1 Tab Oral Q6H PRN Cony Arreguin MD   1 Tab at 08/12/20 0630        Allergies   Allergen Reactions    Beta-Blockers (Beta-Adrenergic Blocking Agts) Other (comments)     \"Very low blood pressures with every one they tried\"    Shellfish Derived Angioedema     Only shrimp causes reaction. Can eat all other shellfish    Xanax [Alprazolam] Other (comments)     Totally changes his personality       Review of Systems:  CONSTITUTIONAL: No weight loss, fever, chills, but positive left-sided weakness. HEENT: Eyes: No visual loss, blurred vision, double vision or yellow sclerae. Ears, Nose, Throat: No hearing loss, sneezing, congestion, runny nose or sore throat. SKIN: No rash or itching. CARDIOVASCULAR: No chest pain, chest pressure or chest discomfort. No palpitations or edema. RESPIRATORY: No shortness of breath, cough or sputum. Objective:     Vitals:    08/12/20 0020 08/12/20 0331 08/12/20 0707 08/12/20 0800   BP: 136/60 110/56 125/64    Pulse: 70 69 70 71   Resp: 18 18 14    Temp: 98.6 °F (37 °C) 99 °F (37.2 °C) 98.8 °F (37.1 °C)    SpO2: 96% 95% 95%    Weight:       Height:            Physical Exam:  General - Well developed, well nourished, in no apparent distress. Pleasant and conversant. HEENT - Normocephalic, atraumatic. Neck - Supple without masses     Neurological examination - Comprehension, attention , memory and reasoning are intact. Language and speech are normal. On cranial nerve examination pupils are equal round and reactive to light. Visual acuity is adequate. Visual fields are full to finger confrontation. Extraocular motility is normal. Face is symmetric and sensation is intact to light touch. Hearing is intact to finger rustle bilaterally. Motor examination - There is normal muscle tone and bulk.  Power is full throughout on the right. The patient has 4/5 strength in the left upper limb and 1/5 strength in the left lower limb. Muscle stretch reflexes are diminished throughout. He has decreased sensation to light touch in the left lower limb, distally. Cerebellar examination is normal.  He cannot ambulate or stand. Lab Results   Component Value Date/Time    Cholesterol, total 125 08/11/2020 04:32 AM    HDL Cholesterol 43 08/11/2020 04:32 AM    LDL, calculated 64.2 08/11/2020 04:32 AM    VLDL, calculated 17.8 08/11/2020 04:32 AM    Triglyceride 89 08/11/2020 04:32 AM    CHOL/HDL Ratio 2.9 08/11/2020 04:32 AM        Lab Results   Component Value Date/Time    Hemoglobin A1c 9.1 (H) 08/11/2020 04:32 AM        Results for orders placed or performed during the hospital encounter of 08/10/20   EKG, 12 LEAD, INITIAL   Result Value Ref Range    Ventricular Rate 97 BPM    Atrial Rate 97 BPM    P-R Interval 228 ms    QRS Duration 128 ms    Q-T Interval 336 ms    QTC Calculation (Bezet) 426 ms    Calculated P Axis 67 degrees    Calculated R Axis -88 degrees    Calculated T Axis 92 degrees    Diagnosis       Atrial-sensed ventricular-paced rhythm with prolonged AV conduction  Abnormal ECG  When compared with ECG of 09-AUG-2020 10:07,  Current rhythm paced  Confirmed by Jefry Acevedo MD (), CONRADO ([id number]) on 8/10/2020 11:00:27 AM     Results for orders placed or performed in visit on 04/10/20   AMB POC EKG ROUTINE W/ 12 LEADS, INTER & REP    Impression    Electronic ventricular pacemaker  -possibly demand type   Pacemaker ECG, No further analysis   INSUFFICIENT DATA          Most recent CTA Personally Reviewed  Results from Hospital Encounter encounter on 08/10/20   CTA CODE NEURO HEAD AND NECK W CONT    Narrative Title:  CT arteriogram of the neck and head. Indication: Unresponsive. Altered mental status. .    Technique: Axial images of the neck and head were obtained after the uneventful   administration of intravenous iodinated contrast media. Contrast was used to  best identify the arterial structures. Images were reviewed on a separate, free  standing, three-dimensional workstation as per the referring physicians request.       All stenosis percentages derived by comparing the narrowest segment with the  distal Internal Carotid Artery luminal diameter, as described in the [place]  American Symptomatic Carotid Endarterectomy Trial (NASCET) criteria. All CT scans at this facility are performed using dose reduction/dose modulation  techniques, as appropriate the performed exam, including the following:   Automated Exposure Control; Adjustment of the mA and/or kV according to patient  size (this includes techniques or standardized protocols for targeted exams  where dose is matched to indication/reason for exam); and Use of Iterative  Reconstruction Technique. Comparison: CTA 2013. [facility] Findings:     Lungs:  Patchy groundglass opacities in the bilateral upper lobes, right much  greater than left. No pleural effusions. No pneumothorax. No suspicious  pulmonary nodules. .    Bones:  No osseous destruction. . Mild degenerative changes in the cervical  spine. Paranasal sinuses:  Paranasal sinuses are clear. .    Brain:  No midline shift. No enhancing mass lesion or hydrocephalus. .    Soft tissues: The esophagus appears patulous. Small amount of debris present  within the esophagus. Other: Prior median sternotomy. Dural venous sinuses:  Patent. Aortic arch:  Mild to moderate stenosis at the origin of the right innominate  artery. .    Right brachiocephalic artery:  No significant stenosis or occlusion. .  . Right subclavian artery:  No significant stenosis or occlusion. .  . Left subclavian artery:  No significant stenosis or occlusion. .  . Right common carotid artery:  There is a 60% stenosis of the distal right common  carotid artery as it approaches the carotid bulb. .  .     Right external carotid artery:  No significant stenosis or occlusion. .  . Right internal carotid artery:  There is an approximately 50% stenosis at the  origin of the right internal carotid artery with associated dense calcific  atherosclerosis. Vascular calcifications are present along the carotid siphon. .   . Left common carotid artery: Moderate to high-grade stenosis at the origin of the  left common carotid artery. The degree of stenosis is difficult to quantify due  to the presence of dense calcifications. Left external carotid artery:  No significant stenosis or occlusion. .  . Left internal carotid artery:  Mild, approximate 40-50%, stenosis at the origin  of the left internal carotid artery. Vascular ossification along the carotid  siphon. .      Right vertebral artery:  Mild stenosis at the origin of the right vertebral  artery. .. Left vertebral artery:  There is a 50% stenosis at the origin of the left  vertebral artery. . Left vertebral artery is dominant. Basilar artery:  No significant stenosis, occlusion, or aneurysm. .      Right middle cerebral artery:  No significant stenosis, occlusion, or aneurysm. Right anterior cerebral artery:  No significant stenosis, occlusion, or  aneurysm. Andrea Sachin Anterior communicating artery: No significant stenosis, occlusion, or aneurysm. Andrea Sachin Left middle cerebral artery:  No significant stenosis, occlusion, or aneurysm. Andrea Sachin Left anterior cerebral artery:  No significant stenosis, occlusion, or  aneurysm. .      Right posterior communicating artery: No significant stenosis, occlusion, or  aneurysm. Andrea Sachin Left posterior communicating artery:  No significant stenosis, occlusion, or  aneurysm. .      Right posterior cerebral artery:  No significant stenosis, occlusion, or  aneurysm. Andrea Sachin Left posterior cerebral artery:  No significant stenosis, occlusion, or  aneurysm. .        Impression Impression:     1. No acute large vessel occlusion.   2.  Moderate to high-grade stenosis at the origin of the left common carotid  artery. The degree of stenosis is difficult to quantify due to the presence of  dense calcifications. 3.  Bilateral ICA origin stenoses, approximately 50%. 4.  There is a 60% stenosis of the distal right common carotid artery   5. There is a 50% stenosis at the origin of the left vertebral artery. Mild  stenosis at the origin of the right vertebral artery. Left vertebral artery is  dominant. 6.  Mild to moderate stenosis at the origin of the right innominate artery. 7.  Patchy groundglass opacities in the bilateral upper lobes, right much  greater than left. These findings are favored to represent aspiration. A small  amount of debris is present within the esophagus. Viral pneumonia (COVID) is not  excluded. Findings were called to Dr. Patel  by Dr. Tiffany Bravo on 8/10/2020 at 1003 hours  via telephone           Assessment:     68-year-old man with multiple vascular risk factors including coronary artery disease and intracranial and extracranial atherosclerosis. Neurology has been consulted to help manage antithrombotics. Plan:     Repeat CT scan of the head (sensitivity increases with time and an infarct would be expected to be seen by now)     In regards to antithrombotics, we generally avoid using anticoagulation with more than one antiplatelet medication. We will repeat CT scan of the head first and if a small infarct is seen, or if no infarct is seen, then resume Eliquis and consider stopping either aspirin or clopidogrel. Cardiology should be made aware. Recommend lowering Keppra dose to 500 mg twice daily.   Can also consider discontinuing because if this was a seizure it was likely provoked in the setting of hyperglycemia    Signed By: Frankey Blackbird,      August 12, 2020

## 2020-08-12 NOTE — PROGRESS NOTES
[address]   [address] [place], [place] [address] [address] FAX: [phone]      Vascular Surgery Progress Note    Admit Date: 8/10/2020  POD * No surgery found *    Procedure:  * No surgery found *      Subjective:     Patient has no new complaints. Still c/o left leg weakness but states this has been present since his previous stroke. Mr. Santiago Sheth states, Oneyda Chirinos it a few days and it will be better. It always does. \"    Objective:     Blood pressure 135/68, pulse 69, temperature 97.9 °F (36.6 °C), resp. rate 14, height 5' 7\" (1.702 m), weight 221 lb (100.2 kg), SpO2 98 %.     Temp (24hrs), Av.6 °F (37 °C), Min:97.5 °F (36.4 °C), Max:99.9 °F (37.7 °C)      Physical Exam:  GENERAL: alert, cooperative, no distress, appears stated age, LUNG:  clear to auscultation bilaterally, HEART:  regular rate and rhythm, S1, S2 normal, no murmur, click, rub or gallop and EXTREMITIES:  Decreased sensation to LLE  Labs:   Recent Labs     20  0557   HGB 10.5*   WBC 6.2   K 3.8   *       Data Review: images and reports reviewed  Current Facility-Administered Medications   Medication Dose Route Frequency    [START ON 2020] levETIRAcetam (KEPPRA) tablet 500 mg  500 mg Oral BID    apixaban (ELIQUIS) tablet 5 mg  5 mg Oral Q12H    nitroglycerin (NITROSTAT) tablet 0.4 mg  0.4 mg SubLINGual PRN    sodium chloride (NS) flush 5-40 mL  5-40 mL IntraVENous Q8H    sodium chloride (NS) flush 5-40 mL  5-40 mL IntraVENous PRN    pantoprazole (PROTONIX) tablet 40 mg  40 mg Oral ACB    finasteride (PROSCAR) tablet 5 mg  5 mg Oral DAILY    insulin glargine (LANTUS) injection 30 Units  30 Units SubCUTAneous DAILY    isosorbide mononitrate ER (IMDUR) tablet 60 mg  60 mg Oral BID    losartan (COZAAR) tablet 50 mg  50 mg Oral DAILY    atorvastatin (LIPITOR) tablet 40 mg  40 mg Oral DAILY    pregabalin (LYRICA) capsule 300 mg  300 mg Oral BID    ranolazine ER (RANEXA) tablet 500 mg  500 mg Oral BID    tamsulosin (FLOMAX) capsule 0.4 mg  0.4 mg Oral DAILY    traZODone (DESYREL) tablet 100 mg  100 mg Oral QHS    insulin lispro (HUMALOG) injection   SubCUTAneous TIDAC    acetaminophen (TYLENOL) suppository 650 mg  650 mg Rectal Q4H PRN    HYDROcodone-acetaminophen (NORCO)  mg tablet 1 Tab  1 Tab Oral Q6H PRN     Assessment:     Principal Problem:    Stroke-like symptoms (8/10/2020)    Active Problems:    CAD (coronary artery disease) (5/4/2009)      Dyslipidemia (5/4/2009)      Diabetes mellitus ([facility] (5/4/2009)      Dilated Cardiomyopathy,Ischemic (2/26/2010)      Paroxysmal atrial fibrillation ([address] (11/30/2010)      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)      Diabetes (HCC) ()      GERD (gastroesophageal reflux disease) ()      Sick sinus syndrome ([address] (9/6/2007)      Chronic renal failure, stage 3 (moderate) ([facility] (4/21/2020)        Plan/Recommendations/Medical Decision Making:     Continue present treatment   Continue anticoagulation per primary team and neurology  No surgical intervention from a vascular standpoint at this time  Will need continued carotid surveillance as an outpatient      Signed By: Gustavo Altamirano NP     August 12, 2020

## 2020-08-12 NOTE — PROGRESS NOTES
End of Shift Note    Patient resting in bed, watching tv. Complained of lower back pain, rating it an 8/10. Administered PRN hydrocodone. On 2l/min oxygen via NC. Breathing even and unlabored. Bed in locked and low position, call light within reach. Preparing to give report to oncoming nurse.

## 2020-08-12 NOTE — PROGRESS NOTES
Pt complains of aching lower back pain rating the pain 10/10. Administered PRN hydrocodone. See MAR. Will continue to monitor.

## 2020-08-12 NOTE — PROGRESS NOTES
SBAR received from Eris Ramirez RN. Patient stable, lying in bed, in no apparent distress. Call light within reach. Елена Blanco

## 2020-08-12 NOTE — PROGRESS NOTES
Patient c/o pain in LLE when SCD's inflate. SCD's taken off patient. Dr. Fabian President notified of c/o LLE pain.

## 2020-08-12 NOTE — PROGRESS NOTES
[facility] CARDIOLOGY PROGRESS NOTE           8/12/2020 4:34 PM    Admit Date: 8/10/2020      Subjective:   A bit better but sx persists. ROS:  Cardiovascular:  As noted above    Objective:      Vitals:    08/12/20 0800 08/12/20 1054 08/12/20 1200 08/12/20 1453   BP:  101/55  135/68   Pulse: 71 70 70 69   Resp:  14  14   Temp:  97.5 °F (36.4 °C)  97.9 °F (36.6 °C)   SpO2:  95%  98%   Weight:       Height:           Physical Exam:  General-No Acute Distress    Data Review:   Recent Labs     08/12/20  0557 08/11/20  0432 08/10/20  0649    145 140   K 3.8 3.8 4.3   MG  --   --  2.0   BUN 12 14 14   CREA 0.92 0.93 1.14   * 120* 319*   WBC 6.2 8.7 7.2   HGB 10.5* 10.6* 12.0*   HCT 31.5* 33.0* 37.2*    146* 158   INR  --   --  1.0   CHOL  --  125  --    LDLC  --  64.2  --    HDL  --  43  --        Assessment/Plan:     Principal Problem:    Stroke-like symptoms (8/10/2020)        Active Problems:    CAD (coronary artery disease) (5/4/2009)          Dyslipidemia (5/4/2009)          Diabetes mellitus ([facility] (5/4/2009)          Dilated Cardiomyopathy,Ischemic (2/26/2010)          Paroxysmal atrial fibrillation (HCC) (11/30/2010)          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)          Diabetes (HCC) ()          GERD (gastroesophageal reflux disease) ()          Sick sinus syndrome ([facility] (9/6/2007)          Chronic renal failure, stage 3 (moderate) (HCC) (4/21/2020)      ////    Stop Plavix  Stop order for IVF  For rehab transfer.           Mendel Goldsmith MD  8/12/2020 4:34 PM

## 2020-08-12 NOTE — PROGRESS NOTES
Problem: Neurolinguistics Impaired (Adult)  Goal: *Acute Goals and Plan of Care (Insert Text)  Description: LTG: Pt will problem solve, monitor and evaluate solutions in everyday situations without assistance with 90% Accuracy across environments. STG: Pt will demonstrate functional problem solving strategies in various aspects of daily living without assistance with 90% accuracy during session. STG: Pt will increase short term memory skills for new information learned with minimal assistance 90% of the time across environments. STG: Pt will demonstrate increase in word retrieval skills without assistance with 90% accuracy across environments. STG: Pt will complete higher level cognitive assessment without assistance with 0 errors during session. Outcome: Progressing Towards Goal   SPEECH LANGUAGE PATHOLOGY: SPEECH-LANGUAGE/COGNITION: Initial Assessment    NAME/AGE/GENDER: Ayla Cordon is a 79 y.o. male  DATE: 8/12/2020  PRIMARY DIAGNOSIS: Chest pain [R07.9]  Stroke-like symptoms [R29.90]       ICD-10: Treatment Diagnosis: R41.841 Cognitive-Communication Deficit    RECOMMENDATIONS   TREATMENT RECOMMENDATIONS:   Higher level cognitive evaluation and treatment     STRATEGIES:   Provide extra time for responses if needed     EDUCATION:  Recommendations discussed with Patient     Continuation of Skilled Services/Medical Necessity:  Patient is expected to demonstrate progress in cognitive ability to decrease assistance required communication and increase independence with activities of daily living. Patient continues to require skilled intervention due to cognition. RECOMMENDATIONS for CONTINUED SPEECH THERAPY: YES: Anticipate need for ongoing speech therapy during this hospitalization. ASSESSMENT   Patient presents with mild cognitive impairments.      Recommend continued speech therapy services to assess and treat higher level cognitive abilities/functions secondary to prior level of function. REHABILITATION POTENTIAL FOR STATED GOALS: Good    PLAN    FREQUENCY/DURATION: Continue to follow patient 2 times a week for duration of hospital stay to address above goals. - Recommendations for next treatment session: Next treatment will address higher level cognition    SUBJECTIVE   alert    History of Present Injury/Illness: Mr. Tate Delgado  has a past medical history of Acute kidney failure, unspecified ([facility] (9/17/2010), Anxiety state, unspecified (11/14/2013), 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), CAD (coronary artery disease), Carotid artery disease ([facility] (03/28/2019), Carotid artery stenosis without cerebral infarction (07/07/2008), CVA (cerebral infarction) (5/25/2011), Diabetes ([address] Diabetes mellitus (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 [address] (4/75/2189), Syncope and collapse (8/19/2011), and Unspecified malignant neoplasm of skin, site unspecified. Gerri Hoover  He also  has a past surgical history that includes hx pacemaker; hx orthopaedic; egd (2010); hx cholecystectomy; colonoscopy (1990); pr cardiac surg procedure unlist; pr left heart cath,percutaneous (11/30/2010); hx heart catheterization (4/30/2013); hx heart catheterization (6/23/2014); hx heart catheterization; hx other surgical; hx coronary stent placement; and hx coronary artery bypass graft. Problem List:  (Impairments causing functional limitations):  cognition    Previous Speech Therapy NONE REPORTED    Orientation:   Person  Place  Situation    Pain: Pain Scale 1: Visual  Pain Intensity 1: 0  Pain Location 1: Back  Pain Intervention(s) 1: Repositioned    OBJECTIVE   Pt completed basic cognitive-linguistic evaluation with performance as follows: simple yes/no questions 5/5, complex yes/no questions 5/5, 1-2 step commands 10/10, 3 step commands 5/5, named 10 items per minute in food category, problem solving questions 4/5, reasoning questions 4/5 and recalled 1/3 items in short term memory task. Pt presents with noted cognitive-linguistic deficits in problem solving, reasoning, word retrieval, and memory. Pt would benefit from ST to address these cognitive-linguistic deficits and assess higher level cognitive abilities secondary to prior level of function. Tool Used: MODIFIED MILLY SCALE (mRS)   Score   No Symptoms  [] 0   No significant disability despite symptoms; able to carry out all usual duties and activities  [] 1   Slight disability; unable to carry out all previous activities but able to look after own affairs without assistance. [x] 2   Moderate disability; requiring some help but able to walk without assistance  [] 3   Moderately severe disability; unable to walk without assistance and unable to attend to own bodily needs without assistance  [] 4   Severe disability; bedridden, incontinent, and requiring constant nursing care and attention  [] 5      Score:  Initial: 2    Interpretation of Tool: The Modified [place] Scale is a 7-point scaled used to quantify level of disability as it relates to a patient's functional abilities.      Current Medications:   No current facility-administered medications on file prior to encounter. Current Outpatient Medications on File Prior to Encounter   Medication Sig Dispense Refill    isosorbide mononitrate ER (Imdur) 60 mg CR tablet Take 1 Tab by mouth two (2) times a day. 180 Tab 3    pravastatin (PRAVACHOL) 80 mg tablet Take 1 Tab by mouth nightly. 90 Tab 3    losartan (COZAAR) 50 mg tablet Take 1 Tab by mouth daily. 30 Tab 5    carvediloL (Coreg) 12.5 mg tablet Take 1 Tab by mouth two (2) times daily (with meals). 180 Tab 3    clopidogreL (PLAVIX) 75 mg tab Take 1 Tab by mouth daily. 90 Tab 3    ranolazine ER (RANEXA) 500 mg SR tablet Take 1 Tab by mouth two (2) times a day. 180 Tab 3    apixaban (ELIQUIS) 5 mg tablet Take 1 Tab by mouth two (2) times a day. 180 Tab 3    tamsulosin (FLOMAX) 0.4 mg capsule TAKE 1 CAPSULE BY MOUTH DAILY 90 Cap 11    finasteride (PROSCAR) 5 mg tablet TAKE 1 TABLET BY MOUTH DAILY 90 Tab 11    cyclobenzaprine (FLEXERIL) 10 mg tablet Take 10 mg by mouth two (2) times daily as needed for Muscle Spasm(s). esomeprazole (NEXIUM) 40 mg capsule Take  by mouth daily. pregabalin (LYRICA) 300 mg capsule Take 300 mg by mouth two (2) times a day. fluticasone (FLONASE) 50 mcg/actuation nasal spray 2 Sprays by Both Nostrils route once. HYDROcodone-acetaminophen (NORCO)  mg tablet Take 1 Tab by mouth four (4) times daily as needed. trazodone (DESYREL) 100 mg tablet take 100 mg by mouth nightly. nitroglycerin (NITROSTAT) 0.4 mg SL tablet 1 Tab by SubLINGual route every five (5) minutes as needed for Chest Pain. Up to 3 doses. 25 Tab 2    insulin detemir U-100 (Levemir U-100 Insulin) 100 unit/mL injection 60 Units by SubCUTAneous route two (2) times a day. insulin aspart (NOVOLOG) 100 unit/mL injection 25 Units by SubCUTAneous route Before breakfast, lunch, and dinner. INTERDISCIPLINARY COLLABORATION: Registered Nurse  PRECAUTIONS/ALLERGIES: Beta-blockers (beta-adrenergic blocking agts);  Shellfish derived; and Xanax [alprazolam]     SAFETY:  After treatment position/precautions:  Upright in bed  Call light within reach    Total Treatment Duration:     Time In: 0905  Time Out: [address] MA/CCC/SLP

## 2020-08-12 NOTE — PROGRESS NOTES
Hospitalist Note     Admit Date:  8/10/2020  6:15 AM   Name:  Shady Mcconnell   Age:  79 y.o.  :  1950   MRN:  [id number]   PCP:  Javan Win MD  Treatment Team: Attending Provider: Holley Way MD; Consulting Provider: Shade Marrufo NP; Consulting Provider: Tonya Trejo MD; Consulting Provider: Cherelle Peres MD; Care Manager: Nathaniel Ramirez RN; Utilization Review: Felix Ordonez RN; Primary Nurse: Jordyn Summers; Primary Nurse: Kenneth Louis; Consulting Provider: Angy Jesus DO; Occupational Therapist: Jere Piña; Consulting Provider: Rui Carrera MD    HPI/Subjective:   80 yo CM with past history of SSS s/p PPM, prior CVAs with no residual deficits, MVCAD s/p CABG, carotid artery stenosis, DM type II, HTN, HLD, GERD who presented to ED after code S in the cath lab. Patient was supposed to have a scheduled outpatient cardiac cath 8/10. Reported to be off balance and unlike himself by his son . When patient arrived to cath lab he was noted to be encephalopathic, his blood sugar was ~300. He was given Narcan with no improvement. Of note patient held home dose Eliquis and insulin due to heart cath, and was supposed to take 4 baby aspirin which she forgot to take. Code S called. CT head was unremarkable. Stat tele-neurology consulted for concern for seizure-like activity and patient was loaded with Keppra. He was not deemed a candidate for thrombolytics. Patient has ongoing left-sided weakness worse in his lower extremity.    Patient seen and examined at bedside in no acute distress. Patient reports continued to decreased strength in his left upper extremity. He continues to report he cannot move his left lower extremity at all. He reports decreased sensation as well. Patient denies any chest pain or shortness of breath.     Objective:     Patient Vitals for the past 24 hrs:   Temp Pulse Resp BP SpO2   20 1200  70    08/12/20 1054 97.5 °F (36.4 °C) 70 14 101/55 95 %   08/12/20 0800  71      08/12/20 0707 98.8 °F (37.1 °C) 70 14 125/64 95 %   08/12/20 0331 99 °F (37.2 °C) 69 18 110/56 95 %   08/12/20 0020 98.6 °F (37 °C) 70 18 136/60 96 %   08/11/20 1938 99.9 °F (37.7 °C) 78 18 130/67 95 %   08/11/20 1553 98.1 °F (36.7 °C) 70 19 122/71 95 %     Oxygen Therapy  O2 Sat (%): 95 % (08/12/20 1054)  Pulse via Oximetry: 74 beats per minute (08/10/20 1030)  O2 Device: Room air (08/11/20 0810)  O2 Flow Rate (L/min): 2 l/min (08/10/20 0654)    Estimated body mass index is 34.61 kg/m² as calculated from the following:    Height as of this encounter: 5' 7\" (1.702 m). Weight as of this encounter: 100.2 kg (221 lb). Intake/Output Summary (Last 24 hours) at 8/12/2020 1409  Last data filed at 8/12/2020 0020  Gross per 24 hour   Intake    Output 1500 ml   Net -1500 ml       *Note that automatically entered I/Os may not be accurate; dependent on patient compliance with collection and accurate  by techs. General:    Male in no acute distress  CV:   RRR. No murmur, rub, or gallop. Lungs:   CTAB. No wheezing, rhonchi, or rales. Abdomen:   Soft, nontender, nondistended. Extremities: Warm and dry. No cyanosis or edema. Skin:     No rashes or jaundice.    Neuro:  Decreased  strength LUE.  0/5 strength LLE; decreased sensation LLE    Data Review:  I have reviewed all labs, meds, and studies from the last 24 hours:    Recent Results (from the past 24 hour(s))   GLUCOSE, POC    Collection Time: 08/11/20  3:46 PM   Result Value Ref Range    Glucose (POC) 199 (H) 65 - 100 mg/dL   GLUCOSE, POC    Collection Time: 08/11/20  9:07 PM   Result Value Ref Range    Glucose (POC) 216 (H) 65 - 100 mg/dL   CBC WITH AUTOMATED DIFF    Collection Time: 08/12/20  5:57 AM   Result Value Ref Range    WBC 6.2 4.3 - 11.1 K/uL    RBC 3.73 (L) 4.23 - 5.6 M/uL    HGB 10.5 (L) 13.6 - 17.2 g/dL    HCT 31.5 (L) 41.1 - 50.3 %    MCV 84.5 79.6 - 97.8 FL    MCH 28.2 26.1 - 32.9 PG    MCHC 33.3 31.4 - 35.0 g/dL    RDW 14.0 11.9 - 14.6 %    PLATELET 842 809 - 628 K/uL    MPV 10.7 9.4 - 12.3 FL    ABSOLUTE NRBC 0.00 0.0 - 0.2 K/uL    DF AUTOMATED      NEUTROPHILS 76 43 - 78 %    LYMPHOCYTES 15 13 - 44 %    MONOCYTES 8 4.0 - 12.0 %    EOSINOPHILS 1 0.5 - 7.8 %    BASOPHILS 0 0.0 - 2.0 %    IMMATURE GRANULOCYTES 1 0.0 - 5.0 %    ABS. NEUTROPHILS 4.7 1.7 - 8.2 K/UL    ABS. LYMPHOCYTES 0.9 0.5 - 4.6 K/UL    ABS. MONOCYTES 0.5 0.1 - 1.3 K/UL    ABS. EOSINOPHILS 0.0 0.0 - 0.8 K/UL    ABS. BASOPHILS 0.0 0.0 - 0.2 K/UL    ABS. IMM.  GRANS. 0.1 0.0 - 0.5 K/UL   METABOLIC PANEL, BASIC    Collection Time: 20  5:57 AM   Result Value Ref Range    Sodium 141 136 - 145 mmol/L    Potassium 3.8 3.5 - 5.1 mmol/L    Chloride 109 (H) 98 - 107 mmol/L    CO2 27 21 - 32 mmol/L    Anion gap 5 (L) 7 - 16 mmol/L    Glucose 131 (H) 65 - 100 mg/dL    BUN 12 8 - 23 MG/DL    Creatinine 0.92 0.8 - 1.5 MG/DL    GFR est AA >60 >60 ml/min/1.73m2    GFR est non-AA >60 >60 ml/min/1.73m2    Calcium 8.4 8.3 - 10.4 MG/DL   GLUCOSE, POC    Collection Time: 20  7:09 AM   Result Value Ref Range    Glucose (POC) 145 (H) 65 - 100 mg/dL   GLUCOSE, POC    Collection Time: 20 10:57 AM   Result Value Ref Range    Glucose (POC) 237 (H) 65 - 100 mg/dL        All Micro Results     Procedure Component Value Units Date/Time    EMERGENT DISEASE PANEL [[id number]] Collected:  08/10/20 1115    Order Status:  Canceled           Results for orders placed or performed during the hospital encounter of 08/10/20   2D ECHO COMPLETE ADULT (TTE) W OR [address] [facility]  [address] [facility], [facility]  [phone]    Transthoracic Echocardiogram  2D, M-mode, Doppler, and Color Doppler    Patient: Kodak Snyder  MR #: [id number]  : 1950  Age: 79 years  Gender: Male  Study date: 12-Aug-2020  Account #: [de-identified]  Height: 67 in  Weight: 220.4 lb  BSA: 2.11 mï¾²  Status:Routine  Location: 829  BP: 110/ 56    Allergies: BETA-BLOCKERS (BETA-ADRENERGIC BLOCKING AGTS), SHELLFISH DERIVED,  ALPRAZOLAM    Sonographer:  CYRUS Ray  Group:  [facility]  Referring Physician:  Angeli Jennings MD  Reading Physician:  Shira Cano MD    INDICATIONS: CVA  *Technically difficult study. Limited apical window. PROCEDURE: This was a routine study. A transthoracic echocardiogram was  performed. The study included complete 2D imaging, M-mode, complete spectral  Doppler, and color Doppler. Intravenous contrast (Definity) was administered. Intravenous contrast (agitated saline) was administered. This was a   technically  difficult study. LEFT VENTRICLE: Size was normal. Systolic function was normal. Ejection  fraction was estimated in the range of 60 % to 65 %. There were no regional  wall motion abnormalities. There was mild concentric hypertrophy. Doppler  parameters were consistent with diastolic dysfunction. Avg E/e': 16.8. RIGHT VENTRICLE: The ventricle was mildly dilated. Estimated peak pressure   was  in the range of 30-35 mmHg. LEFT ATRIUM: The atrium was mildly dilated. ATRIAL SEPTUM: There was no right-to-left shunt. Contrast injection was  performed. There was no right-to-left shunt, with provocative maneuvers to  increase right atrial pressure. RIGHT ATRIUM: Not well visualized. SYSTEMIC VEINS: IVC: The inferior vena cava was normal in size and course. The  respirophasic change in diameter was more than 50%. AORTIC VALVE: The valve was trileaflet. Leaflets exhibited mild sclerosis. There was no evidence for stenosis. There was mild regurgitation. MITRAL VALVE: Valve structure was normal. There was no evidence for stenosis. There was trivial regurgitation. TRICUSPID VALVE: The valve structure was normal. There was no evidence for  stenosis. There was mild regurgitation. PULMONIC VALVE: Not well visualized. There was no evidence for stenosis. There  was trivial regurgitation. PERICARDIUM: There was no pericardial effusion. AORTA: The root exhibited normal size. SUMMARY:    -  Left ventricle: Systolic function was normal. Ejection fraction was  estimated in the range of 60 % to 65 %. There were no regional wall motion  abnormalities. There was mild concentric hypertrophy.    -  Right ventricle: The ventricle was mildly dilated. -  Left atrium: The atrium was mildly dilated. -  Atrial septum: There was no right-to-left shunt. Contrast injection was  performed. There was no right-to-left shunt, with provocative maneuvers to  increase right atrial pressure. -  Inferior vena cava, hepatic veins: The respirophasic change in diameter   was  more than 50%. -  Tricuspid valve: There was mild regurgitation. SYSTEM MEASUREMENT TABLES    2D mode  AoR Diam (2D): 2.7 cm  LA Dimension (2D): 4 cm  Left Atrium Systolic Volume Index; Method of Disks, Biplane; 2D mode;: 51.7  ml/m2  IVS/LVPW (2D): 1.1  IVSd (2D): 1.5 cm  LVIDd (2D): 4.9 cm  LVIDs (2D): 3.2 cm  LVOT Area (2D): 2.8 cm2  LVPWd (2D): 1.4 cm  RVIDd (2D): 3.6 cm    Tissue Doppler Imaging  LV [address]; Lateral MA (TDI): 5.1 cm/s  LV [address]; Medial MA (TDI): 5.5 cm/s    Unspecified Scan Mode  Peak Grad; Mean; Antegrade Flow: 14 mm[Hg]  Vmax;  Antegrade Flow: 187 cm/s  LVOT Diam: 1.9 cm  MV [address]/LV [address] Tissue [address] E-Wave; Lateral MA: 17.4  MV Peak Lane/LV Peak Tissue [address] E-Wave; Medial MA: 16.1    Prepared and signed by    Yamileth Jon MD  Signed 12-Aug-2020 14:06:00         Current Meds:  Current Facility-Administered Medications   Medication Dose Route Frequency    0.9% sodium chloride infusion  75 mL/hr IntraVENous CONTINUOUS    nitroglycerin (NITROSTAT) tablet 0.4 mg  0.4 mg SubLINGual PRN    sodium chloride (NS) flush 5-40 mL  5-40 mL IntraVENous Q8H    sodium chloride (NS) flush 5-40 mL  5-40 mL IntraVENous PRN    clopidogreL (PLAVIX) tablet 75 mg  75 mg Oral DAILY    pantoprazole (PROTONIX) tablet 40 mg  40 mg Oral ACB    finasteride (PROSCAR) tablet 5 mg  5 mg Oral DAILY    insulin glargine (LANTUS) injection 30 Units  30 Units SubCUTAneous DAILY    isosorbide mononitrate ER (IMDUR) tablet 60 mg  60 mg Oral BID    losartan (COZAAR) tablet 50 mg  50 mg Oral DAILY    atorvastatin (LIPITOR) tablet 40 mg  40 mg Oral DAILY    pregabalin (LYRICA) capsule 300 mg  300 mg Oral BID    ranolazine ER (RANEXA) tablet 500 mg  500 mg Oral BID    tamsulosin (FLOMAX) capsule 0.4 mg  0.4 mg Oral DAILY    traZODone (DESYREL) tablet 100 mg  100 mg Oral QHS    insulin lispro (HUMALOG) injection   SubCUTAneous TIDAC    acetaminophen (TYLENOL) suppository 650 mg  650 mg Rectal Q4H PRN    aspirin chewable tablet 81 mg  81 mg Oral DAILY    HYDROcodone-acetaminophen (NORCO)  mg tablet 1 Tab  1 Tab Oral Q6H PRN       Other Studies (last 24 hours):  Ct Head Wo Cont    Result Date: 8/12/2020  Head CT INDICATION: Left-sided weakness Multiple axial images obtained through the brain without intravenous contrast. Radiation dose reduction techniques were used for this study: All CT scans performed at this facility use one or all of the following: Automated exposure control, adjustment of the mA and/or kVp according to patient's size, iterative reconstruction. FINDINGS: There is no significant change compared with 03/10/2020. Intracranial vascular calcification is again noted. There is no CT evidence of acute hemorrhage or infarction. The ventricles are normal in size. There are no extra-axial fluid collections. No masses are seen. The sinuses are clear. There are no bony lesions. IMPRESSION: No CT evidence of acute intracranial abnormality.        Assessment and Plan:     Hospital Problems as of 8/12/2020 Date Reviewed: 8/3/2020          Codes Class Noted - Resolved POA    * (Principal) Stroke-like symptoms ICD-10-CM: R29.90  ICD-9-CM: 781.99  8/10/2020 - Present Unknown        Chronic renal failure, stage 3 (moderate) (HCC) ICD-10-CM: N18.3  ICD-9-CM: 585.3  4/21/2020 - Present Yes        Diabetes ([facility] ICD-10-CM: E11.9  ICD-9-CM: 250.00  Unknown - Present Yes        GERD (gastroesophageal reflux disease) ICD-10-CM: K21.9  ICD-9-CM: 530.81  Unknown - Present Yes        Arteriosclerosis of bypass graft of coronary artery ICD-10-CM: I25.810  ICD-9-CM: 414.04  8/27/2015 - Present Yes        Paroxysmal atrial fibrillation (HCC) (Chronic) ICD-10-CM: I48.0  ICD-9-CM: 427.31  11/30/2010 - Present Yes        Dilated Cardiomyopathy,Ischemic ICD-10-CM: I42.0  ICD-9-CM: 425.4  2/26/2010 - Present Yes        CAD (coronary artery disease) (Chronic) ICD-10-CM: I25.10  ICD-9-CM: 414.00  5/4/2009 - Present Yes        Dyslipidemia (Chronic) ICD-10-CM: E78.5  ICD-9-CM: 272.4  5/4/2009 - Present Yes        Diabetes mellitus (HCC) (Chronic) ICD-10-CM: E11.9  ICD-9-CM: 250.00  5/4/2009 - Present Yes        Sick sinus syndrome ([facility] ICD-10-CM: I49.5  ICD-9-CM: 427.81  9/6/2007 - Present Yes              CVA/stroke/carotid artery stenosis/seizure-like activity  Symptoms persist    CT head: No acute abnormality  Repeat CT head 8/12: No acute abnormality noted  CTA head and neck: Moderate to high-grade stenosis of the origin of the left common carotid artery; bilateral ICA origin stenosis approximately 50%  MRI not possible secondary to incompatible pacemaker  EEG: No abnormality seen  Echo: Pending    Tele-neurology evaluated patient: Not a candidate for TPA  Vascular surgery: Continue ASA, Plavix, and atorvastatin; await neurology recommendations  Cardiology: Resume Eliquis when cleared by neurology  Neurology: Decrease Keppra to 500mg BID; resume eliquis and stop either aspirin or Plavix if no infarct seen on CT    HgbA1c: 9.1    Plan:  -Keppra 500mg BID  -D/C ASA  -Continue Plavix + Statin  -Resume Eliquis   -Telemetry monitoring    Bilateral upper lobe opacities on CT imaging  CTA head and neck: Patchy groundglass opacities in bilateral upper lobes, most likely aspiration, COVID not excluded  COVID-19: Negative  8/10 T-max 101  WBC: 6.2  Afebrile    Plan:  -Monitor  -Will hold on antibiotics at this time unless repeat fever    CAD s/p CABG/chest pain  Was originally scheduled for outpatient [facility]  Cardiology following: Reschedule [facility] in 4 to 6 weeks  Documented allergy to beta-blockers    Plan:  -Continue Plavix, statin, Ranexa, Imdur    Paroxysmal atrial Fibrillation  Currently Rate Controlled   Anticoagulated with Eliquis    Plan:  -Resume Eliquis  -Continue home medications    Diabetes mellitus  -Hold home medications  -POC before meals and at bedtime  -Insulin sliding scale  -Long-acting insulin    GERD  -Continue home medications    DC planning/Dispo: Acute inpatient rehabilitation      Diet:  DIET REGULAR  DVT ppx: SCDs    Signed:  Lupe Davis MD

## 2020-08-12 NOTE — PROGRESS NOTES
STG:  (1.)Mr. Tate Delgado will move from supine to sit and sit to supine , scoot up and down, and roll side to side with CGA x 1  within 4  treatment day(s). (2.)Mr. Tate Delgado will transfer from bed to chair and chair to bed with MOD A x 1 using the least restrictive device within 4 treatment day(s). (3.)Mr. Tate Delgado will ambulate with MOD ASSIST x 1 for 10 feet with the least restrictive device within 4 treatment day(s). LTG:  (1.)Mr. Tate Delgado will move from supine to sit and sit to supine , scoot up and down, and roll side to side in bed with MINIMAL ASSIST within 7 treatment day(s). (2.)Mr. Tate Delgado will transfer from bed to chair and chair to bed with MINIMAL ASSIST using the least restrictive device within 7 treatment day(s). (3.)Mr. Tate Delgado will ambulate with MINIMAL ASSIST for 20 feet with the least restrictive device within 7 treatment day(s). ________________________________________________________________________________________________      PHYSICAL THERAPY: Initial Assessment and AM 8/12/2020  OBSERVATION: PT Visit Days : 1  Payor: Fantasma Haynes / Plan: [address] / Product Type: [employer] /       NAME/AGE/GENDER: Candice Shaver is a 79 y.o. male   PRIMARY DIAGNOSIS: Chest pain [R07.9]  Stroke-like symptoms [R29.90] Stroke-like symptoms Stroke-like symptoms        ICD-10: Treatment Diagnosis:    · Generalized Muscle Weakness (M62.81)  · Other lack of cordination (R27.8)  · Difficulty in walking, Not elsewhere classified (R26.2)  · Other abnormalities of gait and mobility (R26.89)  · Unspecified Lack of Coordination (R27.9)   Precaution/Allergies:  Beta-blockers (beta-adrenergic blocking agts);  Shellfish derived; and Xanax [alprazolam]      ASSESSMENT:     Mr. Tate Delgado   is a pleasant elderly male with above diagnosis and prior CVA's who demonstrates with decreased transfers, ambulation and mobility below his prior functional baseline especially with left sided weakness. All transfers are currently limited at minimal assistance x 2 out of bed to sit and stand followed by ambulation with a single point cane for 5 feet with the deficits stated below. The left LE is limited in function and functional mobility with firing. Skilled PT is indicated for this patient's functional mobility deficits. Patient requires cues to perform exercises correctly for left LE with mostly PROM. Overall good progress towards physical therapy goals is anticipated. Goals listed above are appropriate. PT will continue efforts as patient is still below functional baseline. At this time, patient is appropriate for Co-treatment with occupational therapy due to patient's decreased overall endurance/tolerance levels, as well as need for high level skilled assistance to complete functional transfers/mobility and functional tasks. Savita Pinedatamikadahlia is appropriate for a multidisciplinary co-treatment of PT and OT to address goals of both disciplines. This section established at most recent assessment   PROBLEM LIST (Impairments causing functional limitations):  1. Decreased Strength  2. Decreased ADL/Functional Activities  3. Decreased Transfer Abilities  4. Decreased Ambulation Ability/Technique  5. Decreased Balance  6. Decreased Activity Tolerance  7. Decreased Pacing Skills  8. Decreased Work Simplification/Energy Conservation Techniques  9. Increased Fatigue  10. Decreased Flexibility/Joint Mobility   INTERVENTIONS PLANNED: (Benefits and precautions of physical therapy have been discussed with the patient.)  1. Balance Exercise  2. Bed Mobility  3. Manual Therapy  4. Neuromuscular Re-education/Strengthening  5. Range of Motion (ROM)  6. Therapeutic Activites  7. Therapeutic Exercise/Strengthening  8.  Transfer Training     TREATMENT PLAN: Frequency/Duration: 3 times a week for duration of hospital stay  Rehabilitation Potential For Stated Goals: Good     REHAB RECOMMENDATIONS (at time of discharge pending progress):    Placement: It is my opinion, based on this patient's performance to date, that Mr. Cornelia Gaytan may benefit from intensive therapy at an [address] [place] [address] after discharge due to a probable need for multiple therapy disciplines. .  Equipment:    Walkers, Type: Rolling Walker              HISTORY:   History of Present Injury/Illness (Reason for Referral):  PER MD H&P   78 yo CM with past history of SSS s/p PPM, 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 Patient was supposed to have a scheduled outpatient cardiac cath this morning. His son, at bedside, came to pick him up this morning and says Beth Dilling was just off, I saw that his balance was off and he wasn't acting himself. \" Patient went to bed last night in his usual state of health. He did not take \"my long-acting insulin or my Eliquis because of my heart cath. I was supposed to take 4 baby aspirin but I forgot to take it today. \" When he got to cath lab he was noted to be encephalopathic, his blood sugar was ~300. He was given Narcan with no improvement. Code S called.      ED Course: Code S called. CT head unremarkable. STAT teleneurology consulted, concern for seizure-like activity. He was loaded with Keppra. Deemed not a candidate for thrombolytics. Improved interval left-sided weakness afterwards.   Hospitalist called for admission.      Complete ROS done and is as stated in HPI or otherwise negative  Past Medical History/Comorbidities:   Mr. Cornelia Gaytan  has a past medical history of Acute kidney failure, unspecified ([address] (9/17/2010), Anxiety state, unspecified (11/14/2013), 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), CAD (coronary artery disease), Carotid artery disease ([address] (03/28/2019), Carotid artery stenosis without cerebral infarction (07/07/2008), CVA (cerebral infarction) (5/25/2011), Diabetes ([facility] Diabetes mellitus ([address] (5/4/2009), Dilated Cardiomyopathy,Ischemic (2/26/2010), Dyslipidemia (5/4/2009), Elevated prostate specific antigen (PSA) (11/14/2013), Essential hypertension (11/14/2013), GERD (gastroesophageal reflux disease), Hypertension, Nocturia, Other and unspecified hyperlipidemia, Pacemaker (6/21/2014), Palpitations (2006), Paroxysmal atrial fibrillation ([address] (11/30/2010), Paroxysmal ventricular tachycardia ([address] (11/30/2010), Post PTCA (5/4/2009), Psychiatric disorder, 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 Mr. Mame Rosa  has a past surgical history that includes hx pacemaker; hx orthopaedic; egd (2010); hx cholecystectomy; colonoscopy (1990); pr cardiac surg procedure unlist; pr left heart cath,percutaneous (11/30/2010); hx heart catheterization (4/30/2013); hx heart catheterization (6/23/2014); hx heart catheterization; hx other surgical; hx coronary stent placement; and hx coronary artery bypass graft. Social History/Living Environment:   Home Environment: Other (comment)([place] daughter's property)  One/Two Story Residence: One story  Living Alone: Yes  Support Systems: Family member(s)  Patient Expects to be Discharged to[de-identified] Rehabilitation facility  Current DME Used/Available at Home: Cane, straight  Tub or Shower Type: Shower  Prior Level of Function/Work/Activity:  Prior CVA's but had recovered to use a single point cane functionally.     Dominant Side:         RIGHT  Personal Factors:          Sex:  male        Age: 79 y.o. Number of Personal Factors/Comorbidities that affect the Plan of Care: 1-2: MODERATE COMPLEXITY   EXAMINATION:   Most Recent Physical Functioning:   Gross Assessment:  AROM: Grossly decreased, non-functional  Strength: Grossly decreased, non-functional(left LE)  Coordination: Grossly decreased, non-functional(left LE )               Posture:  Posture (WDL): Exceptions to WDL  Posture Assessment: Cervical, Forward head  Balance:  Sitting: Impaired  Sitting - Static: Fair (occasional)  Sitting - Dynamic: Fair (occasional)  Standing: Impaired  Standing - Static: Poor Bed Mobility:  Rolling: Minimum assistance;Assist x2  Supine to Sit: Minimum assistance;Assist x2  Sit to Supine: Minimum assistance;Assist x2  Scooting: Minimum assistance;Assist x2  Wheelchair Mobility:     Transfers:  Sit to Stand: Maximum assistance  Stand to Sit: Maximum assistance;Assist x2  Bed to Chair: Maximum assistance;Assist x2  Gait:     Base of Support: Center of gravity altered  Speed/Felicia: Delayed; Fluctuations; Pace decreased (<100 feet/min); Shuffled; Slow  Step Length: Left shortened;Right shortened  Swing Pattern: Left asymmetrical;Right asymmetrical  Stance: Left decreased;Right decreased;Time;Weight shift  Gait Abnormalities: Decreased step clearance  Distance (ft): 5 Feet (ft)  Assistive Device: Cane, straight(and BILATERAL MAX A x 2 hand held assistance)  Ambulation - Level of Assistance: Maximum assistance;Assist x2      Body Structures Involved:  1. Metabolic  2. Bones  3. Joints  4. Muscles  5. Ligaments Body Functions Affected:  1. Neuromusculoskeletal  2. Movement Related  3. Skin Related  4. Metobolic/Endocrine Activities and Participation Affected:  1. General Tasks and Demands  2. Communication  3. Mobility  4. Self Care  5.  Community, Social and [place]   Number of elements that affect the Plan of Care: 1-2: LOW COMPLEXITY   CLINICAL PRESENTATION:   Presentation: Evolving clinical presentation with changing clinical characteristics: MODERATE COMPLEXITY   CLINICAL DECISION MAKIN[address] [place] [id number] AM-PAC 6 Clicks   Basic Mobility Inpatient Short Form  How much difficulty does the patient currently have. .. Unable A Lot A Little None   1. Turning over in bed (including adjusting bedclothes, sheets and blankets)? [] 1   [] 2   [x] 3   [] 4   2. Sitting down on and standing up from a chair with arms ( e.g., wheelchair, bedside commode, etc.)   [] 1   [] 2   [x] 3   [] 4   3. Moving from lying on back to sitting on the side of the bed? [] 1   [] 2   [x] 3   [] 4   How much help from another person does the patient currently need. .. Total A Lot A Little None   4. Moving to and from a bed to a chair (including a wheelchair)? [] 1   [x] 2   [] 3   [] 4   5. Need to walk in hospital room? [] 1   [x] 2   [] 3   [] 4   6. Climbing 3-5 steps with a railing? [x] 1   [] 2   [] 3   [] 4   © , Trustees of [address] [place] [id number], under license to [employer]. All rights reserved      Score:  Initial: 14 Most Recent: X (Date: -- )    Interpretation of Tool:  Represents activities that are increasingly more difficult (i.e. Bed mobility, Transfers, Gait). Medical Necessity:     · Patient demonstrates good rehab potential due to higher previous functional level. Reason for Services/Other Comments:  · Patient continues to require skilled intervention due to medical complications, patient unable to attend/participate in therapy as expected and left sided weakness. Use of outcome tool(s) and clinical judgement create a POC that gives a: Clear prediction of patient's progress: LOW COMPLEXITY            TREATMENT:   (In addition to Assessment/Re-Assessment sessions the following treatments were rendered)   Pre-treatment Symptoms/Complaints:  0/10 no pain reported. Pain: Initial:   Pain Intensity 1: 0  Post Session:  0/10 no pain reported.     Today's treatment session addressed Decreased Strength, Decreased ADL/Functional Activities, Decreased Transfer Abilities and Decreased Ambulation Ability/Technique to progress towards achieving goal(s) 3. During this session, Occupational Therapy addressed ADLs to progress towards their discipline specific goal(s). Co-treatment was necessary to improve patient's cognitive participation, ability to follow higher level commands, ability to increase activity demands and ability to return to normal functional activity. Neuromuscular Re-education: ( 18 mins):  Exercise/activities to improve balance, coordination, kinesthetic sense, posture and proprioception. Required moderate visual, verbal, manual and tactile cues to promote coordination of bilateral, lower extremity(s) and promote motor control of bilateral, lower extremity(s). And Seated left LE PROM to slight AAROM to the left LE. Braces/Orthotics/Lines/Etc:   · IV  · O2 Device: Room air  Treatment/Session Assessment:    · Response to Treatment:  Limited left LE movement with weight bearing. · Interdisciplinary Collaboration:   o Physical Therapist  o Registered Nurse  · After treatment position/precautions:   o Up in chair  o Bed alarm/tab alert on  o Bed/Chair-wheels locked  o Bed in low position  o Call light within reach  o RN notified  o Side rails x 3   · Compliance with Program/Exercises: Will assess as treatment progresses  · Recommendations/Intent for next treatment session: \"Next visit will focus on advancements to more challenging activities, reduction in assistance provided and transfers, ambulation and mobility with neuro rehab for the left LE.   \".   Total Treatment Duration:  PT Patient Time In/Time Out  Time In: 1020  Time Out: [address], [place]

## 2020-08-12 NOTE — PROGRESS NOTES
Prakash Doan MD  Medical Director  [facility], [address] [facility]  Tel: [phone]       Physical Medicine & Rehab Consult Progress Note      Patient: Carla Morrison  Admit Date: 8/10/2020  Admit Diagnosis: Chest pain [R07.9]; Stroke-like symptoms [R29.90]    Recommendations:   Subacute Rehab, Continue acute rehabilitation program, Coordination of rehab / medical care, Counseling of PM&R care issues management  -follow up Head CT neg for stroke. Has a pacemaker that is not MRI compatible. Pt with risk factors for stroke, specifically with afib for which Eliquis was on hold for cardiac cath procedure with post proc left sided weakness. Was also hyperglycemic and encephalopathic. EEG neg for Sz. Pt also with hx of prior CVA and cerebrovascular disease.   -currently OBS status. With [facility] pt would require precert for either [facility] or SNF. Currently, no medical necessity for [facility] unfortunately, as the pt does have significant deficits. History / Subjective / Complaint:     Patient seen and examined, interim EMR reviewed. Continues to have left sided weakness, more so in LLE    Allergies   Allergen Reactions    Beta-Blockers (Beta-Adrenergic Blocking Agts) Other (comments)     \"Very low blood pressures with every one they tried\"    Shellfish Derived Angioedema     Only shrimp causes reaction.   Can eat all other shellfish    Xanax [Alprazolam] Other (comments)     Totally changes his personality     Current Facility-Administered Medications   Medication Dose Route Frequency    0.9% sodium chloride infusion  75 mL/hr IntraVENous CONTINUOUS    nitroglycerin (NITROSTAT) tablet 0.4 mg  0.4 mg SubLINGual PRN    sodium chloride (NS) flush 5-40 mL  5-40 mL IntraVENous Q8H    sodium chloride (NS) flush 5-40 mL  5-40 mL IntraVENous PRN    clopidogreL (PLAVIX) tablet 75 mg  75 mg Oral DAILY    pantoprazole (PROTONIX) tablet 40 mg  40 mg Oral ACB    finasteride (PROSCAR) tablet 5 mg  5 mg Oral DAILY    insulin glargine (LANTUS) injection 30 Units  30 Units SubCUTAneous DAILY    isosorbide mononitrate ER (IMDUR) tablet 60 mg  60 mg Oral BID    losartan (COZAAR) tablet 50 mg  50 mg Oral DAILY    atorvastatin (LIPITOR) tablet 40 mg  40 mg Oral DAILY    pregabalin (LYRICA) capsule 300 mg  300 mg Oral BID    ranolazine ER (RANEXA) tablet 500 mg  500 mg Oral BID    tamsulosin (FLOMAX) capsule 0.4 mg  0.4 mg Oral DAILY    traZODone (DESYREL) tablet 100 mg  100 mg Oral QHS    insulin lispro (HUMALOG) injection   SubCUTAneous TIDAC    acetaminophen (TYLENOL) suppository 650 mg  650 mg Rectal Q4H PRN    aspirin chewable tablet 81 mg  81 mg Oral DAILY    HYDROcodone-acetaminophen (NORCO)  mg tablet 1 Tab  1 Tab Oral Q6H PRN       Objective:     Vitals:  Patient Vitals for the past 8 hrs:   BP Temp Pulse Resp SpO2   08/12/20 1200   70     08/12/20 1054 101/55 97.5 °F (36.4 °C) 70 14 95 %   08/12/20 0800   71     08/12/20 0707 125/64 98.8 °F (37.1 °C) 70 14 95 %      Intake and Output:  08/10 1901 - 08/12 0700  In: 1270.3 [I.V.:1270.3]  Out: 2300 [Urine:2300]    Physical Exam:  WDWN obese WM, NAD  Speech clear and appropriate  No facial asymm.  EOMI, visual fields full  Nl muscle bulk and tone  Deficit in LUE strength, grossly 4/5  LLE grossly 1/5    Pain 1  Pain Scale 1: Numeric (0 - 10) (08/12/20 1100)  Pain Intensity 1: 0 (08/12/20 1100)  Patient Stated Pain Goal: 0 (08/12/20 0745)  Pain Reassessment 1: Yes (08/12/20 0745)  Pain Onset 1: chronic  (08/12/20 0715)  Pain Location 1: Back (08/12/20 0715)  Pain Orientation 1: Lower (08/12/20 0715)  Pain Description 1: Aching (08/12/20 0715)  Pain Intervention(s) 1: Repositioned (08/12/20 0715)     Functional Assessment:  Gross Assessment  AROM: Grossly decreased, non-functional (08/12/20 1100)  Strength: Grossly decreased, non-functional(left LE) (08/12/20 1100)  Coordination: Grossly decreased, non-functional(left LE ) (08/12/20 1100)     Gait  Base of Support: Center of gravity altered (08/12/20 1100)  Speed/Felicia: Delayed; Fluctuations; Pace decreased (<100 feet/min); Shuffled; Slow (08/12/20 1100)  Step Length: Left shortened;Right shortened (08/12/20 1100)  Swing Pattern: Left asymmetrical;Right asymmetrical (08/12/20 1100)  Stance: Left decreased;Right decreased;Time;Weight shift (08/12/20 1100)  Gait Abnormalities: Decreased step clearance (08/12/20 1100)  Ambulation - Level of Assistance: Maximum assistance;Assist x2 (08/12/20 1100)  Distance (ft): 5 Feet (ft) (08/12/20 1100)  Assistive Device: Cane, straight(and BILATERAL MAX A x 2 hand held assistance) (08/12/20 1100)     Bed Mobility  Rolling: Minimum assistance;Assist x2 (08/12/20 1100)  Supine to Sit: Minimum assistance;Assist x2 (08/12/20 1100)  Sit to Supine: Minimum assistance;Assist x2 (08/12/20 1100)  Scooting: Minimum assistance;Assist x2 (08/12/20 1100)     Balance  Sitting: Impaired (08/12/20 1100)  Sitting - Static: Fair (occasional) (08/12/20 1100)  Sitting - Dynamic: Fair (occasional) (08/12/20 1100)  Standing: Impaired (08/12/20 1100)  Standing - Static: Poor (08/12/20 1100)  Standing - Dynamic : Poor (08/12/20 1020)     Grooming  Grooming Assistance: Stand-by assistance (08/12/20 1020)  Position Performed: Seated in chair (08/12/20 1020)  Brushing Teeth: Stand-by assistance (08/12/20 1020)                 Bed/Mat Mobility  Rolling: Minimum assistance;Assist x2 (08/12/20 1100)  Supine to Sit: Minimum assistance;Assist x2 (08/12/20 1100)  Sit to Supine: Minimum assistance;Assist x2 (08/12/20 1100)  Sit to Stand: Maximum assistance (08/12/20 1100)  Stand to Sit: Maximum assistance;Assist x2 (08/12/20 1100)  Bed to Chair: Maximum assistance;Assist x2 (08/12/20 1100)  Scooting: Minimum assistance;Assist x2 (08/12/20 1100)     Labs/Studies:  Recent Results (from the past 72 hour(s))   GLUCOSE, POC    Collection Time: 08/10/20  6:16 AM Result Value Ref Range    Glucose (POC) 292 (H) 65 - 100 mg/dL   EKG, 12 LEAD, INITIAL    Collection Time: 08/10/20  6:33 AM   Result Value Ref Range    Ventricular Rate 97 BPM    Atrial Rate 97 BPM    P-R Interval 228 ms    QRS Duration 128 ms    Q-T Interval 336 ms    QTC Calculation (Bezet) 426 ms    Calculated P Axis 67 degrees    Calculated R Axis -88 degrees    Calculated T Axis 92 degrees    Diagnosis       Atrial-sensed ventricular-paced rhythm with prolonged AV conduction  Abnormal ECG  When compared with ECG of 09-AUG-2020 10:07,  Current rhythm paced  Confirmed by French Peres MD (), CONRADO ([id number]) on 8/10/2020 11:00:27 AM     CBC W/O DIFF    Collection Time: 08/10/20  6:49 AM   Result Value Ref Range    WBC 7.2 4.3 - 11.1 K/uL    RBC 4.41 4.23 - 5.6 M/uL    HGB 12.0 (L) 13.6 - 17.2 g/dL    HCT 37.2 (L) 41.1 - 50.3 %    MCV 84.4 79.6 - 97.8 FL    MCH 27.2 26.1 - 32.9 PG    MCHC 32.3 31.4 - 35.0 g/dL    RDW 14.2 11.9 - 14.6 %    PLATELET 311 517 - 536 K/uL    MPV 10.5 9.4 - 12.3 FL    ABSOLUTE NRBC 0.00 0.0 - 0.2 K/uL   MAGNESIUM    Collection Time: 08/10/20  6:49 AM   Result Value Ref Range    Magnesium 2.0 1.8 - 2.4 mg/dL   METABOLIC PANEL, BASIC    Collection Time: 08/10/20  6:49 AM   Result Value Ref Range    Sodium 140 136 - 145 mmol/L    Potassium 4.3 3.5 - 5.1 mmol/L    Chloride 107 98 - 107 mmol/L    CO2 26 21 - 32 mmol/L    Anion gap 7 7 - 16 mmol/L    Glucose 319 (H) 65 - 100 mg/dL    BUN 14 8 - 23 MG/DL    Creatinine 1.14 0.8 - 1.5 MG/DL    GFR est AA >60 >60 ml/min/1.73m2    GFR est non-AA >60 >60 ml/min/1.73m2    Calcium 9.0 8.3 - 10.4 MG/DL   PROTHROMBIN TIME + INR    Collection Time: 08/10/20  6:49 AM   Result Value Ref Range    Prothrombin time 13.8 12.0 - 14.7 sec    INR 1.0     TROPONIN-HIGH SENSITIVITY    Collection Time: 08/10/20  6:49 AM   Result Value Ref Range    Troponin-High Sensitivity 21.9 (H) 0 - 14 pg/mL   SARS-COV-2    Collection Time: 08/10/20 11:15 AM   Result Value Ref Range Specimen source Nasopharyngeal      SARS CoV-2 Negative Negative     GLUCOSE, POC    Collection Time: 08/10/20 12:17 PM   Result Value Ref Range    Glucose (POC) 248 (H) 65 - 100 mg/dL   PLATELET FUNCTION, VERIFY NOW P2Y12    Collection Time: 08/10/20  1:43 PM   Result Value Ref Range    P2Y12 Plt response 227 PRU   GLUCOSE, POC    Collection Time: 08/10/20  4:49 PM   Result Value Ref Range    Glucose (POC) 140 (H) 65 - 100 mg/dL   GLUCOSE, POC    Collection Time: 08/10/20  9:38 PM   Result Value Ref Range    Glucose (POC) 132 (H) 65 - 100 mg/dL   LIPID PANEL    Collection Time: 08/11/20  4:32 AM   Result Value Ref Range    LIPID PROFILE          Cholesterol, total 125 <200 MG/DL    Triglyceride 89 35 - 150 MG/DL    HDL Cholesterol 43 40 - 60 MG/DL    LDL, calculated 64.2 <100 MG/DL    VLDL, calculated 17.8 6.0 - 23.0 MG/DL    CHOL/HDL Ratio 2.9     HEMOGLOBIN A1C WITH EAG    Collection Time: 08/11/20  4:32 AM   Result Value Ref Range    Hemoglobin A1c 9.1 (H) 4.8 - 6.0 %    Est. average glucose 214 mg/dL   CBC W/O DIFF    Collection Time: 08/11/20  4:32 AM   Result Value Ref Range    WBC 8.7 4.3 - 11.1 K/uL    RBC 3.89 (L) 4.23 - 5.6 M/uL    HGB 10.6 (L) 13.6 - 17.2 g/dL    HCT 33.0 (L) 41.1 - 50.3 %    MCV 84.8 79.6 - 97.8 FL    MCH 27.2 26.1 - 32.9 PG    MCHC 32.1 31.4 - 35.0 g/dL    RDW 14.5 11.9 - 14.6 %    PLATELET 457 (L) 964 - 450 K/uL    MPV 10.5 9.4 - 12.3 FL    ABSOLUTE NRBC 0.00 0.0 - 0.2 K/uL   METABOLIC PANEL, BASIC    Collection Time: 08/11/20  4:32 AM   Result Value Ref Range    Sodium 145 136 - 145 mmol/L    Potassium 3.8 3.5 - 5.1 mmol/L    Chloride 111 (H) 98 - 107 mmol/L    CO2 28 21 - 32 mmol/L    Anion gap 6 (L) 7 - 16 mmol/L    Glucose 120 (H) 65 - 100 mg/dL    BUN 14 8 - 23 MG/DL    Creatinine 0.93 0.8 - 1.5 MG/DL    GFR est AA >60 >60 ml/min/1.73m2    GFR est non-AA >60 >60 ml/min/1.73m2    Calcium 8.6 8.3 - 10.4 MG/DL   GLUCOSE, POC    Collection Time: 08/11/20  7:20 AM   Result Value Ref Range    Glucose (POC) 141 (H) 65 - 100 mg/dL   GLUCOSE, POC    Collection Time: 08/11/20 11:35 AM   Result Value Ref Range    Glucose (POC) 182 (H) 65 - 100 mg/dL   GLUCOSE, POC    Collection Time: 08/11/20  3:46 PM   Result Value Ref Range    Glucose (POC) 199 (H) 65 - 100 mg/dL   GLUCOSE, POC    Collection Time: 08/11/20  9:07 PM   Result Value Ref Range    Glucose (POC) 216 (H) 65 - 100 mg/dL   CBC WITH AUTOMATED DIFF    Collection Time: 08/12/20  5:57 AM   Result Value Ref Range    WBC 6.2 4.3 - 11.1 K/uL    RBC 3.73 (L) 4.23 - 5.6 M/uL    HGB 10.5 (L) 13.6 - 17.2 g/dL    HCT 31.5 (L) 41.1 - 50.3 %    MCV 84.5 79.6 - 97.8 FL    MCH 28.2 26.1 - 32.9 PG    MCHC 33.3 31.4 - 35.0 g/dL    RDW 14.0 11.9 - 14.6 %    PLATELET 051 500 - 641 K/uL    MPV 10.7 9.4 - 12.3 FL    ABSOLUTE NRBC 0.00 0.0 - 0.2 K/uL    DF AUTOMATED      NEUTROPHILS 76 43 - 78 %    LYMPHOCYTES 15 13 - 44 %    MONOCYTES 8 4.0 - 12.0 %    EOSINOPHILS 1 0.5 - 7.8 %    BASOPHILS 0 0.0 - 2.0 %    IMMATURE GRANULOCYTES 1 0.0 - 5.0 %    ABS. NEUTROPHILS 4.7 1.7 - 8.2 K/UL    ABS. LYMPHOCYTES 0.9 0.5 - 4.6 K/UL    ABS. MONOCYTES 0.5 0.1 - 1.3 K/UL    ABS. EOSINOPHILS 0.0 0.0 - 0.8 K/UL    ABS. BASOPHILS 0.0 0.0 - 0.2 K/UL    ABS. IMM.  GRANS. 0.1 0.0 - 0.5 K/UL   METABOLIC PANEL, BASIC    Collection Time: 08/12/20  5:57 AM   Result Value Ref Range    Sodium 141 136 - 145 mmol/L    Potassium 3.8 3.5 - 5.1 mmol/L    Chloride 109 (H) 98 - 107 mmol/L    CO2 27 21 - 32 mmol/L    Anion gap 5 (L) 7 - 16 mmol/L    Glucose 131 (H) 65 - 100 mg/dL    BUN 12 8 - 23 MG/DL    Creatinine 0.92 0.8 - 1.5 MG/DL    GFR est AA >60 >60 ml/min/1.73m2    GFR est non-AA >60 >60 ml/min/1.73m2    Calcium 8.4 8.3 - 10.4 MG/DL   GLUCOSE, POC    Collection Time: 08/12/20  7:09 AM   Result Value Ref Range    Glucose (POC) 145 (H) 65 - 100 mg/dL   GLUCOSE, POC    Collection Time: 08/12/20 10:57 AM   Result Value Ref Range    Glucose (POC) 237 (H) 65 - 100 mg/dL        Assessment: Principal Problem:    Stroke-like symptoms (8/10/2020)    Active Problems:    CAD (coronary artery disease) (5/4/2009)      Dyslipidemia (5/4/2009)      Diabetes mellitus ([facility] (5/4/2009)      Dilated Cardiomyopathy,Ischemic (2/26/2010)      Paroxysmal atrial fibrillation ([facility] (11/30/2010)      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)      Diabetes (HCC) ()      GERD (gastroesophageal reflux disease) ()      Sick sinus syndrome ([facility] (9/6/2007)      Chronic renal failure, stage 3 (moderate) ([facility] (4/21/2020)        Plan / Recommendations / Medical Decision Making:     Recommendations:   Continue acute rehabilitation program  Coordination of rehab / medical care  Counseling of PM&R care issues management  Monitoring and management of medical conditions per plan of care / orders    Discussion with Family / Caregiver / Staff    Reviewed Therapies / Iris Loge / Medications / Records

## 2020-08-13 ENCOUNTER — HOME HEALTH ADMISSION (OUTPATIENT)
Dept: HOME HEALTH SERVICES | Facility: HOME HEALTH | Age: 70
End: 2020-08-13

## 2020-08-13 VITALS
TEMPERATURE: 98.3 F | WEIGHT: 221 LBS | DIASTOLIC BLOOD PRESSURE: 66 MMHG | RESPIRATION RATE: 20 BRPM | BODY MASS INDEX: 34.69 KG/M2 | OXYGEN SATURATION: 97 % | SYSTOLIC BLOOD PRESSURE: 141 MMHG | HEIGHT: 67 IN | HEART RATE: 70 BPM

## 2020-08-13 LAB
ANION GAP SERPL CALC-SCNC: 6 MMOL/L (ref 7–16)
BASOPHILS # BLD: 0 K/UL (ref 0–0.2)
BASOPHILS NFR BLD: 0 % (ref 0–2)
BUN SERPL-MCNC: 10 MG/DL (ref 8–23)
CALCIUM SERPL-MCNC: 8.9 MG/DL (ref 8.3–10.4)
CHLORIDE SERPL-SCNC: 113 MMOL/L (ref 98–107)
CO2 SERPL-SCNC: 25 MMOL/L (ref 21–32)
CREAT SERPL-MCNC: 0.9 MG/DL (ref 0.8–1.5)
DIFFERENTIAL METHOD BLD: ABNORMAL
EOSINOPHIL # BLD: 0 K/UL (ref 0–0.8)
EOSINOPHIL NFR BLD: 1 % (ref 0.5–7.8)
ERYTHROCYTE [DISTWIDTH] IN BLOOD BY AUTOMATED COUNT: 13.9 % (ref 11.9–14.6)
GLUCOSE BLD STRIP.AUTO-MCNC: 133 MG/DL (ref 65–100)
GLUCOSE BLD STRIP.AUTO-MCNC: 267 MG/DL (ref 65–100)
GLUCOSE SERPL-MCNC: 141 MG/DL (ref 65–100)
HCT VFR BLD AUTO: 32.9 % (ref 41.1–50.3)
HGB BLD-MCNC: 11 G/DL (ref 13.6–17.2)
IMM GRANULOCYTES # BLD AUTO: 0 K/UL (ref 0–0.5)
IMM GRANULOCYTES NFR BLD AUTO: 1 % (ref 0–5)
LYMPHOCYTES # BLD: 0.8 K/UL (ref 0.5–4.6)
LYMPHOCYTES NFR BLD: 18 % (ref 13–44)
MCH RBC QN AUTO: 28.2 PG (ref 26.1–32.9)
MCHC RBC AUTO-ENTMCNC: 33.4 G/DL (ref 31.4–35)
MCV RBC AUTO: 84.4 FL (ref 79.6–97.8)
MONOCYTES # BLD: 0.4 K/UL (ref 0.1–1.3)
MONOCYTES NFR BLD: 9 % (ref 4–12)
NEUTS SEG # BLD: 3.3 K/UL (ref 1.7–8.2)
NEUTS SEG NFR BLD: 72 % (ref 43–78)
NRBC # BLD: 0 K/UL (ref 0–0.2)
PLATELET # BLD AUTO: 152 K/UL (ref 150–450)
PMV BLD AUTO: 10 FL (ref 9.4–12.3)
POTASSIUM SERPL-SCNC: 3.5 MMOL/L (ref 3.5–5.1)
RBC # BLD AUTO: 3.9 M/UL (ref 4.23–5.6)
SODIUM SERPL-SCNC: 144 MMOL/L (ref 136–145)
WBC # BLD AUTO: 4.6 K/UL (ref 4.3–11.1)

## 2020-08-13 PROCEDURE — 99218 HC RM OBSERVATION: CPT

## 2020-08-13 PROCEDURE — 74011250637 HC RX REV CODE- 250/637: Performed by: INTERNAL MEDICINE

## 2020-08-13 PROCEDURE — 85025 COMPLETE CBC W/AUTO DIFF WBC: CPT

## 2020-08-13 PROCEDURE — 99231 SBSQ HOSP IP/OBS SF/LOW 25: CPT | Performed by: INTERNAL MEDICINE

## 2020-08-13 PROCEDURE — 80048 BASIC METABOLIC PNL TOTAL CA: CPT

## 2020-08-13 PROCEDURE — 82962 GLUCOSE BLOOD TEST: CPT

## 2020-08-13 PROCEDURE — 97530 THERAPEUTIC ACTIVITIES: CPT

## 2020-08-13 PROCEDURE — 74011636637 HC RX REV CODE- 636/637: Performed by: INTERNAL MEDICINE

## 2020-08-13 PROCEDURE — 99213 OFFICE O/P EST LOW 20 MIN: CPT | Performed by: PSYCHIATRY & NEUROLOGY

## 2020-08-13 PROCEDURE — 36415 COLL VENOUS BLD VENIPUNCTURE: CPT

## 2020-08-13 RX ORDER — LEVETIRACETAM 500 MG/1
500 TABLET ORAL 2 TIMES DAILY
Qty: 60 TAB | Refills: 0 | Status: SHIPPED | OUTPATIENT
Start: 2020-08-13 | End: 2021-06-11

## 2020-08-13 RX ADMIN — PANTOPRAZOLE SODIUM 40 MG: 40 TABLET, DELAYED RELEASE ORAL at 08:28

## 2020-08-13 RX ADMIN — FINASTERIDE 5 MG: 5 TABLET, FILM COATED ORAL at 09:00

## 2020-08-13 RX ADMIN — HYDROCODONE BITARTRATE AND ACETAMINOPHEN 1 TABLET: 10; 325 TABLET ORAL at 08:37

## 2020-08-13 RX ADMIN — RANOLAZINE 500 MG: 500 TABLET, FILM COATED, EXTENDED RELEASE ORAL at 08:28

## 2020-08-13 RX ADMIN — Medication 10 ML: at 14:00

## 2020-08-13 RX ADMIN — TAMSULOSIN HYDROCHLORIDE 0.4 MG: 0.4 CAPSULE ORAL at 08:28

## 2020-08-13 RX ADMIN — INSULIN LISPRO 6 UNITS: 100 INJECTION, SOLUTION INTRAVENOUS; SUBCUTANEOUS at 11:30

## 2020-08-13 RX ADMIN — HYDROCODONE BITARTRATE AND ACETAMINOPHEN 1 TABLET: 10; 325 TABLET ORAL at 01:05

## 2020-08-13 RX ADMIN — ISOSORBIDE MONONITRATE 60 MG: 60 TABLET, EXTENDED RELEASE ORAL at 08:28

## 2020-08-13 RX ADMIN — LEVETIRACETAM 500 MG: 500 TABLET ORAL at 08:28

## 2020-08-13 RX ADMIN — LOSARTAN POTASSIUM 50 MG: 50 TABLET, FILM COATED ORAL at 08:28

## 2020-08-13 RX ADMIN — Medication 10 ML: at 05:46

## 2020-08-13 RX ADMIN — ATORVASTATIN CALCIUM 40 MG: 40 TABLET, FILM COATED ORAL at 08:28

## 2020-08-13 RX ADMIN — INSULIN GLARGINE 30 UNITS: 100 INJECTION, SOLUTION SUBCUTANEOUS at 09:00

## 2020-08-13 RX ADMIN — PREGABALIN 300 MG: 150 CAPSULE ORAL at 08:28

## 2020-08-13 RX ADMIN — APIXABAN 5 MG: 5 TABLET, FILM COATED ORAL at 08:28

## 2020-08-13 NOTE — PROGRESS NOTES
[facility] CARDIOLOGY PROGRESS NOTE           8/13/2020 10:55 AM    Admit Date: 8/10/2020      Subjective:   Patient has persistent mild CP. Unchanged. Left arm now normal strength. Improving strength in left leg. Feels pain medication being withheld. BP controlled. ROS:  Cardiovascular:  As noted above    Objective:      Vitals:    08/12/20 1453 08/12/20 1947 08/12/20 2348 08/13/20 0351   BP: 135/68 140/64 134/64 121/73   Pulse: 69 74 70 72   Resp: 14 18 18 18   Temp: 97.9 °F (36.6 °C) 98.8 °F (37.1 °C) 98.1 °F (36.7 °C) 97.9 °F (36.6 °C)   SpO2: 98% 96% 94% 96%   Weight:       Height:           Physical Exam:  General-No Acute Distress  Neck- supple, no JVD  CV- regular rate and rhythm no MRG  Lung- clear bilaterally  Abd- soft, nontender, nondistended  Ext- no edema bilaterally. Skin- warm and dry      Data Review:   Recent Labs     08/12/20  0557 08/11/20  0432    145   K 3.8 3.8   BUN 12 14   CREA 0.92 0.93   * 120*   WBC 6.2 8.7   HGB 10.5* 10.6*   HCT 31.5* 33.0*    146*   TRIGL  --  89   HDL  --  43      No results found for: NI Schwab    Assessment/Plan:     Principal Problem:    Stroke-like symptoms (8/10/2020)    Neurology evaluation ongoing. Active Problems:    CAD (coronary artery disease) (5/4/2009)    Chest pain and dyspnea have been chronic compliants. Originally presented for cardiac cath as outpatient but this has been postponed due to neurologic event. Would plan to reschedule in 4-6 weeks. I will arrange follow up with Dr. Shady Hoskins in 2 weeks. Dyslipidemia (5/4/2009)    Continue Lipitor. Diabetes mellitus ([facility] (5/4/2009)    Continue medications. Paroxysmal atrial fibrillation ([facility] (11/30/2010)    Back on Eliquis. Diabetes [facility]) ()    Defer management to primary team.        Sick sinus syndrome ([facility] (9/6/2007)    S/P pacemaker. Cardiac condition stable. Will sign off.   Please call with any questions or clinical changes. Appreciate consultation.                 Leopoldo Manges, MD  8/13/2020 10:55 AM

## 2020-08-13 NOTE — PROGRESS NOTES
Care Management Interventions  PCP Verified by CM: Yes  Mode of Transport at Discharge: Other (see comment)(family to transport)  Transition of Care Consult (CM Consult): [address]: Yes  Physical Therapy Consult: Yes  Occupational Therapy Consult: Yes  Current Support Network: Other(patient lives in a camper )  Confirm Follow Up Transport: Family  The Plan for Transition of Care is Related to the Following Treatment Goals :  RN/PT  The Patient and/or Patient Representative was Provided with a Choice of Provider and Agrees with the Discharge Plan?: Yes  Name of the Patient Representative Who was Provided with a Choice of Provider and Agrees with the Discharge Plan: [address] [place] [address] [facility] [employer] Lizzette of Choice List was Provided with Basic Dialogue that Supports the Patient's Individualized Plan of Care/Goals, Treatment Preferences and Shares the Quality Data Associated with the Providers?: Yes   Resource Information Provided?: No  Discharge Location  Discharge Placement: Home with home health  Patient is discharging with [facility] RN/Pt.

## 2020-08-13 NOTE — DISCHARGE SUMMARY
Hospitalist Discharge Summary     Admit Date:  8/10/2020  6:15 AM   Name:  Rio Burgos   Age:  79 y.o.  :  1950   MRN:  [id number]   PCP:  Julio Flaherty MD  Treatment Team: Attending Provider: Julien Rivas MD; Consulting Provider: Kirsten Dukes MD; Care Manager: Francisco Houser RN; Consulting Provider: Jabari Armenta DO; Utilization Review: Colton Yadav RN; Physical Therapy Assistant: Amy Rubio PTA    Problem List for this Hospitalization:  Hospital Problems as of 2020 Date Reviewed: 8/3/2020          Codes Class Noted - Resolved POA    * (Principal) Stroke-like symptoms ICD-10-CM: R29.90  ICD-9-CM: 781.99  8/10/2020 - Present Unknown        Chronic renal failure, stage 3 (moderate) ([facility] ICD-10-CM: N18.3  ICD-9-CM: 585.3  2020 - Present Yes        Diabetes ([facility] ICD-10-CM: E11.9  ICD-9-CM: 250.00  Unknown - Present Yes        GERD (gastroesophageal reflux disease) ICD-10-CM: K21.9  ICD-9-CM: 530.81  Unknown - Present Yes        Arteriosclerosis of bypass graft of coronary artery ICD-10-CM: I25.810  ICD-9-CM: 414.04  2015 - Present Yes        Paroxysmal atrial fibrillation ([facility] (Chronic) ICD-10-CM: I48.0  ICD-9-CM: 427.31  2010 - Present Yes        Dilated Cardiomyopathy,Ischemic ICD-10-CM: I42.0  ICD-9-CM: 425.4  2010 - Present Yes        CAD (coronary artery disease) (Chronic) ICD-10-CM: I25.10  ICD-9-CM: 414.00  2009 - Present Yes        Dyslipidemia (Chronic) ICD-10-CM: E78.5  ICD-9-CM: 272.4  2009 - Present Yes        Diabetes mellitus ([facility] (Chronic) ICD-10-CM: E11.9  ICD-9-CM: 250.00  2009 - Present Yes        Sick sinus syndrome ([facility] ICD-10-CM: I49.5  ICD-9-CM: 427.81  2007 - Present Yes                Admission HPI from 8/10/2020:    \"69 yo CM with past history of SSS s/p PPM, 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 Patient was supposed to have a scheduled outpatient cardiac cath this morning. His son, at bedside, came to pick him up this morning and says Stiven Hole was just off, I saw that his balance was off and he wasn't acting himself. \" Patient went to bed last night in his usual state of health. He did not take \"my long-acting insulin or my Eliquis because of my heart cath. I was supposed to take 4 baby aspirin but I forgot to take it today. \" When he got to cath lab he was noted to be encephalopathic, his blood sugar was ~300. He was given Narcan with no improvement. Code S called.      ED Course: Code S called. CT head unremarkable. STAT teleneurology consulted, concern for seizure-like activity. He was loaded with Keppra. Deemed not a candidate for thrombolytics. Improved interval left-sided weakness afterwards. Hospitalist called for admission. \"    Hospital Course:  Patient admitted with CVA symptoms including left upper extremity weakness and left lower extremity weakness. Initial CT head showed no acute abnormality. Repeat CT head was done on 8/12 that also showed no acute abnormality. CTA head and neck was significant for moderate to high-grade stenosis of the origin of the left common carotid artery and bilateral ICA origin stenosis. Patient was evaluated by vascular surgery who recommended monitoring and outpatient follow-up. Patient was evaluated by neurology. They recommended restarting patient's Eliquis and following up with cardiology for antiplatelet agent therapy recommendations. Cardiology evaluated the patient and recommended stopping Plavix. Aspirin was discontinued earlier in hospital course. Patient is to follow-up outpatient with neurology and vascular surgery. In addition to CVA symptoms patient had an event of seizure-like activity. He underwent EEG that was negative for abnormalities. He is to be discharged on Keppra 500 mg twice daily.   He is to follow-up with neurology outpatient for recommendations of continuing or discontinuing antiseizure medication. Patient was initially scheduled for left heart cath when symptoms of CVA began. He complained of intermittent chest pain on and off during hospitalization. Cardiology has planned for rescheduling procedure in 4 to 6 weeks. He has follow-up scheduled with cardiology in 2 weeks. Patient underwent echocardiogram during hospitalization that showed an EF of 60 to 58% and diastolic dysfunction. Patient had patchy groundglass opacities in bilateral upper lobes on CTA head and neck. Findings were most consistent with aspiration. COVID-19 testing was negative. Patient was afebrile greater than 48 hours prior to discharge. He was not treated with antibiotics. Patient was assessed by physical therapy and found to be a candidate for acute inpatient rehab. Patient refusing placement at rehab facility. Discussed with patient in detail, dangers of ignoring recommendations including risk of fall, death, and permanent paralysis. At this time patient reports he will proceed with going home despite the risk. [place] [facility] [place]  Activity: Ambulate with assistance; recommend acute inpatient rehabilitation  Diet: DIET REGULAR  Code Status: Full Code      Follow up instructions, discharge meds at bottom of this note. Plan was discussed with patient. All questions answered. Patient was stable at time of discharge. Patient will call a physician or return if any concerns. Diagnostic Imaging/Tests:   Cta Code Neuro Head And Neck W Cont    Result Date: 8/10/2020  Title:  CT arteriogram of the neck and head. Indication: Unresponsive. Altered mental status. . Technique: Axial images of the neck and head were obtained after the uneventful administration of intravenous iodinated contrast media. Contrast was used to best identify the arterial structures.   Images were reviewed on a separate, free standing, three-dimensional workstation as per the referring physicians request.  All stenosis percentages derived by comparing the narrowest segment with the distal Internal Carotid Artery luminal diameter, as described in the [employer] Symptomatic Carotid Endarterectomy Trial (NASCET) criteria. All CT scans at this facility are performed using dose reduction/dose modulation techniques, as appropriate the performed exam, including the following: Automated Exposure Control; Adjustment of the mA and/or kV according to patient size (this includes techniques or standardized protocols for targeted exams where dose is matched to indication/reason for exam); and Use of Iterative Reconstruction Technique. Comparison: CTA 2013. Ming Rein Findings: Lungs:  Patchy groundglass opacities in the bilateral upper lobes, right much greater than left. No pleural effusions. No pneumothorax. No suspicious pulmonary nodules. .  Bones:  No osseous destruction. . Mild degenerative changes in the cervical spine. Paranasal sinuses:  Paranasal sinuses are clear. .  Brain:  No midline shift. No enhancing mass lesion or hydrocephalus. .  Soft tissues: The esophagus appears patulous. Small amount of debris present within the esophagus. Other: Prior median sternotomy. Dural venous sinuses:  Patent. Aortic arch:  Mild to moderate stenosis at the origin of the right innominate artery. .  Right brachiocephalic artery:  No significant stenosis or occlusion. .  . Right subclavian artery:  No significant stenosis or occlusion. .  . Left subclavian artery:  No significant stenosis or occlusion. .  . Right common carotid artery:  There is a 60% stenosis of the distal right common carotid artery as it approaches the carotid bulb. .  . Right external carotid artery:  No significant stenosis or occlusion. .  .   Right internal carotid artery:  There is an approximately 50% stenosis at the origin of the right internal carotid artery with associated dense calcific atherosclerosis. Vascular calcifications are present along the carotid siphon. . . Left common carotid artery: Moderate to high-grade stenosis at the origin of the left common carotid artery. The degree of stenosis is difficult to quantify due to the presence of dense calcifications. Left external carotid artery:  No significant stenosis or occlusion. .  . Left internal carotid artery:  Mild, approximate 40-50%, stenosis at the origin of the left internal carotid artery. Vascular ossification along the carotid siphon. .  Right vertebral artery:  Mild stenosis at the origin of the right vertebral artery. .. Left vertebral artery:  There is a 50% stenosis at the origin of the left vertebral artery. . Left vertebral artery is dominant. Basilar artery:  No significant stenosis, occlusion, or aneurysm. .  Right middle cerebral artery:  No significant stenosis, occlusion, or aneurysm. Right anterior cerebral artery:  No significant stenosis, occlusion, or aneurysm. [place] Octave Anterior communicating artery: No significant stenosis, occlusion, or aneurysm. [place] Octave Left middle cerebral artery:  No significant stenosis, occlusion, or aneurysm. [place] Octave Left anterior cerebral artery:  No significant stenosis, occlusion, or aneurysm. .  Right posterior communicating artery: No significant stenosis, occlusion, or aneurysm. [place]ave Left posterior communicating artery:  No significant stenosis, occlusion, or aneurysm. .  Right posterior cerebral artery:  No significant stenosis, occlusion, or aneurysm. [place] Octave Left posterior cerebral artery:  No significant stenosis, occlusion, or aneurysm. .      Impression: 1. No acute large vessel occlusion. 2.  Moderate to high-grade stenosis at the origin of the left common carotid artery. The degree of stenosis is difficult to quantify due to the presence of dense calcifications. 3.  Bilateral ICA origin stenoses, approximately 50%. 4.  There is a 60% stenosis of the distal right common carotid artery 5.   There is a 50% stenosis at the origin of the left vertebral artery. Mild stenosis at the origin of the right vertebral artery. Left vertebral artery is dominant. 6.  Mild to moderate stenosis at the origin of the right innominate artery. 7.  Patchy groundglass opacities in the bilateral upper lobes, right much greater than left. These findings are favored to represent aspiration. A small amount of debris is present within the esophagus. Viral pneumonia (COVID) is not excluded. Findings were called to Dr. Jl Colbert by Dr. Alisha Maddox on 8/10/2020 at 1003 hours via telephone    Ct Code Neuro Head Wo Contrast    Result Date: 8/10/2020  Head CT INDICATION: Difficulty speaking, back pain Multiple axial images obtained through the brain without intravenous contrast. Radiation dose reduction techniques were used for this study: All CT scans performed at this facility use one or all of the following: Automated exposure control, adjustment of the mA and/or kVp according to patient's size, iterative reconstruction. FINDINGS: There is intracranial atherosclerosis. There is no CT evidence of acute hemorrhage or infarction. The ventricles are normal in size. There are no extra-axial fluid collections. No masses are seen. The sinuses are clear. There are no bony lesions. IMPRESSION: No CT evidence of acute intracranial abnormality.        Echocardiogram results:  Results for orders placed or performed during the hospital encounter of 08/10/20   2D ECHO COMPLETE ADULT (TTE) W OR WO CONTR    Narrative    Graciela  [facility] [address], [facility] [address] [facility]  [phone]    Transthoracic Echocardiogram  2D, M-mode, Doppler, and Color Doppler    Patient: Chencho Posada  MR #: [id number]  : 1950  Age: 79 years  Gender: Male  Study date: 12-Aug-2020  Account #: [de-identified]  Height: 67 in  Weight: 220.4 lb  BSA: 2.11 mï¾²  Status:Routine  Location: 829  BP: 110/ 56    Allergies: BETA-BLOCKERS (BETA-ADRENERGIC BLOCKING AGTS), SHELLFISH DERIVED,  ALPRAZOLAM    Sonographer:  Jaime Schwab, RCS  Group:  [facility]  Referring Physician:  Angel Johnson MD  Reading Physician:  Magdaleno Medina MD    INDICATIONS: CVA  *Technically difficult study. Limited apical window. PROCEDURE: This was a routine study. A transthoracic echocardiogram was  performed. The study included complete 2D imaging, M-mode, complete spectral  Doppler, and color Doppler. Intravenous contrast (Definity) was administered. Intravenous contrast (agitated saline) was administered. This was a   technically  difficult study. LEFT VENTRICLE: Size was normal. Systolic function was normal. Ejection  fraction was estimated in the range of 60 % to 65 %. There were no regional  wall motion abnormalities. There was mild concentric hypertrophy. Doppler  parameters were consistent with diastolic dysfunction. Avg E/e': 16.8. RIGHT VENTRICLE: The ventricle was mildly dilated. Estimated peak pressure   was  in the range of 30-35 mmHg. LEFT ATRIUM: The atrium was mildly dilated. ATRIAL SEPTUM: There was no right-to-left shunt. Contrast injection was  performed. There was no right-to-left shunt, with provocative maneuvers to  increase right atrial pressure. RIGHT ATRIUM: Not well visualized. SYSTEMIC VEINS: IVC: The inferior vena cava was normal in size and course. The  respirophasic change in diameter was more than 50%. AORTIC VALVE: The valve was trileaflet. Leaflets exhibited mild sclerosis. There was no evidence for stenosis. There was mild regurgitation. MITRAL VALVE: Valve structure was normal. There was no evidence for stenosis. There was trivial regurgitation. TRICUSPID VALVE: The valve structure was normal. There was no evidence for  stenosis. There was mild regurgitation. PULMONIC VALVE: Not well visualized. There was no evidence for stenosis. There  was trivial regurgitation.     PERICARDIUM: There was no pericardial effusion. AORTA: The root exhibited normal size. SUMMARY:    -  Left ventricle: Systolic function was normal. Ejection fraction was  estimated in the range of 60 % to 65 %. There were no regional wall motion  abnormalities. There was mild concentric hypertrophy.    -  Right ventricle: The ventricle was mildly dilated. -  Left atrium: The atrium was mildly dilated. -  Atrial septum: There was no right-to-left shunt. Contrast injection was  performed. There was no right-to-left shunt, with provocative maneuvers to  increase right atrial pressure. -  Inferior vena cava, hepatic veins: The respirophasic change in diameter   was  more than 50%. -  Tricuspid valve: There was mild regurgitation. SYSTEM MEASUREMENT TABLES    2D mode  AoR Diam (2D): 2.7 cm  LA Dimension (2D): 4 cm  Left Atrium Systolic Volume Index; Method of Disks, Biplane; 2D mode;: 51.7  ml/m2  IVS/LVPW (2D): 1.1  IVSd (2D): 1.5 cm  LVIDd (2D): 4.9 cm  LVIDs (2D): 3.2 cm  LVOT Area (2D): 2.8 cm2  LVPWd (2D): 1.4 cm  RVIDd (2D): 3.6 cm    Tissue Doppler Imaging  LV Peak [address]; Lateral MA (TDI): 5.1 cm/s  LV Peak [address]; Medial MA (TDI): 5.5 cm/s    Unspecified Scan Mode  Peak Grad; Mean; Antegrade Flow: 14 mm[Hg]  Vmax;  Antegrade Flow: 187 cm/s  LVOT Diam: 1.9 cm  MV Peak Lane/LV Peak Tissue Lane E-Wave; Lateral MA: 17.4  MV Peak Lane/LV Peak Tissue Lane E-Wave; Medial MA: 16.1    Prepared and signed by    Lisbeth Blancas MD  Signed 12-Aug-2020 14:06:00         Procedures done this admission:  * No surgery found *    All Micro Results     Procedure Component Value Units Date/Time    EMERGENT DISEASE PANEL [[id number]] Collected:  08/10/20 1115    Order Status:  Canceled           Labs: Results:       BMP, Mg, Phos Recent Labs     08/13/20  0703 08/12/20  0557 08/11/20  0432    141 145   K 3.5 3.8 3.8   * 109* 111*   CO2 25 27 28   AGAP 6* 5* 6*   BUN 10 12 14   CREA 0.90 0.92 0.93   CA 8.9 8.4 8.6 * 131* 120*      CBC Recent Labs     08/13/20  0703 08/12/20  0557 08/11/20  0432   WBC 4.6 6.2 8.7   RBC 3.90* 3.73* 3.89*   HGB 11.0* 10.5* 10.6*   HCT 32.9* 31.5* 33.0*    150 146*   GRANS 72 76  --    LYMPH 18 15  --    EOS 1 1  --    MONOS 9 8  --    BASOS 0 0  --    IG 1 1  --    ANEU 3.3 4.7  --    ABL 0.8 0.9  --    DELMER 0.0 0.0  --    ABM 0.4 0.5  --    ABB 0.0 0.0  --    AIG 0.0 0.1  --       LFT No results for input(s): ALT, TBIL, AP, TP, ALB, GLOB, AGRAT in the last 72 hours.     No lab exists for component: SGOT, GPT   Cardiac Testing Lab Results   Component Value Date/Time    BNP 37 04/30/2013 02:40 AM    BNP 40 09/13/2011 05:01 PM    BNP 91 09/19/2010 05:35 AM    CK 54 10/17/2015 12:30 AM    CK 49 11/09/2012 09:15 PM    CK 37 11/09/2012 04:40 PM    CK - MB <0.5 05/05/2009 07:30 AM    CK - MB <0.5 05/04/2009 10:50 PM    CK - MB 0.0 12/29/2008 05:20 PM    CK-MB Index CANNOT BE CALCULATED 05/05/2009 07:30 AM    CK-MB Index CANNOT BE CALCULATED 05/04/2009 10:50 PM    CK-MB Index 0.0 12/29/2008 05:20 PM    Troponin-I <0.05 09/13/2011 05:01 PM    Troponin-I <0.05 05/25/2011 01:25 PM    Troponin-I, Qt. <0.02 (L) 02/22/2016 01:00 PM    Troponin-I, Qt. <0.02 (L) 10/17/2015 09:21 AM    Troponin-I, Qt. 0.02 10/17/2015 12:30 AM      Coagulation Tests Lab Results   Component Value Date/Time    Prothrombin time 13.8 08/10/2020 06:49 AM    Prothrombin time 11.1 02/25/2016 11:21 AM    Prothrombin time 17.2 (H) 10/18/2015 06:02 AM    INR 1.0 08/10/2020 06:49 AM    INR 1.0 02/25/2016 11:21 AM    INR 1.6 (H) 10/18/2015 06:02 AM    aPTT 26.6 02/25/2016 11:21 AM    aPTT 42.7 (H) 06/23/2014 07:00 AM    aPTT 43.9 (H) 06/23/2014 12:15 AM      A1c Lab Results   Component Value Date/Time    Hemoglobin A1c 9.1 (H) 08/11/2020 04:32 AM    Hemoglobin A1c 7.7 (H) 06/22/2014 03:13 AM    Hemoglobin A1c 7.0 (H) 03/01/2010 12:30 PM      Lipid Panel Lab Results   Component Value Date/Time    Cholesterol, total 125 08/11/2020 04:32 AM    HDL Cholesterol 43 08/11/2020 04:32 AM    LDL, calculated 64.2 08/11/2020 04:32 AM    VLDL, calculated 17.8 08/11/2020 04:32 AM    Triglyceride 89 08/11/2020 04:32 AM    CHOL/HDL Ratio 2.9 08/11/2020 04:32 AM      Thyroid Panel No results found for: TSH, T4, FT4, TT3, T3U, TSHEXT     Most Recent UA Lab Results   Component Value Date/Time    Color JAMES 05/28/2008 11:13 AM    Appearance CLEAR 05/28/2008 11:13 AM    pH (UA) 5.5 05/28/2008 11:13 AM    Protein 100 (A) 05/28/2008 11:13 AM    Glucose 500 (A) 05/28/2008 11:13 AM    Ketone NEGATIVE  05/28/2008 11:13 AM    Bilirubin SMALL (A) 05/28/2008 11:13 AM    Blood SMALL (A) 05/28/2008 11:13 AM    Urobilinogen 0.2 05/28/2008 11:13 AM    Nitrites NEGATIVE  05/28/2008 11:13 AM    Leukocyte Esterase NEGATIVE  05/28/2008 11:13 AM    WBC 0 05/28/2008 11:13 AM    RBC 3-5 05/28/2008 11:13 AM    Epithelial cells 0-3 05/28/2008 11:13 AM    Bacteria 2+ (H) 05/28/2008 11:13 AM    Casts 0 05/28/2008 11:13 AM    Crystals, urine 0 05/28/2008 11:13 AM    Mucus 2+ (H) 05/28/2008 11:13 AM        Allergies   Allergen Reactions    Beta-Blockers (Beta-Adrenergic Blocking Agts) Other (comments)     \"Very low blood pressures with every one they tried\"    Shellfish Derived Angioedema     Only shrimp causes reaction.   Can eat all other shellfish    Xanax [Alprazolam] Other (comments)     Totally changes his personality     Immunization History   Administered Date(s) Administered    (RETIRED) Pneumococcal Vaccine (Unspecified Type) 10/16/2008    Influenza Vaccine PF 10/02/2013    Influenza Vaccine Split 10/17/2008    Pneumococcal Polysaccharide (PPSV-23) 10/18/2015       All Labs from Last 24 Hrs:  Recent Results (from the past 24 hour(s))   GLUCOSE, POC    Collection Time: 08/12/20  4:17 PM   Result Value Ref Range    Glucose (POC) 268 (H) 65 - 100 mg/dL   GLUCOSE, POC    Collection Time: 08/13/20  6:49 AM   Result Value Ref Range    Glucose (POC) 133 (H) 65 - 100 mg/dL   CBC WITH AUTOMATED DIFF    Collection Time: 08/13/20  7:03 AM   Result Value Ref Range    WBC 4.6 4.3 - 11.1 K/uL    RBC 3.90 (L) 4.23 - 5.6 M/uL    HGB 11.0 (L) 13.6 - 17.2 g/dL    HCT 32.9 (L) 41.1 - 50.3 %    MCV 84.4 79.6 - 97.8 FL    MCH 28.2 26.1 - 32.9 PG    MCHC 33.4 31.4 - 35.0 g/dL    RDW 13.9 11.9 - 14.6 %    PLATELET 084 211 - 173 K/uL    MPV 10.0 9.4 - 12.3 FL    ABSOLUTE NRBC 0.00 0.0 - 0.2 K/uL    DF AUTOMATED      NEUTROPHILS 72 43 - 78 %    LYMPHOCYTES 18 13 - 44 %    MONOCYTES 9 4.0 - 12.0 %    EOSINOPHILS 1 0.5 - 7.8 %    BASOPHILS 0 0.0 - 2.0 %    IMMATURE GRANULOCYTES 1 0.0 - 5.0 %    ABS. NEUTROPHILS 3.3 1.7 - 8.2 K/UL    ABS. LYMPHOCYTES 0.8 0.5 - 4.6 K/UL    ABS. MONOCYTES 0.4 0.1 - 1.3 K/UL    ABS. EOSINOPHILS 0.0 0.0 - 0.8 K/UL    ABS. BASOPHILS 0.0 0.0 - 0.2 K/UL    ABS. IMM.  GRANS. 0.0 0.0 - 0.5 K/UL   METABOLIC PANEL, BASIC    Collection Time: 08/13/20  7:03 AM   Result Value Ref Range    Sodium 144 136 - 145 mmol/L    Potassium 3.5 3.5 - 5.1 mmol/L    Chloride 113 (H) 98 - 107 mmol/L    CO2 25 21 - 32 mmol/L    Anion gap 6 (L) 7 - 16 mmol/L    Glucose 141 (H) 65 - 100 mg/dL    BUN 10 8 - 23 MG/DL    Creatinine 0.90 0.8 - 1.5 MG/DL    GFR est AA >60 >60 ml/min/1.73m2    GFR est non-AA >60 >60 ml/min/1.73m2    Calcium 8.9 8.3 - 10.4 MG/DL   GLUCOSE, POC    Collection Time: 08/13/20 11:03 AM   Result Value Ref Range    Glucose (POC) 267 (H) 65 - 100 mg/dL       Current Med List in Hospital:   Current Facility-Administered Medications   Medication Dose Route Frequency    levETIRAcetam (KEPPRA) tablet 500 mg  500 mg Oral BID    apixaban (ELIQUIS) tablet 5 mg  5 mg Oral Q12H    nitroglycerin (NITROSTAT) tablet 0.4 mg  0.4 mg SubLINGual PRN    sodium chloride (NS) flush 5-40 mL  5-40 mL IntraVENous Q8H    sodium chloride (NS) flush 5-40 mL  5-40 mL IntraVENous PRN    pantoprazole (PROTONIX) tablet 40 mg  40 mg Oral ACB    finasteride (PROSCAR) tablet 5 mg  5 mg Oral DAILY  insulin glargine (LANTUS) injection 30 Units  30 Units SubCUTAneous DAILY    isosorbide mononitrate ER (IMDUR) tablet 60 mg  60 mg Oral BID    losartan (COZAAR) tablet 50 mg  50 mg Oral DAILY    atorvastatin (LIPITOR) tablet 40 mg  40 mg Oral DAILY    pregabalin (LYRICA) capsule 300 mg  300 mg Oral BID    ranolazine ER (RANEXA) tablet 500 mg  500 mg Oral BID    tamsulosin (FLOMAX) capsule 0.4 mg  0.4 mg Oral DAILY    traZODone (DESYREL) tablet 100 mg  100 mg Oral QHS    insulin lispro (HUMALOG) injection   SubCUTAneous TIDAC    acetaminophen (TYLENOL) suppository 650 mg  650 mg Rectal Q4H PRN    HYDROcodone-acetaminophen (NORCO)  mg tablet 1 Tab  1 Tab Oral Q6H PRN       Discharge Exam:  Patient Vitals for the past 24 hrs:   Temp Pulse Resp BP SpO2   08/13/20 0905 96.9 °F (36.1 °C) 74 20 149/69 96 %   08/13/20 0351 97.9 °F (36.6 °C) 72 18 121/73 96 %   08/12/20 2348 98.1 °F (36.7 °C) 70 18 134/64 94 %   08/12/20 1947 98.8 °F (37.1 °C) 74 18 140/64 96 %   08/12/20 1453 97.9 °F (36.6 °C) 69 14 135/68 98 %     Oxygen Therapy  O2 Sat (%): 96 % (08/13/20 0905)  Pulse via Oximetry: 74 beats per minute (08/10/20 1030)  O2 Device: Room air (08/13/20 0905)  O2 Flow Rate (L/min): 2 l/min (08/10/20 0654)    Estimated body mass index is 34.61 kg/m² as calculated from the following:    Height as of this encounter: 5' 7\" (1.702 m). Weight as of this encounter: 100.2 kg (221 lb). Intake/Output Summary (Last 24 hours) at 8/13/2020 1349  Last data filed at 8/13/2020 8322  Gross per 24 hour   Intake    Output 2120 ml   Net -2120 ml       *Note that automatically entered I/Os may not be accurate; dependent on patient compliance with collection and accurate  by assistants. General:    Male in no acute distress  Eyes:   Normal sclerae. Extraocular movements intact. ENT:  Normocephalic, atraumatic. Moist mucous membranes  CV:   Regular rate and rhythm. No murmur, rub, or gallop.     Lungs: Clear to auscultation bilaterally. No wheezing, rhonchi, or rales. Abdomen: Soft, nontender, nondistended. Bowel sounds normal.   Extremities: Warm and dry. No cyanosis or edema. Neurologic: Equal strength of bilateral upper extremities. 2/5 strength of left lower extremity; decreased sensation  Skin:     No rashes or jaundice. Psych:  Normal mood and affect. Discharge Info:   Current Discharge Medication List      START taking these medications    Details   levETIRAcetam (KEPPRA) 500 mg tablet Take 1 Tab by mouth two (2) times a day. Qty: 60 Tab, Refills: 0         CONTINUE these medications which have NOT CHANGED    Details   isosorbide mononitrate ER (Imdur) 60 mg CR tablet Take 1 Tab by mouth two (2) times a day. Qty: 180 Tab, Refills: 3    Associated Diagnoses: Coronary artery disease involving autologous artery coronary bypass graft, angina presence unspecified; Chest pain, unspecified type; S/P CABG (coronary artery bypass graft); Post PTCA      pravastatin (PRAVACHOL) 80 mg tablet Take 1 Tab by mouth nightly. Qty: 90 Tab, Refills: 3    Comments: **Patient requests 90 days supply**  Associated Diagnoses: Coronary artery disease involving autologous artery coronary bypass graft, angina presence unspecified      losartan (COZAAR) 50 mg tablet Take 1 Tab by mouth daily. Qty: 30 Tab, Refills: 5    Associated Diagnoses: Dilated cardiomyopathy ([address] Essential hypertension      carvediloL (Coreg) 12.5 mg tablet Take 1 Tab by mouth two (2) times daily (with meals). Qty: 180 Tab, Refills: 3    Associated Diagnoses: Coronary artery disease involving autologous artery coronary bypass graft, angina presence unspecified; Essential hypertension; Paroxysmal atrial fibrillation (HCC)      ranolazine ER (RANEXA) 500 mg SR tablet Take 1 Tab by mouth two (2) times a day.   Qty: 180 Tab, Refills: 3    Associated Diagnoses: Coronary artery disease involving autologous artery coronary bypass graft, angina presence unspecified      apixaban (ELIQUIS) 5 mg tablet Take 1 Tab by mouth two (2) times a day. Qty: 180 Tab, Refills: 3      tamsulosin (FLOMAX) 0.4 mg capsule TAKE 1 CAPSULE BY MOUTH DAILY  Qty: 90 Cap, Refills: 11    Comments: **Patient requests 90 days supply**  Associated Diagnoses: Benign prostatic hyperplasia with urinary hesitancy      finasteride (PROSCAR) 5 mg tablet TAKE 1 TABLET BY MOUTH DAILY  Qty: 90 Tab, Refills: 11    Comments: **Patient requests 90 days supply**  Associated Diagnoses: Benign prostatic hyperplasia with urinary hesitancy      cyclobenzaprine (FLEXERIL) 10 mg tablet Take 10 mg by mouth two (2) times daily as needed for Muscle Spasm(s). esomeprazole (NEXIUM) 40 mg capsule Take  by mouth daily. pregabalin (LYRICA) 300 mg capsule Take 300 mg by mouth two (2) times a day. fluticasone (FLONASE) 50 mcg/actuation nasal spray 2 Sprays by Both Nostrils route once. HYDROcodone-acetaminophen (NORCO)  mg tablet Take 1 Tab by mouth four (4) times daily as needed. trazodone (DESYREL) 100 mg tablet take 100 mg by mouth nightly. nitroglycerin (NITROSTAT) 0.4 mg SL tablet 1 Tab by SubLINGual route every five (5) minutes as needed for Chest Pain. Up to 3 doses. Qty: 25 Tab, Refills: 2      insulin detemir U-100 (Levemir U-100 Insulin) 100 unit/mL injection 60 Units by SubCUTAneous route two (2) times a day. insulin aspart (NOVOLOG) 100 unit/mL injection 25 Units by SubCUTAneous route Before breakfast, lunch, and dinner. STOP taking these medications       clopidogreL (PLAVIX) 75 mg tab Comments:   Reason for Stopping: Follow Up Orders:   Follow-up Appointments   Procedures    FOLLOW UP VISIT Appointment in: One Week Follow-up with PCP within 1 week Follow-up with neurology within 1 to 2 weeks Follow-up with cardiology in 2 weeks     Follow-up with PCP within 1 week  Follow-up with neurology within 1 to 2 weeks  Follow-up with cardiology in 2 weeks     Standing Status:   Standing     Number of Occurrences:   1     Order Specific Question:   Appointment in     Answer: One Week       Follow-up Information     Follow up With Specialties Details Why Contact Info    Siddhartha Chavez MD Vascular Surgery In 6 months carotid duplex and evaluation in the office [address] [id number]  [address] [facility], [facility], [address] [employer] [facility]   [address]  [phone]      Martell Wilkins DO Neurology In 2 weeks  [address] [facility]  [address] [facility]      Jolynn Hyatt MD Cardiology In 2 weeks  [facility] [address]  [address] [address]  [phone]            Time spent in patient discharge planning and coordination 35 minutes.     Signed:  Eddie Valdes MD

## 2020-08-13 NOTE — PROGRESS NOTES
Neurology Daily Progress Note     Assessment:     79-year-old man with multiple vascular risk factors including coronary artery disease and intracranial and extracranial atherosclerosis. No infarct was seen on CT and the patient is unable to have MRI. We generally avoid using anticoagulation with more than one antiplatelet medication. Eliquis has been restarted. It appears both aspirin and Plavix have been discontinued. Plan:     Continue Eliquis     Defer decision of antithrombotics to cardiology. We generally avoid using anticoagulation with more than one antiplatelet medication. Keppra decreased. Can also consider discontinuing because if this was a seizure it was likely provoked in the setting of hyperglycemia    Subjective: Interval history:    Left sided strength is improved today. Head CT unchanged. Eliquis restarted. Patient plans to d/c today. History:    Katya Garcia is a 79 y.o. male who is being seen for left sided weakness. Review of systems negative with exception of pertinent positives and negatives noted above.        Objective:     Vitals:    08/12/20 1947 08/12/20 2348 08/13/20 0351 08/13/20 0905   BP: 140/64 134/64 121/73 149/69   Pulse: 74 70 72 74   Resp: 18 18 18 20   Temp: 98.8 °F (37.1 °C) 98.1 °F (36.7 °C) 97.9 °F (36.6 °C) 96.9 °F (36.1 °C)   SpO2: 96% 94% 96% 96%   Weight:       Height:              Current Facility-Administered Medications:     levETIRAcetam (KEPPRA) tablet 500 mg, 500 mg, Oral, BID, Madie Menendez MD, 500 mg at 08/13/20 3318    apixaban (ELIQUIS) tablet 5 mg, 5 mg, Oral, Q12H, Madie Turner MD, 5 mg at 08/13/20 0828    nitroglycerin (NITROSTAT) tablet 0.4 mg, 0.4 mg, SubLINGual, PRN, Miranda MORENO NP, 0.4 mg at 08/10/20 0645    sodium chloride (NS) flush 5-40 mL, 5-40 mL, IntraVENous, Q8H, Ciesco, Cecilio, DO, 10 mL at 08/13/20 0546    sodium chloride (NS) flush 5-40 mL, 5-40 mL, IntraVENous, PRN, Ciesco, Cecilio, DO    pantoprazole (PROTONIX) tablet 40 mg, 40 mg, Oral, ACB, Ciesco, Cecilio, DO, 40 mg at 08/13/20 9715    finasteride (PROSCAR) tablet 5 mg, 5 mg, Oral, DAILY, Ciesco, Cecilio, DO, 5 mg at 08/13/20 0900    insulin glargine (LANTUS) injection 30 Units, 30 Units, SubCUTAneous, DAILY, Ciesco, Cecilio, DO, 30 Units at 08/13/20 0900    isosorbide mononitrate ER (IMDUR) tablet 60 mg, 60 mg, Oral, BID, Ciesco, Cecilio, DO, 60 mg at 08/13/20 8604    losartan (COZAAR) tablet 50 mg, 50 mg, Oral, DAILY, Ciesco, Cecilio, DO, 50 mg at 08/13/20 0284    atorvastatin (LIPITOR) tablet 40 mg, 40 mg, Oral, DAILY, Ciesco, Cecilio, DO, 40 mg at 08/13/20 6269    pregabalin (LYRICA) capsule 300 mg, 300 mg, Oral, BID, Ciesco, Cecilio, DO, 300 mg at 08/13/20 2014    ranolazine ER (RANEXA) tablet 500 mg, 500 mg, Oral, BID, Ciesco, Cecilio, DO, 500 mg at 08/13/20 0828    tamsulosin (FLOMAX) capsule 0.4 mg, 0.4 mg, Oral, DAILY, Ciesco, Cecilio, DO, 0.4 mg at 08/13/20 6239    traZODone (DESYREL) tablet 100 mg, 100 mg, Oral, QHS, Ciesco, Cecilio, DO, 100 mg at 08/12/20 2138    insulin lispro (HUMALOG) injection, , SubCUTAneous, TIDAC, Ciesco, Cecilio, DO, 6 Units at 08/12/20 1709    acetaminophen (TYLENOL) suppository 650 mg, 650 mg, Rectal, Q4H PRN, Ciesco, Cecilio, DO, 650 mg at 08/10/20 1308    HYDROcodone-acetaminophen (NORCO)  mg tablet 1 Tab, 1 Tab, Oral, Q6H PRN, Otis Myles MD, 1 Tab at 08/13/20 5422    Recent Results (from the past 12 hour(s))   GLUCOSE, POC    Collection Time: 08/13/20  6:49 AM   Result Value Ref Range    Glucose (POC) 133 (H) 65 - 100 mg/dL   CBC WITH AUTOMATED DIFF    Collection Time: 08/13/20  7:03 AM   Result Value Ref Range    WBC 4.6 4.3 - 11.1 K/uL    RBC 3.90 (L) 4.23 - 5.6 M/uL    HGB 11.0 (L) 13.6 - 17.2 g/dL    HCT 32.9 (L) 41.1 - 50.3 %    MCV 84.4 79.6 - 97.8 FL    MCH 28.2 26.1 - 32.9 PG    MCHC 33.4 31.4 - 35.0 g/dL    RDW 13.9 11.9 - 14.6 %    PLATELET 037 258 - 110 K/uL    MPV 10.0 9.4 - 12.3 FL    ABSOLUTE NRBC 0.00 0.0 - 0.2 K/uL    DF AUTOMATED      NEUTROPHILS 72 43 - 78 %    LYMPHOCYTES 18 13 - 44 %    MONOCYTES 9 4.0 - 12.0 %    EOSINOPHILS 1 0.5 - 7.8 %    BASOPHILS 0 0.0 - 2.0 %    IMMATURE GRANULOCYTES 1 0.0 - 5.0 %    ABS. NEUTROPHILS 3.3 1.7 - 8.2 K/UL    ABS. LYMPHOCYTES 0.8 0.5 - 4.6 K/UL    ABS. MONOCYTES 0.4 0.1 - 1.3 K/UL    ABS. EOSINOPHILS 0.0 0.0 - 0.8 K/UL    ABS. BASOPHILS 0.0 0.0 - 0.2 K/UL    ABS. IMM. GRANS. 0.0 0.0 - 0.5 K/UL   METABOLIC PANEL, BASIC    Collection Time: 08/13/20  7:03 AM   Result Value Ref Range    Sodium 144 136 - 145 mmol/L    Potassium 3.5 3.5 - 5.1 mmol/L    Chloride 113 (H) 98 - 107 mmol/L    CO2 25 21 - 32 mmol/L    Anion gap 6 (L) 7 - 16 mmol/L    Glucose 141 (H) 65 - 100 mg/dL    BUN 10 8 - 23 MG/DL    Creatinine 0.90 0.8 - 1.5 MG/DL    GFR est AA >60 >60 ml/min/1.73m2    GFR est non-AA >60 >60 ml/min/1.73m2    Calcium 8.9 8.3 - 10.4 MG/DL     CT Results (most recent):  Results from Hospital Encounter encounter on 08/10/20   CT HEAD WO CONT    Narrative Head CT    INDICATION: Left-sided weakness    Multiple axial images obtained through the brain without intravenous contrast.   Radiation dose reduction techniques were used for this study: All CT scans  performed at this facility use one or all of the following: Automated exposure  control, adjustment of the mA and/or kVp according to patient's size, iterative  reconstruction. FINDINGS: There is no significant change compared with 03/10/2020. Intracranial  vascular calcification is again noted. There is no CT evidence of acute  hemorrhage or infarction. The ventricles are normal in size. There are no  extra-axial fluid collections. No masses are seen. The sinuses are clear. There  are no bony lesions. Impression IMPRESSION: No CT evidence of acute intracranial abnormality. Physical Exam:  General - Well developed, well nourished, in no apparent distress. Pleasant and conversant. HEENT - Normocephalic, atraumatic.   Neck - Supple without masses      Neurological examination - Comprehension, attention , memory and reasoning are intact. Language and speech are normal. On cranial nerve examination pupils are equal round and reactive to light. Visual acuity is adequate. Visual fields are full to finger confrontation. Extraocular motility is normal. Face is symmetric and sensation is intact to light touch. Hearing is intact to finger rustle bilaterally. Motor examination - There is normal muscle tone and bulk. Power is full throughout on the right. The patient has 4+/5 strength in the left upper limb and 3/5 strength in the left lower limb. Muscle stretch reflexes are diminished throughout. He has decreased sensation to light touch in the left lower limb, distally.   Cerebellar examination is normal.        Signed By: Abundio Cruz NP     August 13, 2020

## 2020-08-13 NOTE — DISCHARGE INSTRUCTIONS
DISCHARGE SUMMARY from Nurse    PATIENT INSTRUCTIONS:    After general anesthesia or intravenous sedation, for 24 hours or while taking prescription Narcotics:  · Limit your activities  · Do not drive and operate hazardous machinery  · Do not make important personal or business decisions  · Do  not drink alcoholic beverages  · If you have not urinated within 8 hours after discharge, please contact your surgeon on call. Report the following to your surgeon:  · Excessive pain, swelling, redness or odor of or around the surgical area  · Temperature over 100.5  · Nausea and vomiting lasting longer than 4 hours or if unable to take medications  · Any signs of decreased circulation or nerve impairment to extremity: change in color, persistent  numbness, tingling, coldness or increase pain  · Any questions    What to do at Home:  Recommended activity: Activity as tolerated. If you experience any of the following symptoms temp > 101.5, unrelieved pain, nausea or vomiting, shortness of breath or fatigue not relieved with rest, please follow up with MD.    *  Please give a list of your current medications to your Primary Care Provider. *  Please update this list whenever your medications are discontinued, doses are      changed, or new medications (including over-the-counter products) are added. *  Please carry medication information at all times in case of emergency situations. These are general instructions for a healthy lifestyle:    No smoking/ No tobacco products/ Avoid exposure to second hand smoke  Surgeon General's Warning:  Quitting smoking now greatly reduces serious risk to your health.     Obesity, smoking, and sedentary lifestyle greatly increases your risk for illness    A healthy diet, regular physical exercise & weight monitoring are important for maintaining a healthy lifestyle    You may be retaining fluid if you have a history of heart failure or if you experience any of the following symptoms:  Weight gain of 3 pounds or more overnight or 5 pounds in a week, increased swelling in our hands or feet or shortness of breath while lying flat in bed. Please call your doctor as soon as you notice any of these symptoms; do not wait until your next office visit. The discharge information has been reviewed with the patient. The patient verbalized understanding. Discharge medications reviewed with the patient and appropriate educational materials and side effects teaching were provided. Stroke: After Your Visit     Your Care Instructions     You have had a stroke. Risk factors for stroke include being overweight, smoking, and sedentary lifestyle. This means that the blood flow to a part of your brain was blocked for some time, which damages the nerve cells in that part of the brain. The part of your body controlled by that part of your brain may not function properly now. The brain is an amazing organ that can heal itself to some degree. The stroke you had damaged part of your brain, but other parts of your brain may take over in some way for the damaged areas. You have already started this process. Going home may be hard for you and your family. The more you can try to do for yourself, the better. Remember to take each day one at a time. Follow-up care is a key part of your treatment and safety. Be sure to make and go to all appointments, and call your doctor if you are having problems. Its also a good idea to know your test results and keep a list of the medicines you take. How can you care for yourself at home? Enter a stroke rehabilitation (rehab) program, if your doctor recommends it. Physical, speech, and occupational therapies can help you manage bathing, dressing, eating, and other basics of daily living. Eat a heart-healthy diet that is low in cholesterol, saturated fat, and salt. Eat lots of fresh fruits and vegetables and foods high in fiber.   Increase your activities slowly. Take short rest breaks when you get tired. Gradually increase the amount you walk. Start out by walking a little more than you did the day before. Do not drive until your doctor says it is okay. It is normal to feel sad or depressed after a stroke. If the blues last, talk to your doctor. If you are having problems with urine leakage, go to the bathroom at regular times, including when you first wake up and at bedtime. Also, limit fluids after dinner. If you are constipated, drink plenty of fluids, enough so that your urine is light yellow or clear like water. If you have kidney, heart, or liver disease and have to limit fluids, talk with your doctor before you increase the amount of fluids you drink. Set up a regular time for using the toilet. If you continue to have constipation, your doctor may suggest using a bulking agent, such as Metamucil, or a stool softener, laxative, or enema. Medicines  Take your medicines exactly as prescribed. Call your doctor if you think you are having a problem with your medicine. You may be taking several medicines. ACE (angiotensin-converting enzyme) inhibitors, angiotensin II receptor blockers (ARBs), beta-blockers, diuretics (water pills), and calcium channel blockers control your blood pressure. Statins help lower cholesterol. Your doctor may also prescribe medicines for depression, pain, sleep problems, anxiety, or agitation. If your doctor has given you medicine that prevents blood clots, such as warfarin (Coumadin), aspirin combined with extended-release dipyridamole (Aggrenox), clopidogrel (Plavix), or aspirin to prevent another stroke, you should:  Tell your dentist, pharmacist, and other health professionals that you take these medicines. Watch for unusual bruising or bleeding, such as blood in your urine, red or black stools, or bleeding from your nose or gums.   Get regular blood tests to check your clotting time if you are taking Coumadin. Wear medical alert jewelry that says you take blood thinners. You can buy this at most drugstores. Do not take any over-the-counter medicines or herbal products without talking to your doctor first.  If you take birth control pills or hormone replacement therapy, talk to your doctor about whether they are right for you. For family members and caregivers  Make the home safe. Set up a room so that your loved one does not have to climb stairs. Be sure the bathroom is on the same floor. Move throw rugs and furniture that could cause falls, and make sure that the lighting is good. Put grab bars and seats in tubs and showers. Find out what your loved one can do and what he or she needs help with. Try not to do things for your loved one that your loved one can do on his or her own. Help him or her learn and practice new skills. Visit and talk with your loved one often. Try doing activities together that you both enjoy, such as playing cards or board games. Keep in touch with your loved one's friends as much as you can, and encourage them to visit. Take care of yourself. Do not try to do everything yourself. Ask other family members to help. Eat well, get enough rest, and take time to do things that you enjoy. Keep up with your own doctor visits, and make sure to take your medicines regularly. Get out of the house as much as you can. Join a local support group. Find out if you qualify for home health care visits to help with rehab or for adult day care. When should you call for help? Call 911 anytime you think you may need emergency care. For example, call if:  You have signs of another stroke. These may include:  Sudden numbness, paralysis, or weakness in your face, arm, or leg, especially on only one side of your body. New problems with walking or balance. Sudden vision changes. Drooling or slurred speech. New problems speaking or understanding simple statements, or you feel confused.   A sudden, severe headache that is different from past headaches. Call 911 even if these symptoms go away in a few minutes. You cough up blood. You vomit blood or what looks like coffee grounds. You pass maroon or very bloody stools. Call your doctor now or seek immediate medical care if:  You have new bruises or blood spots under your skin. You have a nosebleed. Your gums bleed when you brush your teeth. You have blood in your urine. Your stools are black and tarlike or have streaks of blood. You have vaginal bleeding when you are not having your period, or heavy period bleeding. You have new symptoms that may be related to your stroke, such as falls or trouble swallowing. Watch closely for changes in your health, and be sure to contact your doctor if you have any problems. Where can you learn more? Go to [employer]    Enter C294  in the search box to learn more about \"Stroke: After Your Visit\". © 4419-6831 [employer]. Care instructions adapted under license by [employer] (which disclaims liability or warranty for this information). This care instruction is for use with your licensed healthcare professional. If you have questions about a medical condition or this instruction, always ask your healthcare professional. [employer] any warranty or liability for your use of this information.       ___________________________________________________________________________________________________________________________________

## 2020-08-13 NOTE — PROGRESS NOTES
STG:  (1.)Mr. Keke Dorado will move from supine to sit and sit to supine , scoot up and down, and roll side to side with CGA x 1  within 4  treatment day(s). (2.)Mr. Keke Dorado will transfer from bed to chair and chair to bed with MOD A x 1 using the least restrictive device within 4 treatment day(s). GOAL MET: 8/13/20  (3.)Mr. Keke Dorado will ambulate with MOD ASSIST x 1 for 10 feet with the least restrictive device within 4 treatment day(s). GOAL MET: 8/13/20    LTG:  (1.)Mr. Keke Dorado will move from supine to sit and sit to supine , scoot up and down, and roll side to side in bed with MINIMAL ASSIST within 7 treatment day(s). (2.)Mr. Keke Dorado will transfer from bed to chair and chair to bed with MINIMAL ASSIST using the least restrictive device within 7 treatment day(s). (3.)Mr. Keke Dorado will ambulate with MINIMAL ASSIST for 20 feet with the least restrictive device within 7 treatment day(s). ________________________________________________________________________________________________      PHYSICAL THERAPY: Daily Note and AM 8/13/2020  OBSERVATION: PT Visit Days : 2  Payor: Gabriela Mejia / Plan: [address] / Product Type: [employer] /       NAME/AGE/GENDER: Jessie Odonnell is a 79 y.o. male   PRIMARY DIAGNOSIS: Chest pain [R07.9]  Stroke-like symptoms [R29.90] Stroke-like symptoms Stroke-like symptoms       ICD-10: Treatment Diagnosis:    · Generalized Muscle Weakness (M62.81)  · Other lack of cordination (R27.8)  · Difficulty in walking, Not elsewhere classified (R26.2)  · Other abnormalities of gait and mobility (R26.89)  · Unspecified Lack of Coordination (R27.9)   Precaution/Allergies:  Beta-blockers (beta-adrenergic blocking agts); Shellfish derived; and Xanax [alprazolam]      ASSESSMENT:     Mr. Keke Dorado is supine upon contact and agreeable to PT treatment. He performed bed mobility and supine to sit with CGA and minimal verbal cues for sequencing.   He demonstrated good sitting balance at the edge of the bed and scooted with CGA. He performed transfers throughout with min A and minimal cues for hand placement. The patient then ambulated 30' with the RW and min A (+Chair follow) with cues for posture, walker management, and fatigue awareness. He took one seated rest break in the recliner before ambulating an additional 10' with the same. The patient was returned to the room and participated in a few seated exercises with minimal assist and cues for technique. He then stood again and ambulated 5' to the bed with HHA/min A and returned to supine with CGA. The patient was then positioned for comfort with needs in reach. Overall the patient is making good progress toward therapy goals as indicated by increased gait distances and decreased assistance levels. Will continue skilled PT treatment as patient is still below functional baseline. This section established at most recent assessment   PROBLEM LIST (Impairments causing functional limitations):  1. Decreased Strength  2. Decreased ADL/Functional Activities  3. Decreased Transfer Abilities  4. Decreased Ambulation Ability/Technique  5. Decreased Balance  6. Decreased Activity Tolerance  7. Decreased Pacing Skills  8. Decreased Work Simplification/Energy Conservation Techniques  9. Increased Fatigue  10. Decreased Flexibility/Joint Mobility   INTERVENTIONS PLANNED: (Benefits and precautions of physical therapy have been discussed with the patient.)  1. Balance Exercise  2. Bed Mobility  3. Manual Therapy  4. Neuromuscular Re-education/Strengthening  5. Range of Motion (ROM)  6. Therapeutic Activites  7. Therapeutic Exercise/Strengthening  8. Transfer Training     TREATMENT PLAN: Frequency/Duration: 3 times a week for duration of hospital stay  Rehabilitation Potential For Stated Goals: Good     REHAB RECOMMENDATIONS (at time of discharge pending progress):    Placement:   It is my opinion, based on this patient's performance to date, that Mr. Tate Delgado may benefit from intensive therapy at an [address] [place] [address] after discharge due to a probable need for multiple therapy disciplines. The patient is requesting to go home instead. If he goes home he will need HHPT. Equipment:    Walkers, Type: Rolling Walker              HISTORY:   History of Present Injury/Illness (Reason for Referral):  PER MD H&P   78 yo CM with past history of SSS s/p PPM, 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 Patient was supposed to have a scheduled outpatient cardiac cath this morning. His son, at bedside, came to pick him up this morning and says Zachery Sainz was just off, I saw that his balance was off and he wasn't acting himself. \" Patient went to bed last night in his usual state of health. He did not take \"my long-acting insulin or my Eliquis because of my heart cath. I was supposed to take 4 baby aspirin but I forgot to take it today. \" When he got to cath lab he was noted to be encephalopathic, his blood sugar was ~300. He was given Narcan with no improvement. Code S called.      ED Course: Code S called. CT head unremarkable. STAT teleneurology consulted, concern for seizure-like activity. He was loaded with Keppra. Deemed not a candidate for thrombolytics. Improved interval left-sided weakness afterwards.   Hospitalist called for admission.      Complete ROS done and is as stated in HPI or otherwise negative  Past Medical History/Comorbidities:   Mr. Tate Delgado  has a past medical history of Acute kidney failure, unspecified ([address] (9/17/2010), Anxiety state, unspecified (11/14/2013), 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), CAD (coronary artery disease), Carotid artery disease ([address] (03/28/2019), Carotid artery stenosis without cerebral infarction (07/07/2008), CVA (cerebral infarction) (5/25/2011), Diabetes ([facility] Diabetes mellitus ([facility] (5/4/2009), Dilated Cardiomyopathy,Ischemic (2/26/2010), Dyslipidemia (5/4/2009), Elevated prostate specific antigen (PSA) (11/14/2013), Essential hypertension (11/14/2013), GERD (gastroesophageal reflux disease), Hypertension, Nocturia, Other and unspecified hyperlipidemia, Pacemaker (6/21/2014), Palpitations (2006), Paroxysmal atrial fibrillation ([address] (11/30/2010), Paroxysmal ventricular tachycardia ([address] (11/30/2010), Post PTCA (5/4/2009), Psychiatric disorder, 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 Mr. Nguyen Ruby  has a past surgical history that includes hx pacemaker; hx orthopaedic; egd (2010); hx cholecystectomy; colonoscopy (1990); pr cardiac surg procedure unlist; pr left heart cath,percutaneous (11/30/2010); hx heart catheterization (4/30/2013); hx heart catheterization (6/23/2014); hx heart catheterization; hx other surgical; hx coronary stent placement; and hx coronary artery bypass graft. Social History/Living Environment:   Home Environment: Other (comment)([place] daughter's property)  One/Two Story Residence: One story  Living Alone: Yes  Support Systems: Family member(s)  Patient Expects to be Discharged to[de-identified] Rehabilitation facility  Current DME Used/Available at Home: Cane, straight  Tub or Shower Type: Shower  Prior Level of Function/Work/Activity:  Prior CVA's but had recovered to use a single point cane functionally. Dominant Side:         RIGHT  Personal Factors:          Sex:  male        Age:  79 y.o.    Number of Personal Factors/Comorbidities that affect the Plan of Care: 1-2: MODERATE COMPLEXITY   EXAMINATION:   Most Recent Physical Functioning:   Gross Assessment:                  Posture:     Balance:  Sitting: Impaired  Sitting - Static: Good (unsupported)  Sitting - Dynamic: Good (unsupported); Fair (occasional)  Standing: Impaired  Standing - Static: Fair  Standing - Dynamic : Fair Bed Mobility:  Supine to Sit: Contact guard assistance  Sit to Supine: Contact guard assistance  Scooting: Contact guard assistance  Wheelchair Mobility:     Transfers:  Sit to Stand: Minimum assistance  Stand to Sit: Minimum assistance  Bed to Chair: Minimum assistance  Gait:     Base of Support: Center of gravity altered  Speed/Felicia: Slow  Gait Abnormalities: Decreased step clearance; Antalgic  Distance (ft): 30 Feet (ft)(and 10')  Assistive Device: Walker, rolling;Gait belt  Ambulation - Level of Assistance: Minimal assistance(+chair follow)      Body Structures Involved:  1. Metabolic  2. Bones  3. Joints  4. Muscles  5. Ligaments Body Functions Affected:  1. Neuromusculoskeletal  2. Movement Related  3. Skin Related  4. Metobolic/Endocrine Activities and Participation Affected:  1. General Tasks and Demands  2. Communication  3. Mobility  4. Self Care  5. Community, Social and [place]   Number of elements that affect the Plan of Care: 1-2: LOW COMPLEXITY   CLINICAL PRESENTATION:   Presentation: Evolving clinical presentation with changing clinical characteristics: MODERATE COMPLEXITY   CLINICAL DECISION MAKIN [facility] Inpatient Short Form  How much difficulty does the patient currently have. .. Unable A Lot A Little None   1. Turning over in bed (including adjusting bedclothes, sheets and blankets)? [] 1   [] 2   [x] 3   [] 4   2. Sitting down on and standing up from a chair with arms ( e.g., wheelchair, bedside commode, etc.)   [] 1   [] 2   [x] 3   [] 4   3.   Moving from lying on back to sitting on the side of the bed? [] 1   [] 2   [x] 3   [] 4   How much help from another person does the patient currently need. .. Total A Lot A Little None   4. Moving to and from a bed to a chair (including a wheelchair)? [] 1   [x] 2   [] 3   [] 4   5. Need to walk in hospital room? [] 1   [x] 2   [] 3   [] 4   6. Climbing 3-5 steps with a railing? [x] 1   [] 2   [] 3   [] 4   © 2007, Trustees of [address] [place] [address] [address] [id number], under license to [employer]. All rights reserved      Score:  Initial: 14 Most Recent: X (Date: -- )    Interpretation of Tool:  Represents activities that are increasingly more difficult (i.e. Bed mobility, Transfers, Gait). Medical Necessity:     · Patient demonstrates good rehab potential due to higher previous functional level. Reason for Services/Other Comments:  · Patient continues to require skilled intervention due to medical complications, patient unable to attend/participate in therapy as expected and left sided weakness. Use of outcome tool(s) and clinical judgement create a POC that gives a: Clear prediction of patient's progress: LOW COMPLEXITY            TREATMENT:   (In addition to Assessment/Re-Assessment sessions the following treatments were rendered)   Pre-treatment Symptoms/Complaints:  0/10 no pain reported. Pain: Initial:   Pain Intensity 1: 0  Post Session:  0/10 no pain reported. Therapeutic Activity: (    25 min): Therapeutic activities including Bed transfers, bed mobility, Chair transfers, Ambulation on level ground and training with RW to improve mobility, strength and balance. Required minimal verbal cues   to promote static and dynamic balance in standing.                Braces/Orthotics/Lines/Etc:   · O2 Device: Room air  Treatment/Session Assessment:    · Response to Treatment:  See above, great progress  · Interdisciplinary Collaboration:   o Physical Therapy Assistant  o Registered Nurse  o Rehabilitation Attendant  · After treatment position/precautions:   o Supine in bed  o Bed/Chair-wheels locked  o Bed in low position  o Call light within reach  o RN notified   · Compliance with Program/Exercises: Will assess as treatment progresses  · Recommendations/Intent for next treatment session: \"Next visit will focus on advancements to more challenging activities, reduction in assistance provided and transfers, ambulation and mobility with neuro rehab for the left LE.   \".   Total Treatment Duration:  PT Patient Time In/Time Out  Time In: 1101  Time Out: 300 [facility]

## 2020-08-13 NOTE — PROGRESS NOTES
Unclear if patient had a stroke. Unable to undergo MRI. Recommend continued ultrasound surveillance of asymptomatic carotid lesion in 6 months with our office. Continue anticoagulation per primary team. Will sign off but will be able to assist if needed.     Curt Alexandre NP/Dr. Jelly Randle

## 2020-08-13 NOTE — ROUTINE PROCESS
Discharge instructions, follow up information, medication list, and prescription information provided and explained to the pt. IV removed from right upper arm, remote telemetry removed. Opportunity for questions provided. E-sign not completed at this time - copy of discharge information placed on chart. Patient states daughter is coming to transport patient home. Instructed to call once ready to leave.

## 2020-08-14 ENCOUNTER — PATIENT OUTREACH (OUTPATIENT)
Dept: CASE MANAGEMENT | Age: 70
End: 2020-08-14

## 2020-08-15 NOTE — PROGRESS NOTES
Patient was admitted to [facility] on 8/10/2020 and discharged on 2020 for Stroke like symptoms. Patient was contacted within 1 business days of discharge. Top Discharge Challenges to be reviewed by the provider   Additional needs identified to be addressed with provider no  none  Discussed COVID-19 related testing which was available at this time. Test results were negative. Patient informed of results, if available? Yes   Method of communication with provider : none       Advance Care Planning:   Does patient have an Advance Directive:  not on file     Inpatient Readmission Risk score: 78%  Was this a readmission? no   Patient stated reason for the admission: N/A  Patients top risk factors for readmission: medical condition  Interventions to address risk factors:   Discuss self management:  Attending follow up appointments  Participate in [facility]  Call care team as needed    Care Transition Nurse (CTN) contacted the patient by telephone to perform post hospital discharge assessment. Verified name and  with patient as identifiers. Provided introduction to self, and explanation of the CTN role. CTN reviewed discharge instructions, medical action plan and red flags with patient who verbalized understanding. Patient given an opportunity to ask questions and does not have any further questions or concerns at this time. The patient agrees to contact the PCP office for questions related to their healthcare. Medication reconciliation was performed with patient, who verbalizes understanding of administration of home medications. Advised obtaining a 90-day supply of all daily and as-needed medications.    Referral to Pharm D needed: no     Home Health/Outpatient orders at discharge: RM, PT, [address]: [address]  Date of initial visit: 2020    Durable Medical Equipment ordered at discharge: none  [address] received: none    Covid Risk Education    Patient has following risk factors of: CAD, SSS. Education provided regarding infection prevention, and signs and symptoms of COVID-19 and when to seek medical attention with patient who verbalized understanding. Discussed exposure protocols and quarantine From CDC: Are you at higher risk for severe illness?  and given an opportunity for questions and concerns. The patient agrees to contact the COVID-19 hotline [phone] or PCP office for questions related to COVID-19. For more information on steps you can take to protect yourself, see CDC's How to Protect Yourself     Patient/family/caregiver given information for [employer] and agrees to enroll no  Patient's preferred e-mail: declines  Patient's preferred phone number: declines    Discussed follow-up appointments. If no appointment was previously scheduled, appointment scheduling offered: Already scheduled  [facility] follow up appointment(s):   Future Appointments   Date Time Provider Nikole Navarro   8/24/2020 10:00 AM eKyla Ospina MD [facility] UCDG UCD   9/18/2020  9:30 AM Michela Coffey DO BSND BSND   11/5/2020  1:00 PM YESSI/YODIT DEVICE 54 [facility] [address]     Non-[facility] follow up appointment(s): None  Plan for follow-up call in 10-14 days based on severity of symptoms and risk factors. CTN provided contact information for future needs. Goals Addressed    Self management of chronic disease:   HH therapy, follow up appointments, calling care team     This note will not be viewable in [address]

## 2020-08-24 ENCOUNTER — HOME CARE VISIT (OUTPATIENT)
Dept: HOME HEALTH SERVICES | Facility: HOME HEALTH | Age: 70
End: 2020-08-24

## 2020-08-24 ENCOUNTER — PATIENT OUTREACH (OUTPATIENT)
Dept: CASE MANAGEMENT | Age: 70
End: 2020-08-24

## 2020-08-27 NOTE — PROGRESS NOTES
Transition of Care Hospital Discharge Follow-Up      Date/Time:  2020 3:07 PM    Care Transition Nurse (CTN) contacted the patient by telephone to follow up post hospital discharge assessment. Verified name and  with patient as identifiers. CTN reinforced discharge instructions and red flags with patient who verbalized understanding. Patient given an opportunity to ask questions and does not have any further questions or concerns at this time. The patient agrees to contact the PCP office for questions related to their healthcare. Disease Specific:   N/A    Patients top risk factors for readmission:  medical conditions, utilization of services (not attending follow up appointments)    [address] [place] [address] Street: [address]: N/A    Medication(s):   Medication changes since discharge: no changes noted    Current Outpatient Medications   Medication Sig    losartan (COZAAR) 50 mg tablet Take 1 Tab by mouth daily.  levETIRAcetam (KEPPRA) 500 mg tablet Take 1 Tab by mouth two (2) times a day.  nitroglycerin (NITROSTAT) 0.4 mg SL tablet 1 Tab by SubLINGual route every five (5) minutes as needed for Chest Pain. Up to 3 doses.  isosorbide mononitrate ER (Imdur) 60 mg CR tablet Take 1 Tab by mouth two (2) times a day.  pravastatin (PRAVACHOL) 80 mg tablet Take 1 Tab by mouth nightly.  carvediloL (Coreg) 12.5 mg tablet Take 1 Tab by mouth two (2) times daily (with meals).  ranolazine ER (RANEXA) 500 mg SR tablet Take 1 Tab by mouth two (2) times a day.  apixaban (ELIQUIS) 5 mg tablet Take 1 Tab by mouth two (2) times a day.  tamsulosin (FLOMAX) 0.4 mg capsule TAKE 1 CAPSULE BY MOUTH DAILY    finasteride (PROSCAR) 5 mg tablet TAKE 1 TABLET BY MOUTH DAILY    cyclobenzaprine (FLEXERIL) 10 mg tablet Take 10 mg by mouth two (2) times daily as needed for Muscle Spasm(s).  esomeprazole (NEXIUM) 40 mg capsule Take  by mouth daily.     insulin detemir U-100 (Levemir U-100 Insulin) 100 unit/mL injection 60 Units by SubCUTAneous route two (2) times a day.  pregabalin (LYRICA) 300 mg capsule Take 300 mg by mouth two (2) times a day.  fluticasone (FLONASE) 50 mcg/actuation nasal spray 2 Sprays by Both Nostrils route once.  HYDROcodone-acetaminophen (NORCO)  mg tablet Take 1 Tab by mouth four (4) times daily as needed.  insulin aspart (NOVOLOG) 100 unit/mL injection 25 Units by SubCUTAneous route Before breakfast, lunch, and dinner.  trazodone (DESYREL) 100 mg tablet take 100 mg by mouth nightly. No current facility-administered medications for this visit. [facility] follow up appointment(s):   Future Appointments   Date Time Provider Nikole Navarro   9/18/2020  9:30 AM Paulette Martinez DO BS[facility] BSND   10/9/2020  8:15 AM Kathy Ruiz MD [facility] UCDE UCD   11/5/2020  1:00 PM YESSI/CLEMSON DEVICE 54 [facility] [address]      Non-[facility] follow up appointment(s): none discussed  Patient attended follow up appointments since last contact: None. He is scheduled to see a Neurologist.  What was the outcome of the appointment: N/A    Other Issues/ Miscellaneous? N/A  Referrals needed?  N/A    Goals    Attend follow up       Next Outreach Scheduled: on or before 9/4/2020

## 2020-09-04 ENCOUNTER — PATIENT OUTREACH (OUTPATIENT)
Dept: CASE MANAGEMENT | Age: 70
End: 2020-09-04

## 2020-09-08 ENCOUNTER — PATIENT OUTREACH (OUTPATIENT)
Dept: CASE MANAGEMENT | Age: 70
End: 2020-09-08

## 2020-09-20 ENCOUNTER — APPOINTMENT (OUTPATIENT)
Dept: GENERAL RADIOLOGY | Age: 70
End: 2020-09-20
Attending: EMERGENCY MEDICINE
Payer: MEDICARE

## 2020-09-20 ENCOUNTER — HOSPITAL ENCOUNTER (OUTPATIENT)
Age: 70
Setting detail: OBSERVATION
Discharge: HOME | End: 2020-09-22
Attending: EMERGENCY MEDICINE | Admitting: HOSPITALIST
Payer: MEDICARE

## 2020-09-20 ENCOUNTER — APPOINTMENT (OUTPATIENT)
Dept: CT IMAGING | Age: 70
End: 2020-09-20
Attending: EMERGENCY MEDICINE
Payer: MEDICARE

## 2020-09-20 DIAGNOSIS — I25.810 CORONARY ARTERY DISEASE INVOLVING AUTOLOGOUS ARTERY CORONARY BYPASS GRAFT, ANGINA PRESENCE UNSPECIFIED: Chronic | ICD-10-CM

## 2020-09-20 DIAGNOSIS — R41.0 DELIRIUM: Primary | ICD-10-CM

## 2020-09-20 DIAGNOSIS — R07.9 CHEST PAIN, UNSPECIFIED TYPE: ICD-10-CM

## 2020-09-20 DIAGNOSIS — I10 ACCELERATED HYPERTENSION: ICD-10-CM

## 2020-09-20 DIAGNOSIS — Z95.1 S/P CABG (CORONARY ARTERY BYPASS GRAFT): Chronic | ICD-10-CM

## 2020-09-20 DIAGNOSIS — Z95.0 PACEMAKER: Chronic | ICD-10-CM

## 2020-09-20 PROBLEM — R41.82 ALTERED MENTAL STATE: Status: ACTIVE | Noted: 2020-09-20

## 2020-09-20 LAB
ALBUMIN SERPL-MCNC: 3.5 G/DL (ref 3.2–4.6)
ALBUMIN/GLOB SERPL: 0.8 {RATIO} (ref 1.2–3.5)
ALP SERPL-CCNC: 67 U/L (ref 50–136)
ALT SERPL-CCNC: 19 U/L (ref 12–65)
ANION GAP SERPL CALC-SCNC: 4 MMOL/L (ref 7–16)
APPEARANCE UR: ABNORMAL
AST SERPL-CCNC: 39 U/L (ref 15–37)
ATRIAL RATE: 86 BPM
BASOPHILS # BLD: 0 K/UL (ref 0–0.2)
BASOPHILS NFR BLD: 1 % (ref 0–2)
BILIRUB SERPL-MCNC: 0.7 MG/DL (ref 0.2–1.1)
BILIRUB UR QL: ABNORMAL
BNP SERPL-MCNC: 159 PG/ML (ref 5–125)
BUN SERPL-MCNC: 15 MG/DL (ref 8–23)
CALCIUM SERPL-MCNC: 9.1 MG/DL (ref 8.3–10.4)
CALCULATED P AXIS, ECG09: 36 DEGREES
CALCULATED R AXIS, ECG10: 133 DEGREES
CALCULATED T AXIS, ECG11: -11 DEGREES
CHLORIDE SERPL-SCNC: 112 MMOL/L (ref 98–107)
CO2 SERPL-SCNC: 27 MMOL/L (ref 21–32)
COLOR UR: ABNORMAL
CREAT SERPL-MCNC: 1.03 MG/DL (ref 0.8–1.5)
DIAGNOSIS, 93000: NORMAL
DIFFERENTIAL METHOD BLD: ABNORMAL
EOSINOPHIL # BLD: 0 K/UL (ref 0–0.8)
EOSINOPHIL NFR BLD: 1 % (ref 0.5–7.8)
ERYTHROCYTE [DISTWIDTH] IN BLOOD BY AUTOMATED COUNT: 13.9 % (ref 11.9–14.6)
GLOBULIN SER CALC-MCNC: 4.2 G/DL (ref 2.3–3.5)
GLUCOSE BLD STRIP.AUTO-MCNC: 198 MG/DL (ref 65–100)
GLUCOSE BLD STRIP.AUTO-MCNC: 232 MG/DL (ref 65–100)
GLUCOSE SERPL-MCNC: 164 MG/DL (ref 65–100)
GLUCOSE UR STRIP.AUTO-MCNC: 250 MG/DL
HCT VFR BLD AUTO: 38.1 % (ref 41.1–50.3)
HGB BLD-MCNC: 12.3 G/DL (ref 13.6–17.2)
HGB UR QL STRIP: NEGATIVE
IMM GRANULOCYTES # BLD AUTO: 0 K/UL (ref 0–0.5)
IMM GRANULOCYTES NFR BLD AUTO: 0 % (ref 0–5)
KETONES UR QL STRIP.AUTO: NEGATIVE MG/DL
LEUKOCYTE ESTERASE UR QL STRIP.AUTO: NEGATIVE
LYMPHOCYTES # BLD: 1.5 K/UL (ref 0.5–4.6)
LYMPHOCYTES NFR BLD: 24 % (ref 13–44)
MAGNESIUM SERPL-MCNC: 2.1 MG/DL (ref 1.8–2.4)
MCH RBC QN AUTO: 26.9 PG (ref 26.1–32.9)
MCHC RBC AUTO-ENTMCNC: 32.3 G/DL (ref 31.4–35)
MCV RBC AUTO: 83.2 FL (ref 79.6–97.8)
MONOCYTES # BLD: 0.4 K/UL (ref 0.1–1.3)
MONOCYTES NFR BLD: 6 % (ref 4–12)
NEUTS SEG # BLD: 4.2 K/UL (ref 1.7–8.2)
NEUTS SEG NFR BLD: 68 % (ref 43–78)
NITRITE UR QL STRIP.AUTO: NEGATIVE
NRBC # BLD: 0 K/UL (ref 0–0.2)
P-R INTERVAL, ECG05: 220 MS
PH UR STRIP: 5.5 [PH] (ref 5–9)
PLATELET # BLD AUTO: 289 K/UL (ref 150–450)
PMV BLD AUTO: 10 FL (ref 9.4–12.3)
POTASSIUM SERPL-SCNC: 4 MMOL/L (ref 3.5–5.1)
PROT SERPL-MCNC: 7.7 G/DL (ref 6.3–8.2)
PROT UR STRIP-MCNC: NEGATIVE MG/DL
Q-T INTERVAL, ECG07: 364 MS
QRS DURATION, ECG06: 142 MS
QTC CALCULATION (BEZET), ECG08: 435 MS
RBC # BLD AUTO: 4.58 M/UL (ref 4.23–5.6)
SODIUM SERPL-SCNC: 143 MMOL/L (ref 136–145)
SP GR UR REFRACTOMETRY: 1.03 (ref 1–1.02)
TROPONIN-HIGH SENSITIVITY: 15 PG/ML (ref 0–14)
TROPONIN-HIGH SENSITIVITY: 25.7 PG/ML (ref 0–14)
TROPONIN-HIGH SENSITIVITY: 43.4 PG/ML (ref 0–14)
UROBILINOGEN UR QL STRIP.AUTO: 1 EU/DL (ref 0.2–1)
VENTRICULAR RATE, ECG03: 86 BPM
WBC # BLD AUTO: 6.1 K/UL (ref 4.3–11.1)

## 2020-09-20 PROCEDURE — 96374 THER/PROPH/DIAG INJ IV PUSH: CPT

## 2020-09-20 PROCEDURE — 93005 ELECTROCARDIOGRAM TRACING: CPT | Performed by: EMERGENCY MEDICINE

## 2020-09-20 PROCEDURE — 83735 ASSAY OF MAGNESIUM: CPT

## 2020-09-20 PROCEDURE — 74011250636 HC RX REV CODE- 250/636: Performed by: EMERGENCY MEDICINE

## 2020-09-20 PROCEDURE — 74011636637 HC RX REV CODE- 636/637: Performed by: FAMILY MEDICINE

## 2020-09-20 PROCEDURE — 71045 X-RAY EXAM CHEST 1 VIEW: CPT

## 2020-09-20 PROCEDURE — 74011250636 HC RX REV CODE- 250/636: Performed by: FAMILY MEDICINE

## 2020-09-20 PROCEDURE — 65660000000 HC RM CCU STEPDOWN

## 2020-09-20 PROCEDURE — 81003 URINALYSIS AUTO W/O SCOPE: CPT

## 2020-09-20 PROCEDURE — 74011250637 HC RX REV CODE- 250/637: Performed by: FAMILY MEDICINE

## 2020-09-20 PROCEDURE — 96372 THER/PROPH/DIAG INJ SC/IM: CPT

## 2020-09-20 PROCEDURE — 83880 ASSAY OF NATRIURETIC PEPTIDE: CPT

## 2020-09-20 PROCEDURE — 36415 COLL VENOUS BLD VENIPUNCTURE: CPT

## 2020-09-20 PROCEDURE — 82962 GLUCOSE BLOOD TEST: CPT

## 2020-09-20 PROCEDURE — 99285 EMERGENCY DEPT VISIT HI MDM: CPT

## 2020-09-20 PROCEDURE — 80053 COMPREHEN METABOLIC PANEL: CPT

## 2020-09-20 PROCEDURE — 70450 CT HEAD/BRAIN W/O DYE: CPT

## 2020-09-20 PROCEDURE — 99218 HC RM OBSERVATION: CPT

## 2020-09-20 PROCEDURE — 85025 COMPLETE CBC W/AUTO DIFF WBC: CPT

## 2020-09-20 PROCEDURE — 84484 ASSAY OF TROPONIN QUANT: CPT

## 2020-09-20 PROCEDURE — 2709999900 HC NON-CHARGEABLE SUPPLY

## 2020-09-20 RX ORDER — ISOSORBIDE MONONITRATE 60 MG/1
60 TABLET, EXTENDED RELEASE ORAL 2 TIMES DAILY
Status: DISCONTINUED | OUTPATIENT
Start: 2020-09-20 | End: 2020-09-22 | Stop reason: HOSPADM

## 2020-09-20 RX ORDER — PANTOPRAZOLE SODIUM 40 MG/1
40 TABLET, DELAYED RELEASE ORAL
Status: DISCONTINUED | OUTPATIENT
Start: 2020-09-21 | End: 2020-09-22 | Stop reason: HOSPADM

## 2020-09-20 RX ORDER — PRAVASTATIN SODIUM 80 MG/1
80 TABLET ORAL
Status: DISCONTINUED | OUTPATIENT
Start: 2020-09-20 | End: 2020-09-22 | Stop reason: HOSPADM

## 2020-09-20 RX ORDER — ACETAMINOPHEN 650 MG/1
650 SUPPOSITORY RECTAL
Status: DISCONTINUED | OUTPATIENT
Start: 2020-09-20 | End: 2020-09-22 | Stop reason: HOSPADM

## 2020-09-20 RX ORDER — HYDRALAZINE HYDROCHLORIDE 20 MG/ML
10 INJECTION INTRAMUSCULAR; INTRAVENOUS
Status: DISCONTINUED | OUTPATIENT
Start: 2020-09-20 | End: 2020-09-22 | Stop reason: HOSPADM

## 2020-09-20 RX ORDER — ACETAMINOPHEN 325 MG/1
650 TABLET ORAL
Status: DISCONTINUED | OUTPATIENT
Start: 2020-09-20 | End: 2020-09-22 | Stop reason: HOSPADM

## 2020-09-20 RX ORDER — PREGABALIN 100 MG/1
300 CAPSULE ORAL 2 TIMES DAILY
Status: DISCONTINUED | OUTPATIENT
Start: 2020-09-20 | End: 2020-09-22 | Stop reason: HOSPADM

## 2020-09-20 RX ORDER — RANOLAZINE 500 MG/1
500 TABLET, EXTENDED RELEASE ORAL 2 TIMES DAILY
Status: DISCONTINUED | OUTPATIENT
Start: 2020-09-20 | End: 2020-09-22 | Stop reason: HOSPADM

## 2020-09-20 RX ORDER — SODIUM CHLORIDE 0.9 % (FLUSH) 0.9 %
5-40 SYRINGE (ML) INJECTION EVERY 8 HOURS
Status: DISCONTINUED | OUTPATIENT
Start: 2020-09-20 | End: 2020-09-22 | Stop reason: HOSPADM

## 2020-09-20 RX ORDER — FLUTICASONE PROPIONATE 50 MCG
2 SPRAY, SUSPENSION (ML) NASAL ONCE
Status: COMPLETED | OUTPATIENT
Start: 2020-09-20 | End: 2020-09-20

## 2020-09-20 RX ORDER — NITROGLYCERIN 0.4 MG/1
0.4 TABLET SUBLINGUAL ONCE
Status: DISCONTINUED | OUTPATIENT
Start: 2020-09-20 | End: 2020-09-20

## 2020-09-20 RX ORDER — CARVEDILOL 12.5 MG/1
12.5 TABLET ORAL 2 TIMES DAILY WITH MEALS
Status: CANCELLED | OUTPATIENT
Start: 2020-09-20

## 2020-09-20 RX ORDER — SODIUM CHLORIDE 0.9 % (FLUSH) 0.9 %
5-40 SYRINGE (ML) INJECTION AS NEEDED
Status: DISCONTINUED | OUTPATIENT
Start: 2020-09-20 | End: 2020-09-22 | Stop reason: HOSPADM

## 2020-09-20 RX ORDER — TRAZODONE HYDROCHLORIDE 50 MG/1
100 TABLET ORAL
Status: DISCONTINUED | OUTPATIENT
Start: 2020-09-20 | End: 2020-09-22 | Stop reason: HOSPADM

## 2020-09-20 RX ORDER — CARVEDILOL 12.5 MG/1
12.5 TABLET ORAL 2 TIMES DAILY WITH MEALS
Status: DISCONTINUED | OUTPATIENT
Start: 2020-09-21 | End: 2020-09-22 | Stop reason: HOSPADM

## 2020-09-20 RX ORDER — MORPHINE SULFATE 2 MG/ML
2 INJECTION, SOLUTION INTRAMUSCULAR; INTRAVENOUS
Status: DISCONTINUED | OUTPATIENT
Start: 2020-09-20 | End: 2020-09-21

## 2020-09-20 RX ORDER — POLYETHYLENE GLYCOL 3350 17 G/17G
17 POWDER, FOR SOLUTION ORAL DAILY PRN
Status: DISCONTINUED | OUTPATIENT
Start: 2020-09-20 | End: 2020-09-22 | Stop reason: HOSPADM

## 2020-09-20 RX ORDER — FINASTERIDE 5 MG/1
5 TABLET, FILM COATED ORAL DAILY
Status: DISCONTINUED | OUTPATIENT
Start: 2020-09-21 | End: 2020-09-22 | Stop reason: HOSPADM

## 2020-09-20 RX ORDER — HALOPERIDOL 5 MG/ML
3 INJECTION INTRAMUSCULAR
Status: DISCONTINUED | OUTPATIENT
Start: 2020-09-20 | End: 2020-09-22 | Stop reason: HOSPADM

## 2020-09-20 RX ORDER — TAMSULOSIN HYDROCHLORIDE 0.4 MG/1
0.4 CAPSULE ORAL DAILY
Status: DISCONTINUED | OUTPATIENT
Start: 2020-09-21 | End: 2020-09-22 | Stop reason: HOSPADM

## 2020-09-20 RX ORDER — INSULIN GLARGINE 100 [IU]/ML
10 INJECTION, SOLUTION SUBCUTANEOUS DAILY
Status: DISCONTINUED | OUTPATIENT
Start: 2020-09-21 | End: 2020-09-22 | Stop reason: HOSPADM

## 2020-09-20 RX ORDER — LOSARTAN POTASSIUM 50 MG/1
50 TABLET ORAL DAILY
Status: DISCONTINUED | OUTPATIENT
Start: 2020-09-21 | End: 2020-09-22 | Stop reason: HOSPADM

## 2020-09-20 RX ORDER — LEVETIRACETAM 500 MG/1
500 TABLET ORAL 2 TIMES DAILY
Status: DISCONTINUED | OUTPATIENT
Start: 2020-09-20 | End: 2020-09-22 | Stop reason: HOSPADM

## 2020-09-20 RX ORDER — ENOXAPARIN SODIUM 100 MG/ML
40 INJECTION SUBCUTANEOUS DAILY
Status: DISCONTINUED | OUTPATIENT
Start: 2020-09-21 | End: 2020-09-21

## 2020-09-20 RX ORDER — INSULIN LISPRO 100 [IU]/ML
INJECTION, SOLUTION INTRAVENOUS; SUBCUTANEOUS
Status: DISCONTINUED | OUTPATIENT
Start: 2020-09-20 | End: 2020-09-22 | Stop reason: HOSPADM

## 2020-09-20 RX ORDER — MORPHINE SULFATE 4 MG/ML
4 INJECTION INTRAVENOUS
Status: COMPLETED | OUTPATIENT
Start: 2020-09-20 | End: 2020-09-20

## 2020-09-20 RX ORDER — NITROGLYCERIN 0.4 MG/1
0.4 TABLET SUBLINGUAL
Status: DISCONTINUED | OUTPATIENT
Start: 2020-09-20 | End: 2020-09-22 | Stop reason: HOSPADM

## 2020-09-20 RX ADMIN — PREGABALIN 300 MG: 100 CAPSULE ORAL at 20:22

## 2020-09-20 RX ADMIN — RANOLAZINE 500 MG: 500 TABLET, FILM COATED, EXTENDED RELEASE ORAL at 20:22

## 2020-09-20 RX ADMIN — HYDRALAZINE HYDROCHLORIDE 10 MG: 20 INJECTION, SOLUTION INTRAMUSCULAR; INTRAVENOUS at 18:59

## 2020-09-20 RX ADMIN — FLUTICASONE PROPIONATE 2 SPRAY: 50 SPRAY, METERED NASAL at 20:28

## 2020-09-20 RX ADMIN — LEVETIRACETAM 500 MG: 500 TABLET ORAL at 20:22

## 2020-09-20 RX ADMIN — TRAZODONE HYDROCHLORIDE 100 MG: 50 TABLET ORAL at 21:38

## 2020-09-20 RX ADMIN — PRAVASTATIN SODIUM 80 MG: 80 TABLET ORAL at 21:37

## 2020-09-20 RX ADMIN — Medication 10 ML: at 22:38

## 2020-09-20 RX ADMIN — INSULIN LISPRO 4 UNITS: 100 INJECTION, SOLUTION INTRAVENOUS; SUBCUTANEOUS at 22:37

## 2020-09-20 RX ADMIN — MORPHINE SULFATE 4 MG: 4 INJECTION INTRAVENOUS at 16:43

## 2020-09-20 RX ADMIN — ISOSORBIDE MONONITRATE 60 MG: 60 TABLET, EXTENDED RELEASE ORAL at 20:20

## 2020-09-20 RX ADMIN — HALOPERIDOL LACTATE 3 MG: 5 INJECTION, SOLUTION INTRAMUSCULAR at 20:21

## 2020-09-20 RX ADMIN — APIXABAN 5 MG: 5 TABLET, FILM COATED ORAL at 20:22

## 2020-09-20 NOTE — H&P
History & Physcial   NAME:  Candice Shaver   Age:  79 y.o.  :   1950   MRN:   [id number]  PCP: Sharmila Barksdale MD  Treatment Team: Attending Provider: Maxine Bosworth, MD; Primary Nurse: Ángela Koo; Hospitalist: Yoni Bess NP  HPI:   Candice Shaver is a 79 y.o. male with past medical history significant for recent CVA, CAD status post CABG, PAF, sick sinus syndrome with PPM, hypertension, diabetes mellitus, and hyperlipidemia presented to the ED for worsening altered mental status. Patient reports in the morning when he woke up, noticed his pacemaker slid from his upper chest into his abdomen. While he was coming to the hospital via EMS he had a bump and the pacemaker went back to where it was supposed to be. Also complaining of chest pain nonradiating, central in location. Denies nausea, vomiting, shortness of breath, palpitation, dizziness. No urinary complaints. Son was present at the bedside and reports patient has baseline confusion since the stroke but today it is worsen. Patient is also hallucinating. In the ER patient was hypertensive, rest of the vitals stable. During 97% on room air . clinical elevation of troponin, EKG shows no acute ST changes. Patient had echo on 2020 with left ventricular ejection fraction of 46% with anteroseptal hypokinesia . Patient was admitted under the hospitalist hospitalist service for further management. Results summary of Diagnostic Studies/Procedures copied from within [facility] EMR:    Complete ROS done and is as stated in HPI or otherwise negative  Past Medical History:   Diagnosis Date    Acute kidney failure, unspecified ([address] 2010    Anxiety state, unspecified 2013    Arteriosclerosis of bypass graft of coronary artery 2015    CAD (coronary artery disease)     Cath on 3- showed 5/5 patent bypass grafts with LV EF=60% and a 90% stenosis in native LAD distal to LIMA graft anastomosis. He received a Xience ZAK (2.25 x 18) to LAD.  Carotid artery disease ([facility] [address] 03/28/2019    Bilateral ICA:50-69% on carotid ultrasound.     Carotid artery stenosis without cerebral infarction 07/07/2008    CVA (cerebral infarction) 5/25/2011    Diabetes ([address]     Diabetes mellitus ([address] 5/4/2009    Dilated Cardiomyopathy,Ischemic 2/26/2010    Dyslipidemia 5/4/2009    Elevated prostate specific antigen (PSA) 11/14/2013    Essential hypertension 11/14/2013    GERD (gastroesophageal reflux disease)     Hypertension     Nocturia     Other and unspecified hyperlipidemia     Pacemaker 6/21/2014    Palpitations 2006    Paroxysmal atrial fibrillation ([address] 11/30/2010    Paroxysmal ventricular tachycardia ([address] 11/30/2010    Post PTCA 5/4/2009    stent to left main     Psychiatric disorder     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 [address] 8/53/9400    Syncope and collapse 8/19/2011    Unspecified malignant neoplasm of skin, site unspecified       Past Surgical History:   Procedure Laterality Date    CARDIAC SURG PROCEDURE UNLIST      bypass x 5; 2 stents    COLONOSCOPY  1990    EGD  2010    esophageal ulcer    HX CHOLECYSTECTOMY      biliary dyskinesia    HX CORONARY ARTERY BYPASS GRAFT      HX CORONARY STENT PLACEMENT      HX HEART CATHETERIZATION  4/30/2013    no intervention    HX HEART CATHETERIZATION  6/23/2014    no intervention    HX HEART CATHETERIZATION      MULTIPLE ([address])    HX ORTHOPAEDIC      knee surgery x 2 left    HX OTHER SURGICAL      nerve in back    HX PACEMAKER      ME LEFT HEART CATH,PERCUTANEOUS 2010    native circ x 1      Allergies   Allergen Reactions    Beta-Blockers (Beta-Adrenergic Blocking Agts) Other (comments)     \"Very low blood pressures with every one they tried\"    Shellfish Derived Angioedema     Only shrimp causes reaction. Can eat all other shellfish    Xanax [Alprazolam] Other (comments)     Totally changes his personality      Social History     Tobacco Use    Smoking status: Former Smoker     Packs/day: 3.00     Years: 40.00     Pack years: 120.00     Last attempt to quit: 1999     Years since quittin.7    Smokeless tobacco: Never Used   Substance Use Topics    Alcohol use: No      Family History   Problem Relation Age of Onset    Diabetes Mother     Heart Disease Mother     Heart Disease Sister     Cancer Brother         Eye    Heart Disease Brother     Migraines Brother       Objective:     Visit Vitals  BP (!) 202/91   Pulse (!) 101   Temp 97.9 °F (36.6 °C)   Resp 18   Ht 5' 7\" (1.702 m)   Wt 95.3 kg (210 lb)   SpO2 95%   BMI 32.89 kg/m²      Temp (24hrs), Av.9 °F (36.6 °C), Min:97.9 °F (36.6 °C), Max:97.9 °F (36.6 °C)    Oxygen Therapy  O2 Sat (%): 95 % (20 1730)  Pulse via Oximetry: 101 beats per minute (20 1730)  O2 Device: Room air (20 1141)  Physical Exam:  General:    Alert, and oriented x3 cooperative, no distress, appears stated age. Head:   Normocephalic, without obvious abnormality, atraumatic. Nose:  Nares normal. No drainage or sinus tenderness. Lungs:   CTA  Heart:  Regular rate and rhythm  Abdomen:   Soft, non-tender. Not distended. Bowel sounds normal. No masses  Extremities: No cyanosis. No edema, No clubbing  Skin:     Texture, turgor normal. No rashes or lesions.   Not Jaundiced  Neurologic: Alert and oriented  Data Review:   Recent Results (from the past 24 hour(s))   CBC WITH AUTOMATED DIFF    Collection Time: 20 11:42 AM   Result Value Ref Range    WBC 6.1 4.3 - 11.1 K/uL    RBC 4.58 4.23 - 5.6 M/uL HGB 12.3 (L) 13.6 - 17.2 g/dL    HCT 38.1 (L) 41.1 - 50.3 %    MCV 83.2 79.6 - 97.8 FL    MCH 26.9 26.1 - 32.9 PG    MCHC 32.3 31.4 - 35.0 g/dL    RDW 13.9 11.9 - 14.6 %    PLATELET 978 646 - 792 K/uL    MPV 10.0 9.4 - 12.3 FL    ABSOLUTE NRBC 0.00 0.0 - 0.2 K/uL    DF AUTOMATED      NEUTROPHILS 68 43 - 78 %    LYMPHOCYTES 24 13 - 44 %    MONOCYTES 6 4.0 - 12.0 %    EOSINOPHILS 1 0.5 - 7.8 %    BASOPHILS 1 0.0 - 2.0 %    IMMATURE GRANULOCYTES 0 0.0 - 5.0 %    ABS. NEUTROPHILS 4.2 1.7 - 8.2 K/UL    ABS. LYMPHOCYTES 1.5 0.5 - 4.6 K/UL    ABS. MONOCYTES 0.4 0.1 - 1.3 K/UL    ABS. EOSINOPHILS 0.0 0.0 - 0.8 K/UL    ABS. BASOPHILS 0.0 0.0 - 0.2 K/UL    ABS. IMM. GRANS. 0.0 0.0 - 0.5 K/UL   METABOLIC PANEL, COMPREHENSIVE    Collection Time: 09/20/20 11:42 AM   Result Value Ref Range    Sodium 143 136 - 145 mmol/L    Potassium 4.0 3.5 - 5.1 mmol/L    Chloride 112 (H) 98 - 107 mmol/L    CO2 27 21 - 32 mmol/L    Anion gap 4 (L) 7 - 16 mmol/L    Glucose 164 (H) 65 - 100 mg/dL    BUN 15 8 - 23 MG/DL    Creatinine 1.03 0.8 - 1.5 MG/DL    GFR est AA >60 >60 ml/min/1.73m2    GFR est non-AA >60 >60 ml/min/1.73m2    Calcium 9.1 8.3 - 10.4 MG/DL    Bilirubin, total 0.7 0.2 - 1.1 MG/DL    ALT (SGPT) 19 12 - 65 U/L    AST (SGOT) 39 (H) 15 - 37 U/L    Alk.  phosphatase 67 50 - 136 U/L    Protein, total 7.7 6.3 - 8.2 g/dL    Albumin 3.5 3.2 - 4.6 g/dL    Globulin 4.2 (H) 2.3 - 3.5 g/dL    A-G Ratio 0.8 (L) 1.2 - 3.5     TROPONIN-HIGH SENSITIVITY    Collection Time: 09/20/20 11:42 AM   Result Value Ref Range    Troponin-High Sensitivity 15.0 (H) 0 - 14 pg/mL   MAGNESIUM    Collection Time: 09/20/20 11:42 AM   Result Value Ref Range    Magnesium 2.1 1.8 - 2.4 mg/dL   NT-PRO BNP    Collection Time: 09/20/20 11:42 AM   Result Value Ref Range    NT pro- (H) 5 - 125 PG/ML   EKG, 12 LEAD, INITIAL    Collection Time: 09/20/20 11:43 AM   Result Value Ref Range    Ventricular Rate 86 BPM    Atrial Rate 86 BPM    P-R Interval 220 ms    QRS Duration 142 ms    Q-T Interval 364 ms    QTC Calculation (Bezet) 435 ms    Calculated P Axis 36 degrees    Calculated R Axis 133 degrees    Calculated T Axis -11 degrees    Diagnosis       Normal sinus rhythm  Ventricular-paced rhythm  Confirmed by Cory Ayala ([id number]) on 9/20/2020 4:34:07 PM     URINALYSIS W/ RFLX MICROSCOPIC    Collection Time: 09/20/20  2:46 PM   Result Value Ref Range    Color JAMES      Appearance CLOUDY      Specific gravity 1.026 (H) 1.001 - 1.023      pH (UA) 5.5 5.0 - 9.0      Protein Negative NEG mg/dL    Glucose 250 mg/dL    Ketone Negative NEG mg/dL    Bilirubin SMALL (A) NEG      Blood Negative NEG      Urobilinogen 1.0 0.2 - 1.0 EU/dL    Nitrites Negative NEG      Leukocyte Esterase Negative NEG     TROPONIN-HIGH SENSITIVITY    Collection Time: 09/20/20  4:10 PM   Result Value Ref Range    Troponin-High Sensitivity 25.7 (H) 0 - 14 pg/mL       Assessment and Plan:      Active Hospital Problems    Diagnosis Date Noted    Altered mental state 09/20/2020    Chest pain 08/03/2020    Hypertension     Pacemaker 06/21/2014    Elevated prostate specific antigen (PSA) 11/14/2013    Dilated Cardiomyopathy,Ischemic 02/26/2010    CAD (coronary artery disease) 05/04/2009    S/P CABG (coronary artery bypass graft) 05/04/2009     LIMA TO LAD, SV TO OM, SV TO RCA, SV TO DIAG      Dyslipidemia 05/04/2009    Diabetes mellitus ([facility] [address] 05/04/2009     Plan:    Patient has extensive cardiac history  EKG with normal sinus rhythm and no acute ST changes  Troponin elevated  Will trend EKG and troponin to rule out ACS  Pain control  2 L oxygen via nasal cannula to keep oxygen saturation more than 92%  Nitroglycerin as needed for the chest pain  Continue aspirin and statin  Cardio consulted, appreciate recommendation  UDS  No signs of infection  Continue to monitor    Chronic co morbidities:  CAD, CHF, hypertension: Continue home meds, hydralazine 10 mg IV as needed systolic blood pressure more than 180  Diabetes mellitus: Lantus 10 units at night daily.   Sliding scale  Atrial fibrillation: Continue Eliquis  Depression: Continue home meds    Admit to patient telemetry  Code full code  Surrogate decision maker: Son and daughter  Estimated length of stay: 2 nights  Loretta Lala MD

## 2020-09-20 NOTE — ED NOTES
TRANSFER - OUT REPORT:    Verbal report given to etta rn (name) on Farzana Sarabia  being transferred to 3rd floor (unit) for routine progression of care       Report consisted of patients Situation, Background, Assessment and   Recommendations(SBAR). Information from the following report(s) ED Summary was reviewed with the receiving nurse. Lines:   Peripheral IV 09/20/20 Right; Lower Forearm (Active)        Opportunity for questions and clarification was provided.       Patient transported with:   Monitor

## 2020-09-20 NOTE — ED PROVIDER NOTES
[facility]     Josiane Coronado is a 79 y.o. male seen on 9/20/2020 in the [facility] EMERGENCY DEPT in room ERG/G. Chief Complaint   Patient presents with    Chest Pain     HPI: 51-year-old male presents to the emergency department with complaints of pain overlying his pacemaker. Patient states that he had raised his hand and that his pacemaker slid from his upper chest into his abdomen. Patient states that he was in transit via EMS he had a bump and it went back to where he was supposed to be. Patient states that he has been having chest pain since. Patient complains of continued chest pain. In discussing with patient's son, patient has been having increased confusion and hallucinations since his stroke about a month ago. The symptoms are intermittent. Today they seem to be worse and that is why EMS was called. Patient denies any shortness of breath, vomiting, abdominal pain, headache, changes in bowel or bladder symptoms or any other concerns. Historian: Patient/Son    REVIEW OF SYSTEMS     Review of Systems   Unable to perform ROS: Mental status change   Constitutional: Negative. HENT: Negative. Respiratory: Positive for chest tightness. Cardiovascular: Positive for chest pain. Gastrointestinal: Negative. Genitourinary: Negative. Musculoskeletal: Negative. Skin: Negative. Neurological: Negative. Hematological: Negative. Psychiatric/Behavioral: Negative. All other systems reviewed and are negative. PAST MEDICAL HISTORY     Past Medical History:   Diagnosis Date    Acute kidney failure, unspecified ([facility] [address] 9/17/2010    Anxiety state, unspecified 11/14/2013    Arteriosclerosis of bypass graft of coronary artery 8/27/2015    CAD (coronary artery disease)     Cath on 3- showed 5/5 patent bypass grafts with LV EF=60% and a 90% stenosis in native LAD distal to LIMA graft anastomosis. He received a Xience ZAK (2.25 x 18) to LAD.  Carotid artery disease ([facility] [address] 03/28/2019    Bilateral ICA:50-69% on carotid ultrasound.     Carotid artery stenosis without cerebral infarction 07/07/2008    CVA (cerebral infarction) 5/25/2011    Diabetes ([address]     Diabetes mellitus ([address] 5/4/2009    Dilated Cardiomyopathy,Ischemic 2/26/2010    Dyslipidemia 5/4/2009    Elevated prostate specific antigen (PSA) 11/14/2013    Essential hypertension 11/14/2013    GERD (gastroesophageal reflux disease)     Hypertension     Nocturia     Other and unspecified hyperlipidemia     Pacemaker 6/21/2014    Palpitations 2006    Paroxysmal atrial fibrillation ([address] 11/30/2010    Paroxysmal ventricular tachycardia ([address] 11/30/2010    Post PTCA 5/4/2009    stent to left main     Psychiatric disorder     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    S/P CABG (coronary artery bypass graft) 5/4/2009    LIMA TO LAD, SV TO OM, SV TO RCA, SV TO DIAG     S/P PTCA (percutaneous transluminal coronary angioplasty) 3/11/2016    Sick sinus syndrome ([address] 09/06/2007    Snoring, Possible sleep apnea 2/26/2010    SSS (sick sinus syndrome) ([address] 6/21/2014    Stroke (cerebrum) ([address]     TIA 2/2011    Stroke, 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    Syncope and collapse 8/19/2011    Unspecified malignant neoplasm of skin, site unspecified      Past Surgical History:   Procedure Laterality Date    CARDIAC SURG PROCEDURE UNLIST      bypass x 5; 2 stents    COLONOSCOPY  1990    EGD  2010    esophageal ulcer    HX CHOLECYSTECTOMY      biliary dyskinesia    HX CORONARY ARTERY BYPASS GRAFT      HX CORONARY STENT PLACEMENT      HX HEART CATHETERIZATION  4/30/2013    no intervention    HX HEART CATHETERIZATION  6/23/2014    no intervention    HX HEART CATHETERIZATION      MULTIPLE ([address]    HX ORTHOPAEDIC      knee surgery x 2 left    HX OTHER SURGICAL      nerve in back    HX PACEMAKER      VA LEFT HEART CATH,PERCUTANEOUS  11/30/2010    native circ x 1     Social History     Socioeconomic History    Marital status:      Spouse name: Not on file    Number of children: Not on file    Years of education: Not on file    Highest education level: Not on file   Tobacco Use    Smoking status: Former Smoker     Packs/day: 3.00     Years: 40.00     Pack years: 120.00     Last attempt to quit: 1999     Years since quittin.7    Smokeless tobacco: Never Used   Substance and Sexual Activity    Alcohol use: No    Drug use: No   Other Topics Concern     Prior to Admission Medications   Prescriptions Last Dose Informant Patient Reported? Taking? HYDROcodone-acetaminophen (NORCO)  mg tablet   Yes No   Sig: Take 1 Tab by mouth four (4) times daily as needed. apixaban (ELIQUIS) 5 mg tablet   No No   Sig: Take 1 Tab by mouth two (2) times a day. carvediloL (Coreg) 12.5 mg tablet   No No   Sig: Take 1 Tab by mouth two (2) times daily (with meals). cyclobenzaprine (FLEXERIL) 10 mg tablet   Yes No   Sig: Take 10 mg by mouth two (2) times daily as needed for Muscle Spasm(s). esomeprazole (NEXIUM) 40 mg capsule   Yes No   Sig: Take  by mouth daily. finasteride (PROSCAR) 5 mg tablet   No No   Sig: TAKE 1 TABLET BY MOUTH DAILY   fluticasone (FLONASE) 50 mcg/actuation nasal spray   Yes No   Si Sprays by Both Nostrils route once. insulin aspart (NOVOLOG) 100 unit/mL injection   Yes No   Si Units by SubCUTAneous route Before breakfast, lunch, and dinner. insulin detemir U-100 (Levemir U-100 Insulin) 100 unit/mL injection   Yes No   Si Units by SubCUTAneous route two (2) times a day. isosorbide mononitrate ER (Imdur) 60 mg CR tablet   No No   Sig: Take 1 Tab by mouth two (2) times a day. levETIRAcetam (KEPPRA) 500 mg tablet   No No   Sig: Take 1 Tab by mouth two (2) times a day. losartan (COZAAR) 50 mg tablet   No No   Sig: Take 1 Tab by mouth daily.    nitroglycerin (NITROSTAT) 0.4 mg SL tablet   No No   Si Tab by SubLINGual route every five (5) minutes as needed for Chest Pain. Up to 3 doses. pravastatin (PRAVACHOL) 80 mg tablet   No No   Sig: Take 1 Tab by mouth nightly. pregabalin (LYRICA) 300 mg capsule   Yes No   Sig: Take 300 mg by mouth two (2) times a day. ranolazine ER (RANEXA) 500 mg SR tablet   No No   Sig: Take 1 Tab by mouth two (2) times a day. tamsulosin (FLOMAX) 0.4 mg capsule   No No   Sig: TAKE 1 CAPSULE BY MOUTH DAILY   trazodone (DESYREL) 100 mg tablet  Self Yes No   Sig: take 100 mg by mouth nightly. Facility-Administered Medications: None     Allergies   Allergen Reactions    Beta-Blockers (Beta-Adrenergic Blocking Agts) Other (comments)     \"Very low blood pressures with every one they tried\"    Shellfish Derived Angioedema     Only shrimp causes reaction. Can eat all other shellfish    Xanax [Alprazolam] Other (comments)     Totally changes his personality        PHYSICAL EXAM       Vitals:    09/20/20 1533 09/20/20 1610 09/20/20 1653 09/20/20 1702   BP: (!) 197/95 (!) 175/84  (!) 192/89   Pulse: (!) 109 100 95 94   Resp:       Temp:       SpO2: 97% 96% 96% 96%    Vital signs were reviewed. Physical Exam  Vitals signs and nursing note reviewed. Constitutional:       Appearance: He is well-developed. HENT:      Head: Atraumatic. Cardiovascular:      Rate and Rhythm: Normal rate and regular rhythm. Pulmonary:      Effort: Pulmonary effort is normal.      Breath sounds: Normal breath sounds. Musculoskeletal: Normal range of motion. Skin:     General: Skin is warm and dry. Neurological:      General: No focal deficit present. Mental Status: He is alert and oriented to person, place, and time.    Psychiatric:      Comments: Patient intermittently confused and delusional          MEDICAL DECISION MAKING     ED Course:    Recent Results (from the past 8 hour(s))   CBC WITH AUTOMATED DIFF    Collection Time: 09/20/20 11:42 AM   Result Value Ref Range    WBC 6.1 4.3 - 11.1 K/uL    RBC 4.58 4.23 - 5.6 M/uL    HGB 12.3 (L) 13.6 - 17.2 g/dL    HCT 38.1 (L) 41.1 - 50.3 %    MCV 83.2 79.6 - 97.8 FL    MCH 26.9 26.1 - 32.9 PG    MCHC 32.3 31.4 - 35.0 g/dL    RDW 13.9 11.9 - 14.6 %    PLATELET 844 112 - 749 K/uL    MPV 10.0 9.4 - 12.3 FL    ABSOLUTE NRBC 0.00 0.0 - 0.2 K/uL    DF AUTOMATED      NEUTROPHILS 68 43 - 78 %    LYMPHOCYTES 24 13 - 44 %    MONOCYTES 6 4.0 - 12.0 %    EOSINOPHILS 1 0.5 - 7.8 %    BASOPHILS 1 0.0 - 2.0 %    IMMATURE GRANULOCYTES 0 0.0 - 5.0 %    ABS. NEUTROPHILS 4.2 1.7 - 8.2 K/UL    ABS. LYMPHOCYTES 1.5 0.5 - 4.6 K/UL    ABS. MONOCYTES 0.4 0.1 - 1.3 K/UL    ABS. EOSINOPHILS 0.0 0.0 - 0.8 K/UL    ABS. BASOPHILS 0.0 0.0 - 0.2 K/UL    ABS. IMM. GRANS. 0.0 0.0 - 0.5 K/UL   METABOLIC PANEL, COMPREHENSIVE    Collection Time: 09/20/20 11:42 AM   Result Value Ref Range    Sodium 143 136 - 145 mmol/L    Potassium 4.0 3.5 - 5.1 mmol/L    Chloride 112 (H) 98 - 107 mmol/L    CO2 27 21 - 32 mmol/L    Anion gap 4 (L) 7 - 16 mmol/L    Glucose 164 (H) 65 - 100 mg/dL    BUN 15 8 - 23 MG/DL    Creatinine 1.03 0.8 - 1.5 MG/DL    GFR est AA >60 >60 ml/min/1.73m2    GFR est non-AA >60 >60 ml/min/1.73m2    Calcium 9.1 8.3 - 10.4 MG/DL    Bilirubin, total 0.7 0.2 - 1.1 MG/DL    ALT (SGPT) 19 12 - 65 U/L    AST (SGOT) 39 (H) 15 - 37 U/L    Alk.  phosphatase 67 50 - 136 U/L    Protein, total 7.7 6.3 - 8.2 g/dL    Albumin 3.5 3.2 - 4.6 g/dL    Globulin 4.2 (H) 2.3 - 3.5 g/dL    A-G Ratio 0.8 (L) 1.2 - 3.5     TROPONIN-HIGH SENSITIVITY    Collection Time: 09/20/20 11:42 AM   Result Value Ref Range    Troponin-High Sensitivity 15.0 (H) 0 - 14 pg/mL   MAGNESIUM    Collection Time: 09/20/20 11:42 AM   Result Value Ref Range    Magnesium 2.1 1.8 - 2.4 mg/dL   NT-PRO BNP    Collection Time: 09/20/20 11:42 AM   Result Value Ref Range    NT pro- (H) 5 - 125 PG/ML   EKG, 12 LEAD, INITIAL    Collection Time: 09/20/20 11:43 AM   Result Value Ref Range    Ventricular Rate 86 BPM    Atrial Rate 86 BPM    P-R Interval 220 ms    QRS Duration 142 ms    Q-T Interval 364 ms    QTC Calculation (Bezet) 435 ms    Calculated P Axis 36 degrees    Calculated R Axis 133 degrees    Calculated T Axis -11 degrees    Diagnosis       Normal sinus rhythm  Ventricular-paced rhythm  Confirmed by Anibal Vera ([id number]) on 9/20/2020 4:34:07 PM     URINALYSIS W/ RFLX MICROSCOPIC    Collection Time: 09/20/20  2:46 PM   Result Value Ref Range    Color JAMES      Appearance CLOUDY      Specific gravity 1.026 (H) 1.001 - 1.023      pH (UA) 5.5 5.0 - 9.0      Protein Negative NEG mg/dL    Glucose 250 mg/dL    Ketone Negative NEG mg/dL    Bilirubin SMALL (A) NEG      Blood Negative NEG      Urobilinogen 1.0 0.2 - 1.0 EU/dL    Nitrites Negative NEG      Leukocyte Esterase Negative NEG     TROPONIN-HIGH SENSITIVITY    Collection Time: 09/20/20  4:10 PM   Result Value Ref Range    Troponin-High Sensitivity 25.7 (H) 0 - 14 pg/mL     Ct Head Wo Cont    Result Date: 9/20/2020  CT HEAD WITHOUT CONTRAST, 9/20/2020 History: Confusion and altered mental status. Comparison: CT head without contrast 8/12/2020 Technique:   5 mm axial scans from the skull base to the vertex. All CT scans performed at this facility use one or all of the following: Automated exposure control, adjustment of the mA and/or kVp according to patient's size, iterative reconstruction. Findings:  No evidence of intracranial hemorrhage is seen. No abnormal extra-axial fluid collections are seen. No evidence for acute hydrocephalus is seen. No evidence of midline shift or herniation is seen. No abnormal edema pattern is seen in a vascular distribution to suggest large artery infarction. Evaluation with bone windows shows no acute osseous changes. Stable mild mucosal thickening is seen of the right maxillary sinus. IMPRESSION:  1. No acute intracranial process evident by noncontrast CT study of the head. This report was made using voice transcription.  Despite my best efforts to avoid any, transcription errors may persist. If there is any question about the accuracy of the report or need for clarification, then please call [phone], or text me through [email or web address] for clarification or correction. Xr Chest Port    Result Date: 9/20/2020  CHEST X-RAY, single portable view  9/20/2020 History: Shortness of breath. Technique: Single frontal view of the chest. Comparison: Chest x-ray 2/22/2016 Findings: A stable left-sided intracardiac device is seen. Stable postsurgical changes otherwise overlie the mediastinal silhouette suggesting a prior coronary artery bypass surgery. The cardiac silhouette is mildly enlarged although stable. The lungs are expanded without evidence for pneumothorax. No involving consolidative airspace process or pleural effusion is seen. IMPRESSION: 1.  Stable mild cardiomegaly without evolving acute changes evident by plain film. . This report was made using voice transcription. Despite my best efforts to avoid any, transcription errors may persist. If there is any question about the accuracy of the report or need for clarification, then please call [phone], or text me through [email or web address] for clarification or correction. MDM  Number of Diagnoses or Management Options  Diagnosis management comments: 80-year-old male presented emergency department with complaints that his pacemaker had migrated into his chest.  In discussing with patient's family, patient has been having intermittent times of delusions and confusion since his recent CVA. Patient's troponins were very minimally elevated and it is difficult to ascertain if patient is actually having chest pain because he still believes that his pacemaker migrated in his chest.  Patient's pacemaker looks appropriate on chest x-ray and has not moved. I believe patient is having possible delirium.   Patient will be admitted to the hospital for further treatment evaluation. Disposition:  Admission  Diagnosis:     ICD-10-CM ICD-9-CM   1. Delirium  R41.0 780.09   2. Chest pain, unspecified type  R07.9 786.50     ____________________________________________________________________  A portion of this note was generated using voice recognition dictation software. While the note has been reviewed for accuracy, please note certain words and phrases may not be transcribed as intended and some grammatical and/or typographical errors may be present.

## 2020-09-20 NOTE — ED TRIAGE NOTES
Arrives via ems from home. Ems called out for accidentally pulling down  his pacemaker during the night, reports its lower down, Reports CP 7/10 worse when moving. Denies nitro. EMS gave 324 aspirin.      Pacemaker spikes and first degree heart block seen on EKG per EMS       Ems VS: 190/80 HR 94 afebrile

## 2020-09-21 PROBLEM — G93.41 ACUTE METABOLIC ENCEPHALOPATHY: Status: ACTIVE | Noted: 2020-09-21

## 2020-09-21 LAB
GLUCOSE BLD STRIP.AUTO-MCNC: 150 MG/DL (ref 65–100)
GLUCOSE BLD STRIP.AUTO-MCNC: 196 MG/DL (ref 65–100)
GLUCOSE BLD STRIP.AUTO-MCNC: 211 MG/DL (ref 65–100)
GLUCOSE BLD STRIP.AUTO-MCNC: 256 MG/DL (ref 65–100)
TROPONIN-HIGH SENSITIVITY: 46.3 PG/ML (ref 0–14)
TROPONIN-HIGH SENSITIVITY: 57.8 PG/ML (ref 0–14)

## 2020-09-21 PROCEDURE — 99218 HC RM OBSERVATION: CPT

## 2020-09-21 PROCEDURE — 99222 1ST HOSP IP/OBS MODERATE 55: CPT | Performed by: INTERNAL MEDICINE

## 2020-09-21 PROCEDURE — 36415 COLL VENOUS BLD VENIPUNCTURE: CPT

## 2020-09-21 PROCEDURE — 2709999900 HC NON-CHARGEABLE SUPPLY

## 2020-09-21 PROCEDURE — 74011250637 HC RX REV CODE- 250/637: Performed by: FAMILY MEDICINE

## 2020-09-21 PROCEDURE — 74011250637 HC RX REV CODE- 250/637: Performed by: HOSPITALIST

## 2020-09-21 PROCEDURE — 74011250636 HC RX REV CODE- 250/636: Performed by: FAMILY MEDICINE

## 2020-09-21 PROCEDURE — 74011636637 HC RX REV CODE- 636/637: Performed by: FAMILY MEDICINE

## 2020-09-21 PROCEDURE — 84484 ASSAY OF TROPONIN QUANT: CPT

## 2020-09-21 PROCEDURE — 82962 GLUCOSE BLOOD TEST: CPT

## 2020-09-21 PROCEDURE — 51798 US URINE CAPACITY MEASURE: CPT

## 2020-09-21 PROCEDURE — 96376 TX/PRO/DX INJ SAME DRUG ADON: CPT

## 2020-09-21 RX ORDER — HYDROCODONE BITARTRATE AND ACETAMINOPHEN 10; 325 MG/1; MG/1
1 TABLET ORAL
Status: DISCONTINUED | OUTPATIENT
Start: 2020-09-21 | End: 2020-09-22 | Stop reason: HOSPADM

## 2020-09-21 RX ORDER — HYDROCODONE BITARTRATE AND ACETAMINOPHEN 5; 325 MG/1; MG/1
1 TABLET ORAL
Status: DISCONTINUED | OUTPATIENT
Start: 2020-09-21 | End: 2020-09-22 | Stop reason: HOSPADM

## 2020-09-21 RX ADMIN — LEVETIRACETAM 500 MG: 500 TABLET ORAL at 17:28

## 2020-09-21 RX ADMIN — HYDROCODONE BITARTRATE AND ACETAMINOPHEN 1 TABLET: 5; 325 TABLET ORAL at 17:27

## 2020-09-21 RX ADMIN — MORPHINE SULFATE 2 MG: 2 INJECTION, SOLUTION INTRAMUSCULAR; INTRAVENOUS at 14:44

## 2020-09-21 RX ADMIN — APIXABAN 5 MG: 5 TABLET, FILM COATED ORAL at 17:28

## 2020-09-21 RX ADMIN — ISOSORBIDE MONONITRATE 60 MG: 60 TABLET, EXTENDED RELEASE ORAL at 17:27

## 2020-09-21 RX ADMIN — Medication 10 ML: at 14:00

## 2020-09-21 RX ADMIN — RANOLAZINE 500 MG: 500 TABLET, FILM COATED, EXTENDED RELEASE ORAL at 08:11

## 2020-09-21 RX ADMIN — FINASTERIDE 5 MG: 5 TABLET, FILM COATED ORAL at 08:11

## 2020-09-21 RX ADMIN — TRAZODONE HYDROCHLORIDE 100 MG: 50 TABLET ORAL at 22:01

## 2020-09-21 RX ADMIN — PREGABALIN 300 MG: 100 CAPSULE ORAL at 17:27

## 2020-09-21 RX ADMIN — INSULIN LISPRO 6 UNITS: 100 INJECTION, SOLUTION INTRAVENOUS; SUBCUTANEOUS at 11:37

## 2020-09-21 RX ADMIN — TAMSULOSIN HYDROCHLORIDE 0.4 MG: 0.4 CAPSULE ORAL at 08:11

## 2020-09-21 RX ADMIN — ISOSORBIDE MONONITRATE 60 MG: 60 TABLET, EXTENDED RELEASE ORAL at 08:12

## 2020-09-21 RX ADMIN — PRAVASTATIN SODIUM 80 MG: 80 TABLET ORAL at 22:01

## 2020-09-21 RX ADMIN — CARVEDILOL 12.5 MG: 12.5 TABLET, FILM COATED ORAL at 17:27

## 2020-09-21 RX ADMIN — RANOLAZINE 500 MG: 500 TABLET, FILM COATED, EXTENDED RELEASE ORAL at 17:27

## 2020-09-21 RX ADMIN — APIXABAN 5 MG: 5 TABLET, FILM COATED ORAL at 08:12

## 2020-09-21 RX ADMIN — LEVETIRACETAM 500 MG: 500 TABLET ORAL at 08:12

## 2020-09-21 RX ADMIN — INSULIN LISPRO 2 UNITS: 100 INJECTION, SOLUTION INTRAVENOUS; SUBCUTANEOUS at 17:28

## 2020-09-21 RX ADMIN — INSULIN GLARGINE 10 UNITS: 100 INJECTION, SOLUTION SUBCUTANEOUS at 08:10

## 2020-09-21 RX ADMIN — CARVEDILOL 12.5 MG: 12.5 TABLET, FILM COATED ORAL at 08:11

## 2020-09-21 RX ADMIN — INSULIN LISPRO 2 UNITS: 100 INJECTION, SOLUTION INTRAVENOUS; SUBCUTANEOUS at 08:10

## 2020-09-21 RX ADMIN — Medication 10 ML: at 05:54

## 2020-09-21 RX ADMIN — MORPHINE SULFATE 2 MG: 2 INJECTION, SOLUTION INTRAMUSCULAR; INTRAVENOUS at 11:02

## 2020-09-21 RX ADMIN — PANTOPRAZOLE SODIUM 40 MG: 40 TABLET, DELAYED RELEASE ORAL at 08:11

## 2020-09-21 RX ADMIN — INSULIN LISPRO 4 UNITS: 100 INJECTION, SOLUTION INTRAVENOUS; SUBCUTANEOUS at 22:06

## 2020-09-21 RX ADMIN — LOSARTAN POTASSIUM 50 MG: 50 TABLET, FILM COATED ORAL at 08:11

## 2020-09-21 RX ADMIN — PREGABALIN 300 MG: 100 CAPSULE ORAL at 08:11

## 2020-09-21 RX ADMIN — Medication 10 ML: at 22:07

## 2020-09-21 NOTE — PROGRESS NOTES
Care Management Interventions  Transition of Care Consult (CM Consult): Discharge Planning  Discharge Durable Medical Equipment: No  Physical Therapy Consult: No  Occupational Therapy Consult: No  Discharge Location  Discharge Placement: Home    Pt admitted to 3rd floor tele for altered mental status. CM met with pt to discuss CM needs & DCP. Pt is A&Ox4. Pt is indep at home with all ADLS. Pt lives with alone in a trailer on his daughter's property. . Pt has no DME needs. Pt has no difficulty with obtaining medications or transport. DCP home with family assistance. No further needs noted. CM to continue to monitor.

## 2020-09-21 NOTE — PROGRESS NOTES
Skin assessment completed with 2nd RN. Scattered scarring and tattoos present. Sacrum and heels are intact and free from injury/breakdown. 09/20/20 1936   Dual Skin Pressure Injury Assessment   Dual Skin Pressure Injury Assessment WDL   Second Care Provider (Based on [address] [place] [address]) Sushil Kaiser, RN   Skin Integumentary   Skin Integumentary (WDL) X    Pressure  Injury Documentation No Pressure Injury Noted-Pressure Ulcer Prevention Initiated   Skin Color Appropriate for ethnicity   Skin Condition/Temp Dry; Warm   Skin Integrity Intact;Scars (comment); Tattoos (comment)   Turgor Non-tenting   Hair Growth Present   Nails X   Varicosities Absent   Exceptions to WDL Thick

## 2020-09-21 NOTE — ROUTINE PROCESS
Bedside and Verbal shift change report given to self (oncoming nurse) by Erich De Guzman RN (offgoing nurse). Report included the following information SBAR, Kardex, Procedure Summary, Intake/Output, MAR and Recent Results.

## 2020-09-21 NOTE — PROGRESS NOTES
Bedside and Verbal shift change report to be given to self (oncoming nurse) by Anna Mitchell (offgoing nurse). Report included the following information SBAR, Kardex, MAR and Recent Results.

## 2020-09-21 NOTE — ROUTINE PROCESS
Bedside and Verbal shift change report given to self (oncoming nurse) by Ge Shirley RN (offgoing nurse). Report included the following information SBAR, Kardex, ED Summary, Procedure Summary, MAR and Recent Results.

## 2020-09-21 NOTE — PROGRESS NOTES
Spoke with son, Louis Winters, and updated on plan of care and pt status. Son stated that he is concerned of \"tremors\" that he has seen. Will continue to monitor and pass information to day shift.

## 2020-09-21 NOTE — CONSULTS
Mayra Martines                 Primary Cardiologist: Dr. Dada Shelton    Primary Care Physician: Dr. Oza Kocher    Admitting Physician: Dr. Sydney Ng    Consulting Physician: Dr. Buster Encarnacion:     Patient is a 79 y.o.  male with PMHx of CAD (remote CABG, multiple PCIs), HTN, HLD, PAF with SSS (s/p PPM - SJM), VIVEK, CVA, DM II, Anxiety, dilated ICM (EF 46%), prior tobacco abuse and possible JUANITO who presented to the ED today reporting a problem with his PPM. States this AM a piece of his PPM \"broke off\" and fell into his left abdomen. EMS was called out by family for increased confusion and hallucinations. He was brought to the ED for evaluation. He is currently alert but remains confused. He is really unable to provide any additional information. In the ED: CT head without acute findings. CXR (-). hsTrop 15 --> 25.7. UA cloudy but not infectious. He was also significantly hypertensive with maxBP 211/102, tachy with -110. Cardiology is consulted for possible NSTEMI. Past Medical History:   Diagnosis Date    Acute kidney failure, unspecified ([address] 9/17/2010    Anxiety state, unspecified 11/14/2013    Arteriosclerosis of bypass graft of coronary artery 8/27/2015    CAD (coronary artery disease)     Cath on 3- showed 5/5 patent bypass grafts with LV EF=60% and a 90% stenosis in native LAD distal to LIMA graft anastomosis. He received a Xience ZAK (2.25 x 18) to LAD.  Carotid artery disease ([address] 03/28/2019    Bilateral ICA:50-69% on carotid ultrasound.     Carotid artery stenosis without cerebral infarction 07/07/2008    CVA (cerebral infarction) 5/25/2011    Diabetes ([address]     Diabetes mellitus ([address] 5/4/2009    Dilated Cardiomyopathy,Ischemic 2/26/2010    Dyslipidemia 5/4/2009    Elevated prostate specific antigen (PSA) 11/14/2013    Essential hypertension 11/14/2013    GERD (gastroesophageal reflux disease)     Hypertension     Nocturia     Other and unspecified hyperlipidemia     Pacemaker 6/21/2014    Palpitations 2006    Paroxysmal atrial fibrillation (HCC) 11/30/2010    Paroxysmal ventricular tachycardia ([address] 11/30/2010    Post PTCA 5/4/2009    stent to left main     Psychiatric disorder     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    S/P CABG (coronary artery bypass graft) 5/4/2009    LIMA TO LAD, SV TO OM, SV TO RCA, SV TO DIAG     S/P PTCA (percutaneous transluminal coronary angioplasty) 3/11/2016    Sick sinus syndrome ([address] 09/06/2007    Snoring, Possible sleep apnea 2/26/2010    SSS (sick sinus syndrome) ([address] 6/21/2014    [facility]     TIA 2/2011    Stroke, embolic ([address] 6/59/9461    Syncope and collapse 8/19/2011    Unspecified malignant neoplasm of skin, site unspecified       Past Surgical History:   Procedure Laterality Date    CARDIAC SURG PROCEDURE UNLIST      bypass x 5; 2 stents    COLONOSCOPY  1990    EGD  2010    esophageal ulcer    HX CHOLECYSTECTOMY      biliary dyskinesia    HX CORONARY ARTERY BYPASS GRAFT      HX CORONARY STENT PLACEMENT      HX HEART CATHETERIZATION  4/30/2013    no intervention    HX HEART CATHETERIZATION  6/23/2014    no intervention    HX HEART CATHETERIZATION      MULTIPLE ([facility])    HX ORTHOPAEDIC      knee surgery x 2 left    HX OTHER SURGICAL      nerve in back    HX PACEMAKER      OH LEFT HEART CATH,PERCUTANEOUS  11/30/2010    native circ x 1      Allergies   Allergen Reactions    Beta-Blockers (Beta-Adrenergic Blocking Agts) Other (comments)     \"Very low blood pressures with every one they tried\"    Shellfish Derived Angioedema     Only shrimp causes reaction.   Can eat all other shellfish    Xanax [Alprazolam] Other (comments)     Totally changes his personality     Social History     Tobacco Use    Smoking status: Former Smoker     Packs/day: 3.00     Years: 40.00     Pack years: 120.00     Last attempt to quit: 1/1/1999     Years since quittin.7    Smokeless tobacco: Never Used   Substance Use Topics    Alcohol use: No      FH:   Family History   Problem Relation Age of Onset    Diabetes Mother     Heart Disease Mother     Heart Disease Sister     Cancer Brother         Eye    Heart Disease Brother     Migraines Brother         Review of Systems -- Pt confused and unable to provide      Objective:       Visit Vitals  BP (!) 194/102   Pulse (!) 119   Temp 98.2 °F (36.8 °C)   Resp 20   Ht 5' 7\" (1.702 m)   Wt 96.2 kg (212 lb)   SpO2 98%   BMI 33.20 kg/m²       No intake/output data recorded. No intake/output data recorded. Physical Exam:  General: well developed, well nourished, NAD, resting comfortably eating a sandwich  HEENT: pupils equal and round, no abnormalities noted, sclera clear, EOMs intact  Neck: supple, no JVD, no carotid bruits  Heart: S1S2 with RRR without murmurs, rubs or gallops, HR 100s on bedside monitor, PPM noted to L upper chest in appropriate position  Lungs: clear throughout auscultation bilaterally, normal effort on RA, no wheezing or rales  Abd: soft, nontender, nondistended, +bowel sounds  Ext: warm, no edema, calves supple/nontender, pulses 2+ bilaterally  Skin: warm and dry, intact  Psychiatric: easily agitated but cooperative with exam  Neurologic: Alert but confused, moves all 4 equally, CNs intact      ECG:  SR w/ 1st degree HB HR 86, no acute ST changes,     ECHO: 2020  -  Left ventricle: Systolic function was normal. Ejection fraction was  estimated in the range of 60 % to 65 %. There were no regional wall motion  abnormalities. There was mild concentric hypertrophy.  -  Right ventricle: The ventricle was mildly dilated. -  Left atrium: The atrium was mildly dilated. -  Atrial septum: There was no right-to-left shunt. Contrast injection was  performed. There was no right-to-left shunt, with provocative maneuvers to  increase right atrial pressure. -  Inferior vena cava, hepatic veins:  The respirophasic change in diameter   Was more than 50%. -  Tricuspid valve: There was mild regurgitation. Data Review:      Recent Results (from the past 24 hour(s))   CBC WITH AUTOMATED DIFF    Collection Time: 09/20/20 11:42 AM   Result Value Ref Range    WBC 6.1 4.3 - 11.1 K/uL    RBC 4.58 4.23 - 5.6 M/uL    HGB 12.3 (L) 13.6 - 17.2 g/dL    HCT 38.1 (L) 41.1 - 50.3 %    MCV 83.2 79.6 - 97.8 FL    MCH 26.9 26.1 - 32.9 PG    MCHC 32.3 31.4 - 35.0 g/dL    RDW 13.9 11.9 - 14.6 %    PLATELET 499 699 - 016 K/uL    MPV 10.0 9.4 - 12.3 FL    ABSOLUTE NRBC 0.00 0.0 - 0.2 K/uL    DF AUTOMATED      NEUTROPHILS 68 43 - 78 %    LYMPHOCYTES 24 13 - 44 %    MONOCYTES 6 4.0 - 12.0 %    EOSINOPHILS 1 0.5 - 7.8 %    BASOPHILS 1 0.0 - 2.0 %    IMMATURE GRANULOCYTES 0 0.0 - 5.0 %    ABS. NEUTROPHILS 4.2 1.7 - 8.2 K/UL    ABS. LYMPHOCYTES 1.5 0.5 - 4.6 K/UL    ABS. MONOCYTES 0.4 0.1 - 1.3 K/UL    ABS. EOSINOPHILS 0.0 0.0 - 0.8 K/UL    ABS. BASOPHILS 0.0 0.0 - 0.2 K/UL    ABS. IMM. GRANS. 0.0 0.0 - 0.5 K/UL   METABOLIC PANEL, COMPREHENSIVE    Collection Time: 09/20/20 11:42 AM   Result Value Ref Range    Sodium 143 136 - 145 mmol/L    Potassium 4.0 3.5 - 5.1 mmol/L    Chloride 112 (H) 98 - 107 mmol/L    CO2 27 21 - 32 mmol/L    Anion gap 4 (L) 7 - 16 mmol/L    Glucose 164 (H) 65 - 100 mg/dL    BUN 15 8 - 23 MG/DL    Creatinine 1.03 0.8 - 1.5 MG/DL    GFR est AA >60 >60 ml/min/1.73m2    GFR est non-AA >60 >60 ml/min/1.73m2    Calcium 9.1 8.3 - 10.4 MG/DL    Bilirubin, total 0.7 0.2 - 1.1 MG/DL    ALT (SGPT) 19 12 - 65 U/L    AST (SGOT) 39 (H) 15 - 37 U/L    Alk.  phosphatase 67 50 - 136 U/L    Protein, total 7.7 6.3 - 8.2 g/dL    Albumin 3.5 3.2 - 4.6 g/dL    Globulin 4.2 (H) 2.3 - 3.5 g/dL    A-G Ratio 0.8 (L) 1.2 - 3.5     TROPONIN-HIGH SENSITIVITY    Collection Time: 09/20/20 11:42 AM   Result Value Ref Range    Troponin-High Sensitivity 15.0 (H) 0 - 14 pg/mL   MAGNESIUM    Collection Time: 09/20/20 11:42 AM   Result Value Ref Range    Magnesium 2.1 1.8 - 2.4 mg/dL   NT-PRO BNP    Collection Time: 09/20/20 11:42 AM   Result Value Ref Range    NT pro- (H) 5 - 125 PG/ML   EKG, 12 LEAD, INITIAL    Collection Time: 09/20/20 11:43 AM   Result Value Ref Range    Ventricular Rate 86 BPM    Atrial Rate 86 BPM    P-R Interval 220 ms    QRS Duration 142 ms    Q-T Interval 364 ms    QTC Calculation (Bezet) 435 ms    Calculated P Axis 36 degrees    Calculated R Axis 133 degrees    Calculated T Axis -11 degrees    Diagnosis       Normal sinus rhythm  Ventricular-paced rhythm  Confirmed by Ed Smith ([id number]) on 9/20/2020 4:34:07 PM     URINALYSIS W/ RFLX MICROSCOPIC    Collection Time: 09/20/20  2:46 PM   Result Value Ref Range    Color JAMES      Appearance CLOUDY      Specific gravity 1.026 (H) 1.001 - 1.023      pH (UA) 5.5 5.0 - 9.0      Protein Negative NEG mg/dL    Glucose 250 mg/dL    Ketone Negative NEG mg/dL    Bilirubin SMALL (A) NEG      Blood Negative NEG      Urobilinogen 1.0 0.2 - 1.0 EU/dL    Nitrites Negative NEG      Leukocyte Esterase Negative NEG     TROPONIN-HIGH SENSITIVITY    Collection Time: 09/20/20  4:10 PM   Result Value Ref Range    Troponin-High Sensitivity 25.7 (H) 0 - 14 pg/mL   GLUCOSE, POC    Collection Time: 09/20/20  7:33 PM   Result Value Ref Range    Glucose (POC) 198 (H) 65 - 100 mg/dL   TROPONIN-HIGH SENSITIVITY    Collection Time: 09/20/20  8:08 PM   Result Value Ref Range    Troponin-High Sensitivity 43.4 (H) 0 - 14 pg/mL         Assessment/Plan:   Principal Problem:    Altered mental state -- per Hospitalist, unclear source    Active Problems:    CAD (coronary artery disease) -- cont home meds (BB, ARB, statin, Imdur, Ranexa)      S/P CABG (coronary artery bypass graft) -- noted      Dyslipidemia -- cont statin      Diabetes mellitus -- per Hospitalist      Dilated Cardiomyopathy,Ischemic -- last EF 60-65% 8/2020      Anxiety -- per Hospitalist      Elevated prostate specific antigen (PSA) -- per Hospitalist      SSS (sick sinus syndrome) -- [facility] PPM in place      Pacemaker -- will interrogate in AM, does not appear to have migrated on CXR      Accelerated hypertension -- per primary team, this is likely the cause of the increased hsTrop      Chest pain -- Pt denies presently          Noe Almanzar NP-C  9/20/2020  9:05 PM

## 2020-09-21 NOTE — PROGRESS NOTES
History & Physcial   NAME:  Ariadna Anderson   Age:  79 y.o.  :   1950   MRN:   [id number]  PCP: David Montneegro MD  Treatment Team: Attending Provider: Carol Tello MD; Hospitalist: Wing Drake NP; Consulting Provider: Jayda Elliott NP; Consulting Provider: Mei Reyes MD; Care Manager: Mai Suarez RN  HPI:     This is a 79 y.o. male with past medical history significant for recent CVA, CAD status post CABG, PAF, sick sinus syndrome with PPM, hypertension, diabetes mellitus, and hyperlipidemia presented to the ED for worsening altered mental status. Patient reports in the morning when he woke up, noticed his pacemaker slid from his upper chest into his abdomen. While he was coming to the hospital via EMS he had a bump and the pacemaker went back to where it was supposed to be. Also complaining of chest pain nonradiating, central in location. Denies nausea, vomiting, shortness of breath, palpitation, dizziness. No urinary complaints. Son was present at the bedside and reports patient has baseline confusion since the stroke but today it is worsen. Patient is also hallucinating. In the ER patient was hypertensive, rest of the vitals stable. During 97% on room air . clinical elevation of troponin, EKG shows no acute ST changes. Patient had echo on 2020 with left ventricular ejection fraction of 46% with anteroseptal hypokinesia . Patient was admitted under the hospitalist hospitalist service for further management. Complete ROS done and is as stated in HPI or otherwise negative     patient is doing well this morning. AAO X2,  Pacemaker was interrogated and working well. Spoke with the patient's son who states that the patient was having hallucinations. No fever no chills. No chest pain. No nausea no vomiting.     Past Medical History:   Diagnosis Date    Acute kidney failure, unspecified ([facility] 2010    Anxiety state, unspecified 2013    Arteriosclerosis of bypass graft of coronary artery 8/27/2015    CAD (coronary artery disease)     Cath on 3- showed 5/5 patent bypass grafts with LV EF=60% and a 90% stenosis in native LAD distal to LIMA graft anastomosis. He received a Xience ZAK (2.25 x 18) to LAD.  Carotid artery disease ([address] 03/28/2019    Bilateral ICA:50-69% on carotid ultrasound.     Carotid artery stenosis without cerebral infarction 07/07/2008    CVA (cerebral infarction) 5/25/2011    Diabetes ([address]     Diabetes mellitus ([address] 5/4/2009    Dilated Cardiomyopathy,Ischemic 2/26/2010    Dyslipidemia 5/4/2009    Elevated prostate specific antigen (PSA) 11/14/2013    Essential hypertension 11/14/2013    GERD (gastroesophageal reflux disease)     Hypertension     Nocturia     Other and unspecified hyperlipidemia     Pacemaker 6/21/2014    Palpitations 2006    Paroxysmal atrial fibrillation ([address] 11/30/2010    Paroxysmal ventricular tachycardia ([address] 11/30/2010    Post PTCA 5/4/2009    stent to left main     Psychiatric disorder     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    S/P CABG (coronary artery bypass graft) 5/4/2009    LIMA TO LAD, SV TO OM, SV TO RCA, SV TO DIAG     S/P PTCA (percutaneous transluminal coronary angioplasty) 3/11/2016    Sick sinus syndrome ([address] 09/06/2007    Snoring, Possible sleep apnea 2/26/2010    SSS (sick sinus syndrome) ([address] 6/21/2014    [facility]     TIA 2/2011    Stroke, embolic ([address] 9/00/2014    Syncope and collapse 8/19/2011    Unspecified malignant neoplasm of skin, site unspecified       Past Surgical History:   Procedure Laterality Date    CARDIAC SURG PROCEDURE UNLIST      bypass x 5; 2 stents    COLONOSCOPY  1990    EGD  2010    esophageal ulcer    HX CHOLECYSTECTOMY      biliary dyskinesia    HX CORONARY ARTERY BYPASS GRAFT      HX CORONARY STENT PLACEMENT      HX HEART CATHETERIZATION  4/30/2013    no intervention    HX HEART CATHETERIZATION  2014    no intervention    HX HEART CATHETERIZATION      MULTIPLE ([facility])    HX ORTHOPAEDIC      knee surgery x 2 left    HX OTHER SURGICAL      nerve in back    HX PACEMAKER      ID LEFT HEART CATH,PERCUTANEOUS  2010    native circ x 1      Allergies   Allergen Reactions    Beta-Blockers (Beta-Adrenergic Blocking Agts) Other (comments)     \"Very low blood pressures with every one they tried\"    Shellfish Derived Angioedema     Only shrimp causes reaction. Can eat all other shellfish    Xanax [Alprazolam] Other (comments)     Totally changes his personality      Social History     Tobacco Use    Smoking status: Former Smoker     Packs/day: 3.00     Years: 40.00     Pack years: 120.00     Last attempt to quit: 1999     Years since quittin.7    Smokeless tobacco: Never Used   Substance Use Topics    Alcohol use: No      Family History   Problem Relation Age of Onset    Diabetes Mother     Heart Disease Mother     Heart Disease Sister     Cancer Brother         Eye    Heart Disease Brother     Migraines Brother       Objective:     Visit Vitals  /63 (BP 1 Location: Left arm, BP Patient Position: At rest)   Pulse 69   Temp 97.6 °F (36.4 °C)   Resp 19   Ht 5' 7\" (1.702 m)   Wt 95 kg (209 lb 8 oz)   SpO2 96%   BMI 32.81 kg/m²      Temp (24hrs), Av.7 °F (36.5 °C), Min:97.5 °F (36.4 °C), Max:98.2 °F (36.8 °C)    Oxygen Therapy  O2 Sat (%): 96 % (20 1222)  Pulse via Oximetry: 107 beats per minute (20 1900)  O2 Device: Room air (20 0800)     Physical Exam:  General:    Alert, awake, oriented to person, place, but not time,  cooperative, no distress, appears stated age. Head:   Normocephalic, without obvious abnormality, atraumatic. Nose:  Nares normal. No drainage or sinus tenderness. Lungs:   CTA bilaterally, no wheezing no crackles. Heart:  Regular rate and rhythm, S1-S2 heard, no murmurs rubs or gallops.   Abdomen: Soft, non-tender. Not distended. Bowel sounds normal. No masses  Extremities: No cyanosis. No edema, No clubbing  Skin:     Texture, turgor normal. No rashes or lesions. Not Jaundiced  Neurologic: Alert, awake,  oriented to person and place but not time, no focal neurological deficits,    Data Review:   Recent Results (from the past 24 hour(s))   TROPONIN-HIGH SENSITIVITY    Collection Time: 09/20/20  4:10 PM   Result Value Ref Range    Troponin-High Sensitivity 25.7 (H) 0 - 14 pg/mL   GLUCOSE, POC    Collection Time: 09/20/20  7:33 PM   Result Value Ref Range    Glucose (POC) 198 (H) 65 - 100 mg/dL   TROPONIN-HIGH SENSITIVITY    Collection Time: 09/20/20  8:08 PM   Result Value Ref Range    Troponin-High Sensitivity 43.4 (H) 0 - 14 pg/mL   GLUCOSE, POC    Collection Time: 09/20/20  9:46 PM   Result Value Ref Range    Glucose (POC) 232 (H) 65 - 100 mg/dL   TROPONIN-HIGH SENSITIVITY    Collection Time: 09/21/20  2:03 AM   Result Value Ref Range    Troponin-High Sensitivity 57.8 (HH) 0 - 14 pg/mL   GLUCOSE, POC    Collection Time: 09/21/20  6:08 AM   Result Value Ref Range    Glucose (POC) 150 (H) 65 - 100 mg/dL   TROPONIN-HIGH SENSITIVITY    Collection Time: 09/21/20  7:08 AM   Result Value Ref Range    Troponin-High Sensitivity 46.3 (H) 0 - 14 pg/mL   GLUCOSE, POC    Collection Time: 09/21/20 11:09 AM   Result Value Ref Range    Glucose (POC) 256 (H) 65 - 100 mg/dL       Assessment and Plan:      Active Hospital Problems    Diagnosis Date Noted    Acute metabolic encephalopathy 49/73/3650    Altered mental state 09/20/2020    Chest pain 08/03/2020    Accelerated hypertension     Pacemaker 06/21/2014    SSS (sick sinus syndrome) ([facility] 06/21/2014    Elevated prostate specific antigen (PSA) 11/14/2013    Anxiety 11/14/2013    Dilated Cardiomyopathy,Ischemic 02/26/2010    CAD (coronary artery disease) 05/04/2009    S/P CABG (coronary artery bypass graft) 05/04/2009     LIMA TO LAD, SV TO OM, SV TO RCA, SV TO DIAG      Dyslipidemia 05/04/2009    Diabetes mellitus ([facility] 05/04/2009     Plan: This is a 27-year-old male with    Acute metabolic encephalopathy POA likely polypharmacy- improved  CT head on admission negative. U/A negative. Chest x-ray shows stable cardiomegaly. Wonder if patient is taking more narcotic pain medications than prescribed at home. Will monitor for any mental status changes in the hospital with prescribed pain medications. Sick sinus syndrome status post pacemaker  Cardiology on board. Pacemaker interrogated. Working well. Chest pain no signs of acute coronary syndrome  Continue aspirin and statin  Cardio consulted, appreciate recommendations. No signs of infection  Continue to monitor    Coronary artery disease Status post CABG  Aspirin statin    Chronic diastolic congestive heart failure not in acute exacerbation    Hypertension  Continue home medications. Diabetes mellitus type 2 insulin-dependent  Lantus, sliding scale insulin    Depression: Continue home meds    CODE STATUS full code    DVT prophylaxis Eliquis    Discharge planning:   Anticipate discharge home in the next 24 hours if mental status resolved to baseline. Patient's son updated regarding current plan of care today.     Tal Peralta MD

## 2020-09-21 NOTE — ROUTINE PROCESS
Bedside and Verbal shift change report given to [address] (oncoming nurse) by self (offgoing nurse). Report included the following information SBAR, Kardex, Procedure Summary, MAR and Recent Results.

## 2020-09-22 VITALS
BODY MASS INDEX: 32.71 KG/M2 | HEART RATE: 70 BPM | RESPIRATION RATE: 16 BRPM | SYSTOLIC BLOOD PRESSURE: 93 MMHG | WEIGHT: 208.4 LBS | TEMPERATURE: 97.7 F | DIASTOLIC BLOOD PRESSURE: 59 MMHG | HEIGHT: 67 IN | OXYGEN SATURATION: 96 %

## 2020-09-22 LAB
ALBUMIN SERPL-MCNC: 2.8 G/DL (ref 3.2–4.6)
ALBUMIN/GLOB SERPL: 0.8 {RATIO} (ref 1.2–3.5)
ALP SERPL-CCNC: 55 U/L (ref 50–136)
ALT SERPL-CCNC: 16 U/L (ref 12–65)
ANION GAP SERPL CALC-SCNC: 6 MMOL/L (ref 7–16)
AST SERPL-CCNC: 24 U/L (ref 15–37)
BASOPHILS # BLD: 0 K/UL (ref 0–0.2)
BASOPHILS NFR BLD: 1 % (ref 0–2)
BILIRUB SERPL-MCNC: 0.7 MG/DL (ref 0.2–1.1)
BUN SERPL-MCNC: 17 MG/DL (ref 8–23)
CALCIUM SERPL-MCNC: 8.5 MG/DL (ref 8.3–10.4)
CHLORIDE SERPL-SCNC: 111 MMOL/L (ref 98–107)
CO2 SERPL-SCNC: 27 MMOL/L (ref 21–32)
CREAT SERPL-MCNC: 1.07 MG/DL (ref 0.8–1.5)
DIFFERENTIAL METHOD BLD: ABNORMAL
EOSINOPHIL # BLD: 0.1 K/UL (ref 0–0.8)
EOSINOPHIL NFR BLD: 1 % (ref 0.5–7.8)
ERYTHROCYTE [DISTWIDTH] IN BLOOD BY AUTOMATED COUNT: 14.2 % (ref 11.9–14.6)
GLOBULIN SER CALC-MCNC: 3.5 G/DL (ref 2.3–3.5)
GLUCOSE BLD STRIP.AUTO-MCNC: 174 MG/DL (ref 65–100)
GLUCOSE BLD STRIP.AUTO-MCNC: 211 MG/DL (ref 65–100)
GLUCOSE SERPL-MCNC: 141 MG/DL (ref 65–100)
HCT VFR BLD AUTO: 34.9 % (ref 41.1–50.3)
HGB BLD-MCNC: 11.2 G/DL (ref 13.6–17.2)
IMM GRANULOCYTES # BLD AUTO: 0 K/UL (ref 0–0.5)
IMM GRANULOCYTES NFR BLD AUTO: 1 % (ref 0–5)
LYMPHOCYTES # BLD: 1.8 K/UL (ref 0.5–4.6)
LYMPHOCYTES NFR BLD: 42 % (ref 13–44)
MCH RBC QN AUTO: 26.7 PG (ref 26.1–32.9)
MCHC RBC AUTO-ENTMCNC: 32.1 G/DL (ref 31.4–35)
MCV RBC AUTO: 83.3 FL (ref 79.6–97.8)
MONOCYTES # BLD: 0.4 K/UL (ref 0.1–1.3)
MONOCYTES NFR BLD: 9 % (ref 4–12)
NEUTS SEG # BLD: 2.1 K/UL (ref 1.7–8.2)
NEUTS SEG NFR BLD: 48 % (ref 43–78)
NRBC # BLD: 0 K/UL (ref 0–0.2)
PLATELET # BLD AUTO: 236 K/UL (ref 150–450)
PMV BLD AUTO: 10.1 FL (ref 9.4–12.3)
POTASSIUM SERPL-SCNC: 3.4 MMOL/L (ref 3.5–5.1)
PROT SERPL-MCNC: 6.3 G/DL (ref 6.3–8.2)
RBC # BLD AUTO: 4.19 M/UL (ref 4.23–5.6)
SODIUM SERPL-SCNC: 144 MMOL/L (ref 136–145)
WBC # BLD AUTO: 4.3 K/UL (ref 4.3–11.1)

## 2020-09-22 PROCEDURE — 74011250637 HC RX REV CODE- 250/637: Performed by: INTERNAL MEDICINE

## 2020-09-22 PROCEDURE — 99226 PR SBSQ OBSERVATION CARE/DAY 35 MINUTES: CPT | Performed by: INTERNAL MEDICINE

## 2020-09-22 PROCEDURE — 74011636637 HC RX REV CODE- 636/637: Performed by: FAMILY MEDICINE

## 2020-09-22 PROCEDURE — 74011250637 HC RX REV CODE- 250/637: Performed by: HOSPITALIST

## 2020-09-22 PROCEDURE — 99218 HC RM OBSERVATION: CPT

## 2020-09-22 PROCEDURE — 82962 GLUCOSE BLOOD TEST: CPT

## 2020-09-22 PROCEDURE — 36415 COLL VENOUS BLD VENIPUNCTURE: CPT

## 2020-09-22 PROCEDURE — 85025 COMPLETE CBC W/AUTO DIFF WBC: CPT

## 2020-09-22 PROCEDURE — 74011250637 HC RX REV CODE- 250/637: Performed by: FAMILY MEDICINE

## 2020-09-22 PROCEDURE — 80053 COMPREHEN METABOLIC PANEL: CPT

## 2020-09-22 RX ORDER — POTASSIUM CHLORIDE 20 MEQ/1
40 TABLET, EXTENDED RELEASE ORAL
Status: COMPLETED | OUTPATIENT
Start: 2020-09-22 | End: 2020-09-22

## 2020-09-22 RX ORDER — POTASSIUM CHLORIDE 20 MEQ/1
40 TABLET, EXTENDED RELEASE ORAL 2 TIMES DAILY
Status: DISCONTINUED | OUTPATIENT
Start: 2020-09-22 | End: 2020-09-22

## 2020-09-22 RX ORDER — POTASSIUM CHLORIDE 20 MEQ/1
40 TABLET, EXTENDED RELEASE ORAL DAILY
Qty: 6 TAB | Refills: 0 | Status: SHIPPED | OUTPATIENT
Start: 2020-09-22 | End: 2020-09-25

## 2020-09-22 RX ADMIN — LEVETIRACETAM 500 MG: 500 TABLET ORAL at 08:14

## 2020-09-22 RX ADMIN — PANTOPRAZOLE SODIUM 40 MG: 40 TABLET, DELAYED RELEASE ORAL at 08:14

## 2020-09-22 RX ADMIN — LOSARTAN POTASSIUM 50 MG: 50 TABLET, FILM COATED ORAL at 08:13

## 2020-09-22 RX ADMIN — Medication 10 ML: at 05:51

## 2020-09-22 RX ADMIN — FINASTERIDE 5 MG: 5 TABLET, FILM COATED ORAL at 08:13

## 2020-09-22 RX ADMIN — INSULIN GLARGINE 10 UNITS: 100 INJECTION, SOLUTION SUBCUTANEOUS at 08:14

## 2020-09-22 RX ADMIN — ISOSORBIDE MONONITRATE 60 MG: 60 TABLET, EXTENDED RELEASE ORAL at 08:13

## 2020-09-22 RX ADMIN — RANOLAZINE 500 MG: 500 TABLET, FILM COATED, EXTENDED RELEASE ORAL at 08:13

## 2020-09-22 RX ADMIN — PREGABALIN 300 MG: 100 CAPSULE ORAL at 08:13

## 2020-09-22 RX ADMIN — APIXABAN 5 MG: 5 TABLET, FILM COATED ORAL at 08:13

## 2020-09-22 RX ADMIN — CARVEDILOL 12.5 MG: 12.5 TABLET, FILM COATED ORAL at 08:14

## 2020-09-22 RX ADMIN — TAMSULOSIN HYDROCHLORIDE 0.4 MG: 0.4 CAPSULE ORAL at 08:14

## 2020-09-22 RX ADMIN — POTASSIUM CHLORIDE 40 MEQ: 20 TABLET, EXTENDED RELEASE ORAL at 08:21

## 2020-09-22 RX ADMIN — HYDROCODONE BITARTRATE AND ACETAMINOPHEN 1 TABLET: 5; 325 TABLET ORAL at 00:24

## 2020-09-22 RX ADMIN — POTASSIUM CHLORIDE 40 MEQ: 20 TABLET, EXTENDED RELEASE ORAL at 10:00

## 2020-09-22 RX ADMIN — INSULIN LISPRO 2 UNITS: 100 INJECTION, SOLUTION INTRAVENOUS; SUBCUTANEOUS at 08:14

## 2020-09-22 RX ADMIN — HYDROCODONE BITARTRATE AND ACETAMINOPHEN 1 TABLET: 5; 325 TABLET ORAL at 08:14

## 2020-09-22 NOTE — ROUTINE PROCESS
Bedside and Verbal shift change report given to Gerard Lan RN (oncoming nurse) by self Bubba [place] nurse). Report included the following information SBAR, Kardex, ED Summary, Procedure Summary, Intake/Output, MAR and Recent Results.

## 2020-09-22 NOTE — PROGRESS NOTES
Pt is discharging home today. Pt does not require any discharge needs from CM. Pt is agreeable to d/c plan. CM will continue to follow. Care Management Interventions  PCP Verified by CM: Yes(Dr. Sukhjinder Shelton MD)  Mode of Transport at Discharge:  Other (see comment)(Family)  Transition of Care Consult (CM Consult): Discharge Planning  Discharge Durable Medical Equipment: No  Physical Therapy Consult: No  Occupational Therapy Consult: No  Speech Therapy Consult: No  Current Support Network: Family Lives Nearby  Confirm Follow Up Transport: Self  The Plan for Transition of Care is Related to the Following Treatment Goals : Pt return back to his baseline  The Patient and/or Patient Representative was Provided with a Choice of Provider and Agrees with the Discharge Plan?: Yes  Name of the Patient Representative Who was Provided with a Choice of Provider and Agrees with the Discharge Plan: Patient  Freedom of Choice List was Provided with Basic Dialogue that Supports the Patient's Individualized Plan of Care/Goals, Treatment Preferences and Shares the Quality Data Associated with the Providers?: Yes  [place] Resource Information Provided?: No  Discharge Location  Discharge Placement: Home

## 2020-09-22 NOTE — PROGRESS NOTES
Bedside and Verbal shift change report to be given to self (oncoming nurse) by Elsie Gann (offgoing nurse). Report included the following information SBAR, Kardex, MAR and Recent Results.

## 2020-09-22 NOTE — DISCHARGE INSTRUCTIONS
Patient Education   Potassium Chloride (By mouth)   Potassium Chloride (ydj-XUL-eo-um KLOR-molly)  Prevents and treats low potassium levels in the blood. Brand Name(s): K-Tab, Brian Tavarez Mur, Klor-Con, Klor-Con 10, Klor-Con 8, Klor-Con M10, Klor-Con M15, Klor-Con M20, Klor-Con Sprinkle   There may be other brand names for this medicine. When This Medicine Should Not Be Used: This medicine is not right for everyone. Do not use if you had an allergic reaction to potassium. How to Use This Medicine:   Tablet, Long Acting Capsule, Powder, Liquid, Long Acting Tablet, Granule  · Your doctor will tell you how much medicine to use. Do not use more than directed. · Take this medicine with food or right after eating, to avoid stomach upset. · Powder or oral liquid:  You must mix with at least one-half cup (4 ounces) of water or juice. You could damage your stomach if you take the medicine without mixing it with water or juice. · Tablet or capsule: Do not chew, crush, or break. Swallow it whole with full glass of water. · Extended-release capsule: Swallow whole with a full glass of water. If you cannot swallow the extended-release capsule, you may open it and pour the medicine into a small amount of soft food such as pudding, yogurt, or applesauce. Stir this mixture well and swallow it without chewing. · Extended-release tablet:  Swallow whole with a full glass of water. If you have trouble swallowing, ask your doctor or pharmacist if you may crush the tablet. Some brands of this medicine must be swallowed whole, but other brands may be crushed. · If you mix the medicine in water or soft food, do not mix until you are ready to take your dose. Do not save mixed medicine for later use. · Carefully follow your doctor's instructions about any special diet. · Missed dose: Take a dose as soon as you remember. If it is almost time for your next dose, wait until then and take a regular dose.  Do not take extra medicine to make up for a missed dose. · Store the medicine in a closed container at room temperature, away from heat, moisture, and direct light. Drugs and Foods to Avoid:   Ask your doctor or pharmacist before using any other medicine, including over-the-counter medicines, vitamins, and herbal products. · Some foods and medicines can affect how potassium chloride works. Tell you doctor if you are using any of the following:  ¨ Potassium-sparing diuretic (water pill)  ¨ ACE inhibitor blood pressure medicine  ¨ Salt substitute  ¨ Digoxin  Warnings While Using This Medicine:   · Tell your doctor if you are pregnant or breastfeeding or if you have kidney disease, heart disease, or problems with your digestive system. · This medicine may cause the following problems:  ¨ Bleeding or ulcers in the digestive system  ¨ Potassium levels that are too high  · Your doctor will do lab tests at regular visits to check on the effects of this medicine. Keep all appointments. · Keep all medicine out of the reach of children. Never share your medicine with anyone. Possible Side Effects While Using This Medicine:   Call your doctor right away if you notice any of these side effects:  · Allergic reaction: Itching or hives, swelling in your face or hands, swelling or tingling in your mouth or throat, chest tightness, trouble breathing  · Bloody or black, tarry stools  · Confusion, weakness, uneven heartbeat, trouble breathing, numbness in your hands, feet, or lips  · Severe stomach pain or vomiting  · Throat pain, feeling as if pill is stuck in the throat  If you notice these less serious side effects, talk with your doctor:   · Mild nausea, diarrhea, gas  If you notice other side effects that you think are caused by this medicine, tell your doctor. Call your doctor for medical advice about side effects.  You may report side effects to FDA at 2-634-FDA-8212  © 2017 [facility] Information is for End User's use only and may not be sold, redistributed or otherwise used for commercial purposes. The above information is an  only. It is not intended as medical advice for individual conditions or treatments. Talk to your doctor, nurse or pharmacist before following any medical regimen to see if it is safe and effective for you.

## 2020-09-22 NOTE — PROGRESS NOTES
Given discharge paperwork including prescriptions. RN reiterated follow up appointments. Patient reports understanding. IV discontinued and tele box returned to monitor room.

## 2020-09-22 NOTE — PROGRESS NOTES
[facility] CARDIOLOGY PROGRESS NOTE           9/22/2020 9:58 AM    Admit Date: 9/20/2020      Subjective:   States that he feels better today, thinks that lack of sleep led to his confused state. No chest pain, no palpitations, no leg swelling complaints. ROS:  Cardiovascular:  As noted above    Objective:      Vitals:    09/21/20 2121 09/22/20 0037 09/22/20 0451 09/22/20 0734   BP: (!) 124/58 132/75 117/61 132/67   Pulse: 70 78 67 70   Resp: 16 18 18 18   Temp: 98.4 °F (36.9 °C) 97.5 °F (36.4 °C) 97.7 °F (36.5 °C) 97.7 °F (36.5 °C)   SpO2: 94% 96% 93% 95%   Weight:   208 lb 6.4 oz (94.5 kg)    Height:         Physical Exam:  General-No Acute Distress, awake alert   Neck- supple, no JVD  CV- regular rate and rhythm  Lung- clear bilaterally without wheezes   Abd- soft, nontender, nondistended  Ext- no edema bilaterally. in his legs   Skin- warm and dry    Data Review:   Recent Labs     09/22/20  0359 09/20/20  1142    143   K 3.4* 4.0   MG  --  2.1   BUN 17 15   CREA 1.07 1.03   * 164*   WBC 4.3 6.1   HGB 11.2* 12.3*   HCT 34.9* 38.1*    289       Assessment/Plan:     Principal Problem:    Altered mental state (9/20/2020)    Acute metabolic encephalopathy (5/93/0020)    - resolved, per primary team     Active Problems:    CAD (coronary artery disease) (5/4/2009)    S/P CABG (coronary artery bypass graft) (5/4/2009)    - no chest pain, hemodynamics stable    - con apixaban, Coreg, pravastatin, ranexa       SSS (sick sinus syndrome) ([facility] [address] (6/21/2014)    Pacemaker (6/21/2014)    - functioning appropriate, [employer] device interrogation reviewed today    - high AP-       Dyslipidemia (5/4/2009)    - cont pravastatin       Diabetes mellitus ([address] (5/4/2009)    - per PCP       Dilated Cardiomyopathy,Ischemic (2/26/2010)    - well compensated at this time , on Coreg, losartan,       Anxiety (11/14/2013)    - home meds, outpatient PCP       Elevated prostate specific antigen (PSA) (11/14/2013)    - follow up with PCP     FU as scheduled with Dr Anil Posada 10/9/20 [address] [place] [address] 99,   9/22/2020 9:58 AM

## 2020-09-22 NOTE — DISCHARGE SUMMARY
Discharge Summary     Patient: Brady Husain MRN: [id number]  SSN: [id number]    YOB: 1950  Age: 79 y.o. Sex: male       Admit Date: 9/20/2020    Discharge Date: 9/22/2020      Admission Diagnoses:  Altered mental state [R41.82]  Chest pain [A60.0]  Acute metabolic encephalopathy [L80.69]    Discharge Diagnoses:   Problem List as of 9/22/2020 Date Reviewed: 8/3/2020          Codes Class Noted - Resolved    Acute metabolic encephalopathy DVD-94-AE: G93.41  ICD-9-CM: 348.31  9/21/2020 - Present        * (Principal) Altered mental state ICD-10-CM: R41.82  ICD-9-CM: 780.97  9/20/2020 - Present        Stroke-like symptoms ICD-10-CM: R29.90  ICD-9-CM: 781.99  8/10/2020 - Present        Chest pain ICD-10-CM: R07.9  ICD-9-CM: 786.50  8/3/2020 - Present        Chronic renal failure, stage 3 (moderate) (HCC) ICD-10-CM: N18.3  ICD-9-CM: 585.3  4/21/2020 - Present        Chronic fatigue ICD-10-CM: R53.82  ICD-9-CM: 780.79  4/10/2020 - Present        DIAZ (dyspnea on exertion) ICD-10-CM: R06.00  ICD-9-CM: 786.09  4/10/2020 - Present        Bradycardia ICD-10-CM: R00.1  ICD-9-CM: 427.89  1/23/2020 - Present        Dizziness ICD-10-CM: R42  ICD-9-CM: 780.4  8/17/2017 - Present        Accelerated hypertension ICD-10-CM: I10  ICD-9-CM: 401.0  Unknown - Present        Diabetes ([facility] [address] ICD-10-CM: E11.9  ICD-9-CM: 250.00  Unknown - Present        Stroke (cerebrum) ([facility] [address] ICD-10-CM: I63.9  ICD-9-CM: 434.91  Unknown - Present    Overview Signed 8/3/2016 11:17 AM by Jasper LAURENT 2/2011             GERD (gastroesophageal reflux disease) ICD-10-CM: K21.9  ICD-9-CM: 530.81  Unknown - Present        Nocturia ICD-10-CM: R35.1  ICD-9-CM: 788.43  Unknown - Present        Palpitations ICD-10-CM: R00.2  ICD-9-CM: 785.1  Unknown - Present        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 ICD-10-CM: I08.0  ICD-9-CM: 396.1  Unknown - Present        Psychiatric disorder ICD-10-CM: F99  ICD-9-CM: 300.9 Unknown - Present        S/P PTCA (percutaneous transluminal coronary angioplasty) ICD-10-CM: Z98.61  ICD-9-CM: V45.82  3/11/2016 - Present        Arteriosclerosis of bypass graft of coronary artery ICD-10-CM: I25.810  ICD-9-CM: 414.04  8/27/2015 - Present        SSS (sick sinus syndrome) (HCC) (Chronic) ICD-10-CM: I49.5  ICD-9-CM: 427.81  6/21/2014 - Present        Pacemaker (Chronic) ICD-10-CM: Z95.0  ICD-9-CM: V45.01  6/21/2014 - Present        Essential hypertension ICD-10-CM: I10  ICD-9-CM: 401.9  11/14/2013 - Present        Anxiety (Chronic) ICD-10-CM: F41.9  ICD-9-CM: 300.00  11/14/2013 - Present        Elevated prostate specific antigen (PSA) (Chronic) ICD-10-CM: R97.20  ICD-9-CM: 790.93  11/14/2013 - Present        Syncope and collapse ICD-10-CM: R55  ICD-9-CM: 780.2  8/19/2011 - Present        CVA (cerebral infarction) ICD-10-CM: I63.9  ICD-9-CM: 434.91  5/25/2011 - Present        Paroxysmal ventricular tachycardia (HCC) (Chronic) ICD-10-CM: I47.2  ICD-9-CM: 427.1  11/30/2010 - Present        Paroxysmal atrial fibrillation (HCC) (Chronic) ICD-10-CM: I48.0  ICD-9-CM: 427.31  11/30/2010 - Present        Acute kidney failure, unspecified ([address] ICD-10-CM: N17.9  ICD-9-CM: 584.9  9/17/2010 - Present        Snoring, Possible sleep apnea ICD-10-CM: R06.83  ICD-9-CM: 786.09  2/26/2010 - Present        Dilated Cardiomyopathy,Ischemic (Chronic) ICD-10-CM: I42.0  ICD-9-CM: 425.4  2/26/2010 - Present        Stroke, embolic ([address] FLV-90-WY: I63.9  ICD-9-CM: 434.11  2/25/2010 - Present        CAD (coronary artery disease) (Chronic) ICD-10-CM: I25.10  ICD-9-CM: 414.00  5/4/2009 - Present        S/P CABG (coronary artery bypass graft) (Chronic) ICD-10-CM: Z95.1  ICD-9-CM: V45.81  5/4/2009 - Present    Overview Signed 5/4/2009  5:40 PM by Mignon Klinefelter, MD     PITTS TO LAD, SV TO OM, SV TO RCA, SV TO DIAG             Post PTCA ICD-10-CM: Z98.61  ICD-9-CM: V45.82  5/4/2009 - Present    Overview Signed 5/4/2009  5:40 PM by David Mendieta MD     stent to left main             Dyslipidemia (Chronic) ICD-10-CM: E78.5  ICD-9-CM: 272.4  5/4/2009 - Present        Diabetes mellitus (HCC) (Chronic) ICD-10-CM: E11.9  ICD-9-CM: 250.00  5/4/2009 - Present        Carotid artery stenosis without cerebral infarction ICD-10-CM: I65.29  ICD-9-CM: 433.10  7/7/2008 - Present        Sick sinus syndrome ([facility] ICD-10-CM: I49.5  ICD-9-CM: 427.81  9/6/2007 - Present        RESOLVED: Chest pain ICD-10-CM: R07.9  ICD-9-CM: 786.50  6/21/2014 - 3/11/2016        RESOLVED: Diabetes ([facility] ICD-10-CM: E11.9  ICD-9-CM: 250.00  11/14/2013 - 3/11/2016        RESOLVED: Atypical chest pain ICD-10-CM: R07.89  ICD-9-CM: 786.59  1/16/2012 - 3/11/2016        RESOLVED: Dysphagia ICD-10-CM: R13.10  ICD-9-CM: 787.20  1/16/2012 - 3/11/2016        RESOLVED: Unstable angina ([facility] ICD-10-CM: I20.0  ICD-9-CM: 411.1  1/15/2012 - 3/11/2016        RESOLVED: Other chest pain ICD-10-CM: R07.89  ICD-9-CM: 786.59  11/30/2010 - 3/11/2016        RESOLVED: Other chest pain ICD-10-CM: R07.89  ICD-9-CM: 786.59  9/17/2010 - 3/11/2016        RESOLVED: Acute kidney failure, unspecified ([facility] (Chronic) ICD-10-CM: N17.9  ICD-9-CM: 584.9  9/17/2010 - 11/30/2010               Discharge Condition: Stable    Hospital Course:     Patient with past medical history of recent CVA, CAD status post CABG, PAF, sick sinus syndrome with PPM, hypertension, diabetes mellitus, and hyperlipidemia    Admitted due to confusion. There was an issue about his pacemaker possible dislodgement. That was interrogated and it was found to function well. Patient was seen by cardiologist. He was not thought to have cardiac events. Patient has been monitored in hospital. He is alert and oriented. He is feeling fine and would like to go home today. He is ambulating at the level of his baseline. Physical Exam:      General:                    The patient is a pleasant elderly male in no acute distress.   he is lying in bed. Patient appears comfortable. Head:                                   Normocephalic/atraumatic. Eyes:                                   No palpebral pallor or scleral icterus. ENT:                                    External auricular and nasal exam within normal limits. Mucous membranes are moist.  Neck:                                   Supple, non-tender, no JVD. Lungs:                       Clear to auscultation bilaterally without wheezes or crackles. No respiratory distress or accessory muscle use. Heart:                                  Regular rate and rhythm, without murmurs, rubs, or gallops. Abdomen:                  Soft, non-tender, distended due to truncal obesity  with normoactive bowel sounds. Genitourinary:           No tenderness over the bladder or bilateral CVAs. Extremities:               Without clubbing, cyanosis, or edema. Skin:                                    Normal color, texture, and turgor. No rashes, lesions, or jaundice. Pulses:                      Radial and dorsalis pedis pulses present 2+ bilaterally. Capillary refill <2s. Neurologic:                CN II-XII grossly intact and symmetrical.                                               Moving all four extremities well with no focal deficits. Psychiatric:                Pleasant demeanor, appropriate affect.  Alert and oriented x 3      Consults: Cardiology    Significant Diagnostic Studies:     Recent Results (from the past 24 hour(s))   GLUCOSE, POC    Collection Time: 09/21/20 11:09 AM   Result Value Ref Range    Glucose (POC) 256 (H) 65 - 100 mg/dL   GLUCOSE, POC    Collection Time: 09/21/20  3:56 PM   Result Value Ref Range    Glucose (POC) 196 (H) 65 - 100 mg/dL   GLUCOSE, POC    Collection Time: 09/21/20 10:01 PM   Result Value Ref Range    Glucose (POC) 211 (H) 65 - 100 mg/dL   CBC WITH AUTOMATED DIFF    Collection Time: 09/22/20  3:59 AM   Result Value Ref Range    WBC 4.3 4.3 - 11.1 K/uL    RBC 4.19 (L) 4.23 - 5.6 M/uL    HGB 11.2 (L) 13.6 - 17.2 g/dL    HCT 34.9 (L) 41.1 - 50.3 %    MCV 83.3 79.6 - 97.8 FL    MCH 26.7 26.1 - 32.9 PG    MCHC 32.1 31.4 - 35.0 g/dL    RDW 14.2 11.9 - 14.6 %    PLATELET 404 692 - 690 K/uL    MPV 10.1 9.4 - 12.3 FL    ABSOLUTE NRBC 0.00 0.0 - 0.2 K/uL    DF AUTOMATED      NEUTROPHILS 48 43 - 78 %    LYMPHOCYTES 42 13 - 44 %    MONOCYTES 9 4.0 - 12.0 %    EOSINOPHILS 1 0.5 - 7.8 %    BASOPHILS 1 0.0 - 2.0 %    IMMATURE GRANULOCYTES 1 0.0 - 5.0 %    ABS. NEUTROPHILS 2.1 1.7 - 8.2 K/UL    ABS. LYMPHOCYTES 1.8 0.5 - 4.6 K/UL    ABS. MONOCYTES 0.4 0.1 - 1.3 K/UL    ABS. EOSINOPHILS 0.1 0.0 - 0.8 K/UL    ABS. BASOPHILS 0.0 0.0 - 0.2 K/UL    ABS. IMM. GRANS. 0.0 0.0 - 0.5 K/UL   METABOLIC PANEL, COMPREHENSIVE    Collection Time: 09/22/20  3:59 AM   Result Value Ref Range    Sodium 144 136 - 145 mmol/L    Potassium 3.4 (L) 3.5 - 5.1 mmol/L    Chloride 111 (H) 98 - 107 mmol/L    CO2 27 21 - 32 mmol/L    Anion gap 6 (L) 7 - 16 mmol/L    Glucose 141 (H) 65 - 100 mg/dL    BUN 17 8 - 23 MG/DL    Creatinine 1.07 0.8 - 1.5 MG/DL    GFR est AA >60 >60 ml/min/1.73m2    GFR est non-AA >60 >60 ml/min/1.73m2    Calcium 8.5 8.3 - 10.4 MG/DL    Bilirubin, total 0.7 0.2 - 1.1 MG/DL    ALT (SGPT) 16 12 - 65 U/L    AST (SGOT) 24 15 - 37 U/L    Alk. phosphatase 55 50 - 136 U/L    Protein, total 6.3 6.3 - 8.2 g/dL    Albumin 2.8 (L) 3.2 - 4.6 g/dL    Globulin 3.5 2.3 - 3.5 g/dL    A-G Ratio 0.8 (L) 1.2 - 3.5     GLUCOSE, POC    Collection Time: 09/22/20  6:17 AM   Result Value Ref Range    Glucose (POC) 174 (H) 65 - 100 mg/dL     CT head   9/20/2020  IMPRESSION:    1. No acute intracranial process evident by noncontrast CT study of the head.      XR chest   9/20/2020  IMPRESSION:   1.  Stable mild cardiomegaly without evolving acute changes evident by plain  film. .     EKG 9/20/2020  Diagnosis    Final    Normal sinus rhythm   Ventricular-paced rhythm          Disposition: home    Discharge Medications:   Current Discharge Medication List      START taking these medications    Details   potassium chloride (K-DUR, KLOR-CON) 20 mEq tablet Take 2 Tabs by mouth daily for 3 days. Qty: 6 Tab, Refills: 0         CONTINUE these medications which have NOT CHANGED    Details   apixaban (ELIQUIS) 5 mg tablet Take 1 Tab by mouth two (2) times a day. Qty: 180 Tab, Refills: 3      losartan (COZAAR) 50 mg tablet Take 1 Tab by mouth daily. Qty: 30 Tab, Refills: 5    Associated Diagnoses: Dilated cardiomyopathy ([address] Essential hypertension      levETIRAcetam (KEPPRA) 500 mg tablet Take 1 Tab by mouth two (2) times a day. Qty: 60 Tab, Refills: 0      nitroglycerin (NITROSTAT) 0.4 mg SL tablet 1 Tab by SubLINGual route every five (5) minutes as needed for Chest Pain. Up to 3 doses. Qty: 25 Tab, Refills: 2      isosorbide mononitrate ER (Imdur) 60 mg CR tablet Take 1 Tab by mouth two (2) times a day. Qty: 180 Tab, Refills: 3    Associated Diagnoses: Coronary artery disease involving autologous artery coronary bypass graft, angina presence unspecified; Chest pain, unspecified type; S/P CABG (coronary artery bypass graft); Post PTCA      pravastatin (PRAVACHOL) 80 mg tablet Take 1 Tab by mouth nightly. Qty: 90 Tab, Refills: 3    Comments: **Patient requests 90 days supply**  Associated Diagnoses: Coronary artery disease involving autologous artery coronary bypass graft, angina presence unspecified      carvediloL (Coreg) 12.5 mg tablet Take 1 Tab by mouth two (2) times daily (with meals). Qty: 180 Tab, Refills: 3    Associated Diagnoses: Coronary artery disease involving autologous artery coronary bypass graft, angina presence unspecified; Essential hypertension; Paroxysmal atrial fibrillation (HCC)      ranolazine ER (RANEXA) 500 mg SR tablet Take 1 Tab by mouth two (2) times a day.   Qty: 180 Tab, Refills: 3    Associated Diagnoses: Coronary artery disease involving autologous artery coronary bypass graft, angina presence unspecified      tamsulosin (FLOMAX) 0.4 mg capsule TAKE 1 CAPSULE BY MOUTH DAILY  Qty: 90 Cap, Refills: 11    Comments: **Patient requests 90 days supply**  Associated Diagnoses: Benign prostatic hyperplasia with urinary hesitancy      finasteride (PROSCAR) 5 mg tablet TAKE 1 TABLET BY MOUTH DAILY  Qty: 90 Tab, Refills: 11    Comments: **Patient requests 90 days supply**  Associated Diagnoses: Benign prostatic hyperplasia with urinary hesitancy      cyclobenzaprine (FLEXERIL) 10 mg tablet Take 10 mg by mouth two (2) times daily as needed for Muscle Spasm(s). esomeprazole (NEXIUM) 40 mg capsule Take  by mouth daily. insulin detemir U-100 (Levemir U-100 Insulin) 100 unit/mL injection 60 Units by SubCUTAneous route two (2) times a day. pregabalin (LYRICA) 300 mg capsule Take 300 mg by mouth two (2) times a day. fluticasone (FLONASE) 50 mcg/actuation nasal spray 2 Sprays by Both Nostrils route once. insulin aspart (NOVOLOG) 100 unit/mL injection 25 Units by SubCUTAneous route Before breakfast, lunch, and dinner. trazodone (DESYREL) 100 mg tablet take 100 mg by mouth nightly. STOP taking these medications       HYDROcodone-acetaminophen (NORCO)  mg tablet Comments:   Reason for Stopping:               Activity: Activity as tolerated  Diet: Diabetic Diet  Wound Care: Keep wound clean and dry    Follow-up Appointments   Procedures    FOLLOW UP VISIT Appointment in: 3 - 5 Days See your primary doctor. See your primary doctor. Standing Status:   Standing     Number of Occurrences:   1     Order Specific Question:   Appointment in     Answer:   3 - 5 Days     I have discussed the plan of care with patient. Time spent on discharge is 37 minutes.       Signed By: Criss Harrington MD     September 22, 2020

## 2020-09-23 ENCOUNTER — PATIENT OUTREACH (OUTPATIENT)
Dept: CASE MANAGEMENT | Age: 70
End: 2020-09-23

## 2020-09-23 NOTE — PROGRESS NOTES
Patient contacted regarding recent discharge and COVID-19 risk. Discussed COVID-19 related testing which was available at this time. Test results were negative. Patient informed of results, if available? yes    Care Transition Nurse/ Ambulatory Care Manager/ LPN Care Coordinator contacted the patient by telephone to perform post discharge assessment. Verified name and  with patient as identifiers. Patient has following risk factors of: diabetes and chronic kidney disease. CTN/ACM/LPN reviewed discharge instructions, medical action plan and red flags related to discharge diagnosis. Reviewed and educated them on any new and changed medications related to discharge diagnosis. Advised obtaining a 90-day supply of all daily and as-needed medications. Advance Care Planning:   Does patient have an Advance Directive: health care decision makers updated and currently not on file; education provided     Education provided regarding infection prevention, and signs and symptoms of COVID-19 and when to seek medical attention with patient who verbalized understanding. Discussed exposure protocols and quarantine from [address] you at higher risk for severe illness  and given an opportunity for questions and concerns. The patient agrees to contact the COVID-19 hotline [phone] or PCP office for questions related to their healthcare. CTN/ACM/LPN provided contact information for future reference. From CDC: Are you at higher risk for severe illness?  Wash your hands often.  Avoid close contact (6 feet, which is about two arm lengths) with people who are sick.  Put distance between yourself and other people if COVID-19 is spreading in your community.  Clean and disinfect frequently touched surfaces.  Avoid all cruise travel and non-essential air travel.  Call your healthcare professional if you have concerns about COVID-19 and your underlying condition or if you are sick.     For more information on steps you can take to protect yourself, see CDC's How to Protect Yourself      Patient/family/caregiver given information for [employer] and agrees to enroll no  Patient's preferred e-mail:  No email  Patient's preferred phone number: [phone]    Patient stated he is doing fine. He stated he had not slept in 7 days prior to hospital stay. Stated, I've been resting and sleeping a lot better now. Will f/u on 7-14 days.

## 2020-10-16 ENCOUNTER — PATIENT OUTREACH (OUTPATIENT)
Dept: CASE MANAGEMENT | Age: 70
End: 2020-10-16

## 2020-10-16 NOTE — PROGRESS NOTES
Patient resolved from Transition of Care episode on 10/16/2020  Discussed COVID-19 related testing which was not done at this time. Test results were not done. Patient informed of results, if available? NA    Patient/family has been provided the following resources and education related to COVID-19:                         Signs, symptoms and red flags related to COVID-19            [facility] exposure and quarantine guidelines            [employer] exposure contact - [phone]            Contact for their local Department of Health                 Patient currently reports that the following symptoms have improved: Did not speak with patient, no answer, NO VM set up. No further outreach scheduled with this CTN/ACM/LPN/HC/ MA. Episode of Care resolved. Patient has this CTN/ACM/LPN/HC/MA contact information if future needs arise.

## 2021-05-17 ENCOUNTER — HOSPITAL ENCOUNTER (INPATIENT)
Age: 71
LOS: 3 days | Discharge: HOME HEALTH | DRG: 682 | End: 2021-05-20
Attending: STUDENT IN AN ORGANIZED HEALTH CARE EDUCATION/TRAINING PROGRAM | Admitting: HOSPITALIST
Payer: MEDICARE

## 2021-05-17 ENCOUNTER — APPOINTMENT (OUTPATIENT)
Dept: CT IMAGING | Age: 71
DRG: 682 | End: 2021-05-17
Attending: STUDENT IN AN ORGANIZED HEALTH CARE EDUCATION/TRAINING PROGRAM
Payer: MEDICARE

## 2021-05-17 ENCOUNTER — APPOINTMENT (OUTPATIENT)
Dept: GENERAL RADIOLOGY | Age: 71
DRG: 682 | End: 2021-05-17
Attending: STUDENT IN AN ORGANIZED HEALTH CARE EDUCATION/TRAINING PROGRAM
Payer: MEDICARE
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
## 2021-05-17 DIAGNOSIS — J18.9 COMMUNITY ACQUIRED PNEUMONIA, UNSPECIFIED LATERALITY: ICD-10-CM

## 2021-05-17 DIAGNOSIS — N17.9 AKI (ACUTE KIDNEY INJURY) (HCC): Primary | ICD-10-CM

## 2021-05-17 LAB
ALBUMIN SERPL-MCNC: 3.7 G/DL (ref 3.2–4.6)
ALBUMIN/GLOB SERPL: 1.1 {RATIO} (ref 1.2–3.5)
ALP SERPL-CCNC: 54 U/L (ref 50–136)
ALT SERPL-CCNC: 17 U/L (ref 12–65)
AMMONIA PLAS-SCNC: 32 UMOL/L (ref 11–32)
ANION GAP SERPL CALC-SCNC: 8 MMOL/L (ref 7–16)
AST SERPL-CCNC: 18 U/L (ref 15–37)
BASOPHILS # BLD: 0 K/UL (ref 0–0.2)
BASOPHILS NFR BLD: 0 % (ref 0–2)
BILIRUB SERPL-MCNC: 1 MG/DL (ref 0.2–1.1)
BNP SERPL-MCNC: 647 PG/ML (ref 5–125)
BUN SERPL-MCNC: 35 MG/DL (ref 8–23)
CALCIUM SERPL-MCNC: 8.5 MG/DL (ref 8.3–10.4)
CHLORIDE SERPL-SCNC: 107 MMOL/L (ref 98–107)
CO2 SERPL-SCNC: 24 MMOL/L (ref 21–32)
CREAT SERPL-MCNC: 2.13 MG/DL (ref 0.8–1.5)
DIFFERENTIAL METHOD BLD: ABNORMAL
EOSINOPHIL # BLD: 0 K/UL (ref 0–0.8)
EOSINOPHIL NFR BLD: 0 % (ref 0.5–7.8)
ERYTHROCYTE [DISTWIDTH] IN BLOOD BY AUTOMATED COUNT: 14.3 % (ref 11.9–14.6)
GLOBULIN SER CALC-MCNC: 3.4 G/DL (ref 2.3–3.5)
GLUCOSE SERPL-MCNC: 298 MG/DL (ref 65–100)
HCT VFR BLD AUTO: 38.2 % (ref 41.1–50.3)
HGB BLD-MCNC: 12.6 G/DL (ref 13.6–17.2)
IMM GRANULOCYTES # BLD AUTO: 0.1 K/UL (ref 0–0.5)
IMM GRANULOCYTES NFR BLD AUTO: 1 % (ref 0–5)
LYMPHOCYTES # BLD: 1.2 K/UL (ref 0.5–4.6)
LYMPHOCYTES NFR BLD: 8 % (ref 13–44)
MCH RBC QN AUTO: 27.6 PG (ref 26.1–32.9)
MCHC RBC AUTO-ENTMCNC: 33 G/DL (ref 31.4–35)
MCV RBC AUTO: 83.6 FL (ref 79.6–97.8)
MONOCYTES # BLD: 0.9 K/UL (ref 0.1–1.3)
MONOCYTES NFR BLD: 6 % (ref 4–12)
NEUTS SEG # BLD: 12.5 K/UL (ref 1.7–8.2)
NEUTS SEG NFR BLD: 85 % (ref 43–78)
NRBC # BLD: 0 K/UL (ref 0–0.2)
PLATELET # BLD AUTO: 151 K/UL (ref 150–450)
PLATELET COMMENTS,PCOM: ADEQUATE
PMV BLD AUTO: 10.8 FL (ref 9.4–12.3)
POTASSIUM SERPL-SCNC: 4.3 MMOL/L (ref 3.5–5.1)
PROT SERPL-MCNC: 7.1 G/DL (ref 6.3–8.2)
RBC # BLD AUTO: 4.57 M/UL (ref 4.23–5.6)
RBC MORPH BLD: ABNORMAL
SODIUM SERPL-SCNC: 139 MMOL/L (ref 138–145)
TROPONIN-HIGH SENSITIVITY: 42.1 PG/ML (ref 0–14)
TROPONIN-HIGH SENSITIVITY: 46 PG/ML (ref 0–14)
WBC # BLD AUTO: 14.7 K/UL (ref 4.3–11.1)
WBC MORPH BLD: ABNORMAL

## 2021-05-17 PROCEDURE — 83880 ASSAY OF NATRIURETIC PEPTIDE: CPT

## 2021-05-17 PROCEDURE — 96365 THER/PROPH/DIAG IV INF INIT: CPT

## 2021-05-17 PROCEDURE — 70450 CT HEAD/BRAIN W/O DYE: CPT

## 2021-05-17 PROCEDURE — 99284 EMERGENCY DEPT VISIT MOD MDM: CPT

## 2021-05-17 PROCEDURE — 72170 X-RAY EXAM OF PELVIS: CPT

## 2021-05-17 PROCEDURE — 72125 CT NECK SPINE W/O DYE: CPT

## 2021-05-17 PROCEDURE — 85025 COMPLETE CBC W/AUTO DIFF WBC: CPT

## 2021-05-17 PROCEDURE — 87040 BLOOD CULTURE FOR BACTERIA: CPT

## 2021-05-17 PROCEDURE — 74011250636 HC RX REV CODE- 250/636: Performed by: STUDENT IN AN ORGANIZED HEALTH CARE EDUCATION/TRAINING PROGRAM

## 2021-05-17 PROCEDURE — 74011000258 HC RX REV CODE- 258: Performed by: STUDENT IN AN ORGANIZED HEALTH CARE EDUCATION/TRAINING PROGRAM

## 2021-05-17 PROCEDURE — 65660000000 HC RM CCU STEPDOWN

## 2021-05-17 PROCEDURE — 82140 ASSAY OF AMMONIA: CPT

## 2021-05-17 PROCEDURE — 71045 X-RAY EXAM CHEST 1 VIEW: CPT

## 2021-05-17 PROCEDURE — 93005 ELECTROCARDIOGRAM TRACING: CPT | Performed by: STUDENT IN AN ORGANIZED HEALTH CARE EDUCATION/TRAINING PROGRAM

## 2021-05-17 PROCEDURE — 84484 ASSAY OF TROPONIN QUANT: CPT

## 2021-05-17 PROCEDURE — 80053 COMPREHEN METABOLIC PANEL: CPT

## 2021-05-17 PROCEDURE — 73562 X-RAY EXAM OF KNEE 3: CPT

## 2021-05-17 RX ORDER — FINASTERIDE 5 MG/1
5 TABLET, FILM COATED ORAL DAILY
Status: DISCONTINUED | OUTPATIENT
Start: 2021-05-18 | End: 2021-05-20 | Stop reason: HOSPADM

## 2021-05-17 RX ORDER — PANTOPRAZOLE SODIUM 40 MG/1
40 TABLET, DELAYED RELEASE ORAL
Status: DISCONTINUED | OUTPATIENT
Start: 2021-05-18 | End: 2021-05-20 | Stop reason: HOSPADM

## 2021-05-17 RX ORDER — HYDROCODONE BITARTRATE AND ACETAMINOPHEN 10; 325 MG/1; MG/1
1 TABLET ORAL
Status: DISCONTINUED | OUTPATIENT
Start: 2021-05-17 | End: 2021-05-20 | Stop reason: HOSPADM

## 2021-05-17 RX ORDER — ACETAMINOPHEN 650 MG/1
650 SUPPOSITORY RECTAL
Status: DISCONTINUED | OUTPATIENT
Start: 2021-05-17 | End: 2021-05-20 | Stop reason: HOSPADM

## 2021-05-17 RX ORDER — ISOSORBIDE MONONITRATE 60 MG/1
60 TABLET, EXTENDED RELEASE ORAL 2 TIMES DAILY
Status: DISCONTINUED | OUTPATIENT
Start: 2021-05-18 | End: 2021-05-20 | Stop reason: HOSPADM

## 2021-05-17 RX ORDER — POLYETHYLENE GLYCOL 3350 17 G/17G
17 POWDER, FOR SOLUTION ORAL DAILY PRN
Status: DISCONTINUED | OUTPATIENT
Start: 2021-05-17 | End: 2021-05-20 | Stop reason: HOSPADM

## 2021-05-17 RX ORDER — PROMETHAZINE HYDROCHLORIDE 25 MG/1
12.5 TABLET ORAL
Status: DISCONTINUED | OUTPATIENT
Start: 2021-05-17 | End: 2021-05-20 | Stop reason: HOSPADM

## 2021-05-17 RX ORDER — TAMSULOSIN HYDROCHLORIDE 0.4 MG/1
0.4 CAPSULE ORAL DAILY
Status: DISCONTINUED | OUTPATIENT
Start: 2021-05-18 | End: 2021-05-20 | Stop reason: HOSPADM

## 2021-05-17 RX ORDER — CARVEDILOL 12.5 MG/1
12.5 TABLET ORAL 2 TIMES DAILY WITH MEALS
Status: DISCONTINUED | OUTPATIENT
Start: 2021-05-18 | End: 2021-05-20 | Stop reason: HOSPADM

## 2021-05-17 RX ORDER — HYDROCODONE BITARTRATE AND HOMATROPINE METHYLBROMIDE 1.5; 5 MG/5ML; MG/5ML
5 SYRUP ORAL
Status: DISCONTINUED | OUTPATIENT
Start: 2021-05-17 | End: 2021-05-20 | Stop reason: HOSPADM

## 2021-05-17 RX ORDER — INSULIN LISPRO 100 [IU]/ML
INJECTION, SOLUTION INTRAVENOUS; SUBCUTANEOUS
Status: DISCONTINUED | OUTPATIENT
Start: 2021-05-17 | End: 2021-05-20 | Stop reason: HOSPADM

## 2021-05-17 RX ORDER — INSULIN GLARGINE 100 [IU]/ML
20 INJECTION, SOLUTION SUBCUTANEOUS
Status: DISCONTINUED | OUTPATIENT
Start: 2021-05-17 | End: 2021-05-20 | Stop reason: HOSPADM

## 2021-05-17 RX ORDER — ACETAMINOPHEN 325 MG/1
650 TABLET ORAL
Status: DISCONTINUED | OUTPATIENT
Start: 2021-05-17 | End: 2021-05-20 | Stop reason: HOSPADM

## 2021-05-17 RX ORDER — SODIUM CHLORIDE 9 MG/ML
75 INJECTION, SOLUTION INTRAVENOUS CONTINUOUS
Status: DISCONTINUED | OUTPATIENT
Start: 2021-05-17 | End: 2021-05-20 | Stop reason: HOSPADM

## 2021-05-17 RX ORDER — RANOLAZINE 500 MG/1
500 TABLET, EXTENDED RELEASE ORAL 2 TIMES DAILY
Status: DISCONTINUED | OUTPATIENT
Start: 2021-05-18 | End: 2021-05-20 | Stop reason: HOSPADM

## 2021-05-17 RX ORDER — ONDANSETRON 2 MG/ML
4 INJECTION INTRAMUSCULAR; INTRAVENOUS
Status: DISCONTINUED | OUTPATIENT
Start: 2021-05-17 | End: 2021-05-20 | Stop reason: HOSPADM

## 2021-05-17 RX ADMIN — CEFTRIAXONE 1 G: 1 INJECTION, POWDER, FOR SOLUTION INTRAMUSCULAR; INTRAVENOUS at 20:19

## 2021-05-17 RX ADMIN — AZITHROMYCIN MONOHYDRATE 500 MG: 500 INJECTION, POWDER, LYOPHILIZED, FOR SOLUTION INTRAVENOUS at 21:37

## 2021-05-17 RX ADMIN — SODIUM CHLORIDE 500 ML: 900 INJECTION, SOLUTION INTRAVENOUS at 18:56

## 2021-05-17 NOTE — ED PROVIDER NOTES
60-year-old male presents to the emergency department for evaluation after 2 episodes of falls at home. Patient unsure if he lost consciousness. He is on blood thinners. Family was concerned and called EMS. Patient reports history of chronic low back pain and takes narcotic medication for this. Patient denies fever, chills, chest pain, nausea, vomiting or diarrhea. Patient reports normal muscle strength and sensation in all extremities. Reports chronic left knee pain. Family reports history of patient's knees giving way with him and him falling. Past Medical History:   Diagnosis Date    Acute kidney failure, unspecified ([address] 9/17/2010    Anxiety state, unspecified 11/14/2013    Arteriosclerosis of bypass graft of coronary artery 8/27/2015    CAD (coronary artery disease)     Cath on 3- showed 5/5 patent bypass grafts with LV EF=60% and a 90% stenosis in native LAD distal to LIMA graft anastomosis. He received a Xience ZAK (2.25 x 18) to LAD.  Carotid artery disease ([address] 03/28/2019    Bilateral ICA:50-69% on carotid ultrasound.     Carotid artery stenosis without cerebral infarction 07/07/2008    CVA (cerebral infarction) 5/25/2011    Diabetes ([address]     Diabetes mellitus ([address] 5/4/2009    Dilated Cardiomyopathy,Ischemic 2/26/2010    Dyslipidemia 5/4/2009    Elevated prostate specific antigen (PSA) 11/14/2013    Essential hypertension 11/14/2013    GERD (gastroesophageal reflux disease)     Hypertension     Nocturia     Other and unspecified hyperlipidemia     Pacemaker 6/21/2014    Palpitations 2006    Paroxysmal atrial fibrillation ([address] 11/30/2010    Paroxysmal ventricular tachycardia ([address] 11/30/2010    Post PTCA 5/4/2009    stent to left main     Psychiatric disorder     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    S/P CABG (coronary artery bypass graft) 5/4/2009    LIMA TO LAD, SV TO OM, SV TO RCA, SV TO DIAG     S/P PTCA (percutaneous transluminal coronary angioplasty) 3/11/2016    Sick sinus syndrome ([facility] 2007    Snoring, Possible sleep apnea 2010    SSS (sick sinus syndrome) ([facility] 2014    Stroke (cerebrum) ([facility]CC)     TIA 2011    Stroke, embolic ([facility]     Syncope and collapse 2011    Unspecified malignant neoplasm of skin, site unspecified        Past Surgical History:   Procedure Laterality Date    COLONOSCOPY      EGD      esophageal ulcer    HX CHOLECYSTECTOMY      biliary dyskinesia    HX CORONARY ARTERY BYPASS GRAFT      HX CORONARY STENT PLACEMENT      HX HEART CATHETERIZATION  2013    no intervention    HX HEART CATHETERIZATION  2014    no intervention    HX HEART CATHETERIZATION      MULTIPLE ([place])    HX ORTHOPAEDIC      knee surgery x 2 left    HX OTHER SURGICAL      nerve in back    HX PACEMAKER      MD CARDIAC SURG PROCEDURE UNLIST      bypass x 5; 2 stents    MD LEFT HEART CATH,PERCUTANEOUS  2010    native circ x 1         Family History:   Problem Relation Age of Onset    Diabetes Mother     Heart Disease Mother     Heart Disease Sister     Cancer Brother         Eye    Heart Disease Brother     Migraines Brother        Social History     Socioeconomic History    Marital status:      Spouse name: Not on file    Number of children: Not on file    Years of education: Not on file    Highest education level: Not on file   Occupational History    Not on file   Social Needs    Financial resource strain: Not on file    Food insecurity     Worry: Not on file     Inability: Not on file    Transportation needs     Medical: Not on file     Non-medical: Not on file   Tobacco Use    Smoking status: Former Smoker     Packs/day: 3.00     Years: 40.00     Pack years: 120.00     Quit date: 1999     Years since quittin.3    Smokeless tobacco: Never Used   Substance and Sexual Activity    Alcohol use: No    Drug use:  No  Sexual activity: Not on file   Lifestyle    Physical activity     Days per week: Not on file     Minutes per session: Not on file    Stress: Not on file   Relationships    Social connections     Talks on phone: Not on file     Gets together: Not on file     Attends Evangelical service: Not on file     Active member of club or organization: Not on file     Attends meetings of clubs or organizations: Not on file     Relationship status: Not on file    Intimate partner violence     Fear of current or ex partner: Not on file     Emotionally abused: Not on file     Physically abused: Not on file     Forced sexual activity: Not on file   Other Topics Concern    Caffeine Concern Not Asked    Back Care Not Asked    Exercise Not Asked    Occupational Exposure Not Asked    Sleep Concern Not Asked    Stress Concern Not Asked    Weight Concern Not Asked   Social History Narrative    Not on file         ALLERGIES: Beta-blockers (beta-adrenergic blocking agts), Shellfish derived, and Xanax [alprazolam]    Review of Systems   Constitutional: Negative for chills and fever. HENT: Negative for congestion and sore throat. Eyes: Negative for visual disturbance. Respiratory: Positive for cough. Negative for shortness of breath. Cardiovascular: Negative for chest pain. Gastrointestinal: Negative for abdominal pain, diarrhea, nausea and vomiting. Endocrine: Negative for polyuria. Genitourinary: Negative for difficulty urinating and dysuria. Musculoskeletal: Positive for arthralgias and back pain. Negative for neck pain and neck stiffness. Skin: Positive for wound. Negative for rash. Neurological: Negative for weakness and headaches. All other systems reviewed and are negative.       Vitals:    05/17/21 1739 05/17/21 1829 05/17/21 1844   BP: (!) 164/75 (!) 134/57 (!) 118/56   Pulse: 82 82 69   Resp: 18 18 14   Temp: 98.6 °F (37 °C)     SpO2: 90% 91% 98%   Weight: 98.9 kg (218 lb)     Height: 5' 7\" (1.702 m)              Physical Exam  Vitals signs and nursing note reviewed. Constitutional:       Appearance: Normal appearance. HENT:      Head: Normocephalic and atraumatic. Nose: Nose normal.      Mouth/Throat:      Mouth: Mucous membranes are dry. Comments: 1 cm linear laceration to the mucosal aspect of his lower lip in the center  Eyes:      Extraocular Movements: Extraocular movements intact. Neck:      Musculoskeletal: Normal range of motion. Cardiovascular:      Rate and Rhythm: Normal rate and regular rhythm. Heart sounds: Normal heart sounds. Pulmonary:      Effort: Pulmonary effort is normal.      Breath sounds: Rhonchi present. No wheezing or rales. Abdominal:      General: Abdomen is flat. Palpations: Abdomen is soft. Tenderness: There is no abdominal tenderness. There is no guarding. Musculoskeletal: Normal range of motion. Skin:     General: Skin is warm and dry. Neurological:      General: No focal deficit present. Mental Status: He is alert and oriented to person, place, and time. Cranial Nerves: No cranial nerve deficit. Sensory: No sensory deficit. Motor: No weakness. Comments: Patient follows commands, oriented to person only. Patient moving all extremities. Delayed verbal response unclear etiology   Psychiatric:         Mood and Affect: Mood normal.          MDM  Number of Diagnoses or Management Options  KASIE (acute kidney injury) ([facility]  Community acquired pneumonia, unspecified laterality  Fall, initial encounter  Diagnosis management comments: 59-year-old male history of CHF and sick sinus syndrome with pacemaker placed presents to the ER after 2 falls at home. Head CT and cervical spine CT did not reveal any evidence of emergent findings. Pelvis and left knee x-ray were negative.   Labs show ammonia 32, initial troponin was 46, white count 14.7, stable H&H, normal electrolytes, acute kidney injury, BUN 35, creatinine 2.13, GFR 33, patient given fiber cc IV fluid replacement. Patient does have history of heart failure thus he was not given aggressive fluid replacement. Patient also is not septic although chest ray does show evidence of pneumonia. Patient does not meet SIRS criteria. Patient with acute kidney injury as well as his pneumonia warrants admission. . Blood culture obtained. Patient treated for community acquired pneumonia with Rocephin and azithromycin. I then spoke with hospitalist who agreed to admit this patient continue evaluation and treatment. Patient and family voiced understanding agreement with this plan. I did attempt to repair patient's lip laceration but he stated to me multiple times as I was about to begin the procedure that he did not want to have stitches. He stated he did not care if he had a defect in his lip. I again confirmed this with him multiple times. He voiced understanding and refused suturing. EKG obtained shows paced rhythm, rate 84, , , QTc 435, right axis, negative sgarbossa criteria.        Amount and/or Complexity of Data Reviewed  Clinical lab tests: ordered and reviewed  Tests in the radiology section of CPT®: ordered and reviewed  Discussion of test results with the performing providers: yes  Decide to obtain previous medical records or to obtain history from someone other than the patient: yes  Review and summarize past medical records: yes  Discuss the patient with other providers: yes  Independent visualization of images, tracings, or specimens: yes           Procedures

## 2021-05-17 NOTE — ED TRIAGE NOTES
Pt arrives per EMS from home. Pt has had at least two syncopal episodes today. Had hit front of his head and back of his head during both. Blood noted around his mouth. Currently moaning in pain and not talking. Will talk after being sternal rubbed. Pt is confused. Per EMS normally A&Ox4 is what they were told. Pt is on several blood thinners per EMS.

## 2021-05-18 ENCOUNTER — APPOINTMENT (OUTPATIENT)
Dept: CT IMAGING | Age: 71
DRG: 682 | End: 2021-05-18
Attending: HOSPITALIST
Payer: MEDICARE

## 2021-05-18 LAB
ANION GAP SERPL CALC-SCNC: 8 MMOL/L (ref 7–16)
ATRIAL RATE: 84 BPM
BUN SERPL-MCNC: 31 MG/DL (ref 8–23)
CALCIUM SERPL-MCNC: 8.9 MG/DL (ref 8.3–10.4)
CALCULATED P AXIS, ECG09: 60 DEGREES
CALCULATED R AXIS, ECG10: 121 DEGREES
CALCULATED T AXIS, ECG11: -47 DEGREES
CHLORIDE SERPL-SCNC: 114 MMOL/L (ref 98–107)
CO2 SERPL-SCNC: 23 MMOL/L (ref 21–32)
CREAT SERPL-MCNC: 1.24 MG/DL (ref 0.8–1.5)
DIAGNOSIS, 93000: NORMAL
ERYTHROCYTE [DISTWIDTH] IN BLOOD BY AUTOMATED COUNT: 14.3 % (ref 11.9–14.6)
GLUCOSE BLD STRIP.AUTO-MCNC: 152 MG/DL (ref 65–100)
GLUCOSE BLD STRIP.AUTO-MCNC: 157 MG/DL (ref 65–100)
GLUCOSE BLD STRIP.AUTO-MCNC: 171 MG/DL (ref 65–100)
GLUCOSE BLD STRIP.AUTO-MCNC: 196 MG/DL (ref 65–100)
GLUCOSE SERPL-MCNC: 100 MG/DL (ref 65–100)
HCT VFR BLD AUTO: 38.1 % (ref 41.1–50.3)
HGB BLD-MCNC: 12.6 G/DL (ref 13.6–17.2)
LACTATE SERPL-SCNC: 1.1 MMOL/L (ref 0.4–2)
MAGNESIUM SERPL-MCNC: 2 MG/DL (ref 1.8–2.4)
MCH RBC QN AUTO: 27.3 PG (ref 26.1–32.9)
MCHC RBC AUTO-ENTMCNC: 33.1 G/DL (ref 31.4–35)
MCV RBC AUTO: 82.5 FL (ref 79.6–97.8)
NRBC # BLD: 0 K/UL (ref 0–0.2)
P-R INTERVAL, ECG05: 218 MS
PLATELET # BLD AUTO: 148 K/UL (ref 150–450)
PMV BLD AUTO: 10.6 FL (ref 9.4–12.3)
POTASSIUM SERPL-SCNC: 3.8 MMOL/L (ref 3.5–5.1)
Q-T INTERVAL, ECG07: 368 MS
QRS DURATION, ECG06: 142 MS
QTC CALCULATION (BEZET), ECG08: 434 MS
RBC # BLD AUTO: 4.62 M/UL (ref 4.23–5.6)
SERVICE CMNT-IMP: ABNORMAL
SODIUM SERPL-SCNC: 145 MMOL/L (ref 136–145)
VENTRICULAR RATE, ECG03: 84 BPM
WBC # BLD AUTO: 13.5 K/UL (ref 4.3–11.1)

## 2021-05-18 PROCEDURE — 74011000258 HC RX REV CODE- 258: Performed by: HOSPITALIST

## 2021-05-18 PROCEDURE — 83735 ASSAY OF MAGNESIUM: CPT

## 2021-05-18 PROCEDURE — 82962 GLUCOSE BLOOD TEST: CPT

## 2021-05-18 PROCEDURE — 2709999900 HC NON-CHARGEABLE SUPPLY

## 2021-05-18 PROCEDURE — 97530 THERAPEUTIC ACTIVITIES: CPT

## 2021-05-18 PROCEDURE — 85027 COMPLETE CBC AUTOMATED: CPT

## 2021-05-18 PROCEDURE — 97165 OT EVAL LOW COMPLEX 30 MIN: CPT

## 2021-05-18 PROCEDURE — 36415 COLL VENOUS BLD VENIPUNCTURE: CPT

## 2021-05-18 PROCEDURE — 74011636637 HC RX REV CODE- 636/637: Performed by: HOSPITALIST

## 2021-05-18 PROCEDURE — 97112 NEUROMUSCULAR REEDUCATION: CPT

## 2021-05-18 PROCEDURE — 74176 CT ABD & PELVIS W/O CONTRAST: CPT

## 2021-05-18 PROCEDURE — 83605 ASSAY OF LACTIC ACID: CPT

## 2021-05-18 PROCEDURE — 74011250637 HC RX REV CODE- 250/637: Performed by: HOSPITALIST

## 2021-05-18 PROCEDURE — 65660000000 HC RM CCU STEPDOWN

## 2021-05-18 PROCEDURE — 97162 PT EVAL MOD COMPLEX 30 MIN: CPT

## 2021-05-18 PROCEDURE — 74011250636 HC RX REV CODE- 250/636: Performed by: HOSPITALIST

## 2021-05-18 PROCEDURE — 80048 BASIC METABOLIC PNL TOTAL CA: CPT

## 2021-05-18 PROCEDURE — 87040 BLOOD CULTURE FOR BACTERIA: CPT

## 2021-05-18 RX ADMIN — SODIUM CHLORIDE 75 ML/HR: 900 INJECTION, SOLUTION INTRAVENOUS at 00:11

## 2021-05-18 RX ADMIN — AZITHROMYCIN MONOHYDRATE 500 MG: 500 INJECTION, POWDER, LYOPHILIZED, FOR SOLUTION INTRAVENOUS at 21:51

## 2021-05-18 RX ADMIN — INSULIN LISPRO 3 UNITS: 100 INJECTION, SOLUTION INTRAVENOUS; SUBCUTANEOUS at 00:09

## 2021-05-18 RX ADMIN — FINASTERIDE 5 MG: 5 TABLET, FILM COATED ORAL at 10:39

## 2021-05-18 RX ADMIN — RANOLAZINE 500 MG: 500 TABLET, FILM COATED, EXTENDED RELEASE ORAL at 10:39

## 2021-05-18 RX ADMIN — TAMSULOSIN HYDROCHLORIDE 0.4 MG: 0.4 CAPSULE ORAL at 10:40

## 2021-05-18 RX ADMIN — HYDROCODONE BITARTRATE AND ACETAMINOPHEN 1 TABLET: 10; 325 TABLET ORAL at 00:21

## 2021-05-18 RX ADMIN — ISOSORBIDE MONONITRATE 60 MG: 60 TABLET, EXTENDED RELEASE ORAL at 10:39

## 2021-05-18 RX ADMIN — INSULIN GLARGINE 20 UNITS: 100 INJECTION, SOLUTION SUBCUTANEOUS at 00:09

## 2021-05-18 RX ADMIN — CEFTRIAXONE 1 G: 1 INJECTION, POWDER, FOR SOLUTION INTRAMUSCULAR; INTRAVENOUS at 21:00

## 2021-05-18 RX ADMIN — HYDROCODONE BITARTRATE AND ACETAMINOPHEN 1 TABLET: 10; 325 TABLET ORAL at 07:31

## 2021-05-18 RX ADMIN — INSULIN GLARGINE 20 UNITS: 100 INJECTION, SOLUTION SUBCUTANEOUS at 21:52

## 2021-05-18 RX ADMIN — INSULIN LISPRO 3 UNITS: 100 INJECTION, SOLUTION INTRAVENOUS; SUBCUTANEOUS at 21:52

## 2021-05-18 RX ADMIN — HYDROCODONE BITARTRATE AND ACETAMINOPHEN 1 TABLET: 10; 325 TABLET ORAL at 13:29

## 2021-05-18 RX ADMIN — PANTOPRAZOLE SODIUM 40 MG: 40 TABLET, DELAYED RELEASE ORAL at 05:48

## 2021-05-18 RX ADMIN — HYDROCODONE BITARTRATE AND ACETAMINOPHEN 1 TABLET: 10; 325 TABLET ORAL at 19:52

## 2021-05-18 RX ADMIN — INSULIN LISPRO 3 UNITS: 100 INJECTION, SOLUTION INTRAVENOUS; SUBCUTANEOUS at 13:02

## 2021-05-18 RX ADMIN — ISOSORBIDE MONONITRATE 60 MG: 60 TABLET, EXTENDED RELEASE ORAL at 17:18

## 2021-05-18 RX ADMIN — RANOLAZINE 500 MG: 500 TABLET, FILM COATED, EXTENDED RELEASE ORAL at 17:18

## 2021-05-18 RX ADMIN — INSULIN LISPRO 3 UNITS: 100 INJECTION, SOLUTION INTRAVENOUS; SUBCUTANEOUS at 17:18

## 2021-05-18 RX ADMIN — ACETAMINOPHEN 650 MG: 325 TABLET ORAL at 10:44

## 2021-05-18 RX ADMIN — SODIUM CHLORIDE 75 ML/HR: 900 INJECTION, SOLUTION INTRAVENOUS at 16:04

## 2021-05-18 NOTE — ED NOTES
TRANSFER - OUT REPORT:    Verbal report given to Luz Marina Faith on Ashleigh Mulligan  being transferred to 6th Floor for routine progression of care       Report consisted of patients Situation, Background, Assessment and   Recommendations(SBAR). Information from the following report(s) SBAR, Kardex, ED Summary and Recent Results was reviewed with the receiving nurse. Lines:   Peripheral IV 05/17/21 Right Antecubital (Active)        Opportunity for questions and clarification was provided.       Patient transported with:   [employer]

## 2021-05-18 NOTE — H&P
[facility] Hospitalist History and Physical       Name:  Gonzalez Sanz  Age:70 y.o. Sex:male   :  1950    MRN:  [id number]   PCP:  Tura Rinne, MD      Admit Date:  2021  5:35 PM   Chief Complaint: Pt arrives per EMS from home. Pt has had at least two syncopal episodes today. Had hit front of his head and back of his head during both. Blood noted around his mouth. Currently moaning in pain and not talking. Will talk after being sternal rubbed. Pt is confused. Per EMS normally A&Ox4 is what they were told. Pt is on several blood thinners per EMS. Reason for Admission:   Pneumonia [J18.9]    Assessment & Plan:     Principal Problem:    Pneumonia (2021)    Right infiltrate on CXR, elevated WBC 14.7, but no fever. VSS. Patient has had COVID vaccine. Not hypoxic    Active Problems:    Syncope and collapse (2011) - unclear etiology, possibly due to infection and or meds, lethargic in ER, but requesting his pain meds for his chronic LBP. CT HEAD neg. Chart review shows ECHO 2021 with normal EF and indeterminant diastolic function          Acute kidney injury ([address] (2010)    Creatinine normally 1.07--->2.13 tonight      Diabetes mellitus ([address] (2009)    Takes insulin      Essential hypertension (2013)          Paroxysmal atrial fibrillation ([address] (2010)    On eliquis and plavix. Rate controlled this evening. Has pacemaker      SSS (sick sinus syndrome) ([address] (2014)    H/o pacemaker        PLAN:  1) Admit to remote tele  2) IV abx for pneumonia. Trend WBC. Follow up cultures obtained in ER  3) For his KASIE, will give IVF and monitor. Hold nephrotoxic meds  4) Cautiously restart home meds, ? Whether poly-pharmacy causing encephalopathy vs infectious or combination of both. Last admission in September also for aMS. 5) Add SSI. Start with lower dose basal insulin and adjust accordingly  6) Patient also fell on abdomen and there is some bruising noted.  Given that he takes Plavix and Eliquis, will check CT abd/pelvis before restarting meds      Disposition/Expected LOS: 4  Diet: DIET DIABETIC CONSISTENT CARB  VTE ppx: eliquis  GI ppx:   Code status: Full Code  Surrogate decision-maker: son Regi Izaguirre [phone]      History of Presenting Illness:     Yoana Valenzuela is a 79 y.o. male with medical history of AFIB, CVA, IDDm, HTn, who presents to Er for 2 episodes of falls/syncope at home. Patient unsure if he lost consciousness. He is on blood thinners. Family was concerned and called EMS. Patient reports history of chronic low back pain and takes narcotic & muscle relaxers for this. Patient denies fever, chills, chest pain, nausea, vomiting or diarrhea. Patient reports normal muscle strength and sensation in all extremities. Reports chronic left knee pain.      Family reports history of patient's knees giving way with him and him falling. Workup in ER shows him to be sleepy/lethargic but arouseable, answers most questions appropriately,complaining of LBP. No fever or tachycardia. Labs show ammonia 32, initial troponin was 46, white count 14.7, stable H&H, normal electrolytes, acute kidney injury, BUN 35, creatinine 2.13, GFR 33, patient given  IV fluid replacement. Patient does have history of heart failure thus he was not given aggressive fluid replacement. Patient also is not septic although chest ray does show evidence of pneumonia. Patient does not meet SIRS criteria. Patient with acute kidney injury as well as his pneumonia warrants admission. . Blood culture obtained. Patient treated for Community pneumonia with Rocephin and azithromycin in ER and hospitalist asked to admit      Review of Systems:  A 14 point review of systems was taken and pertinent positive as per HPI.         Past Medical History:   Diagnosis Date    Acute kidney failure, unspecified ([facility] 9/17/2010    Anxiety state, unspecified 11/14/2013    Arteriosclerosis of bypass graft of coronary artery 8/27/2015    CAD (coronary artery disease)     Cath on 3- showed 5/5 patent bypass grafts with LV EF=60% and a 90% stenosis in native LAD distal to LIMA graft anastomosis. He received a Xience ZAK (2.25 x 18) to LAD.  Carotid artery disease ([address] 03/28/2019    Bilateral ICA:50-69% on carotid ultrasound.     Carotid artery stenosis without cerebral infarction 07/07/2008    CVA (cerebral infarction) 5/25/2011    Diabetes ([address]     Diabetes mellitus ([address] 5/4/2009    Dilated Cardiomyopathy,Ischemic 2/26/2010    Dyslipidemia 5/4/2009    Elevated prostate specific antigen (PSA) 11/14/2013    Essential hypertension 11/14/2013    GERD (gastroesophageal reflux disease)     Hypertension     Nocturia     Other and unspecified hyperlipidemia     Pacemaker 6/21/2014    Palpitations 2006    Paroxysmal atrial fibrillation ([address] 11/30/2010    Paroxysmal ventricular tachycardia ([address] 11/30/2010    Post PTCA 5/4/2009    stent to left main     Psychiatric disorder     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    S/P CABG (coronary artery bypass graft) 5/4/2009    LIMA TO LAD, SV TO OM, SV TO RCA, SV TO DIAG     S/P PTCA (percutaneous transluminal coronary angioplasty) 3/11/2016    Sick sinus syndrome ([address] 09/06/2007    Snoring, Possible sleep apnea 2/26/2010    SSS (sick sinus syndrome) ([address] 6/21/2014    Stroke (cerebrum) [facility])     TIA 2/2011    Stroke, 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    Syncope and collapse 8/19/2011    Unspecified malignant neoplasm of skin, site unspecified        Past Surgical History:   Procedure Laterality Date    COLONOSCOPY  1990    EGD  2010    esophageal ulcer    HX CHOLECYSTECTOMY      biliary dyskinesia    HX CORONARY ARTERY BYPASS GRAFT      HX CORONARY STENT PLACEMENT      HX HEART CATHETERIZATION  4/30/2013    no intervention    HX HEART CATHETERIZATION  6/23/2014    no intervention    HX HEART CATHETERIZATION MULTIPLE ([facility])    HX ORTHOPAEDIC      knee surgery x 2 left    HX OTHER SURGICAL      nerve in back    HX PACEMAKER      OH CARDIAC SURG PROCEDURE UNLIST      bypass x 5; 2 stents    OH LEFT HEART CATH,PERCUTANEOUS  2010    native circ x 1       Family History : reviewed  Family History   Problem Relation Age of Onset    Diabetes Mother     Heart Disease Mother     Heart Disease Sister     Cancer Brother         Eye    Heart Disease Brother     Migraines Brother         Social History     Tobacco Use    Smoking status: Former Smoker     Packs/day: 3.00     Years: 40.00     Pack years: 120.00     Quit date: 1999     Years since quittin.3    Smokeless tobacco: Never Used   Substance Use Topics    Alcohol use: No       Allergies   Allergen Reactions    Beta-Blockers (Beta-Adrenergic Blocking Agts) Other (comments)     \"Very low blood pressures with every one they tried\"    Shellfish Derived Angioedema     Only shrimp causes reaction.   Can eat all other shellfish    Xanax [Alprazolam] Other (comments)     Totally changes his personality       Immunization History   Administered Date(s) Administered    (RETIRED) Pneumococcal Vaccine (Unspecified Type) 10/16/2008    Influenza Vaccine PF 10/02/2013    Influenza Vaccine Split 10/17/2008    Pneumococcal Polysaccharide (PPSV-23) 10/18/2015         PTA Medications:  Current Outpatient Medications   Medication Instructions    baclofen (LIORESAL) 10 mg tablet Oral, 3 TIMES DAILY    carvediloL (COREG) 12.5 mg tablet TAKE 1 TABLET BY MOUTH TWICE DAILY WITH MEALS    clopidogreL (PLAVIX) 75 mg tab TAKE 1 TABLET BY MOUTH DAILY    cyclobenzaprine (FLEXERIL) 10 mg, Oral, 2 TIMES DAILY AS NEEDED    Eliquis 5 mg tablet TAKE 1 TABLET BY MOUTH TWICE DAILY    esomeprazole (NEXIUM) 40 mg capsule Oral, DAILY    finasteride (PROSCAR) 5 mg tablet TAKE 1 TABLET BY MOUTH DAILY    fluticasone (FLONASE) 50 mcg/actuation nasal spray 2 Sprays, Both Nostrils, ONCE    HYDROcodone-acetaminophen (Norco)  mg tablet 1 Tab, Oral    insulin aspart U-100 (NOVOLOG U-100 INSULIN ASPART) 25 Units, SubCUTAneous, 3 TIMES DAILY BEFORE MEALS    isosorbide mononitrate ER (IMDUR) 60 mg, Oral, 2 TIMES DAILY    Levemir U-100 Insulin 60 Units, SubCUTAneous, 2 TIMES DAILY    levETIRAcetam (KEPPRA) 500 mg, Oral, 2 TIMES DAILY    losartan (COZAAR) 50 mg tablet TAKE 1 TABLET BY MOUTH DAILY    nitroglycerin (NITROSTAT) 0.4 mg, SubLINGual, EVERY 5 MIN AS NEEDED, Up to 3 doses.  pravastatin (PRAVACHOL) 80 mg, Oral, EVERY BEDTIME    pregabalin (LYRICA) 300 mg, Oral, 2 TIMES DAILY    ranolazine ER (RANEXA) 500 mg, Oral, 2 TIMES DAILY    tamsulosin (FLOMAX) 0.4 mg capsule TAKE 1 CAPSULE BY MOUTH DAILY    traZODone (DESYREL) 150 mg, Oral, EVERY BEDTIME       Objective:     Patient Vitals for the past 24 hrs:   Temp Pulse Resp BP SpO2   05/17/21 1931  78 16 (!) 124/57 97 %   05/17/21 1928  79 14 (!) 124/57 99 %   05/17/21 1913  69  (!) 117/59 99 %   05/17/21 1844  69 14 (!) 118/56 98 %   05/17/21 1829  82 18 (!) 134/57 91 %   05/17/21 1739 98.6 °F (37 °C) 82 18 (!) 164/75 90 %       Oxygen Therapy  O2 Sat (%): 97 % (05/17/21 1931)  Pulse via Oximetry: 78 beats per minute (05/17/21 1931)  O2 Device: None (05/17/21 1739)    Body mass index is 34.14 kg/m². Physical Exam:    General:  Sleepy but arouseable, No acute distress  HEENT:  Pupils equal and reactive to light and accommodation, oropharynx is clear   Neck:   Supple, no lymphadenopathy, no JVD   Lungs:  Diminished at bases  CV:   Regular rate, regular rhythm, with normal S1 and S2   Abdomen:  Soft, nontender, nondistended, normoactive bowel sounds   Extremities:  No cyanosis clubbing or edema   Neuro:  Nonfocal, A&O x1. Patient follows commands, oriented to person only. Patient moving all extremities.   Delayed verbal response unclear etiology   Psych:  Normal mood and affect       Data Reviewed: I have reviewed all labs, meds, and studies. Recent Results (from the past 24 hour(s))   CBC WITH AUTOMATED DIFF    Collection Time: 05/17/21  5:47 PM   Result Value Ref Range    WBC 14.7 (H) 4.3 - 11.1 K/uL    RBC 4.57 4.23 - 5.6 M/uL    HGB 12.6 (L) 13.6 - 17.2 g/dL    HCT 38.2 (L) 41.1 - 50.3 %    MCV 83.6 79.6 - 97.8 FL    MCH 27.6 26.1 - 32.9 PG    MCHC 33.0 31.4 - 35.0 g/dL    RDW 14.3 11.9 - 14.6 %    PLATELET 242 076 - 563 K/uL    MPV 10.8 9.4 - 12.3 FL    ABSOLUTE NRBC 0.00 0.0 - 0.2 K/uL    NEUTROPHILS 85 (H) 43 - 78 %    LYMPHOCYTES 8 (L) 13 - 44 %    MONOCYTES 6 4.0 - 12.0 %    EOSINOPHILS 0 (L) 0.5 - 7.8 %    BASOPHILS 0 0.0 - 2.0 %    IMMATURE GRANULOCYTES 1 0.0 - 5.0 %    ABS. NEUTROPHILS 12.5 (H) 1.7 - 8.2 K/UL    ABS. LYMPHOCYTES 1.2 0.5 - 4.6 K/UL    ABS. MONOCYTES 0.9 0.1 - 1.3 K/UL    ABS. EOSINOPHILS 0.0 0.0 - 0.8 K/UL    ABS. BASOPHILS 0.0 0.0 - 0.2 K/UL    ABS. IMM. GRANS. 0.1 0.0 - 0.5 K/UL    RBC COMMENTS SLIGHT  ANISOCYTOSIS + POIKILOCYTOSIS        WBC COMMENTS Result Confirmed By Smear      PLATELET COMMENTS ADEQUATE      DF AUTOMATED     METABOLIC PANEL, COMPREHENSIVE    Collection Time: 05/17/21  5:47 PM   Result Value Ref Range    Sodium 139 138 - 145 mmol/L    Potassium 4.3 3.5 - 5.1 mmol/L    Chloride 107 98 - 107 mmol/L    CO2 24 21 - 32 mmol/L    Anion gap 8 7 - 16 mmol/L    Glucose 298 (H) 65 - 100 mg/dL    BUN 35 (H) 8 - 23 MG/DL    Creatinine 2.13 (H) 0.8 - 1.5 MG/DL    GFR est AA 40 (L) >60 ml/min/1.73m2    GFR est non-AA 33 (L) >60 ml/min/1.73m2    Calcium 8.5 8.3 - 10.4 MG/DL    Bilirubin, total 1.0 0.2 - 1.1 MG/DL    ALT (SGPT) 17 12 - 65 U/L    AST (SGOT) 18 15 - 37 U/L    Alk.  phosphatase 54 50 - 136 U/L    Protein, total 7.1 6.3 - 8.2 g/dL    Albumin 3.7 3.2 - 4.6 g/dL    Globulin 3.4 2.3 - 3.5 g/dL    A-G Ratio 1.1 (L) 1.2 - 3.5     TROPONIN-HIGH SENSITIVITY    Collection Time: 05/17/21  5:47 PM   Result Value Ref Range    Troponin-High Sensitivity 46.0 (H) 0 - 14 pg/mL NT-PRO BNP    Collection Time: 05/17/21  5:47 PM   Result Value Ref Range    NT pro- (H) 5 - 125 PG/ML   AMMONIA    Collection Time: 05/17/21  5:51 PM   Result Value Ref Range    Ammonia 32 11 - 32 UMOL/L   TROPONIN-HIGH SENSITIVITY    Collection Time: 05/17/21  7:52 PM   Result Value Ref Range    Troponin-High Sensitivity 42.1 (H) 0 - 14 pg/mL       EKG Results     None          All Micro Results     Procedure Component Value Units Date/Time    CULTURE, BLOOD [[id number]] Collected: 05/17/21 2018    Order Status: Completed Specimen: Blood Updated: 05/17/21 2034    CULTURE, BLOOD [[id number]]     Order Status: Sent Specimen: Blood           Other Studies:  Xr Pelv Ap Only    Result Date: 5/17/2021  SINGLE VIEW PELVIS: CLINICAL HISTORY:  Pelvic pain after syncopal fall today. COMPARISON:  None. FINDINGS:  No definite fracture, malalignment, or vielka bone destruction is evident. No persistent radiopaque foreign body is seen. There is mild osteoarthritis at both hips as well as lower lumbar spondylosis. DEGENERATIVE CHANGES BUT NO ACUTE BONY ABNORMALITY IDENTIFIED. Ct Head Wo Cont    Result Date: 5/17/2021  NONCONTRAST HEAD CT CLINICAL HISTORY:  HEAD injury from 2 syncopal fall today. TECHNIQUE:  Axial images were obtained with spiral technique. Radiation dose reduction was achieved using one or all of the following techniques: automated exposure control, weight-based dosing, iterative reconstruction. COMPARISON:  September 20, 2020. REPORT:   Standard noncontrast head CT demonstrates no definite intracranial mass effect, hemorrhage, or evidence of acute geographic infarction. White matter hypodensities are most consistent with small vessel ischemic disease. The ventricles are normal in size and configuration, accounting for the patient's age. Right maxillary sinus mucous membrane thickening is more marked than prior study. Orbits  and other paranasal sinuses are clear where imaged.  Bone windows demonstrate no definite fracture or destruction. 1. MILD SMALL VESSEL ISCHEMIC DISEASE WITH NO ACUTE INTRACRANIAL ABNORMALITY OR CALVARIAL FRACTURE IDENTIFIED AT NONCONTRAST CT 2. RIGHT MAXILLARY SINUSITIS IS MORE MARKED THAN IN [facility]. .     Ct Spine Cerv Wo Cont    Result Date: 5/17/2021  CT CERVICAL SPINE WITH ADDITIONAL REFORMATS CLINICAL HISTORY:  NECK pain after syncopal fall today. TECHNIQUE:  Axial images were obtained with spiral technique, and coronal and sagittal reformats were produced. Radiation dose reduction was achieved using one or all of the following techniques: automated exposure control, weight-based dosing, iterative reconstruction. COMPARISON:  NECK CTA of August 10, 2020. FINDINGS:  There is new mild levoconvex torticollis. No definite acute fracture or malalignment identified. Prevertebral soft tissues are unremarkable. Mild, multilevel degenerative disc disease without central spinal stenosis. Multilevel uncovertebral and facet spurring results in variable, relatively mild foraminal narrowing. Mild levoconvex scoliosis and multilevel spondylosis with no acute bony abnormality identified. Xr Chest Port    Result Date: 5/17/2021  PORTABLE CHEST, May 17, 2021 at 1754 hours CLINICAL HISTORY:  CHEST pain with leukocytosis. COMPARISON:  September 20, 2020. FINDINGS:  AP erect image demonstrates no confluent infiltrate or significant pleural fluid. However, there is patchy infiltrate scattered in the right lung. The heart size is upper limits of normal status post CABG without evidence of right congestive heart failure or pneumothorax. The bony thorax appears intact on this view. There are overlying radiopaque support devices. 1.  PATCHY RIGHT LUNG INFILTRATE SUSPICIOUS FOR PNEUMONIA IN THIS CLINICAL SETTING. 2.  BORDERLINE CARDIOMEGALY STATUS POST CABG WITHOUT EVIDENCE OF ELLIOTT CONGESTIVE HEART FAILURE.         Medications:  Medications Administered      Medications Administered     cefTRIAXone (ROCEPHIN) 1 g in 0.9% sodium chloride (MBP/ADV) 50 mL MBP     Admin Date  05/17/2021 Action  New Bag Dose  1 g Rate  100 mL/hr Route  IntraVENous Administered By  Lisseth Marks RN          sodium chloride 0.9 % bolus infusion 500 mL     Admin Date  05/17/2021 Action  New Bag Dose  500 mL Rate  500 mL/hr Route  IntraVENous Administered By  Julia Harris RN                    Signed By: Edward Fonseca MD   [facility] Hospitalist Service    May 17, 2021   9:33 PM

## 2021-05-18 NOTE — PROGRESS NOTES
ACUTE PHYSICAL THERAPY GOALS:  (Developed with and agreed upon by patient and/or caregiver.)  (1.) Mansi Chiang will move from supine to sit and sit to supine , scoot up and down and roll side to side with MODIFIED INDEPENDENCE within 7 treatment day(s). (2.) Mansi Chiang will transfer from bed to chair and chair to bed with MODIFIED INDEPENDENCE using the least restrictive device within 7 treatment day(s). (3.) Mansi Chiang will ambulate with MODIFIED INDEPENDENCE for 100 feet with the least restrictive device within 7 treatment day(s). (4.) Mansi Chiang will perform standing static and dynamic balance activities x 25 minutes with MODIFIED INDEPENDENCE to improve safety within 7 treatment day(s). (5.) Mansi Chiang will perform therapeutic exercises x 20 min for HEP with INDEPENDENCE to improve strength, endurance, and functional mobility within 7 treatment day(s).      PHYSICAL THERAPY ASSESSMENT: Initial Assessment PT Treatment Day # 1    Mansi Chiang is a 79 y.o. male   PRIMARY DIAGNOSIS: Pneumonia  Pneumonia [J18.9]     Reason for Referral:   ICD-10: Treatment Diagnosis: Generalized Muscle Weakness (M62.81)  Difficulty in walking, Not elsewhere classified (R26.2)  Other abnormalities of gait and mobility (R26.89)  History of falling (Z91.81)  Dizziness and Giddiness (R42)  Low Back Pain (M54.5)  INPATIENT: Payor: [facility] MEDICARE / Plan: [facility] [address] / Product Type: Managed Care Medicare /     ASSESSMENT:     REHAB RECOMMENDATIONS:   Recommendation to date pending progress:  [address] [place] [address]  Equipment:    To Be Determined     PRIOR LEVEL OF FUNCTION:  (Prior to Hospitalization) INITIAL/CURRENT LEVEL OF FUNCTION:  (Most Recently Demonstrated)   Bed Mobility:   Modified Independent  Sit to Stand:   Modified Independent  Transfers:   Modified Independent  Gait/Mobility:   Modified Independent Bed Mobility:   Minimal Assistance x 2  Sit to Stand:   Minimal Assistance x 2  Transfers:   Minimal Assistance x 2  Gait/Mobility:   Not tested     ASSESSMENT:  Mr. Lisa Garces is a 79year old M who presents to hospital after syncopal episodes and falls at home. Admitted with pneumonia. This date pt performs mobility including bed mobility, sitting balance activities, sit <> stand transfers, and scooting EOB with Min Ax2. Orthostatics assessed:    Position BP Heartrate   Supine 162/73 mm Hg 85 bpm   Sitting 163/72 mm Hg 96 bpm   Standing 130/59 mm Hg* 95 bpm   Post Activity (sitting EOB) 168/72 mm Hg 87 bpm   *experiencing dizziness    Pt experiencing low back pain which is limiting his mobility - this is chronic in nature. Pt presents as functioning below his baseline, with deficits in mobility including transfers, gait, balance, and activity tolerance. Pt will benefit from skilled therapy services to address stated deficits to promote return to highest level of function, independence, and safety. Will continue to follow. SUBJECTIVE:   Mr. Lisa Garces states, \"If you're Leander Padron want me to walk, there's no way. \"    SOCIAL HISTORY/LIVING ENVIRONMENT:   Home Environment: Trailer/mobile home  One/Two Story Residence: Other (Comment)  Living Alone: Yes  Support Systems: Child(gabe), Family member(s)  OBJECTIVE:     PAIN: VITAL SIGNS: LINES/DRAINS:   Pre Treatment: Pain Screen  Pain Scale 1: Numeric (0 - 10)  Pain Intensity 1: 8  Pain Onset 1: chronic  Pain Location 1: Back  Pain Orientation 1: Lower  Pain Description 1: Sore  Post Treatment: 7/10   IV  O2 Device: None (Room air)     GROSS EVALUATION:  BLE Within Functional Limits Abnormal/ Functional Abnormal/ Non-Functional (see comments) Not Tested Comments:   AROM [x] [] [] []    PROM [x] [] [] []    Strength [] [x] [] []  generally weak BLE   Balance [] [x] [] []     Posture [x] [] [] []    Sensation [x] [] [] []    Coordination [x] [] [] []    Tone [] [] [] [x]    Edema [] [] [] [x] Activity Tolerance [] [x] [] []  decreased mobility and standing tolerance    [] [] [] []      COGNITION/  PERCEPTION: Intact Impaired   (see comments) Comments:   Orientation [x] []    Vision [x] []    Hearing [x] []    Command Following [x] []    Safety Awareness [x] []     [] []      MOBILITY: I Mod I S SBA CGA Min Mod Max Total  NT x2 Comments:   Bed Mobility    Rolling [] [] [] [] [] [x] [] [] [] [] [x]    Supine to Sit [] [] [] [] [] [x] [] [] [] [] [x]    Scooting [] [] [] [] [] [x] [] [] [] [] [x]    Sit to Supine [] [] [] [] [] [x] [] [] [] [] [x]    Transfers    Sit to Stand [] [] [] [] [] [x] [] [] [] [] [x]    Bed to Chair [] [] [] [] [] [x] [] [] [] [] [x]    Stand to Sit [] [] [] [] [] [x] [] [] [] [] [x]    I=Independent, Mod I=Modified Independent, S=Supervision, SBA=Standby Assistance, CGA=Contact Guard Assistance,   Min=Minimal Assistance, Mod=Moderate Assistance, Max=Maximal Assistance, Total=Total Assistance, NT=Not Tested  GAIT: I Mod I S SBA CGA Min Mod Max Total  NT x2 Comments:   Level of Assistance [] [] [] [] [] [] [] [] [] [x] []    Distance N/A    DME N/A    Gait Quality N/A    Weightbearing Status N/A     I=Independent, Mod I=Modified Independent, S=Supervision, SBA=Standby Assistance, CGA=Contact Guard Assistance,   Min=Minimal Assistance, Mod=Moderate Assistance, Max=Maximal Assistance, Total=Total Assistance, NT=Not Tested    [address] [facility] [address] [address] [facility] [address] [facility] Mob[facility] Inpatient Short Form       How much difficulty does the patient currently have. .. Unable A Lot A Little None   1. Turning over in bed (including adjusting bedclothes, sheets and blankets)? [] 1   [] 2   [x] 3   [] 4   2. Sitting down on and standing up from a chair with arms ( e.g., wheelchair, bedside commode, etc.)   [] 1   [x] 2   [] 3   [] 4   3. Moving from lying on back to sitting on the side of the bed?    [] 1   [] 2   [x] 3   [] 4   How much help from another person does the patient currently need... Total A Lot A Little None   4. Moving to and from a bed to a chair (including a wheelchair)? [] 1   [] 2   [x] 3   [] 4   5. Need to walk in hospital room? [] 1   [] 2   [x] 3   [] 4   6. Climbing 3-5 steps with a railing? [] 1   [x] 2   [] 3   [] 4   © 2007, Trustees of [address] [place] [address] [address] [address], under license to [employer]. All rights reserved      Score:  Initial: 16 Most Recent: X (Date: -- )    Interpretation of Tool:  Represents activities that are increasingly more difficult (i.e. Bed mobility, Transfers, Gait). PLAN:   FREQUENCY/DURATION: PT Plan of Care: 3 times/week for duration of hospital stay or until stated goals are met, whichever comes first.    PROBLEM LIST:   (Skilled intervention is medically necessary to address:)  1. Decreased Activity Tolerance  2. Decreased Balance  3. Decreased Coordination  4. Decreased Gait Ability  5. Decreased Strength  6. Decreased Transfer Abilities  7. Increased Pain   INTERVENTIONS PLANNED:   (Benefits and precautions of physical therapy have been discussed with the patient.)  1. Therapeutic Activity  2. Therapeutic Exercise/HEP  3. Neuromuscular Re-education  4. Gait Training  5. Education     TREATMENT:     EVALUATION: Moderate Complexity : (Untimed Charge)    TREATMENT:   ($$ Therapeutic Activity: 8-22 mins    )  Therapeutic Activity (10 Minutes): Therapeutic activity included Rolling, Supine to Sit, Sit to Supine, Scooting, Lateral Scooting, Transfer Training, Sitting balance  and Standing balance to improve functional Mobility, Strength and Activity tolerance.     TREATMENT GRID:  N/A    AFTER TREATMENT POSITION/PRECAUTIONS:  Bed, Needs within reach, RN notified and Visitors at bedside    INTERDISCIPLINARY COLLABORATION:  RN/PCT, PT/PTA and OT/CANALES    TOTAL TREATMENT DURATION:  PT Patient Time In/Time Out  Time In: 3872  Time Out: 1593 [facility], PT

## 2021-05-18 NOTE — PROGRESS NOTES
Pt could not stand for orthostat VS.  Lying and sitting readings were charted. Pt has back pain preventing him from getting the stand reading, MD notified, pt has received pain meds.

## 2021-05-18 NOTE — PROGRESS NOTES
5/18/2021  Per rounds,  MD wants orthostatics done prior to getting up with OT/PT. Orders for therapy are in the chart.    Thank you,  Maynor Olivares, OT

## 2021-05-18 NOTE — PROGRESS NOTES
CM met with patient to complete assessment. Patient presented alert and oriented. Demographic information verified by the patient. The patient lives in a camper, on the property of his daughter Summer Turner [phone]. Patient confirmed prior to this hospital visit, the patient is independent with completing all ADL's and drives. DME: walker  Patient uses walker as needed. Patient obtains his medications from [facility] in [facility]. Patient voiced no difficulty with obtaining mediations in the community. Diabetes: YES    Discharge planning: PT/OT has not been consulted. Patient denies any history of REHAB. Patient confirmed a history of [facility] services. Patient denies any supportive care needs at this time and anticipates to return home when medically stable. Please consult or notify CM if any needs shall arise. CM continues to follow. Care Management Interventions  PCP Verified by CM: Yes  Mode of Transport at Discharge: Other (see comment)(TBD: based upon need. )  Transition of Care Consult (CM Consult): Discharge Planning  Discharge Durable Medical Equipment: No  Physical Therapy Consult: No  Occupational Therapy Consult: No  Speech Therapy Consult: No  Current Support Network: Lives Alone, Family Lives Nearby  Confirm Follow Up Transport: Self  The Plan for Transition of Care is Related to the Following Treatment Goals : Return to Baseline. The Patient and/or Patient Representative was Provided with a Choice of Provider and Agrees with the Discharge Plan?: Yes  Name of the Patient Representative Who was Provided with a Choice of Provider and Agrees with the Discharge Plan: Patient.     Resource Information Provided?: No  Discharge Location  Discharge Placement: Home'

## 2021-05-18 NOTE — PROGRESS NOTES
Detailed note at end of POC:     Problem: Falls - Risk of  Goal: *Absence of Falls  Description: Document Reji Thomas Fall Risk and appropriate interventions in the flowsheet. Outcome: Progressing Towards Goal  Note: Fall Risk Interventions:  Mobility Interventions: Patient to call before getting OOB, OT consult for ADLs, PT Consult for mobility concerns, PT Consult for assist device competence         Medication Interventions: Patient to call before getting OOB, Teach patient to arise slowly    Elimination Interventions: Call light in reach, Patient to call for help with toileting needs, Urinal in reach    History of Falls Interventions:  Investigate reason for fall         Problem: Patient Education: Go to Patient Education Activity  Goal: Patient/Family Education  Outcome: Progressing Towards Goal     Problem: Pain  Goal: *Control of Pain  Outcome: Progressing Towards Goal  Goal: *PALLIATIVE CARE:  Alleviation of Pain  Outcome: Progressing Towards Goal     Problem: Patient Education: Go to Patient Education Activity  Goal: Patient/Family Education  Outcome: Progressing Towards Goal     Problem: Patient Education: Go to Patient Education Activity  Goal: Patient/Family Education  Outcome: Progressing Towards Goal     Problem: Pneumonia: Day 1  Goal: Off Pathway (Use only if patient is Off Pathway)  Outcome: Progressing Towards Goal  Goal: Activity/Safety  Outcome: Progressing Towards Goal  Goal: Consults, if ordered  Outcome: Progressing Towards Goal  Goal: Diagnostic Test/Procedures  Outcome: Progressing Towards Goal  Goal: Nutrition/Diet  Outcome: Progressing Towards Goal  Goal: Medications  Outcome: Progressing Towards Goal  Goal: Respiratory  Outcome: Progressing Towards Goal  Goal: Treatments/Interventions/Procedures  Outcome: Progressing Towards Goal  Goal: Psychosocial  Outcome: Progressing Towards Goal  Goal: *Oxygen saturation within defined limits  Outcome: Progressing Towards Goal  Goal: *Influenza vaccine administered (October-March)  Outcome: Progressing Towards Goal  Goal: *Pneumoccocal vaccine administered  Outcome: Progressing Towards Goal  Goal: *Hemodynamically stable  Outcome: Progressing Towards Goal  Goal: *Demonstrates progressive activity  Outcome: Progressing Towards Goal  Goal: *Tolerating diet  Outcome: Progressing Towards Goal     Problem: Pneumonia: Day 2  Goal: Off Pathway (Use only if patient is Off Pathway)  Outcome: Progressing Towards Goal  Goal: Activity/Safety  Outcome: Progressing Towards Goal  Goal: Consults, if ordered  Outcome: Progressing Towards Goal  Goal: Diagnostic Test/Procedures  Outcome: Progressing Towards Goal  Goal: Nutrition/Diet  Outcome: Progressing Towards Goal  Goal: Discharge Planning  Outcome: Progressing Towards Goal  Goal: Medications  Outcome: Progressing Towards Goal  Goal: Respiratory  Outcome: Progressing Towards Goal  Goal: Treatments/Interventions/Procedures  Outcome: Progressing Towards Goal  Goal: Psychosocial  Outcome: Progressing Towards Goal  Goal: *Oxygen saturation within defined limits  Outcome: Progressing Towards Goal  Goal: *Hemodynamically stable  Outcome: Progressing Towards Goal  Goal: *Demonstrates progressive activity  Outcome: Progressing Towards Goal  Goal: *Tolerating diet  Outcome: Progressing Towards Goal  Goal: *Optimal pain control at patient's stated goal  Outcome: Progressing Towards Goal     Problem: Pneumonia: Day 3  Goal: Off Pathway (Use only if patient is Off Pathway)  Outcome: Progressing Towards Goal  Goal: Activity/Safety  Outcome: Progressing Towards Goal  Goal: Consults, if ordered  Outcome: Progressing Towards Goal  Goal: Diagnostic Test/Procedures  Outcome: Progressing Towards Goal  Goal: Nutrition/Diet  Outcome: Progressing Towards Goal  Goal: Discharge Planning  Outcome: Progressing Towards Goal  Goal: Medications  Outcome: Progressing Towards Goal  Goal: Respiratory  Outcome: Progressing Towards Goal  Goal: Treatments/Interventions/Procedures  Outcome: Progressing Towards Goal  Goal: Psychosocial  Outcome: Progressing Towards Goal  Goal: *Oxygen saturation within defined limits  Outcome: Progressing Towards Goal  Goal: *Hemodynamically stable  Outcome: Progressing Towards Goal  Goal: *Demonstrates progressive activity  Outcome: Progressing Towards Goal  Goal: *Tolerating diet  Outcome: Progressing Towards Goal  Goal: *Describes available resources and support systems  Outcome: Progressing Towards Goal  Goal: *Optimal pain control at patient's stated goal  Outcome: Progressing Towards Goal     Problem: Pneumonia: Day 4  Goal: Off Pathway (Use only if patient is Off Pathway)  Outcome: Progressing Towards Goal  Goal: Activity/Safety  Outcome: Progressing Towards Goal  Goal: Nutrition/Diet  Outcome: Progressing Towards Goal  Goal: Discharge Planning  Outcome: Progressing Towards Goal  Goal: Medications  Outcome: Progressing Towards Goal  Goal: Respiratory  Outcome: Progressing Towards Goal  Goal: Treatments/Interventions/Procedures  Outcome: Progressing Towards Goal  Goal: Psychosocial  Outcome: Progressing Towards Goal     Problem: Pneumonia: Discharge Outcomes  Goal: *Demonstrates progressive activity  Outcome: Progressing Towards Goal  Goal: *Describes follow-up/return visits to physicians  Outcome: Progressing Towards Goal  Goal: *Tolerating diet  Outcome: Progressing Towards Goal  Goal: *Verbalizes name, dosage, time, side effects, and number of days to continue medications  Outcome: Progressing Towards Goal  Goal: *Influenza immunization  Outcome: Progressing Towards Goal  Goal: *Pneumococcal immunization  Outcome: Progressing Towards Goal  Goal: *Respiratory status at baseline  Outcome: Progressing Towards Goal  Goal: *Vital signs within defined limits  Outcome: Progressing Towards Goal  Goal: *Describes available resources and support systems  Outcome: Progressing Towards Goal  Goal: *Optimal pain control at patient's stated goal  Outcome: Progressing Towards Goal     Pt in no distress, C/O pain, med given, no N/V/diarrhea, tolerating meds/diet, POC/safety reviewed, pt states he understands, CT scan scheduled for this morning.

## 2021-05-18 NOTE — PROGRESS NOTES
ACUTE OT GOALS:  (Developed with and agreed upon by patient and/or caregiver.)  1. Patient will complete lower body bathing and dressing with supervision and adaptive equipment as needed. 2. Patient will complete toileting with modified independence. 3. Patient will tolerate 30 minutes of OT treatment with 2-3 rest breaks to increase activity tolerance for ADLs. 4. Patient will complete functional transfers with modified independence and adaptive equipment as needed. 5. Patient will complete functional mobility for household distances with supervision and appropriate safety awareness. Timeframe: 7 visits       OCCUPATIONAL THERAPY ASSESSMENT: Initial Assessment and Daily Note OT Treatment Day # 1    Henrry Glover is a 79 y.o. male   PRIMARY DIAGNOSIS: Pneumonia  Pneumonia [J18.9]       Reason for Referral:    ICD-10: Treatment Diagnosis: Generalized Muscle Weakness (M62.81)  Other lack of cordination (R27.8)  History of falling (Z91.81)  INPATIENT: Payor: [facility] MEDICARE / Plan: [employer] [address] / Product Type: Managed Care Medicare /   ASSESSMENT:     REHAB RECOMMENDATIONS:   Recommendation to date pending progress:  [address] [place] [address] Therapy  Equipment:    To Be Determined     PRIOR LEVEL OF FUNCTION:  (Prior to Hospitalization)  INITIAL/CURRENT LEVEL OF FUNCTION:  (Based on today's evaluation)   Bathing:   Independent  Dressing:   Independent  Feeding/Grooming:   Independent  Toileting:   Independent  Functional Mobility:   Modified Independent Bathing:   Moderate Assistance  Dressing:   Moderate Assistance  Feeding/Grooming:   Standby Assistance  Toileting:   Minimal Assistance  Functional Mobility:   Minimal Assistance x 2     ASSESSMENT:  Mr. Oli Eng presents to the hospital with pneumonia and multiple syncopal episodes. Pt struck his head and neck with fall but imaging thus far has been negative. Pt reports he wants to go home.  Pt stays in a camper behind his daughter's home. Pt has access to a rollator as needed. Pt was completing his own ADLs. Pt reported back pain with bed mobility and sit to stand transfers today. Orthostatic BPs were monitored with a drop (see chart below) with sit to stand. Pt did become somewhat dizzy and did not tolerate standing for long. Pt educated on safety and transitioning slowly. Pt side scooted to the head of the bed before returning back to supine. Attempted to educate pt on log roll but pt will need continued practice. Pt is currently functioning below baseline and will benefit from OT services to address stated goals and plan of care. SUBJECTIVE:   Mr. Catrina Noble states, \"I got over 200 movie channels at my house. \"    SOCIAL HISTORY/LIVING ENVIRONMENT: Lives in a camper behind his daughter's home. Pt typically independent to modified independent with ADL/functional mobility. Pt uses a rollator on occasion. Still drives.    Home Environment: Trailer/mobile home  One/Two Story Residence: Other (Comment)  Living Alone: Yes  Support Systems: Child(gabe), Family member(s)    OBJECTIVE:     PAIN: VITAL SIGNS: LINES/DRAINS:   Pre Treatment: Pain Screen  Pain Scale 1: Numeric (0 - 10)  Pain Intensity 1: 8  Pain Onset 1: chronic  Pain Location 1: Back  Pain Orientation 1: Lower  Pain Intervention(s) 1: Repositioned  Post Treatment: same Vital Signs  BP: (!) 162/73  MAP (Calculated): 103  BP Patient Position: Supine  More BP/Pulse rows needed?: Yes  Additional Blood Pressure/Pulse Data  BP 2: 163/72  MAP 2 (Calculated): 102  Patient Position 2: Sitting  BP 3: 130/59  MAP 3 (Calculated): 83  Patient Position 3: Standing IV  O2 Device: None (Room air)     GROSS EVALUATION:  B UE Within Functional Limits Abnormal/ Functional Abnormal/ Non-Functional (see comments) Not Tested Comments:   AROM [x] [] [] []    PROM [x] [] [] []    Strength [] [x] [] [] B UE   Balance [] [x] [] [] Fair+ sitting; fair to fair- standing at [employer] [] [x] [] []    Sensation [] [] [] [x]    Coordination [] [x] [] []    Tone [x] [] [] []    Edema [x] [] [] []    Activity Tolerance [] [x] [] []     [] [] [] []      COGNITION/  PERCEPTION: Intact Impaired   (see comments) Comments:   Orientation [x] []    Vision [x] []    Hearing [x] []    Judgment/ Insight [] [x]    Attention [] [x]    Memory [x] []    Command Following [x] []    Emotional Regulation [x] []     [] []      ACTIVITIES OF DAILY LIVING: I Mod I S SBA CGA Min Mod Max Total NT Comments   BASIC ADLs:              Bathing/ Showering [] [] [] [] [] [] [] [] [] [x]    Toileting [] [] [] [] [] [] [] [] [] [x]    Dressing [] [] [] [] [] [] [] [] [] [x]    Feeding [] [] [] [] [] [] [] [] [] [x]    Grooming [] [] [] [] [] [] [] [] [] [x]    Personal Device Care [] [] [] [] [] [] [] [] [] [x]    Functional Mobility [] [] [] [] [] [] [] [] [] [x]    I=Independent, Mod I=Modified Independent, S=Supervision, SBA=Standby Assistance, CGA=Contact Guard Assistance,   Min=Minimal Assistance, Mod=Moderate Assistance, Max=Maximal Assistance, Total=Total Assistance, NT=Not Tested    MOBILITY: I Mod I S SBA CGA Min Mod Max Total  NT x2 Comments:   Supine to sit [] [] [] [] [] [x] [] [] [] [] [x]    Sit to supine [] [] [] [] [] [x] [x] [] [] [] [x]    Sit to stand [] [] [] [] [] [x] [x] [] [] [] [x]    Bed to chair [] [] [] [] [] [] [] [] [] [x] []    I=Independent, Mod I=Modified Independent, S=Supervision, SBA=Standby Assistance, CGA=Contact Guard Assistance,   Min=Minimal Assistance, Mod=Moderate Assistance, Max=Maximal Assistance, Total=Total Assistance, NT=Not Tested    [address] [facility] [address] [address] AM-PAC 6 Clicks   Daily Activity Inpatient Short Form        How much help from another person does the patient currently need. .. Total A Lot A Little None   1. Putting on and taking off regular lower body clothing? [] 1   [x] 2   [] 3   [] 4   2. Bathing (including washing, rinsing, drying)? [] 1   [x] 2   [] 3   [] 4   3. Toileting, which includes using toilet, bedpan or urinal?   [] 1   [] 2   [x] 3   [] 4   4. Putting on and taking off regular upper body clothing? [] 1   [] 2   [x] 3   [] 4   5. Taking care of personal grooming such as brushing teeth? [] 1   [] 2   [x] 3   [] 4   6. Eating meals? [] 1   [] 2   [x] 3   [] 4   © 2007, Trustees of [facility], under license to [employer]. All rights reserved     Score:  Initial: 16 Most Recent: X (Date: -- )   Interpretation of Tool:  Represents activities that are increasingly more difficult (i.e. Bed mobility, Transfers, Gait). PLAN:   FREQUENCY/DURATION: OT Plan of Care: 3 times/week for duration of hospital stay or until stated goals are met, whichever comes first.    PROBLEM LIST:   (Skilled intervention is medically necessary to address:)  1. Decreased ADL/Functional Activities  2. Decreased Activity Tolerance  3. Decreased AROM/PROM  4. Decreased Balance  5. Decreased Cognition  6. Decreased Coordination  7. Decreased Gait Ability  8. Decreased Strength  9. Decreased Transfer Abilities  10. Increased Pain   INTERVENTIONS PLANNED:   (Benefits and precautions of occupational therapy have been discussed with the patient.)  1. Self Care Training  2. Therapeutic Activity  3. Therapeutic Exercise/HEP  4. Neuromuscular Re-education  5. Education     TREATMENT:     EVALUATION: Low Complexity : (Untimed Charge)    TREATMENT:   ( $$ Neuromuscular Re-Education: 8-22 mins   )  Co-Treatment PT/OT necessary due to patient's decreased overall endurance/tolerance levels, as well as need for high level skilled assistance to complete functional transfers/mobility and functional tasks  Neuromuscular Re-education (8 Minutes): Neuromuscular Re-education included Balance Training, Coordination training, Postural training, Sitting balance training and Standing balance training to improve Balance, Coordination, Functional Mobility and Postural Control.     TREATMENT GRID:  N/A    AFTER TREATMENT POSITION/PRECAUTIONS:  Bed, Needs within reach, RN notified and Visitors at bedside    INTERDISCIPLINARY COLLABORATION:  RN/PCT, PT/PTA and OT/CANALES    TOTAL TREATMENT DURATION:  OT Patient Time In/Time Out  Time In: 1408  Time Out: [address], OT

## 2021-05-18 NOTE — PROGRESS NOTES
[facility] Hospitalist Progress Note       Name:  Jaswant Stewart  Age:70 y.o. Sex:male   :  1950    MRN:  [id number]   PCP:  Gracie Mae MD      Admit Date:  2021  5:35 PM   Chief Complaint: Pt arrives per EMS from home. Pt has had at least two syncopal episodes today. Had hit front of his head and back of his head during both. Blood noted around his mouth. Currently moaning in pain and not talking. Will talk after being sternal rubbed. Pt is confused. Per EMS normally A&Ox4 is what they were told. Pt is on several blood thinners per EMS. Reason for Admission:   Pneumonia [J18.9]    Assessment & Plan:   May 18, 2021assessment and plan remain unchanged unless specifically outlined below. Principal Problem:    Pneumonia (2021)    Right infiltrate on CXR, elevated WBC 14.7, but no fever. VSS. Patient has had COVID vaccine. Not hypoxic  -Continue current antibiotics  -Follow-up cultures        Syncope and collapse (2011)   - unclear etiology, possibly due to infection and or meds. - He was lethargic in ER. He states that overmedication is not the issue and that he is being on chronic pain medications for years. - He has requested his pain meds for his chronic LBP. - CT HEAD was neg.   - Chart review shows ECHO 2021 with normal EF and indeterminant diastolic function  -Continue remote telemetry for now          Acute kidney injury ([facility] [address] (2010)    Creatinine normally 1.07--->2.13   -Check orthostatics  -Continue gentle IV fluids if indicated  -Follow-up a.m. lab was      Diabetes mellitus ([facility] [address] (2009)    -Continue to monitor blood sugars and cover with insulin        Essential hypertension (2013)  Continue appropriate home medications  -In the setting of KASIE and syncope holding some blood pressure medications for now      Paroxysmal atrial fibrillation ([address] (2010)    On eliquis and plavix. Rate controlled.   He has a pacemaker      SSS (sick sinus syndrome) ([facility] [address] (6/21/2014)    H/o pacemaker    Fall with pain in the left lower abdomenCT abdomen and pelvis results are pending; will restart his Plavix and Eliquis based on results    Further work-up and management based on his clinical course        PLAN:    Disposition/Expected LOS: 4  Diet: DIET DIABETIC CONSISTENT CARB  VTE ppx: eliquis  GI ppx:   Code status: Full Code  Surrogate decision-maker: virgilio Cade [phone]      History of Presenting Illness:     Coco Haro is a 79 y.o. male with medical history of AFIB, CVA, IDDm, HTn, who presents to Er for 2 episodes of falls/syncope at home. Patient unsure if he lost consciousness. He is on blood thinners. Family was concerned and called EMS. Patient reports history of chronic low back pain and takes narcotic & muscle relaxers for this. Patient denies fever, chills, chest pain, nausea, vomiting or diarrhea. Patient reports normal muscle strength and sensation in all extremities. Reports chronic left knee pain.      Family reports history of patient's knees giving way with him and him falling. Workup in ER shows him to be sleepy/lethargic but arouseable, answers most questions appropriately,complaining of LBP. No fever or tachycardia. Labs show ammonia 32, initial troponin was 46, white count 14.7, stable H&H, normal electrolytes, acute kidney injury, BUN 35, creatinine 2.13, GFR 33, patient given  IV fluid replacement. Patient does have history of heart failure thus he was not given aggressive fluid replacement. Patient also is not septic although chest ray does show evidence of pneumonia. Patient does not meet SIRS criteria. Patient with acute kidney injury as well as his pneumonia warrants admission. . Blood culture obtained.   Patient treated for Community pneumonia with Rocephin and azithromycin in ER and hospitalist asked to admit      Review of Systems:  A 14 point review of systems was taken and pertinent positive as per HPI.        Past Medical History:   Diagnosis Date    Acute kidney failure, unspecified ([address] 9/17/2010    Anxiety state, unspecified 11/14/2013    Arteriosclerosis of bypass graft of coronary artery 8/27/2015    CAD (coronary artery disease)     Cath on 3- showed 5/5 patent bypass grafts with LV EF=60% and a 90% stenosis in native LAD distal to LIMA graft anastomosis. He received a Xience ZAK (2.25 x 18) to LAD.  Carotid artery disease ([address] 03/28/2019    Bilateral ICA:50-69% on carotid ultrasound.     Carotid artery stenosis without cerebral infarction 07/07/2008    CVA (cerebral infarction) 5/25/2011    Diabetes ([address]     Diabetes mellitus ([address] 5/4/2009    Dilated Cardiomyopathy,Ischemic 2/26/2010    Dyslipidemia 5/4/2009    Elevated prostate specific antigen (PSA) 11/14/2013    Essential hypertension 11/14/2013    GERD (gastroesophageal reflux disease)     Hypertension     Nocturia     Other and unspecified hyperlipidemia     Pacemaker 6/21/2014    Palpitations 2006    Paroxysmal atrial fibrillation ([address] 11/30/2010    Paroxysmal ventricular tachycardia ([address] 11/30/2010    Post PTCA 5/4/2009    stent to left main     Psychiatric disorder     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    S/P CABG (coronary artery bypass graft) 5/4/2009    LIMA TO LAD, SV TO OM, SV TO RCA, SV TO DIAG     S/P PTCA (percutaneous transluminal coronary angioplasty) 3/11/2016    Sick sinus syndrome ([address] 09/06/2007    Snoring, Possible sleep apnea 2/26/2010    SSS (sick sinus syndrome) ([address] 6/21/2014    Stroke (cerebrum) ([address]     TIA 2/2011    Stroke, embolic ([address] 3/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)    HX ORTHOPAEDIC      knee surgery x 2 left    HX OTHER SURGICAL      nerve in back    HX PACEMAKER      RI CARDIAC SURG PROCEDURE UNLIST      bypass x 5; 2 stents    RI LEFT HEART CATH,PERCUTANEOUS  2010    native circ x 1       Family History : reviewed  Family History   Problem Relation Age of Onset    Diabetes Mother     Heart Disease Mother     Heart Disease Sister     Cancer Brother         Eye    Heart Disease Brother     Migraines Brother         Social History     Tobacco Use    Smoking status: Former Smoker     Packs/day: 3.00     Years: 40.00     Pack years: 120.00     Quit date: 1999     Years since quittin.3    Smokeless tobacco: Never Used   Substance Use Topics    Alcohol use: No       Allergies   Allergen Reactions    Beta-Blockers (Beta-Adrenergic Blocking Agts) Other (comments)     \"Very low blood pressures with every one they tried\"    Shellfish Derived Angioedema     Only shrimp causes reaction.   Can eat all other shellfish    Xanax [Alprazolam] Other (comments)     Totally changes his personality       Immunization History   Administered Date(s) Administered    (RETIRED) Pneumococcal Vaccine (Unspecified Type) 10/16/2008    Influenza Vaccine PF 10/02/2013    Influenza Vaccine Split 10/17/2008    Pneumococcal Polysaccharide (PPSV-23) 10/18/2015         PTA Medications:  Current Outpatient Medications   Medication Instructions    baclofen (LIORESAL) 10 mg tablet Oral, 3 TIMES DAILY    carvediloL (COREG) 12.5 mg tablet TAKE 1 TABLET BY MOUTH TWICE DAILY WITH MEALS    clopidogreL (PLAVIX) 75 mg tab TAKE 1 TABLET BY MOUTH DAILY    cyclobenzaprine (FLEXERIL) 10 mg, Oral, 2 TIMES DAILY AS NEEDED    Eliquis 5 mg tablet TAKE 1 TABLET BY MOUTH TWICE DAILY    esomeprazole (NEXIUM) 40 mg capsule Oral, DAILY    finasteride (PROSCAR) 5 mg tablet TAKE 1 TABLET BY MOUTH DAILY    fluticasone (FLONASE) 50 mcg/actuation nasal spray 2 Sprays, Both Nostrils, ONCE    HYDROcodone-acetaminophen (Norco)  mg tablet 1 Tab, Oral    insulin aspart U-100 (NOVOLOG U-100 INSULIN ASPART) 25 Units, SubCUTAneous, 3 TIMES DAILY BEFORE MEALS    isosorbide mononitrate ER (IMDUR) 60 mg, Oral, 2 TIMES DAILY    Levemir U-100 Insulin 60 Units, SubCUTAneous, 2 TIMES DAILY    levETIRAcetam (KEPPRA) 500 mg, Oral, 2 TIMES DAILY    losartan (COZAAR) 50 mg tablet TAKE 1 TABLET BY MOUTH DAILY    nitroglycerin (NITROSTAT) 0.4 mg, SubLINGual, EVERY 5 MIN AS NEEDED, Up to 3 doses.  pravastatin (PRAVACHOL) 80 mg, Oral, EVERY BEDTIME    pregabalin (LYRICA) 300 mg, Oral, 2 TIMES DAILY    ranolazine ER (RANEXA) 500 mg, Oral, 2 TIMES DAILY    tamsulosin (FLOMAX) 0.4 mg capsule TAKE 1 CAPSULE BY MOUTH DAILY    traZODone (DESYREL) 150 mg, Oral, EVERY BEDTIME       Objective:     Patient Vitals for the past 24 hrs:   Temp Pulse Resp BP SpO2   05/18/21 0714 97.7 °F (36.5 °C) 85 18 (!) 154/82 96 %   05/18/21 0340 98.4 °F (36.9 °C) 74 16 (!) 115/57 94 %   05/17/21 2327 98.4 °F (36.9 °C) 83 16 (!) 155/72 94 %   05/17/21 2210  77 13 (!) 107/51 94 %   05/17/21 2140  80 14 (!) 141/63 92 %   05/17/21 2058  69 11 (!) 121/56 92 %   05/17/21 1931  78 16 (!) 124/57 97 %   05/17/21 1928  79 14 (!) 124/57 99 %   05/17/21 1913  69  (!) 117/59 99 %   05/17/21 1844  69 14 (!) 118/56 98 %   05/17/21 1829  82 18 (!) 134/57 91 %   05/17/21 1739 98.6 °F (37 °C) 82 18 (!) 164/75 90 %       Oxygen Therapy  O2 Sat (%): 96 % (05/18/21 0714)  Pulse via Oximetry: 79 beats per minute (05/17/21 2210)  O2 Device: None (Room air) (05/18/21 0737)    Body mass index is 34.14 kg/m².     Physical Exam:    General:  Sleepy but arouseable, No acute distress  HEENT:  Pupils equal and reactive to light and accommodation, oropharynx is clear   Neck:   Supple, no lymphadenopathy, no JVD   Lungs:  Diminished at bases  CV:   Regular rate, regular rhythm, with normal S1 and S2   Abdomen:  Soft, nontender, nondistended, normoactive bowel sounds   Extremities:  No cyanosis clubbing or edema   Neuro:  Nonfocal, A&O x1. Patient follows commands, oriented to person only. Patient moving all extremities. Delayed verbal response unclear etiology   Psych:  Normal mood and affect       Data Reviewed: I have reviewed all labs, meds, and studies. Recent Results (from the past 24 hour(s))   EKG, 12 LEAD, INITIAL    Collection Time: 05/17/21  5:40 PM   Result Value Ref Range    Ventricular Rate 84 BPM    Atrial Rate 84 BPM    P-R Interval 218 ms    QRS Duration 142 ms    Q-T Interval 368 ms    QTC Calculation (Bezet) 434 ms    Calculated P Axis 60 degrees    Calculated R Axis 121 degrees    Calculated T Axis -47 degrees    Diagnosis       !! AGE AND GENDER SPECIFIC ECG ANALYSIS !! Sinus rhythm with 1st degree A-V block  Non-specific intra-ventricular conduction block  Anterolateral infarct , age undetermined  T wave abnormality, consider inferior ischemia  Abnormal ECG  When compared with ECG of 20-SEP-2020 11:43,  Sinus rhythm has replaced Electronic ventricular pacemaker     CBC WITH AUTOMATED DIFF    Collection Time: 05/17/21  5:47 PM   Result Value Ref Range    WBC 14.7 (H) 4.3 - 11.1 K/uL    RBC 4.57 4.23 - 5.6 M/uL    HGB 12.6 (L) 13.6 - 17.2 g/dL    HCT 38.2 (L) 41.1 - 50.3 %    MCV 83.6 79.6 - 97.8 FL    MCH 27.6 26.1 - 32.9 PG    MCHC 33.0 31.4 - 35.0 g/dL    RDW 14.3 11.9 - 14.6 %    PLATELET 357 120 - 952 K/uL    MPV 10.8 9.4 - 12.3 FL    ABSOLUTE NRBC 0.00 0.0 - 0.2 K/uL    NEUTROPHILS 85 (H) 43 - 78 %    LYMPHOCYTES 8 (L) 13 - 44 %    MONOCYTES 6 4.0 - 12.0 %    EOSINOPHILS 0 (L) 0.5 - 7.8 %    BASOPHILS 0 0.0 - 2.0 %    IMMATURE GRANULOCYTES 1 0.0 - 5.0 %    ABS. NEUTROPHILS 12.5 (H) 1.7 - 8.2 K/UL    ABS.  LYMPHOCYTES 1.2 0.5 - 4.6 K/UL    ABS. MONOCYTES 0.9 0.1 - 1.3 K/UL    ABS. EOSINOPHILS 0.0 0.0 - 0.8 K/UL    ABS. BASOPHILS 0.0 0.0 - 0.2 K/UL    ABS. IMM. GRANS. 0.1 0.0 - 0.5 K/UL    RBC COMMENTS SLIGHT  ANISOCYTOSIS + POIKILOCYTOSIS        WBC COMMENTS Result Confirmed By Smear      PLATELET COMMENTS ADEQUATE      DF AUTOMATED     METABOLIC PANEL, COMPREHENSIVE    Collection Time: 05/17/21  5:47 PM   Result Value Ref Range    Sodium 139 138 - 145 mmol/L    Potassium 4.3 3.5 - 5.1 mmol/L    Chloride 107 98 - 107 mmol/L    CO2 24 21 - 32 mmol/L    Anion gap 8 7 - 16 mmol/L    Glucose 298 (H) 65 - 100 mg/dL    BUN 35 (H) 8 - 23 MG/DL    Creatinine 2.13 (H) 0.8 - 1.5 MG/DL    GFR est AA 40 (L) >60 ml/min/1.73m2    GFR est non-AA 33 (L) >60 ml/min/1.73m2    Calcium 8.5 8.3 - 10.4 MG/DL    Bilirubin, total 1.0 0.2 - 1.1 MG/DL    ALT (SGPT) 17 12 - 65 U/L    AST (SGOT) 18 15 - 37 U/L    Alk.  phosphatase 54 50 - 136 U/L    Protein, total 7.1 6.3 - 8.2 g/dL    Albumin 3.7 3.2 - 4.6 g/dL    Globulin 3.4 2.3 - 3.5 g/dL    A-G Ratio 1.1 (L) 1.2 - 3.5     TROPONIN-HIGH SENSITIVITY    Collection Time: 05/17/21  5:47 PM   Result Value Ref Range    Troponin-High Sensitivity 46.0 (H) 0 - 14 pg/mL   NT-PRO BNP    Collection Time: 05/17/21  5:47 PM   Result Value Ref Range    NT pro- (H) 5 - 125 PG/ML   AMMONIA    Collection Time: 05/17/21  5:51 PM   Result Value Ref Range    Ammonia 32 11 - 32 UMOL/L   TROPONIN-HIGH SENSITIVITY    Collection Time: 05/17/21  7:52 PM   Result Value Ref Range    Troponin-High Sensitivity 42.1 (H) 0 - 14 pg/mL   GLUCOSE, POC    Collection Time: 05/18/21 12:01 AM   Result Value Ref Range    Glucose (POC) 196 (H) 65 - 100 mg/dL    Performed by [facility]    METABOLIC PANEL, BASIC    Collection Time: 05/18/21  5:53 AM   Result Value Ref Range    Sodium 145 136 - 145 mmol/L    Potassium 3.8 3.5 - 5.1 mmol/L    Chloride 114 (H) 98 - 107 mmol/L    CO2 23 21 - 32 mmol/L    Anion gap 8 7 - 16 mmol/L    Glucose 100 65 - 100 mg/dL    BUN 31 (H) 8 - 23 MG/DL    Creatinine 1.24 0.8 - 1.5 MG/DL    GFR est AA >60 >60 ml/min/1.73m2    GFR est non-AA >60 >60 ml/min/1.73m2    Calcium 8.9 8.3 - 10.4 MG/DL   MAGNESIUM    Collection Time: 05/18/21  5:53 AM   Result Value Ref Range    Magnesium 2.0 1.8 - 2.4 mg/dL   LACTIC ACID    Collection Time: 05/18/21  5:53 AM   Result Value Ref Range    Lactic acid 1.1 0.4 - 2.0 MMOL/L   CBC W/O DIFF    Collection Time: 05/18/21  5:53 AM   Result Value Ref Range    WBC 13.5 (H) 4.3 - 11.1 K/uL    RBC 4.62 4.23 - 5.6 M/uL    HGB 12.6 (L) 13.6 - 17.2 g/dL    HCT 38.1 (L) 41.1 - 50.3 %    MCV 82.5 79.6 - 97.8 FL    MCH 27.3 26.1 - 32.9 PG    MCHC 33.1 31.4 - 35.0 g/dL    RDW 14.3 11.9 - 14.6 %    PLATELET 340 (L) 449 - 450 K/uL    MPV 10.6 9.4 - 12.3 FL    ABSOLUTE NRBC 0.00 0.0 - 0.2 K/uL       EKG Results     Procedure 720 Value Units Date/Time    EKG, 12 LEAD, INITIAL [[id number]] Collected: 05/17/21 1740    Order Status: Completed Updated: 05/17/21 2213     Ventricular Rate 84 BPM      Atrial Rate 84 BPM      P-R Interval 218 ms      QRS Duration 142 ms      Q-T Interval 368 ms      QTC Calculation (Bezet) 434 ms      Calculated P Axis 60 degrees      Calculated R Axis 121 degrees      Calculated T Axis -47 degrees      Diagnosis --     !! AGE AND GENDER SPECIFIC ECG ANALYSIS !!   Sinus rhythm with 1st degree A-V block  Non-specific intra-ventricular conduction block  Anterolateral infarct , age undetermined  T wave abnormality, consider inferior ischemia  Abnormal ECG  When compared with ECG of 20-SEP-2020 11:43,  Sinus rhythm has replaced Electronic ventricular pacemaker            All Micro Results     Procedure Component Value Units Date/Time    CULTURE, BLOOD [[id number]] Collected: 05/18/21 0004    Order Status: Completed Specimen: Blood Updated: 05/18/21 0040    CULTURE, BLOOD [[id number]] Collected: 05/17/21 2018    Order Status: Completed Specimen: Blood Updated: 05/17/21 2034          Other Studies:  Xr Pelv Ap Only    Result Date: 5/17/2021  SINGLE VIEW PELVIS: CLINICAL HISTORY:  Pelvic pain after syncopal fall today. COMPARISON:  None. FINDINGS:  No definite fracture, malalignment, or vielka bone destruction is evident. No persistent radiopaque foreign body is seen. There is mild osteoarthritis at both hips as well as lower lumbar spondylosis. DEGENERATIVE CHANGES BUT NO ACUTE BONY ABNORMALITY IDENTIFIED. Ct Head Wo Cont    Result Date: 5/17/2021  NONCONTRAST HEAD CT CLINICAL HISTORY:  HEAD injury from 2 syncopal fall today. TECHNIQUE:  Axial images were obtained with spiral technique. Radiation dose reduction was achieved using one or all of the following techniques: automated exposure control, weight-based dosing, iterative reconstruction. COMPARISON:  September 20, 2020. REPORT:   Standard noncontrast head CT demonstrates no definite intracranial mass effect, hemorrhage, or evidence of acute geographic infarction. White matter hypodensities are most consistent with small vessel ischemic disease. The ventricles are normal in size and configuration, accounting for the patient's age. Right maxillary sinus mucous membrane thickening is more marked than prior study. Orbits  and other paranasal sinuses are clear where imaged. Bone windows demonstrate no definite fracture or destruction. 1. MILD SMALL VESSEL ISCHEMIC DISEASE WITH NO ACUTE INTRACRANIAL ABNORMALITY OR CALVARIAL FRACTURE IDENTIFIED AT NONCONTRAST CT 2. RIGHT MAXILLARY SINUSITIS IS MORE MARKED THAN IN [facility]. .     Ct Spine Cerv Wo Cont    Result Date: 5/17/2021  CT CERVICAL SPINE WITH ADDITIONAL REFORMATS CLINICAL HISTORY:  NECK pain after syncopal fall today. TECHNIQUE:  Axial images were obtained with spiral technique, and coronal and sagittal reformats were produced.   Radiation dose reduction was achieved using one or all of the following techniques: automated exposure control, weight-based dosing, iterative reconstruction. COMPARISON:  NECK CTA of August 10, 2020. FINDINGS:  There is new mild levoconvex torticollis. No definite acute fracture or malalignment identified. Prevertebral soft tissues are unremarkable. Mild, multilevel degenerative disc disease without central spinal stenosis. Multilevel uncovertebral and facet spurring results in variable, relatively mild foraminal narrowing. Mild levoconvex scoliosis and multilevel spondylosis with no acute bony abnormality identified. Xr Chest Port    Result Date: 5/17/2021  PORTABLE CHEST, May 17, 2021 at 1754 hours CLINICAL HISTORY:  CHEST pain with leukocytosis. COMPARISON:  September 20, 2020. FINDINGS:  AP erect image demonstrates no confluent infiltrate or significant pleural fluid. However, there is patchy infiltrate scattered in the right lung. The heart size is upper limits of normal status post CABG without evidence of right congestive heart failure or pneumothorax. The bony thorax appears intact on this view. There are overlying radiopaque support devices. 1.  PATCHY RIGHT LUNG INFILTRATE SUSPICIOUS FOR PNEUMONIA IN THIS CLINICAL SETTING. 2.  BORDERLINE CARDIOMEGALY STATUS POST CABG WITHOUT EVIDENCE OF ELLIOTT CONGESTIVE HEART FAILURE. Xr Knee Lt 3 V    Result Date: 5/17/2021  EXAMINATION: XR KNEE LT 3 V HISTORY: fall, pain. TECHNIQUE: Frontal, lateral, and oblique views of the left knee. COMPARISON: 9/30/2019 FINDINGS: There is a total knee prosthesis. There is no evidence of acute fracture or dislocation. Joint spaces are maintained. There is a small joint effusion. Soft tissues are within normal limits. Vascular calcifications are observed. No evidence of acute fracture or dislocation within the left knee. Small joint effusion.         Medications:  Medications Administered      Medications Administered     cefTRIAXone (ROCEPHIN) 1 g in 0.9% sodium chloride (MBP/ADV) 50 mL MBP     Admin Date  05/17/2021 Action  New Bag Dose  1 g Rate  100 mL/hr Route  IntraVENous Administered By  Claudeen Stank, RN          sodium chloride 0.9 % bolus infusion 500 mL     Admin Date  05/17/2021 Action  New Bag Dose  500 mL Rate  500 mL/hr Route  IntraVENous Administered By  Lonny Dancer, RN                    Signed By: Sue Andrea MD   [address] [place] [address] Service    May 18, 2021   9:33 PM

## 2021-05-19 ENCOUNTER — HOME HEALTH ADMISSION (OUTPATIENT)
Dept: HOME HEALTH SERVICES | Facility: HOME HEALTH | Age: 71
End: 2021-05-19
Payer: MEDICARE

## 2021-05-19 LAB
GLUCOSE BLD STRIP.AUTO-MCNC: 135 MG/DL (ref 65–100)
GLUCOSE BLD STRIP.AUTO-MCNC: 140 MG/DL (ref 65–100)
GLUCOSE BLD STRIP.AUTO-MCNC: 153 MG/DL (ref 65–100)
GLUCOSE BLD STRIP.AUTO-MCNC: 183 MG/DL (ref 65–100)
GLUCOSE BLD STRIP.AUTO-MCNC: 189 MG/DL (ref 65–100)
SERVICE CMNT-IMP: ABNORMAL

## 2021-05-19 PROCEDURE — 2709999900 HC NON-CHARGEABLE SUPPLY

## 2021-05-19 PROCEDURE — 74011636637 HC RX REV CODE- 636/637: Performed by: HOSPITALIST

## 2021-05-19 PROCEDURE — 82962 GLUCOSE BLOOD TEST: CPT

## 2021-05-19 PROCEDURE — 65660000000 HC RM CCU STEPDOWN

## 2021-05-19 PROCEDURE — 74011250637 HC RX REV CODE- 250/637: Performed by: HOSPITALIST

## 2021-05-19 PROCEDURE — 74011250637 HC RX REV CODE- 250/637: Performed by: INTERNAL MEDICINE

## 2021-05-19 PROCEDURE — 74011000258 HC RX REV CODE- 258: Performed by: HOSPITALIST

## 2021-05-19 PROCEDURE — 74011250636 HC RX REV CODE- 250/636: Performed by: HOSPITALIST

## 2021-05-19 RX ORDER — CLOPIDOGREL BISULFATE 75 MG/1
75 TABLET ORAL DAILY
Status: DISCONTINUED | OUTPATIENT
Start: 2021-05-20 | End: 2021-05-20 | Stop reason: HOSPADM

## 2021-05-19 RX ORDER — PRAVASTATIN SODIUM 20 MG/1
80 TABLET ORAL
Status: DISCONTINUED | OUTPATIENT
Start: 2021-05-19 | End: 2021-05-20 | Stop reason: HOSPADM

## 2021-05-19 RX ORDER — BACLOFEN 10 MG/1
10 TABLET ORAL 3 TIMES DAILY
Status: DISCONTINUED | OUTPATIENT
Start: 2021-05-19 | End: 2021-05-20 | Stop reason: HOSPADM

## 2021-05-19 RX ORDER — TRAZODONE HYDROCHLORIDE 50 MG/1
150 TABLET ORAL
Status: DISCONTINUED | OUTPATIENT
Start: 2021-05-19 | End: 2021-05-20 | Stop reason: HOSPADM

## 2021-05-19 RX ORDER — LEVETIRACETAM 500 MG/1
500 TABLET ORAL 2 TIMES DAILY
Status: DISCONTINUED | OUTPATIENT
Start: 2021-05-19 | End: 2021-05-20 | Stop reason: HOSPADM

## 2021-05-19 RX ORDER — AZITHROMYCIN 250 MG/1
500 TABLET, FILM COATED ORAL DAILY
Status: DISCONTINUED | OUTPATIENT
Start: 2021-05-19 | End: 2021-05-20 | Stop reason: HOSPADM

## 2021-05-19 RX ORDER — PREGABALIN 100 MG/1
300 CAPSULE ORAL 2 TIMES DAILY
Status: DISCONTINUED | OUTPATIENT
Start: 2021-05-19 | End: 2021-05-20 | Stop reason: HOSPADM

## 2021-05-19 RX ADMIN — SODIUM CHLORIDE 75 ML/HR: 900 INJECTION, SOLUTION INTRAVENOUS at 20:32

## 2021-05-19 RX ADMIN — LEVETIRACETAM 500 MG: 500 TABLET ORAL at 17:10

## 2021-05-19 RX ADMIN — TRAZODONE HYDROCHLORIDE 150 MG: 50 TABLET ORAL at 21:29

## 2021-05-19 RX ADMIN — HYDROCODONE BITARTRATE AND ACETAMINOPHEN 1 TABLET: 10; 325 TABLET ORAL at 02:03

## 2021-05-19 RX ADMIN — INSULIN GLARGINE 20 UNITS: 100 INJECTION, SOLUTION SUBCUTANEOUS at 21:30

## 2021-05-19 RX ADMIN — BACLOFEN 10 MG: 10 TABLET ORAL at 21:30

## 2021-05-19 RX ADMIN — SODIUM CHLORIDE 75 ML/HR: 900 INJECTION, SOLUTION INTRAVENOUS at 05:49

## 2021-05-19 RX ADMIN — TAMSULOSIN HYDROCHLORIDE 0.4 MG: 0.4 CAPSULE ORAL at 09:35

## 2021-05-19 RX ADMIN — CARVEDILOL 12.5 MG: 12.5 TABLET, FILM COATED ORAL at 17:09

## 2021-05-19 RX ADMIN — RANOLAZINE 500 MG: 500 TABLET, FILM COATED, EXTENDED RELEASE ORAL at 17:09

## 2021-05-19 RX ADMIN — PREGABALIN 300 MG: 100 CAPSULE ORAL at 17:09

## 2021-05-19 RX ADMIN — APIXABAN 5 MG: 5 TABLET, FILM COATED ORAL at 20:36

## 2021-05-19 RX ADMIN — INSULIN LISPRO 3 UNITS: 100 INJECTION, SOLUTION INTRAVENOUS; SUBCUTANEOUS at 17:08

## 2021-05-19 RX ADMIN — PANTOPRAZOLE SODIUM 40 MG: 40 TABLET, DELAYED RELEASE ORAL at 06:27

## 2021-05-19 RX ADMIN — AZITHROMYCIN MONOHYDRATE 500 MG: 250 TABLET ORAL at 20:36

## 2021-05-19 RX ADMIN — FINASTERIDE 5 MG: 5 TABLET, FILM COATED ORAL at 09:35

## 2021-05-19 RX ADMIN — ISOSORBIDE MONONITRATE 60 MG: 60 TABLET, EXTENDED RELEASE ORAL at 09:35

## 2021-05-19 RX ADMIN — PRAVASTATIN SODIUM 80 MG: 20 TABLET ORAL at 21:29

## 2021-05-19 RX ADMIN — BACLOFEN 10 MG: 10 TABLET ORAL at 17:09

## 2021-05-19 RX ADMIN — RANOLAZINE 500 MG: 500 TABLET, FILM COATED, EXTENDED RELEASE ORAL at 09:34

## 2021-05-19 RX ADMIN — HYDROCODONE BITARTRATE AND ACETAMINOPHEN 1 TABLET: 10; 325 TABLET ORAL at 20:35

## 2021-05-19 RX ADMIN — CEFTRIAXONE 1 G: 1 INJECTION, POWDER, FOR SOLUTION INTRAMUSCULAR; INTRAVENOUS at 21:00

## 2021-05-19 RX ADMIN — ISOSORBIDE MONONITRATE 60 MG: 60 TABLET, EXTENDED RELEASE ORAL at 17:09

## 2021-05-19 RX ADMIN — INSULIN LISPRO 3 UNITS: 100 INJECTION, SOLUTION INTRAVENOUS; SUBCUTANEOUS at 21:30

## 2021-05-19 RX ADMIN — INSULIN LISPRO 3 UNITS: 100 INJECTION, SOLUTION INTRAVENOUS; SUBCUTANEOUS at 12:43

## 2021-05-19 RX ADMIN — CARVEDILOL 12.5 MG: 12.5 TABLET, FILM COATED ORAL at 09:35

## 2021-05-19 NOTE — PROGRESS NOTES
Lying quietly in bed, alert,oriented. Remains on room air. Lung sounds diminished, no cough at present time. Remains on remote tele with ST rate 113 reported. Denies any syncope episodes. Aware to call for asst.  Rt hw intact with ns infusing at 75 cc/hr via pump. Positional.  Voiding w/o any difficulty using urinal. Yellow clear. multiple bruising noted  On multiple sites, no open wounds s/p fall at home. Rails up x2.

## 2021-05-19 NOTE — PROGRESS NOTES
CM met with patient to discuss discharge planning. Per PT/OT, the patient has been recommended for [facility] therapy. The patient is agreeable to [facility] therapy and requested [facility]. CM sent referral. CM made [facility] Liaison Vanessa/Chip aware, via e-mail. CM remains available.

## 2021-05-19 NOTE — PROGRESS NOTES
Progress Note    Patient: Mansi Chiang MRN: [id number]  SSN: [id number]    YOB: 1950  Age: 79 y.o. Sex: male      Admit Date: 5/17/2021    LOS: 2 days     Subjective:     Patient with past medical history of    Atrial fibrillation   CVA  Diabetes   Hypertension     He came to ER with 2 episodes of falls/syncope at home. He was found to have KASIE with creatinine of 2.13. he is given some fluid resuscitation. He is also found to have evidence of pneumonia. He is being treated with Ceftriaxone and Azithromycin. 5/19/21 He is feeling a bit better. Less shortness of breath. No fever. No shaking. No chills, today. Objective:     Vitals:    05/18/21 2241 05/19/21 0646 05/19/21 0729 05/19/21 1115   BP: (!) 147/82 (!) 168/79 (!) 175/83 (!) 165/68   Pulse: 88 95 (!) 101 92   Resp: 16 16 16 16   Temp: 98.9 °F (37.2 °C) 98.3 °F (36.8 °C) 98.9 °F (37.2 °C) 98.6 °F (37 °C)   SpO2: 94% 96% 95% 93%   Weight:       Height:            Intake and Output:  Current Shift: 05/19 0701 - 05/19 1900  In: -   Out: 500 [Urine:500]  Last three shifts: 05/17 1901 - 05/19 0700  In: 1270 [P.O.:720; I.V.:550]  Out: 1875 [Urine:1875]    Physical Exam:     General:                    The patient is a pleasant male in no acute respiratory distress. He is lying flat in bed. Head:                                   Normocephalic/atraumatic. Eyes:                                   No palpebral pallor or scleral icterus. ENT:                                    External auricular and nasal exam within normal limits. Mucous membranes are moist.  Neck:                                   Supple, non-tender, no JVD. Lungs:                       Clear to auscultation bilaterally without wheezes or crackles. No respiratory distress or accessory muscle use.   Heart:                                  Regular rate and rhythm, without murmurs, rubs, or gallops. Abdomen:                  Soft, non-tender, non-distended with normoactive bowel sounds. Genitourinary:           No tenderness over the bladder or bilateral CVAs. Extremities:               Without clubbing, cyanosis, or edema. Skin:                                    Normal color, texture, and turgor. No rashes, lesions, or jaundice. Pulses:                      Radial and dorsalis pedis pulses present 2+ bilaterally. Capillary refill <2s. Neurologic:                CN II-XII grossly intact and symmetrical.                                               Moving all four extremities well with no focal deficits. Psychiatric:                Pleasant demeanor, appropriate affect.  Alert and oriented x 3      Lab/Data Review:    Recent Results (from the past 24 hour(s))   GLUCOSE, POC    Collection Time: 05/18/21  4:15 PM   Result Value Ref Range    Glucose (POC) 171 (H) 65 - 100 mg/dL    Performed by [employer]    GLUCOSE, POC    Collection Time: 05/18/21  8:04 PM   Result Value Ref Range    Glucose (POC) 152 (H) 65 - 100 mg/dL    Performed by [employer]    GLUCOSE, POC    Collection Time: 05/19/21  3:55 AM   Result Value Ref Range    Glucose (POC) 140 (H) 65 - 100 mg/dL    Performed by [employer]    GLUCOSE, POC    Collection Time: 05/19/21  7:32 AM   Result Value Ref Range    Glucose (POC) 135 (H) 65 - 100 mg/dL    Performed by [employer]    GLUCOSE, POC    Collection Time: 05/19/21 11:18 AM   Result Value Ref Range    Glucose (POC) 189 (H) 65 - 100 mg/dL    Performed by [employer]      CBC W/O DIFF [[id number]] (Order [id number])  Lab  Date: 5/17/2021 Department: [address] [place] [address] Med Surg Released By/Authorizing: Alison West MD (auto-released)   5/18/2021  6:44 AM - [facility], Lab In [facility]    Component Value Flag Ref Range Units Status   WBC 13.5  High   4.3 - 11.1 K/uL Final   RBC 4.62   4.23 - 5.6 M/uL Final   HGB 12.6  Low   13.6 - 17.2 g/dL Final   HCT 38.1  Low   41.1 - 50.3 % Final   MCV 82.5   79.6 - 97.8 FL Final   MCH 27.3   26.1 - 32.9 PG Final   MCHC 33.1   31.4 - 35.0 g/dL Final   RDW 14.3   11.9 - 14.6 % Final   PLATELET 843  Low   377 - 450 K/uL Final   MPV 10.6   9.4 - 12.3 FL Final   ABSOLUTE NRBC 0.00   0.0 - 0.2 K/uL Final   Comment:   **Note: Absolute NRBC parameter is now reported with Hemogram**     METABOLIC PANEL, BASIC [[id number]] (Order [id number])  Lab  Date: 5/17/2021 Department: [facility] Released By/Authorizing: Alison West MD (auto-released)   5/18/2021  6:39 AM - [place], Lab In [facility]    Component Value Flag Ref Range Units Status   Sodium 145   136 - 145 mmol/L Final   Potassium 3.8   3.5 - 5.1 mmol/L Final   Chloride 114  High   98 - 107 mmol/L Final   CO2 23   21 - 32 mmol/L Final   Anion gap 8   7 - 16 mmol/L Final   Glucose 100   65 - 100 mg/dL Final   Comment:   47 - 60 mg/dl Consistent with, but not fully diagnostic of hypoglycemia. 101 - 125 mg/dl Impaired fasting glucose/consistent with pre-diabetes mellitus   > 126 mg/dl Fasting glucose consistent with overt diabetes mellitus    BUN 31  High   8 - 23 MG/DL Final   Creatinine 1.24   0.8 - 1.5 MG/DL Final   GFR est AA >60   >60 ml/min/1.73m2 Final   GFR est non-AA >60   >60 ml/min/1.73m2 Final   Comment:   (NOTE)   Estimated GFR is calculated using the Modification of Diet in Renal   Disease (MDRD) Study equation, reported for both  Americans   (GFRAA) and non- Americans (GFRNA), and normalized to 1.73m2   body surface area. The physician must decide which value applies to   the patient. The MDRD study equation should only be used in   individuals age 25 or older. It has not been validated for the   following: pregnant women, patients with serious comorbid conditions,   or on certain medications, or persons with extremes of body size,   muscle mass, or nutritional status.     Calcium 8.9   8.3 - 10.4 MG/DL Final     CT abdomen and pelvis   5-  IMPRESSION  1) Peripheral patchy densities right lung base, possibly infiltrate from  pneumonia. Pulmonary contusion is not excluded although there are no adjacent  rib fractures. 2) No retroperitoneal or intraperitoneal hemorrhage. 3) Mild left hydroureteronephrosis and small kidney stones. 4) Additional findings include cardiac pacemaker, cholecystectomy clips, fatty  atrophy of the pancreas, aortic atherosclerosis. Knee left   5-     IMPRESSION  No evidence of acute fracture or dislocation within the left knee.     Small joint effusion.       Current Facility-Administered Medications:     azithromycin (ZITHROMAX) tablet 500 mg, 500 mg, Oral, DAILY, Kenia Griffiths MD    acetaminophen (TYLENOL) tablet 650 mg, 650 mg, Oral, Q6H PRN, 650 mg at 05/18/21 1044 **OR** acetaminophen (TYLENOL) suppository 650 mg, 650 mg, Rectal, Q6H PRN, Vida Santana MD    polyethylene glycol (MIRALAX) packet 17 g, 17 g, Oral, DAILY PRN, Vida Santana MD    promethazine (PHENERGAN) tablet 12.5 mg, 12.5 mg, Oral, Q6H PRN **OR** ondansetron (ZOFRAN) injection 4 mg, 4 mg, IntraVENous, Q6H PRN, Vida Santana MD    insulin lispro (HUMALOG) injection, , SubCUTAneous, AC&HS, Vida Santana MD, 3 Units at 05/19/21 1243    0.9% sodium chloride infusion, 75 mL/hr, IntraVENous, CONTINUOUS, Vida Santana MD, Last Rate: 75 mL/hr at 05/19/21 0549, 75 mL/hr at 05/19/21 0549    cefTRIAXone (ROCEPHIN) 1 g in 0.9% sodium chloride (MBP/ADV) 50 mL MBP, 1 g, IntraVENous, Q24H, Vida Santana MD, Last Rate: 100 mL/hr at 05/18/21 2100, 1 g at 05/18/21 2100    HYDROcodone-homatropine (HYCODAN) 5-1.5 mg/5 mL (5 mL) oral solution 5 mL, 5 mL, Oral, Q4H PRN, Vida Santana MD    carvediloL (COREG) tablet 12.5 mg, 12.5 mg, Oral, BID WITH MEALS, Vida Santana MD, 12.5 mg at 05/19/21 0935    pantoprazole (PROTONIX) tablet 40 mg, 40 mg, Oral, ACB, iVda Santana MD, 40 mg at 05/19/21 9361    finasteride (PROSCAR) tablet 5 mg, 5 mg, Oral, DAILY, Alyce Duval MD, 5 mg at 05/19/21 0935    isosorbide mononitrate ER (IMDUR) tablet 60 mg, 60 mg, Oral, BID, Alyce Duval MD, 60 mg at 05/19/21 0935    HYDROcodone-acetaminophen (NORCO)  mg tablet 1 Tab, 1 Tablet, Oral, Q6H PRN, Alyce Duval MD, 1 Tablet at 05/19/21 0203    tamsulosin (FLOMAX) capsule 0.4 mg, 0.4 mg, Oral, DAILY, Florence HARMON MD, 0.4 mg at 05/19/21 0935    ranolazine ER (RANEXA) tablet 500 mg, 500 mg, Oral, BID, Alyce Duval MD, 500 mg at 05/19/21 0934    insulin glargine (LANTUS) injection 20 Units, 20 Units, SubCUTAneous, QHS, Alyce Duval MD, 20 Units at 05/18/21 2152      Assessment:     Principal Problem:    Pneumonia (5/17/2021)    Active Problems:    Diabetes mellitus ([facility] (5/4/2009)      Paroxysmal atrial fibrillation ([facility] (11/30/2010)      Essential hypertension (11/14/2013)      SSS (sick sinus syndrome) ([facility] (6/21/2014)      Acute kidney injury ([facility] (9/17/2010)      Syncope and collapse (8/19/2011)        Plan:     Community-acquired pneumonia   No fever now. Tachycardia is improved. Continue Empiric IV antibiotics with Ceftriaxone and Azithromycin. Monitor symptoms and follow up on cultures. Patient has had COVID vaccine    Syncope and collapse   On admission   Could be from medication side effect? Acute illness with dehydration ? Negative head CT   Continue to monitor. KASIE   Likely from volume depletion   Improving   Monitor renal function and intake and output. Avoid nephrotoxic agents. Diabetes mellitus type 2  Monitor blood sugar. Cover with insulin sliding scale accordingly. Hypertension  Monitor blood pressure and manage accordingly. Continue current medications. Avoid hypotension especially in the setting of KASIE. Paroxysmal AF   On Plavix, Apixaban, Carvedilol. HR is controlled. Sick sinus syndrome   Patient is s/p pacemaker.      I have discussed the plan of care with patient. DVT prophylaxis : already on [facility], Apixaban.              Signed By: Elvira Burnette MD     May 19, 2021

## 2021-05-19 NOTE — PROGRESS NOTES
Appetite poor at supper, denies need for anything else. No s/s of syncope this shift. Remains on room air. Ns continues to infuse at 75 cc/hr via pump, site positional.  Remains remote tele with  sr rate 69 reported.

## 2021-05-20 VITALS
BODY MASS INDEX: 27.51 KG/M2 | HEART RATE: 89 BPM | TEMPERATURE: 98.4 F | OXYGEN SATURATION: 97 % | DIASTOLIC BLOOD PRESSURE: 60 MMHG | WEIGHT: 175.27 LBS | SYSTOLIC BLOOD PRESSURE: 170 MMHG | HEIGHT: 67 IN | RESPIRATION RATE: 18 BRPM

## 2021-05-20 LAB
GLUCOSE BLD STRIP.AUTO-MCNC: 71 MG/DL (ref 65–100)
SERVICE CMNT-IMP: NORMAL

## 2021-05-20 PROCEDURE — 82962 GLUCOSE BLOOD TEST: CPT

## 2021-05-20 PROCEDURE — 74011250637 HC RX REV CODE- 250/637: Performed by: INTERNAL MEDICINE

## 2021-05-20 PROCEDURE — 74011250637 HC RX REV CODE- 250/637: Performed by: HOSPITALIST

## 2021-05-20 RX ORDER — CEFPODOXIME PROXETIL 200 MG/1
200 TABLET, FILM COATED ORAL 2 TIMES DAILY
Qty: 20 TABLET | Refills: 0 | Status: SHIPPED | OUTPATIENT
Start: 2021-05-20 | End: 2021-05-30

## 2021-05-20 RX ORDER — AZITHROMYCIN 500 MG/1
500 TABLET, FILM COATED ORAL DAILY
Qty: 2 TABLET | Refills: 0 | Status: SHIPPED | OUTPATIENT
Start: 2021-05-20 | End: 2021-05-22

## 2021-05-20 RX ADMIN — LEVETIRACETAM 500 MG: 500 TABLET ORAL at 09:33

## 2021-05-20 RX ADMIN — BACLOFEN 10 MG: 10 TABLET ORAL at 09:32

## 2021-05-20 RX ADMIN — PANTOPRAZOLE SODIUM 40 MG: 40 TABLET, DELAYED RELEASE ORAL at 05:50

## 2021-05-20 RX ADMIN — RANOLAZINE 500 MG: 500 TABLET, FILM COATED, EXTENDED RELEASE ORAL at 09:31

## 2021-05-20 RX ADMIN — HYDROCODONE BITARTRATE AND ACETAMINOPHEN 1 TABLET: 10; 325 TABLET ORAL at 09:31

## 2021-05-20 RX ADMIN — ISOSORBIDE MONONITRATE 60 MG: 60 TABLET, EXTENDED RELEASE ORAL at 09:32

## 2021-05-20 RX ADMIN — PREGABALIN 300 MG: 100 CAPSULE ORAL at 09:31

## 2021-05-20 RX ADMIN — HYDROCODONE BITARTRATE AND ACETAMINOPHEN 1 TABLET: 10; 325 TABLET ORAL at 03:38

## 2021-05-20 RX ADMIN — FINASTERIDE 5 MG: 5 TABLET, FILM COATED ORAL at 09:32

## 2021-05-20 RX ADMIN — CLOPIDOGREL BISULFATE 75 MG: 75 TABLET ORAL at 09:32

## 2021-05-20 RX ADMIN — APIXABAN 5 MG: 5 TABLET, FILM COATED ORAL at 09:32

## 2021-05-20 RX ADMIN — CARVEDILOL 12.5 MG: 12.5 TABLET, FILM COATED ORAL at 09:32

## 2021-05-20 RX ADMIN — TAMSULOSIN HYDROCHLORIDE 0.4 MG: 0.4 CAPSULE ORAL at 09:31

## 2021-05-20 NOTE — PROGRESS NOTES
Pt is alert and oriented at this time. AVS reviewed with pt, pt voiced understanding. Opportunity for questions and clarification was provided. Paper prescriptions given to pt. PIV removed with no complications. Patient waiting on ride.

## 2021-05-20 NOTE — PROGRESS NOTES
Received report. .  Patient denies any needs at this time. Call bell in reach, bed low and locked. Will continue to monitor.

## 2021-05-20 NOTE — DISCHARGE INSTRUCTIONS
DISCHARGE SUMMARY from Nurse    PATIENT INSTRUCTIONS:    After general anesthesia or intravenous sedation, for 24 hours or while taking prescription Narcotics:  · Limit your activities  · Do not drive and operate hazardous machinery  · Do not make important personal or business decisions  · Do  not drink alcoholic beverages  · If you have not urinated within 8 hours after discharge, please contact your surgeon on call. Report the following to your surgeon:  · Excessive pain, swelling, redness or odor of or around the surgical area  · Temperature over 100.5  · Nausea and vomiting lasting longer than 4 hours or if unable to take medications  · Any signs of decreased circulation or nerve impairment to extremity: change in color, persistent  numbness, tingling, coldness or increase pain  · Any questions    What to do at Home:  Recommended activity: Activity as tolerated. *  Please give a list of your current medications to your Primary Care Provider. *  Please update this list whenever your medications are discontinued, doses are      changed, or new medications (including over-the-counter products) are added. *  Please carry medication information at all times in case of emergency situations. These are general instructions for a healthy lifestyle:    No smoking/ No tobacco products/ Avoid exposure to second hand smoke  Surgeon General's Warning:  Quitting smoking now greatly reduces serious risk to your health. Obesity, smoking, and sedentary lifestyle greatly increases your risk for illness    A healthy diet, regular physical exercise & weight monitoring are important for maintaining a healthy lifestyle    You may be retaining fluid if you have a history of heart failure or if you experience any of the following symptoms:  Weight gain of 3 pounds or more overnight or 5 pounds in a week, increased swelling in our hands or feet or shortness of breath while lying flat in bed.   Please call your doctor as soon as you notice any of these symptoms; do not wait until your next office visit. The discharge information has been reviewed with the patient. The patient verbalized understanding. Discharge medications reviewed with the patient and appropriate educational materials and side effects teaching were provided. ___________________________________________________________________________________________________________________________________    If worsened, call your doctor or return to hospital.   When follow up with your doctor, make sure that your doctor is aware of this admission and review hospital record and follow up on lab or other results for continuity of care. Also, review your medications with your doctor for possible need of adjustment or refills.

## 2021-05-20 NOTE — DISCHARGE SUMMARY
Discharge Summary     Patient: Aiden Carter MRN: [id number]  SSN: [id number]    YOB: 1950  Age: 79 y.o.   Sex: male       Admit Date: 5/17/2021    Discharge Date: 5/20/2021      Admission Diagnoses: Pneumonia [J18.9]    Discharge Diagnoses:   Problem List as of 5/20/2021 Date Reviewed: 2/10/2021        Codes Class Noted - Resolved    * (Principal) Pneumonia ICD-10-CM: J18.9  ICD-9-CM: 493  5/17/2021 - Present        Acute metabolic encephalopathy [id number]: G93.41  ICD-9-CM: 348.31  9/21/2020 - Present        Altered mental state ICD-10-CM: R41.82  ICD-9-CM: 780.97  9/20/2020 - Present        Stroke-like symptoms ICD-10-CM: R29.90  ICD-9-CM: 781.99  8/10/2020 - Present        Chest pain ICD-10-CM: R07.9  ICD-9-CM: 786.50  8/3/2020 - Present        Chronic renal failure, stage 3 (moderate) ([facility] ICD-10-CM: N18.30  ICD-9-CM: 585.3  4/21/2020 - Present        Chronic fatigue ICD-10-CM: R53.82  ICD-9-CM: 780.79  4/10/2020 - Present        DIAZ (dyspnea on exertion) ICD-10-CM: R06.00  ICD-9-CM: 786.09  4/10/2020 - Present        Bradycardia ICD-10-CM: R00.1  ICD-9-CM: 427.89  1/23/2020 - Present        Dizziness ICD-10-CM: R42  ICD-9-CM: 780.4  8/17/2017 - Present        Accelerated hypertension ICD-10-CM: I10  ICD-9-CM: 401.0  Unknown - Present        Diabetes ([facility] ICD-10-CM: E11.9  ICD-9-CM: 250.00  Unknown - Present        Stroke (cerebrum) ([facility] ICD-10-CM: I63.9  ICD-9-CM: 434.91  Unknown - Present    Overview Signed 8/3/2016 11:17 AM by Kayce LAURENT 2/2011             GERD (gastroesophageal reflux disease) ICD-10-CM: K21.9  ICD-9-CM: 530.81  Unknown - Present        Nocturia ICD-10-CM: R35.1  ICD-9-CM: 788.43  Unknown - Present        Palpitations ICD-10-CM: R00.2  ICD-9-CM: 785.1  Unknown - Present        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 ICD-10-CM: I08.0  ICD-9-CM: 396.1  Unknown - Present        Psychiatric disorder ICD-10-CM: F99  ICD-9-CM: 298.9 Unknown - Present        S/P PTCA (percutaneous transluminal coronary angioplasty) ICD-10-CM: Z98.61  ICD-9-CM: V45.82  3/11/2016 - Present        Arteriosclerosis of bypass graft of coronary artery ICD-10-CM: I25.810  ICD-9-CM: 414.04  8/27/2015 - Present        SSS (sick sinus syndrome) (HCC) (Chronic) ICD-10-CM: I49.5  ICD-9-CM: 427.81  6/21/2014 - Present        Pacemaker (Chronic) ICD-10-CM: Z95.0  ICD-9-CM: V45.01  6/21/2014 - Present        Essential hypertension ICD-10-CM: I10  ICD-9-CM: 401.9  11/14/2013 - Present        Anxiety (Chronic) ICD-10-CM: F41.9  ICD-9-CM: 300.00  11/14/2013 - Present        Elevated prostate specific antigen (PSA) (Chronic) ICD-10-CM: R97.20  ICD-9-CM: 790.93  11/14/2013 - Present        Syncope and collapse ICD-10-CM: R55  ICD-9-CM: 780.2  8/19/2011 - Present        CVA (cerebral infarction) ICD-10-CM: I63.9  ICD-9-CM: 434.91  5/25/2011 - Present        Paroxysmal ventricular tachycardia (HCC) (Chronic) ICD-10-CM: I47.2  ICD-9-CM: 427.1  11/30/2010 - Present        Paroxysmal atrial fibrillation (HCC) (Chronic) ICD-10-CM: I48.0  ICD-9-CM: 427.31  11/30/2010 - Present        Acute kidney injury ([facility] ICD-10-CM: N17.9  ICD-9-CM: 584.9  9/17/2010 - Present        Snoring, Possible sleep apnea ICD-10-CM: R06.83  ICD-9-CM: 786.09  2/26/2010 - Present        Dilated Cardiomyopathy,Ischemic (Chronic) ICD-10-CM: I42.0  ICD-9-CM: 425.4  2/26/2010 - Present        Stroke, embolic ([facility] RXM-56-MQ: I63.9  ICD-9-CM: 434.11  2/25/2010 - Present        CAD (coronary artery disease) (Chronic) ICD-10-CM: I25.10  ICD-9-CM: 414.00  5/4/2009 - Present        S/P CABG (coronary artery bypass graft) (Chronic) ICD-10-CM: Z95.1  ICD-9-CM: V45.81  5/4/2009 - Present    Overview Signed 5/4/2009  5:40 PM by Ezra Myrick MD     PITTS TO LAD, SV TO OM, SV TO RCA, SV TO DIAG             Post PTCA ICD-10-CM: Z98.61  ICD-9-CM: V45.82  5/4/2009 - Present    Overview Signed 5/4/2009  5:40 PM by Hollie Hernandez MD BERTIN     stent to left main             Dyslipidemia (Chronic) ICD-10-CM: E78.5  ICD-9-CM: 272.4  5/4/2009 - Present        Diabetes mellitus (HCC) (Chronic) ICD-10-CM: E11.9  ICD-9-CM: 250.00  5/4/2009 - Present        Carotid artery stenosis without cerebral infarction ICD-10-CM: I65.29  ICD-9-CM: 433.10  7/7/2008 - Present        Sick sinus syndrome ([facility] ICD-10-CM: I49.5  ICD-9-CM: 427.81  9/6/2007 - Present        RESOLVED: Chest pain ICD-10-CM: R07.9  ICD-9-CM: 786.50  6/21/2014 - 3/11/2016        RESOLVED: Diabetes ([facility] ICD-10-CM: E11.9  ICD-9-CM: 250.00  11/14/2013 - 3/11/2016        RESOLVED: Atypical chest pain ICD-10-CM: R07.89  ICD-9-CM: 786.59  1/16/2012 - 3/11/2016        RESOLVED: Dysphagia ICD-10-CM: R13.10  ICD-9-CM: 787.20  1/16/2012 - 3/11/2016        RESOLVED: Unstable angina ([facility] ICD-10-CM: I20.0  ICD-9-CM: 411.1  1/15/2012 - 3/11/2016        RESOLVED: Other chest pain ICD-10-CM: R07.89  ICD-9-CM: 786.59  11/30/2010 - 3/11/2016        RESOLVED: Other chest pain ICD-10-CM: R07.89  ICD-9-CM: 786.59  9/17/2010 - 3/11/2016        RESOLVED: Acute kidney failure, unspecified ([facility] (Chronic) ICD-10-CM: N17.9  ICD-9-CM: 584.9  9/17/2010 - 11/30/2010               Discharge Condition: Stable    Hospital Course:     Patient with past medical history of    Atrial fibrillation   CVA  Diabetes   Hypertension      He came to ER with 2 episodes of falls/syncope at home.      He was found to have KASIE with creatinine of 2.13. He was given some fluid resuscitation. He felt better. It is very likely that his syncope is related to his poor intake and volume depletion before admission.      He is also found to have evidence of pneumonia. He was treated with Ceftriaxone and Azithromycin.      He felt better and would like to be discharged home today.  He does not need oxygen supplementation at the time of discharge,     Physical Exam:      General:                    The patient is a pleasant male in no acute respiratory distress. He is sitting up in bed and talking well. WUHK:                                   [id number]/ULTRBCQYYV. Eyes:                                   No palpebral pallor or scleral icterus. ENT:                                    External auricular and nasal exam within normal limits.                                             PPXGAD membranes are moist.  Neck:                                   Supple, non-tender, no JVD. Lungs:                       Clear to auscultation bilaterally without wheezes or crackles.                                             No respiratory distress or accessory muscle use. Heart:                                  Regular rate and rhythm, without murmurs, rubs, or gallops. Abdomen:                  Soft, non-tender, non-distended with normoactive bowel sounds. Genitourinary:           No tenderness over the bladder or bilateral CVAs. Extremities:               Without clubbing, cyanosis, or edema. Skin:                                    Normal color, texture, and turgor. No rashes, lesions, or jaundice. Pulses:                      Radial and dorsalis pedis pulses present 2+ bilaterally.                                               Capillary refill <2s. Neurologic:                CN II-XII grossly intact and symmetrical.                                               Moving all four extremities well with no focal deficits. Psychiatric:                Pleasant demeanor, appropriate affect.  Alert and oriented x 3    Consults: None    Significant Diagnostic Studies:     Recent Results (from the past 24 hour(s))   GLUCOSE, POC    Collection Time: 05/19/21 11:18 AM   Result Value Ref Range    Glucose (POC) 189 (H) 65 - 100 mg/dL    Performed by BootheDoganTunitraPCT    GLUCOSE, POC    Collection Time: 05/19/21  4:00 PM   Result Value Ref Range    Glucose (POC) 183 (H) 65 - 100 mg/dL    Performed by BootheDoganTunitraPCT    GLUCOSE, POC    Collection Time: 05/19/21  8:44 PM   Result Value Ref Range    Glucose (POC) 153 (H) 65 - 100 mg/dL    Performed by Marquez    GLUCOSE, POC    Collection Time: 05/20/21  7:38 AM   Result Value Ref Range    Glucose (POC) 71 65 - 100 mg/dL    Performed by Anamaria      CBC W/O DIFF [[id number]] (Order [id number])  Lab  Date: 5/17/2021 Department: [facility] Released By/Authorizing: Digna Bah MD (auto-released)   5/18/2021  6:44 AM - [facility], Lab In [facility]     Component Value Flag Ref Range Units Status   WBC 13.5  High   4.3 - 11.1 K/uL Final   RBC 4.62    4.23 - 5.6 M/uL Final   HGB 12.6  Low   13.6 - 17.2 g/dL Final   HCT 38.1  Low   41.1 - 50.3 % Final   MCV 82.5    79.6 - 97.8 FL Final   MCH 27.3    26.1 - 32.9 PG Final   MCHC 33.1    31.4 - 35.0 g/dL Final   RDW 14.3    11.9 - 14.6 % Final   PLATELET 403  Low   214 - 450 K/uL Final   MPV 10.6    9.4 - 12.3 FL Final   ABSOLUTE NRBC 0.00    0.0 - 0.2 K/uL Final   Comment:   **Note: Absolute NRBC parameter is now reported with Hemogram**      METABOLIC PANEL, BASIC [[id number]] (Order [id number])  Lab  Date: 5/17/2021 Department: [facility] Released By/Authorizing: Digna Bah MD (auto-released)   5/18/2021  6:39 AM - [facility] Lab In [facility]     Component Value Flag Ref Range Units Status   Sodium 145    136 - 145 mmol/L Final   Potassium 3.8    3.5 - 5.1 mmol/L Final   Chloride 114  High   98 - 107 mmol/L Final   CO2 23    21 - 32 mmol/L Final   Anion gap 8    7 - 16 mmol/L Final   Glucose 100    65 - 100 mg/dL Final   Comment:   47 - 60 mg/dl Consistent with, but not fully diagnostic of hypoglycemia.    101 - 125 mg/dl Impaired fasting glucose/consistent with pre-diabetes mellitus   > 126 mg/dl Fasting glucose consistent with overt diabetes mellitus    BUN 31  High   8 - 23 MG/DL Final   Creatinine 1.24    0.8 - 1.5 MG/DL Final   GFR est AA >60    >60 ml/min/1.73m2 Final   GFR est non-AA >60    >60 ml/min/1.73m2 Final   Comment:   (NOTE) Estimated GFR is calculated using the Modification of Diet in Renal   Disease (MDRD) Study equation, reported for both  Americans   (GFRAA) and non- Americans (GFRNA), and normalized to 1.73m2   body surface area. The physician must decide which value applies to   the patient. The MDRD study equation should only be used in   individuals age 25 or older. It has not been validated for the   following: pregnant women, patients with serious comorbid conditions,   or on certain medications, or persons with extremes of body size,   muscle mass, or nutritional status. Calcium 8.9    8.3 - 10.4 MG/DL Final      CT abdomen and pelvis   5-  IMPRESSION  1) Peripheral patchy densities right lung base, possibly infiltrate from  pneumonia. Pulmonary contusion is not excluded although there are no adjacent  rib fractures. 2) No retroperitoneal or intraperitoneal hemorrhage. 3) Mild left hydroureteronephrosis and small kidney stones. 4) Additional findings include cardiac pacemaker, cholecystectomy clips, fatty  atrophy of the pancreas, aortic atherosclerosis.     Knee left   5-     IMPRESSION  No evidence of acute fracture or dislocation within the left knee.     Small joint effusion.         Disposition: home    Discharge Medications:   Current Discharge Medication List      START taking these medications    Details   azithromycin (ZITHROMAX) 500 mg tab Take 1 Tablet by mouth daily for 2 doses. Qty: 2 Tablet, Refills: 0      cefpodoxime (VANTIN) 200 mg tablet Take 1 Tablet by mouth two (2) times a day for 10 days. Qty: 20 Tablet, Refills: 0         CONTINUE these medications which have CHANGED    Details   insulin detemir U-100 (Levemir U-100 Insulin) 100 unit/mL injection 20 Units by SubCUTAneous route nightly for 30 days.   Qty: 6 mL, Refills: 0         CONTINUE these medications which have NOT CHANGED    Details   Eliquis 5 mg tablet TAKE 1 TABLET BY MOUTH TWICE DAILY  Qty: 180 Tab, Refills: 3      losartan (COZAAR) 50 mg tablet TAKE 1 TABLET BY MOUTH DAILY  Qty: 30 Tab, Refills: 5    Associated Diagnoses: Dilated cardiomyopathy ([address] Essential hypertension      clopidogreL (PLAVIX) 75 mg tab TAKE 1 TABLET BY MOUTH DAILY  Qty: 90 Tab, Refills: 3      baclofen (LIORESAL) 10 mg tablet Take  by mouth three (3) times daily. levETIRAcetam (KEPPRA) 500 mg tablet Take 1 Tab by mouth two (2) times a day. Qty: 60 Tab, Refills: 0      isosorbide mononitrate ER (Imdur) 60 mg CR tablet Take 1 Tab by mouth two (2) times a day. Qty: 180 Tab, Refills: 3    Associated Diagnoses: Coronary artery disease involving autologous artery coronary bypass graft, angina presence unspecified; Chest pain, unspecified type; S/P CABG (coronary artery bypass graft); Post PTCA      ranolazine ER (RANEXA) 500 mg SR tablet Take 1 Tab by mouth two (2) times a day. Qty: 180 Tab, Refills: 3    Associated Diagnoses: Coronary artery disease involving autologous artery coronary bypass graft, angina presence unspecified      esomeprazole (NEXIUM) 40 mg capsule Take  by mouth daily. pregabalin (LYRICA) 300 mg capsule Take 300 mg by mouth two (2) times a day. fluticasone (FLONASE) 50 mcg/actuation nasal spray 2 Sprays by Both Nostrils route once. trazodone (DESYREL) 100 mg tablet Take 150 mg by mouth nightly. pravastatin (PRAVACHOL) 80 mg tablet Take 1 Tab by mouth nightly.   Qty: 90 Tab, Refills: 3    Comments: **Patient requests 90 days supply**  Associated Diagnoses: Coronary artery disease involving autologous artery coronary bypass graft      carvediloL (COREG) 12.5 mg tablet TAKE 1 TABLET BY MOUTH TWICE DAILY WITH MEALS  Qty: 180 Tab, Refills: 3    Associated Diagnoses: Coronary artery disease involving autologous artery coronary bypass graft, angina presence unspecified; Essential hypertension; Paroxysmal atrial fibrillation (HCC)      nitroglycerin (NITROSTAT) 0.4 mg SL tablet 1 Tab by SubLINGual route every five (5) minutes as needed for Chest Pain. Up to 3 doses. Qty: 25 Tab, Refills: 2      tamsulosin (FLOMAX) 0.4 mg capsule TAKE 1 CAPSULE BY MOUTH DAILY  Qty: 90 Cap, Refills: 11    Comments: **Patient requests 90 days supply**  Associated Diagnoses: Benign prostatic hyperplasia with urinary hesitancy      finasteride (PROSCAR) 5 mg tablet TAKE 1 TABLET BY MOUTH DAILY  Qty: 90 Tab, Refills: 11    Comments: **Patient requests 90 days supply**  Associated Diagnoses: Benign prostatic hyperplasia with urinary hesitancy      cyclobenzaprine (FLEXERIL) 10 mg tablet Take 10 mg by mouth two (2) times daily as needed for Muscle Spasm(s). STOP taking these medications       HYDROcodone-acetaminophen (Norco)  mg tablet Comments:   Reason for Stopping:         insulin aspart (NOVOLOG) 100 unit/mL injection Comments:   Reason for Stopping:               Activity: Activity as tolerated  Diet: Diabetic Diet  Wound Care: Keep wound clean and dry    Follow-up Appointments   Procedures    FOLLOW UP VISIT Appointment in: 3 - 5 Days See your primary doctor. See your primary doctor. Standing Status:   Standing     Number of Occurrences:   1     Order Specific Question:   Appointment in     Answer:   3 - 5 Days     I have discussed the plan of care with patient. Time spent on discharge is 38 minutes.       Signed By: Damian Almeida MD     May 20, 2021

## 2021-05-20 NOTE — PROGRESS NOTES
Discharge orders have been placed. CM met with patient to discuss discharge needs. Patient denies any supportive care needs at this time. Patient to discharge home this day with [facility]. Patient's daughter to attentively arrive at 1300. CM was receptive. Please consult or notify CM if any needs shall arise. CM remains available. Care Management Interventions  PCP Verified by CM: Yes  Mode of Transport at Discharge: Other (see comment) (Family. )  Transition of Care Consult (CM Consult): Discharge Planning, [address]: Yes  Discharge Durable Medical Equipment: No  Physical Therapy Consult: No  Occupational Therapy Consult: No  Speech Therapy Consult: No  Current Support Network: Lives Alone, Family Lives Nearby  Confirm Follow Up Transport: Self  The Plan for Transition of Care is Related to the Following Treatment Goals : Return to Baseline. The Patient and/or Patient Representative was Provided with a Choice of Provider and Agrees with the Discharge Plan?: Yes  Name of the Patient Representative Who was Provided with a Choice of Provider and Agrees with the Discharge Plan: Patient.    Sharon Resource Information Provided?: No  Discharge Location  Discharge Placement: Home with home health [facility]

## 2021-05-20 NOTE — PROGRESS NOTES
Pt has been discharged home. Hourly rounds completed. Pt's daughter came and picked him up. PIV removed with no bleeding. Discharge paperwork and prescriptions handed to pt and reviewed with pt by Hayley Marquez RN. Opportunity for questions provided. Pt denies needs at this time. All needs met at this time.

## 2021-05-21 ENCOUNTER — TELEPHONE (OUTPATIENT)
Dept: HOME HEALTH SERVICES | Facility: HOME HEALTH | Age: 71
End: 2021-05-21

## 2021-05-21 NOTE — TELEPHONE ENCOUNTER
[address] [place] [address] Parul Huber Pharmacist consult. Mr. Annabella Gilbert was discharged from [facility] after having PNA. I explained to him my part of the [facility] team.  He was discharged on two new antibiotics-azithromycin and cefpodoxime. We reviewed his medications. He is having no issues with new meds. We discussed his BS. It is good and he watches it. I gave him my cell phone number and asked him to call me with medication questions any time. He said was OK for me to call back any time.

## 2021-05-22 ENCOUNTER — HOME CARE VISIT (OUTPATIENT)
Dept: SCHEDULING | Facility: HOME HEALTH | Age: 71
End: 2021-05-22

## 2021-05-22 LAB
BACTERIA SPEC CULT: NORMAL
SERVICE CMNT-IMP: NORMAL

## 2021-05-22 PROCEDURE — G0299 HHS/HOSPICE OF RN EA 15 MIN: HCPCS

## 2021-05-23 LAB
BACTERIA SPEC CULT: NORMAL
SERVICE CMNT-IMP: NORMAL

## 2021-05-26 ENCOUNTER — TELEPHONE (OUTPATIENT)
Dept: HOME HEALTH SERVICES | Facility: HOME HEALTH | Age: 71
End: 2021-05-26

## 2021-05-26 NOTE — TELEPHONE ENCOUNTER
Mr. Lisa Garces said he was feeling good. He saw [facility] assigned MD yesterday and was released as his PNA has cleared up. I reviewed his medications. He still has several days of cefpodoxime to finish. I emphasized the need to complete the entire regimen. He said he felt the best he had felt in 2-3 weeks. No medication questions. He said he was taking meds per orders and no changes in his maintenance meds.

## 2021-10-31 ENCOUNTER — HOSPITAL ENCOUNTER (EMERGENCY)
Age: 71
Discharge: HOME | End: 2021-10-31
Attending: EMERGENCY MEDICINE | Admitting: EMERGENCY MEDICINE
Payer: MEDICARE

## 2021-10-31 VITALS
TEMPERATURE: 98.3 F | HEART RATE: 72 BPM | DIASTOLIC BLOOD PRESSURE: 72 MMHG | OXYGEN SATURATION: 96 % | WEIGHT: 210 LBS | BODY MASS INDEX: 32.96 KG/M2 | HEIGHT: 67 IN | RESPIRATION RATE: 19 BRPM | SYSTOLIC BLOOD PRESSURE: 149 MMHG

## 2021-10-31 DIAGNOSIS — R07.89 ATYPICAL CHEST PAIN: Primary | ICD-10-CM

## 2021-10-31 LAB
ALBUMIN SERPL-MCNC: 3.2 G/DL (ref 3.2–4.6)
ALBUMIN/GLOB SERPL: 0.9 {RATIO} (ref 1.2–3.5)
ALP SERPL-CCNC: 61 U/L (ref 50–136)
ALT SERPL-CCNC: 21 U/L (ref 12–65)
ANION GAP SERPL CALC-SCNC: 5 MMOL/L (ref 7–16)
AST SERPL-CCNC: 22 U/L (ref 15–37)
BASOPHILS # BLD: 0 K/UL (ref 0–0.2)
BASOPHILS NFR BLD: 0 % (ref 0–2)
BILIRUB SERPL-MCNC: 0.6 MG/DL (ref 0.2–1.1)
BUN SERPL-MCNC: 12 MG/DL (ref 8–23)
CALCIUM SERPL-MCNC: 8.3 MG/DL (ref 8.3–10.4)
CHLORIDE SERPL-SCNC: 111 MMOL/L (ref 98–107)
CO2 SERPL-SCNC: 26 MMOL/L (ref 21–32)
CREAT SERPL-MCNC: 1.32 MG/DL (ref 0.8–1.5)
DIFFERENTIAL METHOD BLD: ABNORMAL
EOSINOPHIL # BLD: 0.1 K/UL (ref 0–0.8)
EOSINOPHIL NFR BLD: 1 % (ref 0.5–7.8)
ERYTHROCYTE [DISTWIDTH] IN BLOOD BY AUTOMATED COUNT: 13.4 % (ref 11.9–14.6)
GLOBULIN SER CALC-MCNC: 3.7 G/DL (ref 2.3–3.5)
GLUCOSE SERPL-MCNC: 213 MG/DL (ref 65–100)
HCT VFR BLD AUTO: 35.6 % (ref 41.1–50.3)
HGB BLD-MCNC: 11.6 G/DL (ref 13.6–17.2)
IMM GRANULOCYTES # BLD AUTO: 0 K/UL (ref 0–0.5)
IMM GRANULOCYTES NFR BLD AUTO: 1 % (ref 0–5)
LIPASE SERPL-CCNC: 108 U/L (ref 73–393)
LYMPHOCYTES # BLD: 1.3 K/UL (ref 0.5–4.6)
LYMPHOCYTES NFR BLD: 24 % (ref 13–44)
MAGNESIUM SERPL-MCNC: 1.9 MG/DL (ref 1.8–2.4)
MCH RBC QN AUTO: 27.2 PG (ref 26.1–32.9)
MCHC RBC AUTO-ENTMCNC: 32.6 G/DL (ref 31.4–35)
MCV RBC AUTO: 83.6 FL (ref 79.6–97.8)
MONOCYTES # BLD: 0.4 K/UL (ref 0.1–1.3)
MONOCYTES NFR BLD: 7 % (ref 4–12)
NEUTS SEG # BLD: 3.7 K/UL (ref 1.7–8.2)
NEUTS SEG NFR BLD: 67 % (ref 43–78)
NRBC # BLD: 0 K/UL (ref 0–0.2)
PLATELET # BLD AUTO: 158 K/UL (ref 150–450)
PMV BLD AUTO: 10.4 FL (ref 9.4–12.3)
POTASSIUM SERPL-SCNC: 4.2 MMOL/L (ref 3.5–5.1)
PROT SERPL-MCNC: 6.9 G/DL (ref 6.3–8.2)
RBC # BLD AUTO: 4.26 M/UL (ref 4.23–5.6)
SODIUM SERPL-SCNC: 142 MMOL/L (ref 136–145)
TROPONIN-HIGH SENSITIVITY: 10 PG/ML (ref 0–14)
TROPONIN-HIGH SENSITIVITY: 10.3 PG/ML (ref 0–14)
WBC # BLD AUTO: 5.5 K/UL (ref 4.3–11.1)

## 2021-10-31 PROCEDURE — 99285 EMERGENCY DEPT VISIT HI MDM: CPT

## 2021-10-31 PROCEDURE — 85025 COMPLETE CBC W/AUTO DIFF WBC: CPT

## 2021-10-31 PROCEDURE — 84484 ASSAY OF TROPONIN QUANT: CPT

## 2021-10-31 PROCEDURE — 83735 ASSAY OF MAGNESIUM: CPT

## 2021-10-31 PROCEDURE — 83690 ASSAY OF LIPASE: CPT

## 2021-10-31 PROCEDURE — 74011250637 HC RX REV CODE- 250/637: Performed by: EMERGENCY MEDICINE

## 2021-10-31 PROCEDURE — 93005 ELECTROCARDIOGRAM TRACING: CPT | Performed by: EMERGENCY MEDICINE

## 2021-10-31 PROCEDURE — 80053 COMPREHEN METABOLIC PANEL: CPT

## 2021-10-31 RX ORDER — SODIUM CHLORIDE 0.9 % (FLUSH) 0.9 %
5-10 SYRINGE (ML) INJECTION EVERY 8 HOURS
Status: DISCONTINUED | OUTPATIENT
Start: 2021-10-31 | End: 2021-11-01 | Stop reason: HOSPADM

## 2021-10-31 RX ORDER — SODIUM CHLORIDE 0.9 % (FLUSH) 0.9 %
5-10 SYRINGE (ML) INJECTION AS NEEDED
Status: DISCONTINUED | OUTPATIENT
Start: 2021-10-31 | End: 2021-11-01 | Stop reason: HOSPADM

## 2021-10-31 RX ADMIN — NITROGLYCERIN 0.5 INCH: 20 OINTMENT TOPICAL at 18:23

## 2021-10-31 NOTE — ED TRIAGE NOTES
Pt arrives via EMS from home c/o of malaise and CP that started today. A/ox4.      12 lead. No st elevation. Paced. cabg and stent hx. Gave aspirin and 1 nitro with minimal nitro. 1568/74. bgl 253. Hr 70. Oral 98.8.

## 2021-10-31 NOTE — ED PROVIDER NOTES
70-year-old gentleman with a history of coronary artery disease status post CABG and stenting presents with concerns about chest pain he says is in the center of his chest going towards the left. With this pain he has had no nausea, vomiting, or diarrhea. He has had no fevers or chills and no cough. Patient says his pain is exertional and he tries to get up and walk around it does seem like it gets worse. He denies any other symptoms. EMS gave him 2 nitros and his pain improved. They also gave him aspirin. Elements of this note were created using speech recognition software. As such, errors of speech recognition may be present. Past Medical History:   Diagnosis Date    Acute kidney failure, unspecified ([address] 9/17/2010    Anxiety state, unspecified 11/14/2013    Arteriosclerosis of bypass graft of coronary artery 8/27/2015    CAD (coronary artery disease)     Cath on 3- showed 5/5 patent bypass grafts with LV EF=60% and a 90% stenosis in native LAD distal to LIMA graft anastomosis. He received a Xience ZAK (2.25 x 18) to LAD.  Carotid artery disease ([address] 03/28/2019    Bilateral ICA:50-69% on carotid ultrasound.     Carotid artery stenosis without cerebral infarction 07/07/2008    CVA (cerebral infarction) 5/25/2011    Diabetes ([address]     Diabetes mellitus ([address] 5/4/2009    Dilated Cardiomyopathy,Ischemic 2/26/2010    Dyslipidemia 5/4/2009    Elevated prostate specific antigen (PSA) 11/14/2013    Essential hypertension 11/14/2013    GERD (gastroesophageal reflux disease)     Hypertension     Nocturia     Other and unspecified hyperlipidemia     Pacemaker 6/21/2014    Palpitations 2006    Paroxysmal atrial fibrillation ([address] 11/30/2010    Paroxysmal ventricular tachycardia ([address] 11/30/2010    Post PTCA 5/4/2009    stent to left main     Psychiatric disorder     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    S/P CABG (coronary artery bypass graft) 2009    LIMA TO LAD, SV TO OM, SV TO RCA, SV TO DIAG     S/P PTCA (percutaneous transluminal coronary angioplasty) 3/11/2016    Sick sinus syndrome ([facility] 2007    Snoring, Possible sleep apnea 2010    SSS (sick sinus syndrome) ([facility] 2014    Stroke (cerebrum) (HCC)     TIA 2011    Stroke, embolic ([facility]     Syncope and collapse 2011    Unspecified malignant neoplasm of skin, site unspecified        Past Surgical History:   Procedure Laterality Date    COLONOSCOPY      EGD      esophageal ulcer    HX CHOLECYSTECTOMY      biliary dyskinesia    HX CORONARY ARTERY BYPASS GRAFT      HX CORONARY STENT PLACEMENT      HX HEART CATHETERIZATION  2013    no intervention    HX HEART CATHETERIZATION  2014    no intervention    HX HEART CATHETERIZATION      MULTIPLE ([place])    HX ORTHOPAEDIC      knee surgery x 2 left    HX OTHER SURGICAL      nerve in back    HX PACEMAKER      [place] CARDIAC SURG PROCEDURE UNLIST      bypass x 5; 2 stents    [place] LEFT HEART CATH,PERCUTANEOUS  2010    native circ x 1         Family History:   Problem Relation Age of Onset    Diabetes Mother     Heart Disease Mother     Heart Disease Sister     Cancer Brother         Eye    Heart Disease Brother     Migraines Brother        Social History     Socioeconomic History    Marital status:      Spouse name: Not on file    Number of children: Not on file    Years of education: Not on file    Highest education level: Not on file   Occupational History    Not on file   Tobacco Use    Smoking status: Former Smoker     Packs/day: 3.00     Years: 40.00     Pack years: 120.00     Quit date: 1999     Years since quittin.8    Smokeless tobacco: Never Used   Substance and Sexual Activity    Alcohol use: No    Drug use: No    Sexual activity: Not on file   Other Topics Concern    Caffeine Concern Not Asked    Back Care Not Asked    Exercise Not Asked    Occupational Exposure Not Asked    Sleep Concern Not Asked    Stress Concern Not Asked    Weight Concern Not Asked   Social History Narrative    Not on file     Social Determinants of Health     Financial Resource Strain:     Difficulty of Paying Living Expenses:    Food Insecurity:     Worried About Running Out of Food in the Last Year:     [address] in the Last Year:    Transportation Needs:     Lack of Transportation (Medical):  Lack of Transportation (Non-Medical):    Physical Activity:     Days of Exercise per Week:     Minutes of Exercise per Session:    Stress:     Feeling of Stress :    Social Connections:     Frequency of Communication with Friends and Family:     Frequency of Social Gatherings with Friends and Family:     Attends [employer] Services:     Active Member of Clubs or Organizations:     Attends Club or Organization Meetings:     Marital Status:    Intimate Partner Violence:     Fear of Current or Ex-Partner:     Emotionally Abused:     Physically Abused:     Sexually Abused: ALLERGIES: Beta-blockers (beta-adrenergic blocking agts), Shellfish derived, and Xanax [alprazolam]    Review of Systems   Constitutional: Negative for chills and fever. Respiratory: Positive for chest tightness. Negative for cough and shortness of breath. Cardiovascular: Positive for chest pain. Negative for palpitations. Gastrointestinal: Negative for nausea and vomiting. Genitourinary: Negative for dysuria and hematuria. Musculoskeletal: Negative for arthralgias and myalgias. Skin: Negative for color change and wound. Neurological: Negative for dizziness and headaches. Vitals:    10/31/21 1744 10/31/21 1747 10/31/21 1748 10/31/21 1823   BP: (!) 159/74 (!) 159/74  (!) 126/59   Pulse: 69 69  69   Resp: 22 19     SpO2: 97% 97%  96%   Weight:   95.3 kg (210 lb)    Height:   5' 7\" (1.702 m)             Physical Exam  Vitals and nursing note reviewed. Constitutional:       Appearance: He is well-developed. HENT:      Head: Normocephalic and atraumatic. Cardiovascular:      Rate and Rhythm: Normal rate and regular rhythm. Pulmonary:      Effort: Pulmonary effort is normal.      Breath sounds: Normal breath sounds. Abdominal:      General: Bowel sounds are normal.      Palpations: Abdomen is soft. Neurological:      General: No focal deficit present. Mental Status: He is alert and oriented to person, place, and time. MDM  Number of Diagnoses or Management Options  Diagnosis management comments: I will do a cardiac evaluation. I will place some Nitropaste to try to help alleviate his pain. ED Course as of Oct 31 2205   Nicanor Vargas Oct 31, 2021   1840 EKG review by ER doctor:Normal sinus rhythmNo acute ischemic ST segment changes  Patient has what appears to be a left bundle branch block. No QTC prolongationRate of: 69    [AC]   2205 Patient's repeat troponin is negative.   I will discharge him home.    [AC]      ED Course User Index  [AC] Joshua Beck MD       Procedures

## 2021-11-01 LAB
ATRIAL RATE: 70 BPM
CALCULATED P AXIS, ECG09: 62 DEGREES
CALCULATED R AXIS, ECG10: 127 DEGREES
CALCULATED T AXIS, ECG11: -15 DEGREES
DIAGNOSIS, 93000: NORMAL
P-R INTERVAL, ECG05: 122 MS
Q-T INTERVAL, ECG07: 410 MS
QRS DURATION, ECG06: 138 MS
QTC CALCULATION (BEZET), ECG08: 439 MS
VENTRICULAR RATE, ECG03: 69 BPM

## 2021-11-01 NOTE — ED NOTES
I have reviewed discharge instructions with the patient. The patient verbalized understanding. Patient left ED via Discharge Method: ambulatory to Home with granddaughter. Opportunity for questions and clarification provided. Patient given 0 scripts. To continue your aftercare when you leave the hospital, you may receive an automated call from our care team to check in on how you are doing. This is a free service and part of our promise to provide the best care and service to meet your aftercare needs.  If you have questions, or wish to unsubscribe from this service please call [phone]. Thank you for Choosing our [facility] Emergency Department.

## 2021-12-11 ENCOUNTER — HOSPITAL ENCOUNTER (INPATIENT)
Age: 71
LOS: 6 days | Discharge: HOME HEALTH | DRG: 177 | End: 2021-12-17
Attending: EMERGENCY MEDICINE | Admitting: INTERNAL MEDICINE
Payer: MEDICARE

## 2021-12-11 ENCOUNTER — APPOINTMENT (OUTPATIENT)
Dept: GENERAL RADIOLOGY | Age: 71
DRG: 177 | End: 2021-12-11
Attending: EMERGENCY MEDICINE
Payer: MEDICARE

## 2021-12-11 DIAGNOSIS — U07.1 COVID-19 VIRUS INFECTION: Primary | ICD-10-CM

## 2021-12-11 DIAGNOSIS — I42.0 DILATED CARDIOMYOPATHY (HCC): ICD-10-CM

## 2021-12-11 DIAGNOSIS — R09.02 HYPOXIA: ICD-10-CM

## 2021-12-11 DIAGNOSIS — I10 ESSENTIAL HYPERTENSION: ICD-10-CM

## 2021-12-11 PROBLEM — J12.82 PNEUMONIA DUE TO COVID-19 VIRUS: Status: ACTIVE | Noted: 2021-12-11

## 2021-12-11 PROBLEM — J96.01 ACUTE HYPOXEMIC RESPIRATORY FAILURE (HCC): Status: ACTIVE | Noted: 2021-12-11

## 2021-12-11 LAB
ALBUMIN SERPL-MCNC: 3 G/DL (ref 3.2–4.6)
ALBUMIN/GLOB SERPL: 0.6 {RATIO} (ref 1.2–3.5)
ALP SERPL-CCNC: 68 U/L (ref 50–136)
ALT SERPL-CCNC: 20 U/L (ref 12–65)
ANION GAP SERPL CALC-SCNC: 12 MMOL/L (ref 7–16)
AST SERPL-CCNC: 25 U/L (ref 15–37)
BASOPHILS # BLD: 0 K/UL (ref 0–0.2)
BASOPHILS NFR BLD: 0 % (ref 0–2)
BILIRUB SERPL-MCNC: 1.3 MG/DL (ref 0.2–1.1)
BNP SERPL-MCNC: 3704 PG/ML (ref 5–125)
BUN SERPL-MCNC: 19 MG/DL (ref 8–23)
CALCIUM SERPL-MCNC: 9.9 MG/DL (ref 8.3–10.4)
CHLORIDE SERPL-SCNC: 107 MMOL/L (ref 98–107)
CO2 SERPL-SCNC: 20 MMOL/L (ref 21–32)
COVID-19 RAPID TEST, COVR: DETECTED
CREAT SERPL-MCNC: 0.96 MG/DL (ref 0.8–1.5)
CRP SERPL-MCNC: 9.5 MG/DL (ref 0–0.9)
D DIMER PPP FEU-MCNC: 3.9 UG/ML(FEU)
DIFFERENTIAL METHOD BLD: ABNORMAL
EOSINOPHIL # BLD: 0.1 K/UL (ref 0–0.8)
EOSINOPHIL NFR BLD: 1 % (ref 0.5–7.8)
ERYTHROCYTE [DISTWIDTH] IN BLOOD BY AUTOMATED COUNT: 13.4 % (ref 11.9–14.6)
GLOBULIN SER CALC-MCNC: 4.9 G/DL (ref 2.3–3.5)
GLUCOSE BLD STRIP.AUTO-MCNC: 333 MG/DL (ref 65–100)
GLUCOSE SERPL-MCNC: 290 MG/DL (ref 65–100)
HCT VFR BLD AUTO: 40.3 % (ref 41.1–50.3)
HGB BLD-MCNC: 13.6 G/DL (ref 13.6–17.2)
IMM GRANULOCYTES # BLD AUTO: 0.1 K/UL (ref 0–0.5)
IMM GRANULOCYTES NFR BLD AUTO: 1 % (ref 0–5)
LYMPHOCYTES # BLD: 0.5 K/UL (ref 0.5–4.6)
LYMPHOCYTES NFR BLD: 7 % (ref 13–44)
MAGNESIUM SERPL-MCNC: 2.1 MG/DL (ref 1.8–2.4)
MCH RBC QN AUTO: 25.8 PG (ref 26.1–32.9)
MCHC RBC AUTO-ENTMCNC: 33.7 G/DL (ref 31.4–35)
MCV RBC AUTO: 76.5 FL (ref 79.6–97.8)
MONOCYTES # BLD: 0.4 K/UL (ref 0.1–1.3)
MONOCYTES NFR BLD: 6 % (ref 4–12)
NEUTS SEG # BLD: 5.8 K/UL (ref 1.7–8.2)
NEUTS SEG NFR BLD: 85 % (ref 43–78)
NRBC # BLD: 0 K/UL (ref 0–0.2)
PLATELET # BLD AUTO: 296 K/UL (ref 150–450)
PMV BLD AUTO: 9.9 FL (ref 9.4–12.3)
POTASSIUM SERPL-SCNC: 3.5 MMOL/L (ref 3.5–5.1)
PROCALCITONIN SERPL-MCNC: 0.13 NG/ML (ref 0–0.49)
PROT SERPL-MCNC: 7.9 G/DL (ref 6.3–8.2)
RBC # BLD AUTO: 5.27 M/UL (ref 4.23–5.6)
SARS-COV-2, COV2: NORMAL
SERVICE CMNT-IMP: ABNORMAL
SODIUM SERPL-SCNC: 139 MMOL/L (ref 136–145)
SOURCE, COVRS: ABNORMAL
TROPONIN-HIGH SENSITIVITY: 119.7 PG/ML (ref 0–14)
TROPONIN-HIGH SENSITIVITY: 171.4 PG/ML (ref 0–14)
TROPONIN-HIGH SENSITIVITY: 199.9 PG/ML (ref 0–14)
WBC # BLD AUTO: 6.9 K/UL (ref 4.3–11.1)

## 2021-12-11 PROCEDURE — 74011250637 HC RX REV CODE- 250/637: Performed by: INTERNAL MEDICINE

## 2021-12-11 PROCEDURE — 84484 ASSAY OF TROPONIN QUANT: CPT

## 2021-12-11 PROCEDURE — 84145 PROCALCITONIN (PCT): CPT

## 2021-12-11 PROCEDURE — 36415 COLL VENOUS BLD VENIPUNCTURE: CPT

## 2021-12-11 PROCEDURE — 86140 C-REACTIVE PROTEIN: CPT

## 2021-12-11 PROCEDURE — 83880 ASSAY OF NATRIURETIC PEPTIDE: CPT

## 2021-12-11 PROCEDURE — 71045 X-RAY EXAM CHEST 1 VIEW: CPT

## 2021-12-11 PROCEDURE — XW0DXM6 INTRODUCTION OF BARICITINIB INTO MOUTH AND PHARYNX, EXTERNAL APPROACH, NEW TECHNOLOGY GROUP 6: ICD-10-PCS | Performed by: INTERNAL MEDICINE

## 2021-12-11 PROCEDURE — 99285 EMERGENCY DEPT VISIT HI MDM: CPT

## 2021-12-11 PROCEDURE — 74011636637 HC RX REV CODE- 636/637: Performed by: INTERNAL MEDICINE

## 2021-12-11 PROCEDURE — 83735 ASSAY OF MAGNESIUM: CPT

## 2021-12-11 PROCEDURE — 93005 ELECTROCARDIOGRAM TRACING: CPT | Performed by: EMERGENCY MEDICINE

## 2021-12-11 PROCEDURE — 74011636637 HC RX REV CODE- 636/637: Performed by: EMERGENCY MEDICINE

## 2021-12-11 PROCEDURE — 65270000029 HC RM PRIVATE

## 2021-12-11 PROCEDURE — 87635 SARS-COV-2 COVID-19 AMP PRB: CPT

## 2021-12-11 PROCEDURE — 85025 COMPLETE CBC W/AUTO DIFF WBC: CPT

## 2021-12-11 PROCEDURE — 82962 GLUCOSE BLOOD TEST: CPT

## 2021-12-11 PROCEDURE — 80053 COMPREHEN METABOLIC PANEL: CPT

## 2021-12-11 PROCEDURE — 74011250636 HC RX REV CODE- 250/636: Performed by: INTERNAL MEDICINE

## 2021-12-11 PROCEDURE — 85379 FIBRIN DEGRADATION QUANT: CPT

## 2021-12-11 RX ORDER — ALBUTEROL SULFATE 90 UG/1
2 AEROSOL, METERED RESPIRATORY (INHALATION)
Status: DISCONTINUED | OUTPATIENT
Start: 2021-12-11 | End: 2021-12-17 | Stop reason: HOSPADM

## 2021-12-11 RX ORDER — FINASTERIDE 5 MG/1
5 TABLET, FILM COATED ORAL DAILY
Status: DISCONTINUED | OUTPATIENT
Start: 2021-12-12 | End: 2021-12-17 | Stop reason: HOSPADM

## 2021-12-11 RX ORDER — POLYETHYLENE GLYCOL 3350 17 G/17G
17 POWDER, FOR SOLUTION ORAL DAILY PRN
Status: DISCONTINUED | OUTPATIENT
Start: 2021-12-11 | End: 2021-12-17 | Stop reason: HOSPADM

## 2021-12-11 RX ORDER — PREGABALIN 150 MG/1
300 CAPSULE ORAL 2 TIMES DAILY
Status: DISCONTINUED | OUTPATIENT
Start: 2021-12-11 | End: 2021-12-17 | Stop reason: HOSPADM

## 2021-12-11 RX ORDER — INSULIN LISPRO 100 [IU]/ML
INJECTION, SOLUTION INTRAVENOUS; SUBCUTANEOUS
Status: DISCONTINUED | OUTPATIENT
Start: 2021-12-11 | End: 2021-12-17 | Stop reason: HOSPADM

## 2021-12-11 RX ORDER — FUROSEMIDE 10 MG/ML
20 INJECTION INTRAMUSCULAR; INTRAVENOUS DAILY
Status: DISCONTINUED | OUTPATIENT
Start: 2021-12-11 | End: 2021-12-17 | Stop reason: HOSPADM

## 2021-12-11 RX ORDER — PANTOPRAZOLE SODIUM 40 MG/1
40 TABLET, DELAYED RELEASE ORAL
Status: DISCONTINUED | OUTPATIENT
Start: 2021-12-12 | End: 2021-12-17 | Stop reason: HOSPADM

## 2021-12-11 RX ORDER — INSULIN LISPRO 100 [IU]/ML
INJECTION, SOLUTION INTRAVENOUS; SUBCUTANEOUS
Status: DISCONTINUED | OUTPATIENT
Start: 2021-12-11 | End: 2021-12-11

## 2021-12-11 RX ORDER — SODIUM CHLORIDE 0.9 % (FLUSH) 0.9 %
5-10 SYRINGE (ML) INJECTION AS NEEDED
Status: DISCONTINUED | OUTPATIENT
Start: 2021-12-11 | End: 2021-12-17 | Stop reason: HOSPADM

## 2021-12-11 RX ORDER — INSULIN GLARGINE 100 [IU]/ML
35 INJECTION, SOLUTION SUBCUTANEOUS 2 TIMES DAILY
Status: DISCONTINUED | OUTPATIENT
Start: 2021-12-11 | End: 2021-12-14

## 2021-12-11 RX ORDER — ACETAMINOPHEN 650 MG/1
650 SUPPOSITORY RECTAL
Status: DISCONTINUED | OUTPATIENT
Start: 2021-12-11 | End: 2021-12-17 | Stop reason: HOSPADM

## 2021-12-11 RX ORDER — TAMSULOSIN HYDROCHLORIDE 0.4 MG/1
0.4 CAPSULE ORAL DAILY
Status: DISCONTINUED | OUTPATIENT
Start: 2021-12-12 | End: 2021-12-17 | Stop reason: HOSPADM

## 2021-12-11 RX ORDER — ACETAMINOPHEN 325 MG/1
650 TABLET ORAL
Status: DISCONTINUED | OUTPATIENT
Start: 2021-12-11 | End: 2021-12-17 | Stop reason: HOSPADM

## 2021-12-11 RX ORDER — CARVEDILOL 3.12 MG/1
6.25 TABLET ORAL 2 TIMES DAILY WITH MEALS
Status: DISCONTINUED | OUTPATIENT
Start: 2021-12-12 | End: 2021-12-17

## 2021-12-11 RX ORDER — DEXAMETHASONE 4 MG/1
6 TABLET ORAL DAILY
Status: DISCONTINUED | OUTPATIENT
Start: 2021-12-11 | End: 2021-12-17 | Stop reason: HOSPADM

## 2021-12-11 RX ORDER — SODIUM CHLORIDE 0.9 % (FLUSH) 0.9 %
5-10 SYRINGE (ML) INJECTION EVERY 8 HOURS
Status: DISCONTINUED | OUTPATIENT
Start: 2021-12-11 | End: 2021-12-17 | Stop reason: HOSPADM

## 2021-12-11 RX ORDER — SODIUM CHLORIDE 0.9 % (FLUSH) 0.9 %
5-40 SYRINGE (ML) INJECTION EVERY 8 HOURS
Status: DISCONTINUED | OUTPATIENT
Start: 2021-12-11 | End: 2021-12-13

## 2021-12-11 RX ORDER — ISOSORBIDE MONONITRATE 60 MG/1
60 TABLET, EXTENDED RELEASE ORAL DAILY
Status: DISCONTINUED | OUTPATIENT
Start: 2021-12-12 | End: 2021-12-17 | Stop reason: HOSPADM

## 2021-12-11 RX ORDER — LOSARTAN POTASSIUM 25 MG/1
12.5 TABLET ORAL DAILY
Status: DISCONTINUED | OUTPATIENT
Start: 2021-12-12 | End: 2021-12-13

## 2021-12-11 RX ORDER — SODIUM CHLORIDE 0.9 % (FLUSH) 0.9 %
5-40 SYRINGE (ML) INJECTION AS NEEDED
Status: DISCONTINUED | OUTPATIENT
Start: 2021-12-11 | End: 2021-12-17 | Stop reason: HOSPADM

## 2021-12-11 RX ADMIN — APIXABAN 5 MG: 5 TABLET, FILM COATED ORAL at 21:40

## 2021-12-11 RX ADMIN — Medication 10 ML: at 21:41

## 2021-12-11 RX ADMIN — BARICITINIB 4 MG: 1 TABLET, FILM COATED ORAL at 21:40

## 2021-12-11 RX ADMIN — FUROSEMIDE 20 MG: 10 INJECTION, SOLUTION INTRAMUSCULAR; INTRAVENOUS at 18:09

## 2021-12-11 RX ADMIN — PREGABALIN 300 MG: 150 CAPSULE ORAL at 21:40

## 2021-12-11 RX ADMIN — INSULIN GLARGINE 35 UNITS: 100 INJECTION, SOLUTION SUBCUTANEOUS at 21:41

## 2021-12-11 RX ADMIN — INSULIN LISPRO 8 UNITS: 100 INJECTION, SOLUTION INTRAVENOUS; SUBCUTANEOUS at 21:40

## 2021-12-11 RX ADMIN — Medication 10 ML: at 21:42

## 2021-12-11 RX ADMIN — DEXAMETHASONE 6 MG: 4 TABLET ORAL at 18:09

## 2021-12-11 NOTE — ED TRIAGE NOTES
Pt to ED via [facility], for ongoing weakness for the past couple of months. Pt did have a cardiac stent placed Monday at [facility]. Pt states his weakness is not related to the cardiac procedure. Pt is on 4L NC and in the 92% range. Pt is not complaining of pain at this time, no cough, no fevers. Pt states the congestion and wheezing when he breathes is his normal but denies COPD or CHF. Pt not a good historian.     Pt masked with EMS

## 2021-12-11 NOTE — ROUTINE PROCESS
TRANSFER - OUT REPORT:    Verbal report given to Josy Navarro on Victoria Pires  being transferred to 8th floor for routine progression of care       Report consisted of patients Situation, Background, Assessment and   Recommendations(SBAR). Information from the following report(s) ED Summary was reviewed with the receiving nurse. Lines:   Peripheral IV 12/11/21 Right Antecubital (Active)   Site Assessment Clean, dry, & intact 12/11/21 1331   Phlebitis Assessment 0 12/11/21 1331   Infiltration Assessment 0 12/11/21 1331   Dressing Status Clean, dry, & intact 12/11/21 1331        Opportunity for questions and clarification was provided.       Patient transported with:   O2 @ 2 liters

## 2021-12-11 NOTE — H&P
Admit date: 2021   Name:  Allan Huber   Age:  70 y.o.   :  1950   MRN:  [id number]   PCP:  Heather Banrey MD   Provider:  Sherren Roses MD      ASSESSMENT AND PLAN  35-year-old male with a past medical history of hypertension, hyperlipidemia, coronary artery disease status post bypass, CVA, paroxysmal atrial fibrillation, sick sinus syndrome status post pacemaker, insulin-dependent diabetes mellitus, BPH, GERD, neuropathic pain, that presents in setting of generalized weakness, shortness of breath and cough    1. Acute hypoxic respiratory failure secondary to COVID-19 pneumonia. Also in the setting of increased BNP and history of coronary artery disease concerns heart failure  -Oxygen therapy to maintain oxygen saturation more than 90%  -Start Decadron 6 mg p.o. and increase dosage if worsening hypoxia  -CRP of 9.5  -Start baricitinib  -Obtain procalcitonin  -Hold off on antibiotics  -Start IV Lasix 20 mg daily  -Strict input and output  -Daily weights  -Echocardiogram if possible  -Self proning  -Symptomatic management  -Incentive spirometer    2. Paroxysmal atrial fibrillation  -Telemetry monitoring  -Continue Eliquis  -Hold carvedilol for now    3. Coronary artery disease  -Continue Eliquis and statin    4. Insulin-dependent diabetes mellitus  -Start basal insulin  -Insulin sliding scale  -Blood sugar checks before meals and at bedtime    5. Hypertension  -Hold losartan, Imdur, carvedilol since currently normotensive and starting Lasix    6. GERD  -Continue pantoprazole    7. BPH  -Continue finasteride and tamsulosin    8. Neuropathic pain  -Continue home dose Lyrica    9.   Troponinemia likely in the setting of demand ischemia  -Currently no chest pain  -EKG without acute ischemic changes  -Telemetry monitoring  -Trend troponins  -EKG as needed    DVT prophylaxis with Eliquis    Full code    Patient aware of plan      CHIEF COMPLAINT:  Generalized weakness, shortness of breath, cough      HISTORY OF PRESENT ILLNESS:  35-year-old male with a past medical history of hypertension, hyperlipidemia, coronary artery disease status post bypass, CVA, paroxysmal atrial fibrillation, sick sinus syndrome status post pacemaker, insulin-dependent diabetes mellitus, BPH, GERD, neuropathic pain, that presents in setting of generalized weakness, shortness of breath and cough. The patient states that over the past couple of weeks he has been presenting with generalized weakness associated with shortness of breath, dry cough and body aches. He symptoms did not improve so he decided to come to the emergency department. He has also been experiencing some chills but no fevers. Otherwise denies any chest pain, abdominal pain, no nausea vomiting or diarrhea. In the emergency department the patient was found to be hypoxic on ambulation with oxygen saturation on the 80s. Rapid Covid test positive. Radiologic findings compatible with viral pneumonia. He will be admitted to the medical floors for further management. Past Medical History:   Diagnosis Date    Acute kidney failure, unspecified ([address] 9/17/2010    Anxiety state, unspecified 11/14/2013    Arteriosclerosis of bypass graft of coronary artery 8/27/2015    CAD (coronary artery disease)     Cath on 3- showed 5/5 patent bypass grafts with LV EF=60% and a 90% stenosis in native LAD distal to LIMA graft anastomosis. He received a Xience ZAK (2.25 x 18) to LAD.  Carotid artery disease ([address] 03/28/2019    Bilateral ICA:50-69% on carotid ultrasound.     Carotid artery stenosis without cerebral infarction 07/07/2008    CVA (cerebral infarction) 5/25/2011    Diabetes ([address]     Diabetes mellitus ([address] 5/4/2009    Dilated Cardiomyopathy,Ischemic 2/26/2010    Dyslipidemia 5/4/2009    Elevated prostate specific antigen (PSA) 11/14/2013    Essential hypertension 11/14/2013    GERD (gastroesophageal reflux disease)     Hypertension     Nocturia     Other and unspecified hyperlipidemia     Pacemaker 6/21/2014    Palpitations 2006    Paroxysmal atrial fibrillation (HCC) 11/30/2010    Paroxysmal ventricular tachycardia ([address] 11/30/2010    Post PTCA 5/4/2009    stent to left main     Psychiatric disorder     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    S/P CABG (coronary artery bypass graft) 5/4/2009    LIMA TO LAD, SV TO OM, SV TO RCA, SV TO DIAG     S/P PTCA (percutaneous transluminal coronary angioplasty) 3/11/2016    Sick sinus syndrome ([address] 09/06/2007    Snoring, Possible sleep apnea 2/26/2010    SSS (sick sinus syndrome) ([address] 6/21/2014    Stroke (cerebrum) ([address]     TIA 2/2011    Stroke, embolic ([address] 0/40/5104    Syncope and collapse 8/19/2011    Unspecified malignant neoplasm of skin, site unspecified        Past Surgical History:   Procedure Laterality Date    COLONOSCOPY  1990    EGD  2010    esophageal ulcer    HX CHOLECYSTECTOMY      biliary dyskinesia    HX CORONARY ARTERY BYPASS GRAFT      HX CORONARY STENT PLACEMENT      HX HEART CATHETERIZATION  4/30/2013    no intervention    HX HEART CATHETERIZATION  6/23/2014    no intervention    HX HEART CATHETERIZATION      MULTIPLE ([place])    HX ORTHOPAEDIC      knee surgery x 2 left    HX OTHER SURGICAL      nerve in back    HX PACEMAKER      [place] CARDIAC SURG PROCEDURE UNLIST      bypass x 5; 2 stents    [place] LEFT HEART CATH,PERCUTANEOUS  11/30/2010    native circ x 1          HOME MEDICATION:  Prior to Admission medications    Medication Sig Start Date End Date Taking? Authorizing Provider   nitroglycerin (NITROSTAT) 0.4 mg SL tablet 1 Tablet by SubLINGual route every five (5) minutes as needed for Chest Pain. Up to 3 doses. 11/11/21   Noe Gillette MD   isosorbide mononitrate ER (Imdur) 60 mg CR tablet Take 1 Tablet by mouth two (2) times a day.  8/12/21   Noe Gillette MD   HYDROcodone-acetaminophen (Norco)  mg tablet Take 1 Tablet by mouth. prn    Provider, Historical   insulin detemir U-100 (LEVEMIR) 100 unit/mL injection by SubCUTAneous route nightly. Provider, Historical   Eliquis 5 mg tablet TAKE 1 TABLET BY MOUTH TWICE DAILY 21   Jaky Velez MD   losartan (COZAAR) 50 mg tablet TAKE 1 TABLET BY MOUTH DAILY  Patient taking differently: Take 12.5 mg by mouth daily. TAKE 1 TABLET BY MOUTH DAILY 21   Jaky Velez MD   pravastatin (PRAVACHOL) 80 mg tablet Take 1 Tab by mouth nightly. 21   Jaky Velez MD   carvediloL (COREG) 12.5 mg tablet TAKE 1 TABLET BY MOUTH TWICE DAILY WITH MEALS  Patient taking differently: 6.25 mg two (2) times daily (with meals). 3/12/21   Jaky Velez MD   tamsulosin (FLOMAX) 0.4 mg capsule TAKE 1 CAPSULE BY MOUTH DAILY 18   Cristina Leigh MD   finasteride (PROSCAR) 5 mg tablet TAKE 1 TABLET BY MOUTH DAILY 18   Cristina Leigh MD   esomeprazole (NEXIUM) 40 mg capsule Take  by mouth daily. Provider, Historical   pregabalin (LYRICA) 300 mg capsule Take 300 mg by mouth two (2) times a day. Provider, Historical   fluticasone (FLONASE) 50 mcg/actuation nasal spray 2 Sprays by Both Nostrils route once. Provider, Historical   trazodone (DESYREL) 100 mg tablet Take 150 mg by mouth nightly.     Other, MD Ana         REVIEW OF SYSTEMS:  14 ROS negative except from stated on HPI      Social History     Tobacco Use    Smoking status: Former Smoker     Packs/day: 3.00     Years: 40.00     Pack years: 120.00     Quit date: 1999     Years since quittin.9    Smokeless tobacco: Never Used   Substance Use Topics    Alcohol use: No    Drug use: No         Family History   Problem Relation Age of Onset    Diabetes Mother     Heart Disease Mother     Heart Disease Sister     Cancer Brother         Eye    Heart Disease Brother     Migraines Brother          Allergies   Allergen Reactions    Beta-Blockers (Beta-Adrenergic Blocking Agts) Other (comments)     \"Very low blood pressures with every one they tried\"    Shellfish Derived Angioedema     Only shrimp causes reaction. Can eat all other shellfish    Xanax [Alprazolam] Other (comments)     Totally changes his personality         Vitals:    12/11/21 1302   BP: 110/74   Pulse: 100   Resp: 20   Temp: 97.3 °F (36.3 °C)   SpO2: 90%   Weight: 91.6 kg (202 lb)   Height: 5' 7\" (1.702 m)         PHYSICAL EXAM:  General: Alert, oriented, NAD  HEENT: NC/AT, EOM are intact  Neck: supple, no JVD  Cardiovascular: RRR, S1, S2, no murmurs  Respiratory: Coarse breath sounds, no wheezes  Abdomen: Soft, NT, ND  Back: No CVA tenderness, no paraspinal tenderness  Extremities: LE without pedal edema, no erythema  Neuro: A&O, CN are intact, no focal deficits  Skin: no rash or ulcers  Psych: good mood and affect      I have personally reviewed patients laboratory data showing  Lab Results   Component Value Date    WBC 6.9 12/11/2021    HGB 13.6 12/11/2021    HCT 40.3 (L) 12/11/2021    MCV 76.5 (L) 12/11/2021     12/11/2021     Lab Results   Component Value Date    CKMB <0.5 05/05/2009     Lab Results   Component Value Date     (H) 12/11/2021     12/11/2021    K 3.5 12/11/2021    CO2 20 (L) 12/11/2021     12/11/2021    BUN 19 12/11/2021         I have personally reviewed patients EKG showing  Sinus rhythm, no acute ischemic changes      I have personally reviewed patients imaging showing  XR CHEST PORT   Final Result   Low lung volumes with bilateral infiltrates.             Likely length of stay more than 2 midnights

## 2021-12-11 NOTE — ROUTINE PROCESS
TRANSFER - IN REPORT:    Verbal report received from JUAN Cisse (name) on Fabienne Guerin  being received from ED (unit) for routine progression of care      Report consisted of patients Situation, Background, Assessment and   Recommendations(SBAR). Information from the following report(s) SBAR, Kardex and ED Summary was reviewed with the receiving nurse. Opportunity for questions and clarification was provided. Assessment completed upon patients arrival to unit and care assumed. SBAR received and given to primary receiving RN, Joe Chaves. Patient not on 8th @ transfer-in time.

## 2021-12-11 NOTE — ED PROVIDER NOTES
72-year-old white male who is a fairly poor historian presents with generalized weakness and aching all over. States that symptoms have actually been present for close to a month. Symptoms worsened over the past couple days. Has had slight cough. Denies shortness of breath. Has been having some abdominal pain and loose stool and 5 days ago underwent stenting of mesenteric arteries. Denies vomiting. Denies chest pain. Does report that his daughter is currently hospitalized with Covid. Patient has had 2 vaccinations    The history is provided by the patient. Fatigue  Pertinent negatives include no shortness of breath, no chest pain, no vomiting, no headaches and no nausea. Past Medical History:   Diagnosis Date    Acute kidney failure, unspecified ([address] 9/17/2010    Anxiety state, unspecified 11/14/2013    Arteriosclerosis of bypass graft of coronary artery 8/27/2015    CAD (coronary artery disease)     Cath on 3- showed 5/5 patent bypass grafts with LV EF=60% and a 90% stenosis in native LAD distal to LIMA graft anastomosis. He received a Xience ZAK (2.25 x 18) to LAD.  Carotid artery disease ([address] 03/28/2019    Bilateral ICA:50-69% on carotid ultrasound.     Carotid artery stenosis without cerebral infarction 07/07/2008    CVA (cerebral infarction) 5/25/2011    Diabetes ([address]     Diabetes mellitus ([address] 5/4/2009    Dilated Cardiomyopathy,Ischemic 2/26/2010    Dyslipidemia 5/4/2009    Elevated prostate specific antigen (PSA) 11/14/2013    Essential hypertension 11/14/2013    GERD (gastroesophageal reflux disease)     Hypertension     Nocturia     Other and unspecified hyperlipidemia     Pacemaker 6/21/2014    Palpitations 2006    Paroxysmal atrial fibrillation ([address] 11/30/2010    Paroxysmal ventricular tachycardia ([address] 11/30/2010    Post PTCA 5/4/2009    stent to left main     Psychiatric disorder     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  S/P CABG (coronary artery bypass graft) 2009    LIMA TO LAD, SV TO OM, SV TO RCA, SV TO DIAG     S/P PTCA (percutaneous transluminal coronary angioplasty) 3/11/2016    Sick sinus syndrome ([facility] 2007    Snoring, Possible sleep apnea 2010    SSS (sick sinus syndrome) ([address] 2014    Stroke [facility]cerebrum[facility]     TIA 2011    Stroke, embolic ([address]     Syncope and collapse 2011    Unspecified malignant neoplasm of skin, site unspecified        Past Surgical History:   Procedure Laterality Date    COLONOSCOPY      EGD      esophageal ulcer    HX CHOLECYSTECTOMY      biliary dyskinesia    HX CORONARY ARTERY BYPASS GRAFT      HX CORONARY STENT PLACEMENT      HX HEART CATHETERIZATION  2013    no intervention    HX HEART CATHETERIZATION  2014    no intervention    HX HEART CATHETERIZATION      MULTIPLE ([address]    HX ORTHOPAEDIC      knee surgery x 2 left    HX OTHER SURGICAL      nerve in back    HX PACEMAKER      MO CARDIAC SURG PROCEDURE UNLIST      bypass x 5; 2 stents    MO LEFT HEART CATH,PERCUTANEOUS  2010    native circ x 1         Family History:   Problem Relation Age of Onset    Diabetes Mother     Heart Disease Mother     Heart Disease Sister     Cancer Brother         Eye    Heart Disease Brother     Migraines Brother        Social History     Socioeconomic History    Marital status:      Spouse name: Not on file    Number of children: Not on file    Years of education: Not on file    Highest education level: Not on file   Occupational History    Not on file   Tobacco Use    Smoking status: Former Smoker     Packs/day: 3.00     Years: 40.00     Pack years: 120.00     Quit date: 1999     Years since quittin.9    Smokeless tobacco: Never Used   Substance and Sexual Activity    Alcohol use: No    Drug use: No    Sexual activity: Not on file   Other Topics Concern    Caffeine Concern Not Asked    Back Care Not Asked    Exercise Not Asked    Occupational Exposure Not Asked    Sleep Concern Not Asked    Stress Concern Not Asked    Weight Concern Not Asked   Social History Narrative    Not on file     Social Determinants of Health     Financial Resource Strain:     Difficulty of Paying Living Expenses: Not on file   Food Insecurity:     Worried About Running Out of Food in the Last Year: Not on file    Debra of Food in the Last Year: Not on file   Transportation Needs:     Lack of Transportation (Medical): Not on file    Lack of Transportation (Non-Medical): Not on file   Physical Activity:     Days of Exercise per Week: Not on file    Minutes of Exercise per Session: Not on file   Stress:     Feeling of Stress : Not on file   Social Connections:     Frequency of Communication with Friends and Family: Not on file    Frequency of Social Gatherings with Friends and Family: Not on file    Attends Hinduism Services: Not on file    Active Member of [address] [place] [address] or Organizations: Not on file    Attends Club or Organization Meetings: Not on file    Marital Status: Not on file   Intimate Partner Violence:     Fear of Current or Ex-Partner: Not on file    Emotionally Abused: Not on file    Physically Abused: Not on file    Sexually Abused: Not on file   Housing Stability:     Unable to Pay for Housing in the Last Year: Not on file    Number of [place] in the Last Year: Not on file    Unstable Housing in the Last Year: Not on file         ALLERGIES: Beta-blockers (beta-adrenergic blocking agts), Shellfish derived, and Xanax [alprazolam]    Review of Systems   Constitutional: Positive for fatigue. HENT: Negative for congestion. Respiratory: Positive for cough. Negative for shortness of breath. Cardiovascular: Negative for chest pain. Gastrointestinal: Positive for abdominal pain and diarrhea. Negative for nausea and vomiting. Genitourinary: Negative for dysuria.    Musculoskeletal: Negative for back pain and neck pain. Neurological: Negative for headaches. All other systems reviewed and are negative. Vitals:    12/11/21 1302   BP: 110/74   Pulse: 100   Resp: 20   Temp: 97.3 °F (36.3 °C)   SpO2: 90%   Weight: 91.6 kg (202 lb)   Height: 5' 7\" (1.702 m)            Physical Exam  Vitals and nursing note reviewed. Constitutional:       General: He is not in acute distress. Appearance: Normal appearance. He is not toxic-appearing. HENT:      Head: Normocephalic and atraumatic. Nose: Nose normal.      Mouth/Throat:      Mouth: Mucous membranes are moist.      Pharynx: Oropharynx is clear. Eyes:      Conjunctiva/sclera: Conjunctivae normal.      Pupils: Pupils are equal, round, and reactive to light. Cardiovascular:      Rate and Rhythm: Normal rate and regular rhythm. Pulmonary:      Comments: Appears dyspneic and has diffuse crackles throughout  Abdominal:      General: There is no distension. Palpations: Abdomen is soft. Tenderness: There is abdominal tenderness. There is no guarding. Comments: Generalized abdominal tenderness without rebound rigidity   Musculoskeletal:         General: No tenderness. Normal range of motion. Cervical back: Normal range of motion and neck supple. Skin:     General: Skin is warm and dry. Neurological:      Mental Status: He is alert and oriented to person, place, and time. Psychiatric:         Mood and Affect: Mood normal.         Behavior: Behavior normal.          MDM  Number of Diagnoses or Management Options  COVID-19 virus infection  Hypoxia  Diagnosis management comments: EKG shows sinus tachycardia at 103 with nonspecific interventricular delay and LVH but no diagnostic ST changes. Rapid Covid is positive. Chest x-ray shows bilateral infiltrate consistent with Covid. Patient is requiring supplemental oxygen and does appear much more comfortable on oxygen.   Blood work shows hyperglycemia to 90 without DKA, elevated troponin 199, elevated CRP 9.5. As patient is dyspneic and O2 saturation of 90% which drops into the 80s with activity, will discuss with hospitalist for admission.        Amount and/or Complexity of Data Reviewed  Clinical lab tests: ordered and reviewed  Tests in the radiology section of CPT®: ordered and reviewed    Risk of Complications, Morbidity, and/or Mortality  Presenting problems: moderate  Diagnostic procedures: moderate  Management options: moderate           EKG    Date/Time: 12/11/2021 2:31 PM  Performed by: Zhao Victor MD  Authorized by: Zhao Victor MD     ECG reviewed by ED Physician in the absence of a cardiologist: yes    Interpretation:     Interpretation: abnormal    Rate:     ECG rate:  103    ECG rate assessment: tachycardic    Rhythm:     Rhythm: sinus tachycardia    Ectopy:     Ectopy: none    QRS:     QRS axis:  Normal  Conduction:     Conduction: abnormal      Abnormal conduction: non-specific intraventricular conduction delay    ST segments:     ST segments:  Normal  T waves:     T waves: normal    Other findings:     Other findings: LVH

## 2021-12-11 NOTE — PROGRESS NOTES
INITIAL SPIRITUAL ASSESSMENT     NOTED:      PATIENT IS BEING ADMITTED TO FLOOR FROM ER    [facility]    SON - MAYA    DAUGHTER -  ASHLEY    LIVES IN [facility]        FULL CODE    NO DIRECTIVES        WILL ASSESS HOW WE CAN BEST SERVE THIS FAMILY

## 2021-12-11 NOTE — PROGRESS NOTES
TRANSFER - IN REPORT:    Verbal report received from [employer] (charge nurse) (name) on Jackelyn Mg  being received from ER (unit) for routine progression of care      Report consisted of patients Situation, Background, Assessment and   Recommendations(SBAR). Information from the following report(s) SBAR was reviewed with the receiving nurse. Opportunity for questions and clarification was provided. Arrives to room from ER via stretcher and transport. Care assume upon patients arrival to unit. Respiration even and unlabored 22/min at rest. O2 intact and patent with 2 liters via NC with O2 sats 93% at rest. To place remote tele monitor as soon as it is available from telemetry. Remains on droplet plus isolation for positive COVID test. Ordered PPE in place and in use per protocol. To report off to oncoming nurse.

## 2021-12-12 LAB
ANION GAP SERPL CALC-SCNC: 8 MMOL/L (ref 7–16)
ATRIAL RATE: 103 BPM
BASOPHILS # BLD: 0 K/UL (ref 0–0.2)
BASOPHILS NFR BLD: 0 % (ref 0–2)
BUN SERPL-MCNC: 25 MG/DL (ref 8–23)
CALCIUM SERPL-MCNC: 9.2 MG/DL (ref 8.3–10.4)
CALCULATED P AXIS, ECG09: -109 DEGREES
CALCULATED R AXIS, ECG10: -38 DEGREES
CALCULATED T AXIS, ECG11: 107 DEGREES
CHLORIDE SERPL-SCNC: 107 MMOL/L (ref 98–107)
CO2 SERPL-SCNC: 25 MMOL/L (ref 21–32)
CREAT SERPL-MCNC: 0.97 MG/DL (ref 0.8–1.5)
DIAGNOSIS, 93000: NORMAL
DIFFERENTIAL METHOD BLD: ABNORMAL
EOSINOPHIL # BLD: 0 K/UL (ref 0–0.8)
EOSINOPHIL NFR BLD: 0 % (ref 0.5–7.8)
ERYTHROCYTE [DISTWIDTH] IN BLOOD BY AUTOMATED COUNT: 13.4 % (ref 11.9–14.6)
GLUCOSE BLD STRIP.AUTO-MCNC: 184 MG/DL (ref 65–100)
GLUCOSE BLD STRIP.AUTO-MCNC: 222 MG/DL (ref 65–100)
GLUCOSE BLD STRIP.AUTO-MCNC: 241 MG/DL (ref 65–100)
GLUCOSE BLD STRIP.AUTO-MCNC: 264 MG/DL (ref 65–100)
GLUCOSE SERPL-MCNC: 231 MG/DL (ref 65–100)
HCT VFR BLD AUTO: 37.9 % (ref 41.1–50.3)
HGB BLD-MCNC: 12.5 G/DL (ref 13.6–17.2)
IMM GRANULOCYTES # BLD AUTO: 0.1 K/UL (ref 0–0.5)
IMM GRANULOCYTES NFR BLD AUTO: 2 % (ref 0–5)
LYMPHOCYTES # BLD: 0.7 K/UL (ref 0.5–4.6)
LYMPHOCYTES NFR BLD: 17 % (ref 13–44)
MCH RBC QN AUTO: 25.3 PG (ref 26.1–32.9)
MCHC RBC AUTO-ENTMCNC: 33 G/DL (ref 31.4–35)
MCV RBC AUTO: 76.7 FL (ref 79.6–97.8)
MONOCYTES # BLD: 0.3 K/UL (ref 0.1–1.3)
MONOCYTES NFR BLD: 8 % (ref 4–12)
NEUTS SEG # BLD: 2.8 K/UL (ref 1.7–8.2)
NEUTS SEG NFR BLD: 73 % (ref 43–78)
NRBC # BLD: 0 K/UL (ref 0–0.2)
P-R INTERVAL, ECG05: 166 MS
PLATELET # BLD AUTO: 231 K/UL (ref 150–450)
PMV BLD AUTO: 9.9 FL (ref 9.4–12.3)
POTASSIUM SERPL-SCNC: 3.4 MMOL/L (ref 3.5–5.1)
Q-T INTERVAL, ECG07: 367 MS
QRS DURATION, ECG06: 119 MS
QTC CALCULATION (BEZET), ECG08: 481 MS
RBC # BLD AUTO: 4.94 M/UL (ref 4.23–5.6)
SERVICE CMNT-IMP: ABNORMAL
SODIUM SERPL-SCNC: 140 MMOL/L (ref 136–145)
VENTRICULAR RATE, ECG03: 103 BPM
WBC # BLD AUTO: 3.9 K/UL (ref 4.3–11.1)

## 2021-12-12 PROCEDURE — 74011250637 HC RX REV CODE- 250/637: Performed by: INTERNAL MEDICINE

## 2021-12-12 PROCEDURE — 74011636637 HC RX REV CODE- 636/637: Performed by: EMERGENCY MEDICINE

## 2021-12-12 PROCEDURE — 77010033678 HC OXYGEN DAILY

## 2021-12-12 PROCEDURE — 74011250637 HC RX REV CODE- 250/637: Performed by: FAMILY MEDICINE

## 2021-12-12 PROCEDURE — 36415 COLL VENOUS BLD VENIPUNCTURE: CPT

## 2021-12-12 PROCEDURE — 74011250637 HC RX REV CODE- 250/637: Performed by: HOSPITALIST

## 2021-12-12 PROCEDURE — 82962 GLUCOSE BLOOD TEST: CPT

## 2021-12-12 PROCEDURE — 74011636637 HC RX REV CODE- 636/637: Performed by: INTERNAL MEDICINE

## 2021-12-12 PROCEDURE — 80048 BASIC METABOLIC PNL TOTAL CA: CPT

## 2021-12-12 PROCEDURE — 65270000029 HC RM PRIVATE

## 2021-12-12 PROCEDURE — 85025 COMPLETE CBC W/AUTO DIFF WBC: CPT

## 2021-12-12 PROCEDURE — 74011250636 HC RX REV CODE- 250/636: Performed by: INTERNAL MEDICINE

## 2021-12-12 RX ORDER — TRAZODONE HYDROCHLORIDE 50 MG/1
300 TABLET ORAL
Status: DISCONTINUED | OUTPATIENT
Start: 2021-12-12 | End: 2021-12-17 | Stop reason: HOSPADM

## 2021-12-12 RX ORDER — HYDROCODONE BITARTRATE AND ACETAMINOPHEN 10; 325 MG/1; MG/1
1 TABLET ORAL
Status: DISCONTINUED | OUTPATIENT
Start: 2021-12-12 | End: 2021-12-17 | Stop reason: HOSPADM

## 2021-12-12 RX ORDER — PRAVASTATIN SODIUM 20 MG/1
80 TABLET ORAL
Status: DISCONTINUED | OUTPATIENT
Start: 2021-12-12 | End: 2021-12-17 | Stop reason: HOSPADM

## 2021-12-12 RX ADMIN — BARICITINIB 4 MG: 1 TABLET, FILM COATED ORAL at 20:11

## 2021-12-12 RX ADMIN — TRAZODONE HYDROCHLORIDE 300 MG: 50 TABLET ORAL at 21:58

## 2021-12-12 RX ADMIN — INSULIN LISPRO 4 UNITS: 100 INJECTION, SOLUTION INTRAVENOUS; SUBCUTANEOUS at 17:11

## 2021-12-12 RX ADMIN — APIXABAN 5 MG: 5 TABLET, FILM COATED ORAL at 20:11

## 2021-12-12 RX ADMIN — Medication 5 ML: at 21:05

## 2021-12-12 RX ADMIN — PREGABALIN 300 MG: 150 CAPSULE ORAL at 17:11

## 2021-12-12 RX ADMIN — INSULIN LISPRO 4 UNITS: 100 INJECTION, SOLUTION INTRAVENOUS; SUBCUTANEOUS at 08:19

## 2021-12-12 RX ADMIN — PRAVASTATIN SODIUM 80 MG: 20 TABLET ORAL at 21:28

## 2021-12-12 RX ADMIN — INSULIN LISPRO 6 UNITS: 100 INJECTION, SOLUTION INTRAVENOUS; SUBCUTANEOUS at 21:28

## 2021-12-12 RX ADMIN — Medication 10 ML: at 05:43

## 2021-12-12 RX ADMIN — FINASTERIDE 5 MG: 5 TABLET, FILM COATED ORAL at 08:17

## 2021-12-12 RX ADMIN — FUROSEMIDE 20 MG: 10 INJECTION, SOLUTION INTRAMUSCULAR; INTRAVENOUS at 08:18

## 2021-12-12 RX ADMIN — APIXABAN 5 MG: 5 TABLET, FILM COATED ORAL at 08:18

## 2021-12-12 RX ADMIN — PREGABALIN 300 MG: 150 CAPSULE ORAL at 08:18

## 2021-12-12 RX ADMIN — INSULIN GLARGINE 35 UNITS: 100 INJECTION, SOLUTION SUBCUTANEOUS at 17:12

## 2021-12-12 RX ADMIN — Medication 10 ML: at 08:20

## 2021-12-12 RX ADMIN — HYDROCODONE BITARTRATE AND ACETAMINOPHEN 1 TABLET: 10; 325 TABLET ORAL at 20:11

## 2021-12-12 RX ADMIN — INSULIN LISPRO 2 UNITS: 100 INJECTION, SOLUTION INTRAVENOUS; SUBCUTANEOUS at 11:47

## 2021-12-12 RX ADMIN — HYDROCODONE BITARTRATE AND ACETAMINOPHEN 1 TABLET: 10; 325 TABLET ORAL at 14:00

## 2021-12-12 RX ADMIN — PANTOPRAZOLE SODIUM 40 MG: 40 TABLET, DELAYED RELEASE ORAL at 05:43

## 2021-12-12 RX ADMIN — TAMSULOSIN HYDROCHLORIDE 0.4 MG: 0.4 CAPSULE ORAL at 08:18

## 2021-12-12 RX ADMIN — INSULIN GLARGINE 35 UNITS: 100 INJECTION, SOLUTION SUBCUTANEOUS at 08:19

## 2021-12-12 RX ADMIN — HYDROCODONE BITARTRATE AND ACETAMINOPHEN 1 TABLET: 10; 325 TABLET ORAL at 05:53

## 2021-12-12 RX ADMIN — DEXAMETHASONE 6 MG: 4 TABLET ORAL at 08:17

## 2021-12-12 NOTE — PROGRESS NOTES
Hospitalist Progress Note   Admit Date:  2021 12:58 PM   Name:  Maurice Rener   Age:  70 y.o. Sex:  male  :  1950   MRN:  [id number]   Room:      Presenting Complaint: Fatigue    Reason(s) for Admission: Acute hypoxemic respiratory failure ([facility] [J96.01]  Pneumonia due to COVID-19 virus [U07.1, J12.82]     Hospital Course & Interval History:   Mr. Jb Naqvi is a nice 69 y/o WM with a h/o HTN, HLD, CAD s/p CABG, AFib, SSS s/p PPM, IDDM, BPH, GERD, CVA and peripheral neuropathy who was admitted to our service on  with acute hypoxemic respiratory failure and bilateral pneumonia 2/2 COVID-19. He was started on supplemental O2, baricitinib and dexamethasone. Subjective (21):  Awake, about to eat breakfast. Breathing seems about the same. Still on 3L NC O2. No chest pain, N/V/D. Assessment & Plan:   # Acute hypoxemic respiratory failure and bilateral pneumonia 2/2 COVID-19   - Dexamethasone, baricitinib, currently on 3L NC O2. Prone as able. # HTN   - BPs low overnight so meds held    # Celiac artery stenosis   - Recently underwent stent placement at [facility]. # HLD   - Pravastatin    # AFib   - Eliquis. # GERD   - PPI    # IDDM   - Basal + bolus, likely will have to adjust further with steroids. # BPH   - Flomax, Proscar    # CAD s/p CABG    # SSS s/p PPM    Dispo/Discharge Planning: Likely home when able. Diet:  ADULT DIET Regular; 3 carb choices (45 gm/meal);  Low Sodium (2 gm)  DVT PPx: Eliquis  Code status: Full Code    Hospital Problems as of 2021 Date Reviewed: 2021          Codes Class Noted - Resolved POA    * (Principal) Acute hypoxemic respiratory failure ([facility] ICD-10-CM: J96.01  ICD-9-CM: 518.81  2021 - Present Unknown        Pneumonia due to COVID-19 virus ICD-10-CM: U07.1, J12.82  ICD-9-CM: 480.8, 079.89  2021 - Present Unknown        CAD (coronary artery disease) (Chronic) ICD-10-CM: I25.10  ICD-9-CM: 414.00  2009 - Present Yes        Dyslipidemia (Chronic) ICD-10-CM: E78.5  ICD-9-CM: 272.4  5/4/2009 - Present Yes        Diabetes mellitus ([facility] (Chronic) ICD-10-CM: E11.9  ICD-9-CM: 250.00  5/4/2009 - Present Yes              Objective:     Patient Vitals for the past 24 hrs:   Temp Pulse Resp BP SpO2   12/12/21 0755 98.2 °F (36.8 °C) 73 20 (!) 144/72 96 %   12/12/21 0400  70      12/12/21 0312 97.6 °F (36.4 °C) 70 16 123/61 95 %   12/12/21 0000  71      12/11/21 2247 97.6 °F (36.4 °C) 70 18 120/62 95 %   12/11/21 2000  71      12/11/21 1943 98.5 °F (36.9 °C) 70 18 123/66 90 %   12/11/21 1745  70 19 126/74 95 %   12/11/21 1730  71 19 130/72 95 %   12/11/21 1711  79 17 129/68 91 %   12/11/21 1656  70 20 104/68 95 %   12/11/21 1641  71 19 121/66 95 %   12/11/21 1626  70 19 109/69 96 %   12/11/21 1611  70 19 104/69 96 %   12/11/21 1556  78 23 126/71 94 %   12/11/21 1541  78 21 120/70 96 %   12/11/21 1526  82 21 120/78 90 %   12/11/21 1511  69 18 104/65 97 %   12/11/21 1456  70 20 116/71 90 %   12/11/21 1441  77 18 (!) 98/54 91 %   12/11/21 1426  89 20 (!) 91/54 91 %   12/11/21 1411  90 21 105/63 90 %   12/11/21 1356  86 19 108/60 96 %   12/11/21 1302 97.3 °F (36.3 °C) 100 20 110/74 90 %     Oxygen Therapy  O2 Sat (%): 96 % (12/12/21 0755)  Pulse via Oximetry: 69 beats per minute (12/11/21 1745)  O2 Device: Nasal cannula (12/11/21 2017)  O2 Flow Rate (L/min): 3 l/min (12/11/21 2017)    Estimated body mass index is 30.23 kg/m² as calculated from the following:    Height as of this encounter: 5' 7\" (1.702 m). Weight as of this encounter: 87.5 kg (193 lb). Intake/Output Summary (Last 24 hours) at 12/12/2021 0952  Last data filed at 12/12/2021 0541  Gross per 24 hour   Intake 400 ml   Output 700 ml   Net -300 ml         Physical Exam:   Blood pressure (!) 144/72, pulse 73, temperature 98.2 °F (36.8 °C), resp. rate 20, height 5' 7\" (1.702 m), weight 87.5 kg (193 lb), SpO2 96 %. General:    Well nourished.   No overt distress  Head:  Normocephalic, atraumatic  Eyes:  Sclerae appear normal.  Pupils equally round. ENT:  Nares appear normal, no drainage. Moist oral mucosa  Neck:  No restricted ROM. Trachea midline   CV:   RRR. No m/r/g. No jugular venous distension. Lungs:   Bilateral rales, 3L NC O2, mild conversational dyspnea. No wheezes or rales. Abdomen: Bowel sounds present. Soft, nontender, nondistended. Extremities: No cyanosis or clubbing. No edema  Skin:     No rashes and normal coloration. Warm and dry. Neuro:  CN II-XII grossly intact. Sensation intact. A&Ox3  Psych:  Normal mood and affect.       I have reviewed ordered lab tests and independently visualized imaging below:    Recent Labs:  Recent Results (from the past 48 hour(s))   EKG, 12 LEAD, INITIAL    Collection Time: 12/11/21  1:09 PM   Result Value Ref Range    Ventricular Rate 103 BPM    Atrial Rate 103 BPM    P-R Interval 166 ms    QRS Duration 119 ms    Q-T Interval 367 ms    QTC Calculation (Bezet) 481 ms    Calculated P Axis -109 degrees    Calculated R Axis -38 degrees    Calculated T Axis 107 degrees    Diagnosis       Ectopic atrial tachycardia, unifocal  Nonspecific IVCD with LAD  LVH with secondary repolarization abnormality  Anterolateral infarct, age indeterminate  Baseline wander in lead(s) V2     COVID-19 RAPID TEST    Collection Time: 12/11/21  1:19 PM   Result Value Ref Range    Specimen source Nasopharyngeal      COVID-19 rapid test Detected (AA) NOTD     SARS-COV-2    Collection Time: 12/11/21  1:19 PM   Result Value Ref Range    SARS-CoV-2 Nasopharyngeal     CBC WITH AUTOMATED DIFF    Collection Time: 12/11/21  1:26 PM   Result Value Ref Range    WBC 6.9 4.3 - 11.1 K/uL    RBC 5.27 4.23 - 5.6 M/uL    HGB 13.6 13.6 - 17.2 g/dL    HCT 40.3 (L) 41.1 - 50.3 %    MCV 76.5 (L) 79.6 - 97.8 FL    MCH 25.8 (L) 26.1 - 32.9 PG    MCHC 33.7 31.4 - 35.0 g/dL    RDW 13.4 11.9 - 14.6 %    PLATELET 284 616 - 099 K/uL    MPV 9.9 9.4 - 12.3 FL    ABSOLUTE NRBC 0.00 0.0 - 0.2 K/uL    DF AUTOMATED      NEUTROPHILS 85 (H) 43 - 78 %    LYMPHOCYTES 7 (L) 13 - 44 %    MONOCYTES 6 4.0 - 12.0 %    EOSINOPHILS 1 0.5 - 7.8 %    BASOPHILS 0 0.0 - 2.0 %    IMMATURE GRANULOCYTES 1 0.0 - 5.0 %    ABS. NEUTROPHILS 5.8 1.7 - 8.2 K/UL    ABS. LYMPHOCYTES 0.5 0.5 - 4.6 K/UL    ABS. MONOCYTES 0.4 0.1 - 1.3 K/UL    ABS. EOSINOPHILS 0.1 0.0 - 0.8 K/UL    ABS. BASOPHILS 0.0 0.0 - 0.2 K/UL    ABS. IMM. GRANS. 0.1 0.0 - 0.5 K/UL   METABOLIC PANEL, COMPREHENSIVE    Collection Time: 12/11/21  1:26 PM   Result Value Ref Range    Sodium 139 136 - 145 mmol/L    Potassium 3.5 3.5 - 5.1 mmol/L    Chloride 107 98 - 107 mmol/L    CO2 20 (L) 21 - 32 mmol/L    Anion gap 12 7 - 16 mmol/L    Glucose 290 (H) 65 - 100 mg/dL    BUN 19 8 - 23 MG/DL    Creatinine 0.96 0.8 - 1.5 MG/DL    GFR est AA >60 >60 ml/min/1.73m2    GFR est non-AA >60 >60 ml/min/1.73m2    Calcium 9.9 8.3 - 10.4 MG/DL    Bilirubin, total 1.3 (H) 0.2 - 1.1 MG/DL    ALT (SGPT) 20 12 - 65 U/L    AST (SGOT) 25 15 - 37 U/L    Alk.  phosphatase 68 50 - 136 U/L    Protein, total 7.9 6.3 - 8.2 g/dL    Albumin 3.0 (L) 3.2 - 4.6 g/dL    Globulin 4.9 (H) 2.3 - 3.5 g/dL    A-G Ratio 0.6 (L) 1.2 - 3.5     MAGNESIUM    Collection Time: 12/11/21  1:26 PM   Result Value Ref Range    Magnesium 2.1 1.8 - 2.4 mg/dL   TROPONIN-HIGH SENSITIVITY    Collection Time: 12/11/21  1:26 PM   Result Value Ref Range    Troponin-High Sensitivity 199.9 (HH) 0 - 14 pg/mL   D DIMER    Collection Time: 12/11/21  1:26 PM   Result Value Ref Range    D DIMER 3.90 (H) <0.56 ug/ml(FEU)   C REACTIVE PROTEIN, QT    Collection Time: 12/11/21  1:26 PM   Result Value Ref Range    C-Reactive protein 9.5 (H) 0.0 - 0.9 mg/dL   NT-PRO BNP    Collection Time: 12/11/21  1:26 PM   Result Value Ref Range    NT pro-BNP 3,704 (H) 5 - 125 PG/ML   PROCALCITONIN    Collection Time: 12/11/21  1:26 PM   Result Value Ref Range    Procalcitonin 0.13 0.00 - 0.49 ng/mL TROPONIN-HIGH SENSITIVITY    Collection Time: 12/11/21  3:57 PM   Result Value Ref Range    Troponin-High Sensitivity 171.4 (HH) 0 - 14 pg/mL   GLUCOSE, POC    Collection Time: 12/11/21  8:55 PM   Result Value Ref Range    Glucose (POC) 333 (H) 65 - 100 mg/dL    Performed by [employer] SENSITIVITY    Collection Time: 12/11/21  9:21 PM   Result Value Ref Range    Troponin-High Sensitivity 119.7 (HH) 0 - 14 pg/mL   METABOLIC PANEL, BASIC    Collection Time: 12/12/21  6:43 AM   Result Value Ref Range    Sodium 140 136 - 145 mmol/L    Potassium 3.4 (L) 3.5 - 5.1 mmol/L    Chloride 107 98 - 107 mmol/L    CO2 25 21 - 32 mmol/L    Anion gap 8 7 - 16 mmol/L    Glucose 231 (H) 65 - 100 mg/dL    BUN 25 (H) 8 - 23 MG/DL    Creatinine 0.97 0.8 - 1.5 MG/DL    GFR est AA >60 >60 ml/min/1.73m2    GFR est non-AA >60 >60 ml/min/1.73m2    Calcium 9.2 8.3 - 10.4 MG/DL   CBC WITH AUTOMATED DIFF    Collection Time: 12/12/21  6:43 AM   Result Value Ref Range    WBC 3.9 (L) 4.3 - 11.1 K/uL    RBC 4.94 4.23 - 5.6 M/uL    HGB 12.5 (L) 13.6 - 17.2 g/dL    HCT 37.9 (L) 41.1 - 50.3 %    MCV 76.7 (L) 79.6 - 97.8 FL    MCH 25.3 (L) 26.1 - 32.9 PG    MCHC 33.0 31.4 - 35.0 g/dL    RDW 13.4 11.9 - 14.6 %    PLATELET 545 742 - 419 K/uL    MPV 9.9 9.4 - 12.3 FL    ABSOLUTE NRBC 0.00 0.0 - 0.2 K/uL    DF AUTOMATED      NEUTROPHILS 73 43 - 78 %    LYMPHOCYTES 17 13 - 44 %    MONOCYTES 8 4.0 - 12.0 %    EOSINOPHILS 0 (L) 0.5 - 7.8 %    BASOPHILS 0 0.0 - 2.0 %    IMMATURE GRANULOCYTES 2 0.0 - 5.0 %    ABS. NEUTROPHILS 2.8 1.7 - 8.2 K/UL    ABS. LYMPHOCYTES 0.7 0.5 - 4.6 K/UL    ABS. MONOCYTES 0.3 0.1 - 1.3 K/UL    ABS. EOSINOPHILS 0.0 0.0 - 0.8 K/UL    ABS. BASOPHILS 0.0 0.0 - 0.2 K/UL    ABS. IMM.  GRANS. 0.1 0.0 - 0.5 K/UL   GLUCOSE, POC    Collection Time: 12/12/21  7:44 AM   Result Value Ref Range    Glucose (POC) 222 (H) 65 - 100 mg/dL    Performed by [facility]        All Micro Results     Procedure Component Value Units Date/Time    COVID-19 RAPID TEST [[id number]]  (Abnormal) Collected: 12/11/21 1319    Order Status: Completed Specimen: Nasopharyngeal Updated: 12/11/21 1340     Specimen source Nasopharyngeal        COVID-19 rapid test Detected        Comment:      The specimen is POSITIVE for SARS-CoV-2, the novel coronavirus associated with COVID-19. This test has been authorized by the FDA under an Emergency Use Authorization (EUA) for use by authorized laboratories. Fact sheet for Healthcare Providers: CityOddsdate.co.nz  Fact sheet for Patients: Flitete.co.nz       Methodology: Isothermal Nucleic Acid Amplification               Other Studies:  XR CHEST PORT    Result Date: 12/11/2021  PORTABLE CHEST X-RAY 12/11/2021 1:47 PM HISTORY: Shortness of Breath COMPARISON: May 17, 2021 FINDINGS: The lung volumes are diminished. Patchy peripheral bibasilar infiltrates are present. Pleural spaces are clear. Median sternotomy wires are present along with a cardiac pacemaker device. EKG leads are present. Low lung volumes with bilateral infiltrates.       Current Meds:  Current Facility-Administered Medications   Medication Dose Route Frequency    HYDROcodone-acetaminophen (NORCO)  mg tablet 1 Tablet  1 Tablet Oral Q6H PRN    sodium chloride (NS) flush 5-10 mL  5-10 mL IntraVENous Q8H    sodium chloride (NS) flush 5-10 mL  5-10 mL IntraVENous PRN    apixaban (ELIQUIS) tablet 5 mg  5 mg Oral Q12H    pantoprazole (PROTONIX) tablet 40 mg  40 mg Oral ACB    finasteride (PROSCAR) tablet 5 mg  5 mg Oral DAILY    insulin glargine (LANTUS) injection 35 Units  35 Units SubCUTAneous BID    [Held by provider] isosorbide mononitrate ER (IMDUR) tablet 60 mg  60 mg Oral DAILY    [Held by provider] losartan (COZAAR) tablet 12.5 mg  12.5 mg Oral DAILY    pregabalin (LYRICA) capsule 300 mg  300 mg Oral BID    tamsulosin (FLOMAX) capsule 0.4 mg  0.4 mg Oral DAILY    [Held by provider] carvediloL (COREG) tablet 6.25 mg  6.25 mg Oral BID WITH MEALS    sodium chloride (NS) flush 5-40 mL  5-40 mL IntraVENous Q8H    sodium chloride (NS) flush 5-40 mL  5-40 mL IntraVENous PRN    acetaminophen (TYLENOL) tablet 650 mg  650 mg Oral Q6H PRN    Or    acetaminophen (TYLENOL) suppository 650 mg  650 mg Rectal Q6H PRN    polyethylene glycol (MIRALAX) packet 17 g  17 g Oral DAILY PRN    prochlorperazine (COMPAZINE) with saline injection 5 mg  5 mg IntraVENous Q6H PRN    dexAMETHasone (DECADRON) tablet 6 mg  6 mg Oral DAILY    furosemide (LASIX) injection 20 mg  20 mg IntraVENous DAILY    baricitinib (OLUMIANT) tablet 4 mg  4 mg Oral Q24H    albuterol (PROVENTIL HFA, VENTOLIN HFA, PROAIR HFA) inhaler 2 Puff  2 Puff Inhalation Q6H PRN    insulin lispro (HUMALOG) injection   SubCUTAneous AC&HS       Signed:  Daniella Brasher MD    Part of this note may have been written by using a voice dictation software. The note has been proof read but may still contain some grammatical/other typographical errors.

## 2021-12-12 NOTE — PROGRESS NOTES
Admission assessment completed. Dual skin assessment completed. Patient resting quietly. Respirations even and unlabored on 3L NC. No acute distress noted. Call light within reach. Instructed patient to call for any needs. Denies needs at this time.

## 2021-12-12 NOTE — ROUTINE PROCESS
Shift report received from Jodie Cushing, [facility]. Patient resting in bed with eyes closed. No distress noted on 3L N/C. SR up x2, bed low locked and call light within reach. Encouraged to call for needs.

## 2021-12-12 NOTE — PROGRESS NOTES
Problem: Airway Clearance - Ineffective  Goal: Achieve or maintain patent airway  Outcome: Progressing Towards Goal     Problem: Gas Exchange - Impaired  Goal: Absence of hypoxia  Outcome: Progressing Towards Goal  Goal: Promote optimal lung function  Outcome: Progressing Towards Goal     Problem: Breathing Pattern - Ineffective  Goal: Ability to achieve and maintain a regular respiratory rate  Outcome: Progressing Towards Goal     Problem: Body Temperature -  Risk of, Imbalanced  Goal: Ability to maintain a body temperature within defined limits  Outcome: Progressing Towards Goal  Goal: Will regain or maintain usual level of consciousness  Outcome: Progressing Towards Goal  Goal: Complications related to the disease process, condition or treatment will be avoided or minimized  Outcome: Progressing Towards Goal     Problem: Isolation Precautions - Risk of Spread of Infection  Goal: Prevent transmission of infectious organism to others  Outcome: Progressing Towards Goal     Problem: Fatigue  Goal: Verbalize increase energy and improved vitality  Outcome: Progressing Towards Goal     Problem: Falls - Risk of  Goal: *Absence of Falls  Description: Document Wendy Pearl Fall Risk and appropriate interventions in the flowsheet.   Outcome: Progressing Towards Goal  Note: Fall Risk Interventions:  Mobility Interventions: Patient to call before getting OOB         Medication Interventions: Patient to call before getting OOB

## 2021-12-13 LAB
ATRIAL RATE: 70 BPM
CALCULATED P AXIS, ECG09: 70 DEGREES
CALCULATED R AXIS, ECG10: 134 DEGREES
CALCULATED T AXIS, ECG11: -36 DEGREES
DIAGNOSIS, 93000: NORMAL
GLUCOSE BLD STRIP.AUTO-MCNC: 105 MG/DL (ref 65–100)
GLUCOSE BLD STRIP.AUTO-MCNC: 219 MG/DL (ref 65–100)
GLUCOSE BLD STRIP.AUTO-MCNC: 252 MG/DL (ref 65–100)
GLUCOSE BLD STRIP.AUTO-MCNC: 279 MG/DL (ref 65–100)
P-R INTERVAL, ECG05: 220 MS
Q-T INTERVAL, ECG07: 428 MS
QRS DURATION, ECG06: 142 MS
QTC CALCULATION (BEZET), ECG08: 462 MS
SERVICE CMNT-IMP: ABNORMAL
TROPONIN-HIGH SENSITIVITY: 54.1 PG/ML (ref 0–14)
VENTRICULAR RATE, ECG03: 70 BPM

## 2021-12-13 PROCEDURE — 74011250637 HC RX REV CODE- 250/637: Performed by: INTERNAL MEDICINE

## 2021-12-13 PROCEDURE — 65270000029 HC RM PRIVATE

## 2021-12-13 PROCEDURE — 82962 GLUCOSE BLOOD TEST: CPT

## 2021-12-13 PROCEDURE — 74011250637 HC RX REV CODE- 250/637: Performed by: HOSPITALIST

## 2021-12-13 PROCEDURE — 74011636637 HC RX REV CODE- 636/637: Performed by: INTERNAL MEDICINE

## 2021-12-13 PROCEDURE — 93005 ELECTROCARDIOGRAM TRACING: CPT | Performed by: INTERNAL MEDICINE

## 2021-12-13 PROCEDURE — 74011250636 HC RX REV CODE- 250/636: Performed by: INTERNAL MEDICINE

## 2021-12-13 PROCEDURE — 36415 COLL VENOUS BLD VENIPUNCTURE: CPT

## 2021-12-13 PROCEDURE — 77010033678 HC OXYGEN DAILY

## 2021-12-13 PROCEDURE — 74011636637 HC RX REV CODE- 636/637: Performed by: EMERGENCY MEDICINE

## 2021-12-13 PROCEDURE — 74011250637 HC RX REV CODE- 250/637: Performed by: FAMILY MEDICINE

## 2021-12-13 PROCEDURE — 94760 N-INVAS EAR/PLS OXIMETRY 1: CPT

## 2021-12-13 PROCEDURE — 84484 ASSAY OF TROPONIN QUANT: CPT

## 2021-12-13 RX ORDER — LOSARTAN POTASSIUM 25 MG/1
12.5 TABLET ORAL DAILY
Status: DISCONTINUED | OUTPATIENT
Start: 2021-12-13 | End: 2021-12-17 | Stop reason: HOSPADM

## 2021-12-13 RX ADMIN — HYDROCODONE BITARTRATE AND ACETAMINOPHEN 1 TABLET: 10; 325 TABLET ORAL at 17:48

## 2021-12-13 RX ADMIN — Medication 5 ML: at 05:04

## 2021-12-13 RX ADMIN — INSULIN LISPRO 6 UNITS: 100 INJECTION, SOLUTION INTRAVENOUS; SUBCUTANEOUS at 17:48

## 2021-12-13 RX ADMIN — Medication 5 ML: at 21:44

## 2021-12-13 RX ADMIN — INSULIN GLARGINE 35 UNITS: 100 INJECTION, SOLUTION SUBCUTANEOUS at 08:39

## 2021-12-13 RX ADMIN — PREGABALIN 300 MG: 150 CAPSULE ORAL at 08:38

## 2021-12-13 RX ADMIN — INSULIN GLARGINE 35 UNITS: 100 INJECTION, SOLUTION SUBCUTANEOUS at 22:12

## 2021-12-13 RX ADMIN — TAMSULOSIN HYDROCHLORIDE 0.4 MG: 0.4 CAPSULE ORAL at 08:38

## 2021-12-13 RX ADMIN — INSULIN LISPRO 6 UNITS: 100 INJECTION, SOLUTION INTRAVENOUS; SUBCUTANEOUS at 22:12

## 2021-12-13 RX ADMIN — DEXAMETHASONE 6 MG: 4 TABLET ORAL at 08:38

## 2021-12-13 RX ADMIN — FINASTERIDE 5 MG: 5 TABLET, FILM COATED ORAL at 08:39

## 2021-12-13 RX ADMIN — FUROSEMIDE 20 MG: 10 INJECTION, SOLUTION INTRAMUSCULAR; INTRAVENOUS at 08:38

## 2021-12-13 RX ADMIN — PANTOPRAZOLE SODIUM 40 MG: 40 TABLET, DELAYED RELEASE ORAL at 05:49

## 2021-12-13 RX ADMIN — APIXABAN 5 MG: 5 TABLET, FILM COATED ORAL at 08:38

## 2021-12-13 RX ADMIN — TRAZODONE HYDROCHLORIDE 300 MG: 50 TABLET ORAL at 22:12

## 2021-12-13 RX ADMIN — HYDROCODONE BITARTRATE AND ACETAMINOPHEN 1 TABLET: 10; 325 TABLET ORAL at 02:32

## 2021-12-13 RX ADMIN — HYDROCODONE BITARTRATE AND ACETAMINOPHEN 1 TABLET: 10; 325 TABLET ORAL at 10:18

## 2021-12-13 RX ADMIN — APIXABAN 5 MG: 5 TABLET, FILM COATED ORAL at 22:15

## 2021-12-13 RX ADMIN — LOSARTAN POTASSIUM 12.5 MG: 25 TABLET, FILM COATED ORAL at 09:18

## 2021-12-13 RX ADMIN — PREGABALIN 300 MG: 150 CAPSULE ORAL at 17:49

## 2021-12-13 RX ADMIN — Medication 10 ML: at 14:25

## 2021-12-13 RX ADMIN — PRAVASTATIN SODIUM 80 MG: 20 TABLET ORAL at 22:12

## 2021-12-13 RX ADMIN — INSULIN LISPRO 4 UNITS: 100 INJECTION, SOLUTION INTRAVENOUS; SUBCUTANEOUS at 12:43

## 2021-12-13 RX ADMIN — BARICITINIB 4 MG: 1 TABLET, FILM COATED ORAL at 22:15

## 2021-12-13 NOTE — PROGRESS NOTES
Hospitalist Progress Note   Admit Date:  2021 12:58 PM   Name:  Mirtha Olivera   Age:  70 y.o. Sex:  male  :  1950   MRN:  [id number]   Room:      Presenting Complaint: Fatigue    Reason(s) for Admission: Acute hypoxemic respiratory failure ([facility] [address] [J96.01]  Pneumonia due to COVID-19 virus [U07.1, J12.82]     Hospital Course & Interval History:   Mr. Marbella Beasley is a nice 71 y/o WM with a h/o HTN, HLD, CAD s/p CABG, AFib, SSS s/p PPM, IDDM, BPH, GERD, CVA and peripheral neuropathy who was admitted to our service on  with acute hypoxemic respiratory failure and bilateral pneumonia 2/2 COVID-19. He was started on supplemental O2, baricitinib and dexamethasone. Subjective (21): Still having some SOB but says he feels better than yesterday. He got up and sat in the chair and after sitting for about 5 min he developed chest pain that persisted for 10 minutes, he got back in bed and it ultimately resolved. No N/V/D, fevers, abdominal pain. Assessment & Plan:   # Acute hypoxemic respiratory failure and bilateral pneumonia 2/2 COVID-19              - Stable on 3L NC O2. Con't dexamethasone and baricitinib. If he remains on 3L by tomorrow and feels well could consider ambulatory oximetry to assess home O2 needs. # Atypical chest pain   - Given h/o CAD will check EKG and hsTrop     # HTN              - BPs low overnight so meds held     # Celiac artery stenosis              - Recently underwent stent placement at [facility].     # HLD              - Pravastatin     # AFib              - Eliquis.     # GERD              - PPI     # IDDM              - Basal + bolus, AM glucose 105 so no change for now. Follow today's trend.      # BPH              - Flomax, Proscar     # CAD s/p CABG     # SSS s/p PPM    Dispo/Discharge Planning: Likely home when able, may need home O2. Diet:  ADULT DIET Regular; 3 carb choices (45 gm/meal);  Low Sodium (2 gm)  DVT PPx: Eliquis  Code status: Full Code    Hospital Problems as of 12/13/2021 Date Reviewed: 11/18/2021          Codes Class Noted - Resolved POA    * (Principal) Acute hypoxemic respiratory failure (HCC) ICD-10-CM: J96.01  ICD-9-CM: 518.81  12/11/2021 - Present Unknown        Pneumonia due to COVID-19 virus ICD-10-CM: U07.1, J12.82  ICD-9-CM: 480.8, 079.89  12/11/2021 - Present Unknown        CAD (coronary artery disease) (Chronic) ICD-10-CM: I25.10  ICD-9-CM: 414.00  5/4/2009 - Present Yes        Dyslipidemia (Chronic) ICD-10-CM: E78.5  ICD-9-CM: 272.4  5/4/2009 - Present Yes        Diabetes mellitus ([facility] [address] (Chronic) ICD-10-CM: E11.9  ICD-9-CM: 250.00  5/4/2009 - Present Yes              Objective:     Patient Vitals for the past 24 hrs:   Temp Pulse Resp BP SpO2   12/13/21 1048 98.4 °F (36.9 °C) 70 20 130/64 95 %   12/13/21 0800  83      12/13/21 0752 98 °F (36.7 °C) 75 20 129/71 97 %   12/13/21 0400  69      12/13/21 0317 97.5 °F (36.4 °C) 70 20 122/75 97 %   12/13/21 0000  69      12/12/21 2249 97.9 °F (36.6 °C) 74 20 116/69 95 %   12/12/21 1956 98 °F (36.7 °C) 76 20 138/76 93 %   12/12/21 1945  72      12/12/21 1547 98 °F (36.7 °C) 71 20 (!) 151/78 96 %     Oxygen Therapy  O2 Sat (%): 95 % (12/13/21 1048)  Pulse via Oximetry: 69 beats per minute (12/11/21 1745)  O2 Device: Nasal cannula (12/13/21 0726)  O2 Flow Rate (L/min): 3 l/min (12/13/21 0726)    Estimated body mass index is 30.04 kg/m² as calculated from the following:    Height as of this encounter: 5' 7\" (1.702 m). Weight as of this encounter: 87 kg (191 lb 12.8 oz). Intake/Output Summary (Last 24 hours) at 12/13/2021 1158  Last data filed at 12/13/2021 0955  Gross per 24 hour   Intake 900 ml   Output 1400 ml   Net -500 ml         Physical Exam:   Blood pressure 130/64, pulse 70, temperature 98.4 °F (36.9 °C), resp. rate 20, height 5' 7\" (1.702 m), weight 87 kg (191 lb 12.8 oz), SpO2 95 %. General:    Well nourished.   No overt distress  Head:  Normocephalic, atraumatic  Eyes:  Sclerae appear normal.  Pupils equally round. ENT:  Nares appear normal, no drainage. Moist oral mucosa  Neck:  No restricted ROM. Trachea midline   CV:   RRR. No m/r/g. No jugular venous distension. Lungs:   BB rales, 3L NC O2. No wheezes or rales. Abdomen: Bowel sounds present. Soft, nontender, nondistended. Extremities: No cyanosis or clubbing. No edema  Skin:     No rashes and normal coloration. Warm and dry. Neuro:  CN II-XII grossly intact. Sensation intact. A&Ox3  Psych:  Normal mood and affect.       I have reviewed ordered lab tests and independently visualized imaging below:    Recent Labs:  Recent Results (from the past 48 hour(s))   EKG, 12 LEAD, INITIAL    Collection Time: 12/11/21  1:09 PM   Result Value Ref Range    Ventricular Rate 103 BPM    Atrial Rate 103 BPM    P-R Interval 166 ms    QRS Duration 119 ms    Q-T Interval 367 ms    QTC Calculation (Bezet) 481 ms    Calculated P Axis -109 degrees    Calculated R Axis -38 degrees    Calculated T Axis 107 degrees    Diagnosis       Sinus tachycardia  Nonspecific IVCD with LAD  LVH with secondary repolarization abnormality  Anterolateral infarct, age indeterminate  When compared with ECG of prior  Sinus tachycardia has replaced atrial paced rhythm  Confirmed by Amor Bang MD, Daiana Marie ([id number]) on 12/12/2021 7:12:50 PM     COVID-19 RAPID TEST    Collection Time: 12/11/21  1:19 PM   Result Value Ref Range    Specimen source Nasopharyngeal      COVID-19 rapid test Detected (AA) NOTD     SARS-COV-2    Collection Time: 12/11/21  1:19 PM   Result Value Ref Range    SARS-CoV-2 Nasopharyngeal     CBC WITH AUTOMATED DIFF    Collection Time: 12/11/21  1:26 PM   Result Value Ref Range    WBC 6.9 4.3 - 11.1 K/uL    RBC 5.27 4.23 - 5.6 M/uL    HGB 13.6 13.6 - 17.2 g/dL    HCT 40.3 (L) 41.1 - 50.3 %    MCV 76.5 (L) 79.6 - 97.8 FL    MCH 25.8 (L) 26.1 - 32.9 PG    MCHC 33.7 31.4 - 35.0 g/dL    RDW 13.4 11.9 - 14.6 %    PLATELET 052 316 - 450 K/uL    MPV 9.9 9.4 - 12.3 FL    ABSOLUTE NRBC 0.00 0.0 - 0.2 K/uL    DF AUTOMATED      NEUTROPHILS 85 (H) 43 - 78 %    LYMPHOCYTES 7 (L) 13 - 44 %    MONOCYTES 6 4.0 - 12.0 %    EOSINOPHILS 1 0.5 - 7.8 %    BASOPHILS 0 0.0 - 2.0 %    IMMATURE GRANULOCYTES 1 0.0 - 5.0 %    ABS. NEUTROPHILS 5.8 1.7 - 8.2 K/UL    ABS. LYMPHOCYTES 0.5 0.5 - 4.6 K/UL    ABS. MONOCYTES 0.4 0.1 - 1.3 K/UL    ABS. EOSINOPHILS 0.1 0.0 - 0.8 K/UL    ABS. BASOPHILS 0.0 0.0 - 0.2 K/UL    ABS. IMM. GRANS. 0.1 0.0 - 0.5 K/UL   METABOLIC PANEL, COMPREHENSIVE    Collection Time: 12/11/21  1:26 PM   Result Value Ref Range    Sodium 139 136 - 145 mmol/L    Potassium 3.5 3.5 - 5.1 mmol/L    Chloride 107 98 - 107 mmol/L    CO2 20 (L) 21 - 32 mmol/L    Anion gap 12 7 - 16 mmol/L    Glucose 290 (H) 65 - 100 mg/dL    BUN 19 8 - 23 MG/DL    Creatinine 0.96 0.8 - 1.5 MG/DL    GFR est AA >60 >60 ml/min/1.73m2    GFR est non-AA >60 >60 ml/min/1.73m2    Calcium 9.9 8.3 - 10.4 MG/DL    Bilirubin, total 1.3 (H) 0.2 - 1.1 MG/DL    ALT (SGPT) 20 12 - 65 U/L    AST (SGOT) 25 15 - 37 U/L    Alk.  phosphatase 68 50 - 136 U/L    Protein, total 7.9 6.3 - 8.2 g/dL    Albumin 3.0 (L) 3.2 - 4.6 g/dL    Globulin 4.9 (H) 2.3 - 3.5 g/dL    A-G Ratio 0.6 (L) 1.2 - 3.5     MAGNESIUM    Collection Time: 12/11/21  1:26 PM   Result Value Ref Range    Magnesium 2.1 1.8 - 2.4 mg/dL   TROPONIN-HIGH SENSITIVITY    Collection Time: 12/11/21  1:26 PM   Result Value Ref Range    Troponin-High Sensitivity 199.9 (HH) 0 - 14 pg/mL   D DIMER    Collection Time: 12/11/21  1:26 PM   Result Value Ref Range    D DIMER 3.90 (H) <0.56 ug/ml(FEU)   C REACTIVE PROTEIN, QT    Collection Time: 12/11/21  1:26 PM   Result Value Ref Range    C-Reactive protein 9.5 (H) 0.0 - 0.9 mg/dL   NT-PRO BNP    Collection Time: 12/11/21  1:26 PM   Result Value Ref Range    NT pro-BNP 3,704 (H) 5 - 125 PG/ML   PROCALCITONIN    Collection Time: 12/11/21  1:26 PM   Result Value Ref Range    Procalcitonin 0.13 0.00 - 0.49 ng/mL   TROPONIN-HIGH SENSITIVITY    Collection Time: 12/11/21  3:57 PM   Result Value Ref Range    Troponin-High Sensitivity 171.4 (HH) 0 - 14 pg/mL   GLUCOSE, POC    Collection Time: 12/11/21  8:55 PM   Result Value Ref Range    Glucose (POC) 333 (H) 65 - 100 mg/dL    Performed by Nancy Carrion SENSITIVITY    Collection Time: 12/11/21  9:21 PM   Result Value Ref Range    Troponin-High Sensitivity 119.7 (HH) 0 - 14 pg/mL   METABOLIC PANEL, BASIC    Collection Time: 12/12/21  6:43 AM   Result Value Ref Range    Sodium 140 136 - 145 mmol/L    Potassium 3.4 (L) 3.5 - 5.1 mmol/L    Chloride 107 98 - 107 mmol/L    CO2 25 21 - 32 mmol/L    Anion gap 8 7 - 16 mmol/L    Glucose 231 (H) 65 - 100 mg/dL    BUN 25 (H) 8 - 23 MG/DL    Creatinine 0.97 0.8 - 1.5 MG/DL    GFR est AA >60 >60 ml/min/1.73m2    GFR est non-AA >60 >60 ml/min/1.73m2    Calcium 9.2 8.3 - 10.4 MG/DL   CBC WITH AUTOMATED DIFF    Collection Time: 12/12/21  6:43 AM   Result Value Ref Range    WBC 3.9 (L) 4.3 - 11.1 K/uL    RBC 4.94 4.23 - 5.6 M/uL    HGB 12.5 (L) 13.6 - 17.2 g/dL    HCT 37.9 (L) 41.1 - 50.3 %    MCV 76.7 (L) 79.6 - 97.8 FL    MCH 25.3 (L) 26.1 - 32.9 PG    MCHC 33.0 31.4 - 35.0 g/dL    RDW 13.4 11.9 - 14.6 %    PLATELET 617 612 - 006 K/uL    MPV 9.9 9.4 - 12.3 FL    ABSOLUTE NRBC 0.00 0.0 - 0.2 K/uL    DF AUTOMATED      NEUTROPHILS 73 43 - 78 %    LYMPHOCYTES 17 13 - 44 %    MONOCYTES 8 4.0 - 12.0 %    EOSINOPHILS 0 (L) 0.5 - 7.8 %    BASOPHILS 0 0.0 - 2.0 %    IMMATURE GRANULOCYTES 2 0.0 - 5.0 %    ABS. NEUTROPHILS 2.8 1.7 - 8.2 K/UL    ABS. LYMPHOCYTES 0.7 0.5 - 4.6 K/UL    ABS. MONOCYTES 0.3 0.1 - 1.3 K/UL    ABS. EOSINOPHILS 0.0 0.0 - 0.8 K/UL    ABS. BASOPHILS 0.0 0.0 - 0.2 K/UL    ABS. IMM.  GRANS. 0.1 0.0 - 0.5 K/UL   GLUCOSE, POC    Collection Time: 12/12/21  7:44 AM   Result Value Ref Range    Glucose (POC) 222 (H) 65 - 100 mg/dL    [facility]    GLUCOSE, POC    Collection Time: 12/12/21 11:35 AM   Result Value Ref Range    Glucose (POC) 184 (H) 65 - 100 mg/dL    Performed by [facility]    GLUCOSE, POC    Collection Time: 12/12/21  4:41 PM   Result Value Ref Range    Glucose (POC) 241 (H) 65 - 100 mg/dL    Performed by [facility]    GLUCOSE, POC    Collection Time: 12/12/21  8:46 PM   Result Value Ref Range    Glucose (POC) 264 (H) 65 - 100 mg/dL    Performed by Tyrell    GLUCOSE, POC    Collection Time: 12/13/21  7:18 AM   Result Value Ref Range    Glucose (POC) 105 (H) 65 - 100 mg/dL    Performed by Keri        All Micro Results     Procedure Component Value Units Date/Time    COVID-19 RAPID TEST [[id number]]  (Abnormal) Collected: 12/11/21 1319    Order Status: Completed Specimen: Nasopharyngeal Updated: 12/11/21 1340     Specimen source Nasopharyngeal        COVID-19 rapid test Detected        Comment:      The specimen is POSITIVE for SARS-CoV-2, the novel coronavirus associated with COVID-19. This test has been authorized by the FDA under an Emergency Use Authorization (EUA) for use by authorized laboratories. Fact sheet for Healthcare Providers: ConventionUpdate.co.nz  Fact sheet for Patients: ConventionUpdate.co.nz       Methodology: Isothermal Nucleic Acid Amplification               Other Studies:  No results found.     Current Meds:  Current Facility-Administered Medications   Medication Dose Route Frequency    losartan (COZAAR) tablet 12.5 mg  12.5 mg Oral DAILY    HYDROcodone-acetaminophen (NORCO)  mg tablet 1 Tablet  1 Tablet Oral Q6H PRN    pravastatin (PRAVACHOL) tablet 80 mg  80 mg Oral QHS    traZODone (DESYREL) tablet 300 mg  300 mg Oral QHS PRN    sodium chloride (NS) flush 5-10 mL  5-10 mL IntraVENous Q8H    sodium chloride (NS) flush 5-10 mL  5-10 mL IntraVENous PRN    apixaban (ELIQUIS) tablet 5 mg  5 mg Oral Q12H    pantoprazole (PROTONIX) tablet 40 mg  40 mg Oral ACB    finasteride (PROSCAR) tablet 5 mg  5 mg Oral DAILY    insulin glargine (LANTUS) injection 35 Units  35 Units SubCUTAneous BID    [Held by provider] isosorbide mononitrate ER (IMDUR) tablet 60 mg  60 mg Oral DAILY    pregabalin (LYRICA) capsule 300 mg  300 mg Oral BID    tamsulosin (FLOMAX) capsule 0.4 mg  0.4 mg Oral DAILY    [Held by provider] carvediloL (COREG) tablet 6.25 mg  6.25 mg Oral BID WITH MEALS    sodium chloride (NS) flush 5-40 mL  5-40 mL IntraVENous Q8H    sodium chloride (NS) flush 5-40 mL  5-40 mL IntraVENous PRN    acetaminophen (TYLENOL) tablet 650 mg  650 mg Oral Q6H PRN    Or    acetaminophen (TYLENOL) suppository 650 mg  650 mg Rectal Q6H PRN    polyethylene glycol (MIRALAX) packet 17 g  17 g Oral DAILY PRN    prochlorperazine (COMPAZINE) with saline injection 5 mg  5 mg IntraVENous Q6H PRN    dexAMETHasone (DECADRON) tablet 6 mg  6 mg Oral DAILY    furosemide (LASIX) injection 20 mg  20 mg IntraVENous DAILY    baricitinib (OLUMIANT) tablet 4 mg  4 mg Oral Q24H    albuterol (PROVENTIL HFA, VENTOLIN HFA, PROAIR HFA) inhaler 2 Puff  2 Puff Inhalation Q6H PRN    insulin lispro (HUMALOG) injection   SubCUTAneous AC&HS       Signed:  Vernetta Moritz, MD    Part of this note may have been written by using a voice dictation software. The note has been proof read but may still contain some grammatical/other typographical errors.

## 2021-12-13 NOTE — ACP (ADVANCE CARE PLANNING)
Advance Care Planning     General Advance Care Planning (ACP) Conversation      Date of Conversation: 12/11/2021  Conducted with: Patient with Decision Making Capacity    Healthcare Decision Maker:     Primary Decision Maker: Adrianne Quintero - Child - [phone]    Secondary Decision Maker: Mike Rosas Child - [phone]  Click here to complete [address] including selection of the Healthcare Decision Maker Relationship (ie \"Primary\")      Today we documented Decision Maker(s) consistent with Legal Next of Kin hierarchy.     Content/Action Overview:   Has NO ACP documents/care preferences - requested patient complete ACP documents  Reviewed DNR/DNI and patient elects Full Code (Attempt Resuscitation)  Topics discussed: ventilation preferences and resuscitation preferences       Length of Voluntary ACP Conversation in minutes:  <16 minutes (Non-Billable)    Kena No RN

## 2021-12-13 NOTE — PROGRESS NOTES
SBAR from [facility] [place], [facility]. Patient in stable condition with resps even/unlabored. NAD noted. Patient on oxygen to nasal cannula. Safety measures noted. Will continue to monitor per policy.

## 2021-12-13 NOTE — PROGRESS NOTES
MSN, CM:  Spoke with patient this AM about discharge planning. Patient lives alone in a camper with a ramp for entrance. [place] is located on patient's daughters property. Patient has two children \"Glenys and Burak\" which lives locally. Patient is independent with all ADL's and requires a cane for ambulation. Other DME includes a walker and rollator. Patient denies any home oxygen or rehab in the past.  [facility] after having knee surgery a few years ago. Patient confirms PCP is Dr. Sarah Healy and patient drives himself to all appointments. PT and OT consulted for evaluation and recommendations. Case Management will continue to follow for any discharge needs. Care Management Interventions  PCP Verified by CM: Yes (Dr. Sarah Healy)  Mode of Transport at Discharge:  Other (see comment) (family to transport)  Health Maintenance Reviewed: Yes  Physical Therapy Consult: Yes  Support Systems: Child(gabe)  Confirm Follow Up Transport: Self  Freedom of Choice List was Provided with Basic Dialogue that Supports the Patient's Individualized Plan of Care/Goals, Treatment Preferences and Shares the Quality Data Associated with the Providers?: Yes  Discharge Location  Discharge Placement: Home

## 2021-12-14 PROBLEM — R41.82 ALTERED MENTAL STATE: Status: RESOLVED | Noted: 2020-09-20 | Resolved: 2021-12-14

## 2021-12-14 PROBLEM — J18.9 PNEUMONIA: Status: RESOLVED | Noted: 2021-05-17 | Resolved: 2021-12-14

## 2021-12-14 PROBLEM — E87.8 ELECTROLYTE OR FLUID DISORDER: Status: ACTIVE | Noted: 2021-12-14

## 2021-12-14 PROBLEM — G93.41 ACUTE METABOLIC ENCEPHALOPATHY: Status: RESOLVED | Noted: 2020-09-21 | Resolved: 2021-12-14

## 2021-12-14 PROBLEM — E87.6 HYPOKALEMIA: Status: ACTIVE | Noted: 2021-12-14

## 2021-12-14 LAB
ANION GAP SERPL CALC-SCNC: 7 MMOL/L (ref 7–16)
BUN SERPL-MCNC: 25 MG/DL (ref 8–23)
CALCIUM SERPL-MCNC: 9.4 MG/DL (ref 8.3–10.4)
CHLORIDE SERPL-SCNC: 108 MMOL/L (ref 98–107)
CO2 SERPL-SCNC: 29 MMOL/L (ref 21–32)
CREAT SERPL-MCNC: 0.91 MG/DL (ref 0.8–1.5)
GLUCOSE BLD STRIP.AUTO-MCNC: 144 MG/DL (ref 65–100)
GLUCOSE BLD STRIP.AUTO-MCNC: 226 MG/DL (ref 65–100)
GLUCOSE BLD STRIP.AUTO-MCNC: 295 MG/DL (ref 65–100)
GLUCOSE BLD STRIP.AUTO-MCNC: 52 MG/DL (ref 65–100)
GLUCOSE SERPL-MCNC: 49 MG/DL (ref 65–100)
MAGNESIUM SERPL-MCNC: 2.6 MG/DL (ref 1.8–2.4)
POTASSIUM SERPL-SCNC: 3.3 MMOL/L (ref 3.5–5.1)
SERVICE CMNT-IMP: ABNORMAL
SODIUM SERPL-SCNC: 144 MMOL/L (ref 138–145)

## 2021-12-14 PROCEDURE — 74011250637 HC RX REV CODE- 250/637: Performed by: INTERNAL MEDICINE

## 2021-12-14 PROCEDURE — 74011250637 HC RX REV CODE- 250/637: Performed by: HOSPITALIST

## 2021-12-14 PROCEDURE — 94761 N-INVAS EAR/PLS OXIMETRY MLT: CPT

## 2021-12-14 PROCEDURE — 74011250636 HC RX REV CODE- 250/636: Performed by: INTERNAL MEDICINE

## 2021-12-14 PROCEDURE — 94760 N-INVAS EAR/PLS OXIMETRY 1: CPT

## 2021-12-14 PROCEDURE — 74011250637 HC RX REV CODE- 250/637: Performed by: FAMILY MEDICINE

## 2021-12-14 PROCEDURE — 74011250636 HC RX REV CODE- 250/636: Performed by: FAMILY MEDICINE

## 2021-12-14 PROCEDURE — 65270000029 HC RM PRIVATE

## 2021-12-14 PROCEDURE — 82962 GLUCOSE BLOOD TEST: CPT

## 2021-12-14 PROCEDURE — 97161 PT EVAL LOW COMPLEX 20 MIN: CPT

## 2021-12-14 PROCEDURE — 36415 COLL VENOUS BLD VENIPUNCTURE: CPT

## 2021-12-14 PROCEDURE — 97165 OT EVAL LOW COMPLEX 30 MIN: CPT

## 2021-12-14 PROCEDURE — 80048 BASIC METABOLIC PNL TOTAL CA: CPT

## 2021-12-14 PROCEDURE — 74011636637 HC RX REV CODE- 636/637: Performed by: FAMILY MEDICINE

## 2021-12-14 PROCEDURE — 74011636637 HC RX REV CODE- 636/637: Performed by: EMERGENCY MEDICINE

## 2021-12-14 PROCEDURE — 83735 ASSAY OF MAGNESIUM: CPT

## 2021-12-14 PROCEDURE — 97530 THERAPEUTIC ACTIVITIES: CPT

## 2021-12-14 PROCEDURE — 97112 NEUROMUSCULAR REEDUCATION: CPT

## 2021-12-14 PROCEDURE — 77010033711 HC HIGH FLOW OXYGEN

## 2021-12-14 RX ORDER — POTASSIUM CHLORIDE 14.9 MG/ML
20 INJECTION INTRAVENOUS
Status: COMPLETED | OUTPATIENT
Start: 2021-12-14 | End: 2021-12-15

## 2021-12-14 RX ORDER — INSULIN GLARGINE 100 [IU]/ML
20 INJECTION, SOLUTION SUBCUTANEOUS 2 TIMES DAILY
Status: DISCONTINUED | OUTPATIENT
Start: 2021-12-14 | End: 2021-12-17 | Stop reason: HOSPADM

## 2021-12-14 RX ADMIN — HYDROCODONE BITARTRATE AND ACETAMINOPHEN 1 TABLET: 10; 325 TABLET ORAL at 19:25

## 2021-12-14 RX ADMIN — APIXABAN 5 MG: 5 TABLET, FILM COATED ORAL at 21:45

## 2021-12-14 RX ADMIN — PRAVASTATIN SODIUM 80 MG: 20 TABLET ORAL at 21:45

## 2021-12-14 RX ADMIN — Medication 5 ML: at 05:12

## 2021-12-14 RX ADMIN — PREGABALIN 300 MG: 150 CAPSULE ORAL at 17:11

## 2021-12-14 RX ADMIN — APIXABAN 5 MG: 5 TABLET, FILM COATED ORAL at 08:39

## 2021-12-14 RX ADMIN — INSULIN LISPRO 4 UNITS: 100 INJECTION, SOLUTION INTRAVENOUS; SUBCUTANEOUS at 21:44

## 2021-12-14 RX ADMIN — HYDROCODONE BITARTRATE AND ACETAMINOPHEN 1 TABLET: 10; 325 TABLET ORAL at 03:46

## 2021-12-14 RX ADMIN — PREGABALIN 300 MG: 150 CAPSULE ORAL at 08:39

## 2021-12-14 RX ADMIN — INSULIN GLARGINE 20 UNITS: 100 INJECTION, SOLUTION SUBCUTANEOUS at 08:40

## 2021-12-14 RX ADMIN — LOSARTAN POTASSIUM 12.5 MG: 25 TABLET, FILM COATED ORAL at 08:39

## 2021-12-14 RX ADMIN — FINASTERIDE 5 MG: 5 TABLET, FILM COATED ORAL at 08:39

## 2021-12-14 RX ADMIN — POTASSIUM CHLORIDE 20 MEQ: 14.9 INJECTION, SOLUTION INTRAVENOUS at 15:00

## 2021-12-14 RX ADMIN — FUROSEMIDE 20 MG: 10 INJECTION, SOLUTION INTRAMUSCULAR; INTRAVENOUS at 08:39

## 2021-12-14 RX ADMIN — PANTOPRAZOLE SODIUM 40 MG: 40 TABLET, DELAYED RELEASE ORAL at 06:06

## 2021-12-14 RX ADMIN — TAMSULOSIN HYDROCHLORIDE 0.4 MG: 0.4 CAPSULE ORAL at 08:39

## 2021-12-14 RX ADMIN — INSULIN LISPRO 6 UNITS: 100 INJECTION, SOLUTION INTRAVENOUS; SUBCUTANEOUS at 16:54

## 2021-12-14 RX ADMIN — HYDROCODONE BITARTRATE AND ACETAMINOPHEN 1 TABLET: 10; 325 TABLET ORAL at 12:38

## 2021-12-14 RX ADMIN — TRAZODONE HYDROCHLORIDE 300 MG: 50 TABLET ORAL at 21:45

## 2021-12-14 RX ADMIN — INSULIN GLARGINE 20 UNITS: 100 INJECTION, SOLUTION SUBCUTANEOUS at 21:45

## 2021-12-14 RX ADMIN — Medication 5 ML: at 21:06

## 2021-12-14 RX ADMIN — DEXAMETHASONE 6 MG: 4 TABLET ORAL at 08:39

## 2021-12-14 RX ADMIN — POTASSIUM CHLORIDE 20 MEQ: 14.9 INJECTION, SOLUTION INTRAVENOUS at 17:11

## 2021-12-14 RX ADMIN — BARICITINIB 4 MG: 1 TABLET, FILM COATED ORAL at 22:06

## 2021-12-14 NOTE — PROGRESS NOTES
ACUTE PHYSICAL THERAPY GOALS:  (Developed with and agreed upon by patient and/or caregiver.)  ST. Patient will perform bed mobility with INDEPENDENT within 3 days. 2. Patient will transfer bed to chair with CONTACT GUARD ASSISTANCE within 3 days. 3. Patient will demonstrate GOOD DYNAMIC STANDING balance within 3 day(s). 4. Patient will ambulate 50+ using least restrictive assistive device and MINIMAL ASSISTANCE within 3 days. 5. Patient will tolerate 15+ minutes of therapeutic activity/exercise and/or neuromuscular re-education while maintaining stable vitals to improve functional strength and activity tolerance within 3 days. LT. Patient will perform bed mobility with INDEPENDENCE within 7 days. 2. Patient will transfer bed to chair with INDEPENDENCE within 7 days. 3. Patient will demonstrate GOOD DYNAMIC STANDING balance within 7 day(s). 4. Patient will ambulate 150+ using least restrictive assistive device and INDEPENDENCE within 7 days. 5. Patient will tolerate 25+ minutes of therapeutic activity/exercise and/or neuromuscular re-education while maintaining stable vitals to improve functional strength and activity tolerance within 7 days.       PHYSICAL THERAPY ASSESSMENT: Initial Assessment, Daily Note and AM PT Treatment Day # 1      Pennie Saez is a 70 y.o. male   PRIMARY DIAGNOSIS: Acute hypoxemic respiratory failure ([facility] [address]  Acute hypoxemic respiratory failure (HCC) [J96.01]  Pneumonia due to COVID-19 virus [U07.1, J12.82]       Reason for Referral:    ICD-10: Treatment Diagnosis: Generalized Muscle Weakness (M62.81)  Difficulty in walking, Not elsewhere classified (R26.2)  INPATIENT: Payor: [facility] MEDICARE / Plan: [address] / Product Type: Managed Care Medicare /     ASSESSMENT:     REHAB RECOMMENDATIONS:   Recommendation to date pending progress:  Settin \[facility] [place] [facility]\"" Therapy  Equipment:    To Be Determined     PRIOR LEVEL OF FUNCTION:  (Prior to Hospitalization) INITIAL/CURRENT LEVEL OF FUNCTION:  (Most Recently Demonstrated)   Bed Mobility:   Independent  Sit to Stand:   Independent  Transfers:   Independent  Gait/Mobility:   Independent Bed Mobility:   Minimal Assistance  Sit to Stand:   Minimal Assistance  Transfers:   Minimal Assistance  Gait/Mobility:   Not tested     ASSESSMENT:  Mr. Raimundo Gerard is a pleasant 70year old male admitted with COVID 19 virus. Patient is seen this AM for initial PT evaluation. At baseline, Mr. Raimundo Gerard is an independent, community level ambullator. Today, presents with impaired functional activity tolerance. Required 11L to maintain Sp02 at 90% with sitting EOB, minimal scooting, and partial stand for bed change. Unable to progress activity secondary to respiratory status. With supported sitting patient very slowly weaned/recovered to 91% on 5.5L. Primary RN notified. Anticipate HHPT vs. No needs at discharge pending patient progress toward goals. Anticipate patient will progress will once respiratory status improves. SUBJECTIVE:   Mr. Raimundo Gerard states, \"I like to work on lawn [employer]. \"    SOCIAL HISTORY/LIVING ENVIRONMENT: Lives alone in a camper with ramp to enter. At baseline,  Patient is an independent, community-level ambulator. Drives. Reports independence with ADLs and IADLs. Enjoys yard work. Home Environment: Other (comment) (camper)  # Steps to Enter: 0  One/Two Story Residence: One story  Living Alone: Yes  Support Systems: Child(gabe)  OBJECTIVE:     PAIN: VITAL SIGNS: LINES/DRAINS:   Pre Treatment: Pain Screen  Pain Scale 1: Numeric (0 - 10)  Pain Intensity 1: 3  Pain Onset 1: acute on chronic  Pain Location 1: Back  Pain Orientation 1: Lower  Pain Description 1: Sore  Pain Intervention(s) 1: Emotional support; Position  Post Treatment: 3/10 positional interventions   Required 11L to maintain Sp02 at 90% with sitting EOB, minimal scooting, and partial stand for bed change.  Unable to progress activity secondary to respiratory status. With supported sitting patient slowly recovered to 91% on 5.5L. Primary RN notified. RN notified of patient 02 demands with minimal activity.   IV  O2 Device: Hi flow nasal cannula     GROSS EVALUATION:   Within Functional Limits Abnormal/ Functional Abnormal/ Non-Functional (see comments) Not Tested Comments:   AROM [x] [] [] [] B LE   PROM [] [] [] [x]    Strength [] [x] [] [] Generalized functionally, non-focal   Balance [] [x] [] [] Fair    Posture [] [x] [] [] Mild rounded shoulders; improved with cueing   Sensation [x] [] [] [] Intact light touch distal B LEs    Coordination [] [] [] []    Tone [] [] [] [x]    Edema [] [] [] [x]    Activity Tolerance [] [x] [] [] Impaired secondary to respiratory status    [] [] [] []      COGNITION/  PERCEPTION: Intact Impaired   (see comments) Comments:   Orientation [x] []    Vision [] []    Hearing [] []    Command Following [x] []    Safety Awareness [x] []     [] []      MOBILITY: I Mod I S SBA CGA Min Mod Max Total  NT x2 Comments:   Bed Mobility    Rolling [] [] [] [] [] [] [] [] [] [x] []    Supine to Sit [] [] [] [] [] [x] [] [] [] [] []    Scooting [] [] [] [] [] [x] [] [] [] [] []    Sit to Supine [] [] [] [] [x] [] [] [] [] [] []    Transfers    Sit to Stand [] [] [] [] [] [x] [] [] [] [] []    Bed to Chair [] [] [] [] [] [] [] [] [] [x] []    Stand to Sit [] [] [] [] [] [x] [] [] [] [] []    I=Independent, Mod I=Modified Independent, S=Supervision, SBA=Standby Assistance, CGA=Contact Guard Assistance,   Min=Minimal Assistance, Mod=Moderate Assistance, Max=Maximal Assistance, Total=Total Assistance, NT=Not Tested  GAIT: I Mod I S SBA CGA Min Mod Max Total  NT x2 Comments:   Level of Assistance [] [] [] [] [] [] [] [] [] [x] [] NT secondary to respiratory status   Distance N/A    DME N/A    Gait Quality N/A    Weightbearing Status N/A     I=Independent, Mod I=Modified Independent, S=Supervision, SBA=Standby Assistance, CGA=Contact Janes Schwab,   Min=Minimal Assistance, Mod=Moderate Assistance, Max=Maximal Assistance, Total=Total Assistance, NT=Not [facility] [place] [place]       How much difficulty does the patient currently have. .. Unable A Lot A Little None   1. Turning over in bed (including adjusting bedclothes, sheets and blankets)? [] 1   [] 2   [] 3   [x] 4   2. Sitting down on and standing up from a chair with arms ( e.g., wheelchair, bedside commode, etc.)   [] 1   [] 2   [x] 3   [] 4   3. Moving from lying on back to sitting on the side of the bed? [] 1   [] 2   [x] 3   [] 4   How much help from another person does the patient currently need. .. Total A Lot A Little None   4. Moving to and from a bed to a chair (including a wheelchair)? [] 1   [] 2   [x] 3   [] 4   5. Need to walk in hospital room? [] 1   [] 2   [x] 3   [] 4   6. Climbing 3-5 steps with a railing? [] 1   [x] 2   [] 3   [] 4   © 2007, Trustees of [facility] [employer], under license to [employer]. All rights reserved     Score:  Initial: 18 Most Recent: X (Date: -- )    Interpretation of Tool:  Represents activities that are increasingly more difficult (i.e. Bed mobility, Transfers, Gait). PLAN:   FREQUENCY/DURATION: PT Plan of Care: 3 times/week for duration of hospital stay or until stated goals are met, whichever comes first.    PROBLEM LIST:   (Skilled intervention is medically necessary to address:)  1. Decreased ADL/Functional Activities  2. Decreased Activity Tolerance  3. Decreased AROM/PROM  4. Decreased Balance  5. Decreased Gait Ability  6. Decreased Strength  7. Decreased Transfer Abilities   INTERVENTIONS PLANNED:   (Benefits and precautions of physical therapy have been discussed with the patient.)  1. Therapeutic Activity  2. Therapeutic Exercise/HEP  3. Neuromuscular Re-education  4. Gait Training  5. Manual Therapy  6.  Education     TREATMENT: EVALUATION: Low Complexity : (Untimed Charge)  At this time, patient is appropriate for Co-treatment with occupational therapy due to patient's decreased overall endurance/tolerance levels, as well as need for high level skilled assistance to complete functional transfers/mobility and functional tasks. Oliverio Padilla is appropriate for a multidisciplinary co-treatment of PT and OT to address goals of both disciplines. TREATMENT:   ($$ Neuromuscular Re-education: 8-22 mins    )  Neuromuscular Re-education (10 Minutes): Neuromuscular Re-education included Balance Training, Functional mobility with facilitation, Postural training, Sitting balance training and upright midline positioning to improve Balance, Functional Mobility and Postural Control. TREATMENT GRID:  N/A    AFTER TREATMENT POSITION/PRECAUTIONS:  Bed, Needs within reach and RN notified   5.5L 02 at end of treatment with Sp02 91%  RN notified of patient 02 demands with minimal activity.      INTERDISCIPLINARY COLLABORATION:  RN/PCT, PT/PTA and OT/CANALES    TOTAL TREATMENT DURATION:  PT Patient Time In/Time Out  Time In: 1100  Time Out: 503 Trinh Oscar, DPT

## 2021-12-14 NOTE — ASSESSMENT & PLAN NOTE
Secondary to Covid, was vaccinated  -Dexamethasone, baricitinib    12/16: down to 3L but feeling better.  Still with very hard time with any ambulation

## 2021-12-14 NOTE — PROGRESS NOTES
Hospitalist Progress Note   Admit Date:  2021 12:58 PM   Name:  Mirtha Olivera   Age:  70 y.o. Sex:  male  :  1950   MRN:  [id number]   Room:      Presenting Complaint: Fatigue    Reason(s) for Admission: [facility] [J96.01]  Pneumonia due to COVID-19 virus [U07.1, J12.82]     Hospital Course & Interval History:   71 y/o WM with a h/o HTN, HLD, CAD s/p CABG, AFib, SSS s/p PPM, IDDM, BPH, GERD, CVA and peripheral neuropathy who was admitted to our service on  with acute hypoxemic respiratory failure and bilateral pneumonia 2/2 COVID-19. He was started on supplemental O2, baricitinib and dexamethasone. Subjective (21):  Reports feeling worse today. No specific complaint other than just generalized malaise and fatigue. Appears ill. No dramatic change in oxygen requirement. Unable to tolerate even brief ambulatory oxygen qualifier. Assessment & Plan:   * Acute hypoxemic respiratory failure ([facility]  Secondary to Covid, was vaccinated  -Dexamethasone, baricitinib    : Stable on 3 L but feeling worse    Diabetes mellitus ([facility]  -Sliding scale insulin  -Fingerstick blood glucose  -Titrate basal insulin as indicated    2021: sGlc 279-52 16U last 24 hours, reduce basal from 35 to 20 for hypoglycemia    Electrolyte or fluid disorder  : Hypokalemia, replete recheck    GERD (gastroesophageal reflux disease)  -Protonix    Paroxysmal atrial fibrillation (HCC)  -Apixaban    Dyslipidemia  -Pravastatin        Dispo/Discharge Planning:    With clinical improvement    Diet:  ADULT DIET Regular; 3 carb choices (45 gm/meal);  Low Sodium (2 gm)  DVT PPx: On apixaban  Code status: Full Code    Hospital Problems as of 2021 Date Reviewed: 2021          Codes Class Noted - Resolved POA    * (Principal) Acute hypoxemic respiratory failure ([facility] ICD-10-CM: J96.01  ICD-9-CM: 518.81  2021 - Present Yes        Diabetes mellitus ([facility] (Chronic) ICD-10-CM: E11.9  ICD-9-CM: 250.00  5/4/2009 - Present Yes        Pneumonia due to COVID-19 virus ICD-10-CM: U07.1, J12.82  ICD-9-CM: 480.8, 079.89  12/11/2021 - Present Yes        GERD (gastroesophageal reflux disease) ICD-10-CM: K21.9  ICD-9-CM: 530.81  Unknown - Present Yes        Paroxysmal atrial fibrillation (HCC) (Chronic) ICD-10-CM: I48.0  ICD-9-CM: 427.31  11/30/2010 - Present Yes        CAD (coronary artery disease) (Chronic) ICD-10-CM: I25.10  ICD-9-CM: 414.00  5/4/2009 - Present Yes        Dyslipidemia (Chronic) ICD-10-CM: E78.5  ICD-9-CM: 272.4  5/4/2009 - Present Yes              Objective:     Patient Vitals for the past 24 hrs:   Temp Pulse Resp BP SpO2   12/14/21 1159 98.3 °F (36.8 °C) 70 20 119/70 90 %   12/14/21 1100     92 %   12/14/21 1010     96 %   12/14/21 0751 97.5 °F (36.4 °C) 73 17 124/74 95 %   12/14/21 0408  69      12/14/21 0337 97.7 °F (36.5 °C) 72 20 127/71 97 %   12/14/21 0006  69      12/13/21 2326 97.5 °F (36.4 °C) 70 20 139/78 98 %   12/13/21 2003 97.5 °F (36.4 °C) 72 20 122/74 95 %   12/13/21 1949  69      12/13/21 1600  94      12/13/21 1535 98.4 °F (36.9 °C) 73 20 126/73 95 %     Oxygen Therapy  O2 Sat (%): 90 % (12/14/21 1159)  Pulse via Oximetry: 69 beats per minute (12/11/21 1745)  O2 Device: Hi flow nasal cannula (12/14/21 1100)  O2 Flow Rate (L/min): 5.5 l/min (12/14/21 1100)    Estimated body mass index is 29.87 kg/m² as calculated from the following:    Height as of this encounter: 5' 7\" (1.702 m). Weight as of this encounter: 86.5 kg (190 lb 11.2 oz). Intake/Output Summary (Last 24 hours) at 12/14/2021 1458  Last data filed at 12/14/2021 1010  Gross per 24 hour   Intake 118 ml   Output 1050 ml   Net -932 ml       Blood pressure 119/70, pulse 70, temperature 98.3 °F (36.8 °C), resp. rate 20, height 5' 7\" (1.702 m), weight 86.5 kg (190 lb 11.2 oz), SpO2 90 %. Physical Exam  Vitals and nursing note reviewed.    Constitutional: General: He is not in acute distress. Appearance: Normal appearance. He is ill-appearing. Comments: Appears tired   HENT:      Head: Normocephalic. Eyes:      Extraocular Movements: Extraocular movements intact. Cardiovascular:      Rate and Rhythm: Normal rate. Pulses:           Radial pulses are 2+ on the left side. Pulmonary:      Effort: Respiratory distress present. Breath sounds: Wheezing present. No rhonchi. Musculoskeletal:         General: No deformity. Cervical back: No rigidity. Right lower leg: No edema. Left lower leg: No edema. Skin:     General: Skin is warm and dry. Neurological:      General: No focal deficit present. Mental Status: He is alert.    Psychiatric:         Mood and Affect: Mood normal.         Behavior: Behavior normal.         I have reviewed ordered lab tests and independently visualized imaging below:    Recent Labs:  Recent Results (from the past 48 hour(s))   GLUCOSE, POC    Collection Time: 12/12/21  4:41 PM   Result Value Ref Range    Glucose (POC) 241 (H) 65 - 100 mg/dL    Performed by IldefonsoWordyPCT    GLUCOSE, POC    Collection Time: 12/12/21  8:46 PM   Result Value Ref Range    Glucose (POC) 264 (H) 65 - 100 mg/dL    Performed by Tyrell    GLUCOSE, POC    Collection Time: 12/13/21  7:18 AM   Result Value Ref Range    Glucose (POC) 105 (H) 65 - 100 mg/dL    Performed by DatalogixA    GLUCOSE, POC    Collection Time: 12/13/21 12:01 PM   Result Value Ref Range    Glucose (POC) 219 (H) 65 - 100 mg/dL    Performed by DatalogixA    EKG, 12 LEAD, SUBSEQUENT    Collection Time: 12/13/21 12:10 PM   Result Value Ref Range    Ventricular Rate 70 BPM    Atrial Rate 70 BPM    P-R Interval 220 ms    QRS Duration 142 ms    Q-T Interval 428 ms    QTC Calculation (Bezet) 462 ms    Calculated P Axis 70 degrees    Calculated R Axis 134 degrees    Calculated T Axis -36 degrees    Diagnosis       AV dual-paced rhythm with prolonged AV conduction  Abnormal ECG  When compared with ECG of 13-DEC-2021 12:09,  No significant change was found  Confirmed by [facility]  MD ()MARSHA ([id number]) on 12/13/2021 1:28:47 PM     TROPONIN-HIGH SENSITIVITY    Collection Time: 12/13/21  3:59 PM   Result Value Ref Range    Troponin-High Sensitivity 54.1 (H) 0 - 14 pg/mL   GLUCOSE, POC    Collection Time: 12/13/21  4:32 PM   Result Value Ref Range    Glucose (POC) 279 (H) 65 - 100 mg/dL    Performed by Keri    GLUCOSE, POC    Collection Time: 12/13/21  8:47 PM   Result Value Ref Range    Glucose (POC) 252 (H) 65 - 100 mg/dL    Performed by Artur    GLUCOSE, POC    Collection Time: 12/14/21  7:36 AM   Result Value Ref Range    Glucose (POC) 52 (L) 65 - 100 mg/dL    Performed by SivakumarWilliamstownEfrem    METABOLIC PANEL, BASIC    Collection Time: 12/14/21  7:42 AM   Result Value Ref Range    Sodium 144 138 - 145 mmol/L    Potassium 3.3 (L) 3.5 - 5.1 mmol/L    Chloride 108 (H) 98 - 107 mmol/L    CO2 29 21 - 32 mmol/L    Anion gap 7 7 - 16 mmol/L    Glucose 49 (L) 65 - 100 mg/dL    BUN 25 (H) 8 - 23 MG/DL    Creatinine 0.91 0.8 - 1.5 MG/DL    GFR est AA >60 >60 ml/min/1.73m2    GFR est non-AA >60 >60 ml/min/1.73m2    Calcium 9.4 8.3 - 10.4 MG/DL   MAGNESIUM    Collection Time: 12/14/21  7:42 AM   Result Value Ref Range    Magnesium 2.6 (H) 1.8 - 2.4 mg/dL   GLUCOSE, POC    Collection Time: 12/14/21 11:26 AM   Result Value Ref Range    Glucose (POC) 144 (H) 65 - 100 mg/dL    Performed by Leilani        All Micro Results     Procedure Component Value Units Date/Time    COVID-19 RAPID TEST [[id number]]  (Abnormal) Collected: 12/11/21 1319    Order Status: Completed Specimen: Nasopharyngeal Updated: 12/11/21 1340     Specimen source Nasopharyngeal        COVID-19 rapid test Detected        Comment:      The specimen is POSITIVE for SARS-CoV-2, the novel coronavirus associated with COVID-19.        This test has been authorized by the FDA under an Emergency Use Authorization (EUA) for use by authorized laboratories. Fact sheet for Healthcare Providers: ConventionUpdate.co.nz  Fact sheet for Patients: ConventionUpdate.co.nz       Methodology: Isothermal Nucleic Acid Amplification               Other Studies:  No results found.     Current Meds:  Current Facility-Administered Medications   Medication Dose Route Frequency    insulin glargine (LANTUS) injection 20 Units  20 Units SubCUTAneous BID    potassium chloride 20 mEq in 100 ml IVPB  20 mEq IntraVENous Q2H    losartan (COZAAR) tablet 12.5 mg  12.5 mg Oral DAILY    HYDROcodone-acetaminophen (NORCO)  mg tablet 1 Tablet  1 Tablet Oral Q6H PRN    pravastatin (PRAVACHOL) tablet 80 mg  80 mg Oral QHS    traZODone (DESYREL) tablet 300 mg  300 mg Oral QHS PRN    sodium chloride (NS) flush 5-10 mL  5-10 mL IntraVENous Q8H    sodium chloride (NS) flush 5-10 mL  5-10 mL IntraVENous PRN    apixaban (ELIQUIS) tablet 5 mg  5 mg Oral Q12H    pantoprazole (PROTONIX) tablet 40 mg  40 mg Oral ACB    finasteride (PROSCAR) tablet 5 mg  5 mg Oral DAILY    [Held by provider] isosorbide mononitrate ER (IMDUR) tablet 60 mg  60 mg Oral DAILY    pregabalin (LYRICA) capsule 300 mg  300 mg Oral BID    tamsulosin (FLOMAX) capsule 0.4 mg  0.4 mg Oral DAILY    [Held by provider] carvediloL (COREG) tablet 6.25 mg  6.25 mg Oral BID WITH MEALS    sodium chloride (NS) flush 5-40 mL  5-40 mL IntraVENous PRN    acetaminophen (TYLENOL) tablet 650 mg  650 mg Oral Q6H PRN    Or    acetaminophen (TYLENOL) suppository 650 mg  650 mg Rectal Q6H PRN    polyethylene glycol (MIRALAX) packet 17 g  17 g Oral DAILY PRN    prochlorperazine (COMPAZINE) with saline injection 5 mg  5 mg IntraVENous Q6H PRN    dexAMETHasone (DECADRON) tablet 6 mg  6 mg Oral DAILY    furosemide (LASIX) injection 20 mg  20 mg IntraVENous DAILY    baricitinib (OLUMIANT) tablet 4 mg  4 mg Oral Q24H  albuterol (PROVENTIL HFA, VENTOLIN HFA, PROAIR HFA) inhaler 2 Puff  2 Puff Inhalation Q6H PRN    insulin lispro (HUMALOG) injection   SubCUTAneous AC&HS       Signed:  Patricia Ragland MD

## 2021-12-14 NOTE — PROGRESS NOTES
Evaluation for Home Oxygen    Resting (Room Air)  SpO2: 90  HR: 76    Resting (On O2)  SpO2:     HR:    O2 Device:    O2 Flow Rate (L/min):    FIO2 (%):    Comments:      During Walk (Room Air)  SpO2: 84  HR: 85  Walk/Assistance Device: Ambulation  Rate of Dyspnea: Regular pattern RR 12-20  Symptoms:    Comments:      During Walk (On O2)  SpO2: 84  HR: 89  O2 Device: Nasal cannula  O2 Flow Rate (L/min): 1 l/min  O2 Flow Rate: 2 l/min  O2 Saturation: 82  O2 Flow Rate: 3 l/min  O2 Saturation: 85  O2 Flow Rate: 4 l/min  O2 Saturation: 87  O2 Flow Rate: 5 l/min  O2 Saturation: 87  O2 Flow Rate: 9 l/min  O2 Saturation: 90  Walk/Assistance Device: Ambulation  Rate of Dyspnea: Regular pattern RR 12-20  Symptoms:    Comments:      After Walk  SpO2: 90  HR: 88  O2 Device: Nasal cannula  O2 Flow Rate (L/min): 9 l/min  FIO2 (%): 55  Rate of Dyspnea: Regular pattern RR 12-20  Symptoms:    Comments: RT increased 02 flow to 9 LPM to increase patients oxygenation, exerise completed without complications, patient resting and stable at this time, RN and case management aware  Does the Patient Qualify for Home O2: Yes  Liter Flow at Rest:    Liter Flow on Exertion:    Does the Patient Need Portable Oxygen Tanks:         Additional Comments:    Electronically signed by RT Dre on 12/14/2021 at 6:11 PM

## 2021-12-14 NOTE — PROGRESS NOTES
Am assessment completed. (see flow sheets for details). Blood sugar low 1 oj given. Denies pain. Safety measures in place.

## 2021-12-14 NOTE — PROGRESS NOTES
ACUTE OT GOALS:  (Developed with and agreed upon by patient and/or caregiver.)  1. Patient will complete lower body bathing and dressing with Mod I and rest breaks as needed. 2. Patient will complete toileting with Mod I and rest breaks as needed. 3. Patient will tolerate 23 minutes of OT treatment with 2-3 rest breaks to increase activity tolerance for ADLs. 4. Patient will complete functional transfers with Mod I and adaptive equipment as needed. 5. Patient will complete standing grooming ADL with Mod I and seated rest breaks as needed. 6. Patient will verbalize and demonstrate at least 3 energy conservation techniques to increase activity tolerance for ADLs. Timeframe: 7 visits       OCCUPATIONAL THERAPY ASSESSMENT: Initial Assessment, Daily Note and AM OT Treatment Day # 1    Frederic Massey is a 70 y.o. male   PRIMARY DIAGNOSIS: Acute hypoxemic respiratory failure ([facility] [address]  Acute hypoxemic respiratory failure (HCC) [J96.01]  Pneumonia due to COVID-19 virus [U07.1, J12.82]       Reason for Referral:  Generalized Weakness  ICD-10: Treatment Diagnosis: Generalized Muscle Weakness (M62.81)  Difficulty in walking, Not elsewhere classified (R26.2)  INPATIENT: Payor: [facility] MEDICARE / Plan: [employer] / Product Type: Managed Care Medicare /   ASSESSMENT:     REHAB RECOMMENDATIONS:   Recommendation to date pending progress:  [address] [place] [address] pending improvement in O2 sats. Equipment:    To Be Determined     PRIOR LEVEL OF FUNCTION:  (Prior to Hospitalization)  INITIAL/CURRENT LEVEL OF FUNCTION:  (Based on today's evaluation)   Bathing:   Independent  Dressing:   Independent  Feeding/Grooming:   Independent  Toileting:   Independent  Functional Mobility:   Modified Independent with use of cane as needed.  Bathing:   Minimal Assistance  Dressing:   Minimal Assistance  Feeding/Grooming:   Minimal Assistance  Toileting:   Minimal Assistance  Functional Mobility:   Minimal Assistance sit <> stand transfers. ASSESSMENT:  Mr. Misha Moulton was admitted for acute hypoxemic respiratory failure and Covid 19+. Presents with deficits in overall strength, balance, and activity tolerance for performance of ADLs and functional mobility. Received on 5.5L O2. Requires Min A (with head of bed elevated) to complete supine <> sit. Desaturates to low 80s. Educated on pursed-lip breathing technique to improve O2 sats but requires increase to 8.5L (1L at a time pending O2 sats) to improve O2 sats to 90%. Pt observed to intermittently desaturate to 88-89% with talking and encouraged to use pursed-lip breathing to improve. Worked on pt sitting balance and activity tolerance in preparation for functional ADL. Requires Min A x HHA to complete partial sit <> stand for quick linen change. Requires SBA to complete lateral scooting to head of bed and sit <> supine. Of note O2 increased to 11.5L O2 following sit <> stand due to pt desaturating to 80%. Pt slowly weaned back down to 5.5L at end of session with O2 sats 92% at rest. Pt would benefit from continued skilled OT to increase independence and safety for performance of ADLs and functional mobility. SUBJECTIVE:   Mr. Misha Moluton states, \"My daughter is in [facility]. \" In reference to pt daughter also ill with Covid. SOCIAL HISTORY/LIVING ENVIRONMENT: Lives alone in [facility] on daughter's property with 0 LEIGH ANN. Independent for ADLs. Mod I for functional mobility with use of cane as needed. Also has walker and rollator. Reports no falls in a long time. Drives.    Home Environment: Other (comment) ([facility])  # Steps to Enter: 0  One/Two Story Residence: One story  Living Alone: Yes  Support Systems: Child(gabe)    OBJECTIVE:     PAIN: VITAL SIGNS: LINES/DRAINS:   Pre Treatment: Pain Screen  Pain Scale 1: Numeric (0 - 10)  Pain Intensity 1: 3  Pain Location 1: Back  Post Treatment: Unchanged Vital Signs  O2 Sat (%): 92 %  O2 Device: Hi flow nasal cannula  O2 Flow Rate (L/min): 5.5 l/min None  O2 Device: Hi flow nasal cannula     GROSS EVALUATION:  BUE Within Functional Limits Abnormal/ Functional Abnormal/ Non-Functional (see comments) Not Tested Comments:   AROM [] [x] [] [] RUE shoulder overhead ROM minimally decreased. PROM [] [] [] [x]    Strength [] [x] [] []    Balance [] [x] [] []    Posture [] [x] [] []    Sensation [] [x] [] [] Hx of peripheral neuropathy. Coordination [x] [] [] []    Tone [] [] [] [x]    Edema [x] [] [] []    Activity Tolerance [] [x] [] []     [] [] [] []      COGNITION/  PERCEPTION: Intact Impaired   (see comments) Comments:   Orientation [x] []    Vision [x] []    Hearing [x] []    Judgment/ Insight [x] []    Attention [x] []    Memory [x] []    Command Following [x] []    Emotional Regulation [x] []     [] []      ACTIVITIES OF DAILY LIVING: I Mod I S SBA CGA Min Mod Max Total NT Comments   BASIC ADLs:              Bathing/ Showering [] [] [] [] [] [] [] [] [] [x]    Toileting [] [] [] [] [] [] [] [] [] [x]    Dressing [] [] [] [] [] [] [] [] [] [x]    Feeding [] [] [] [] [] [] [] [] [] [x]    Grooming [] [] [] [] [] [] [] [] [] [x]    Personal Device Care [] [] [] [] [] [] [] [] [] [x]    Functional Mobility [] [] [] [] [] [x] [] [] [] [] Min A x HHA partial sit <> stand. I=Independent, Mod I=Modified Independent, S=Supervision, SBA=Standby Assistance, CGA=Contact Guard Assistance,   Min=Minimal Assistance, Mod=Moderate Assistance, Max=Maximal Assistance, Total=Total Assistance, NT=Not Tested    MOBILITY: I Mod I S SBA CGA Min Mod Max Total  NT x2 Comments:   Supine to sit [] [] [x] [] [] [] [] [] [] [] []    Sit to supine [] [] [] [x] [] [] [] [] [] [] []    Sit to stand [] [] [] [] [] [x] [] [] [] [] [] x HHA partial sit <> stand.     Bed to chair [] [] [] [] [] [] [] [] [] [x] []    I=Independent, Mod I=Modified Independent, S=Supervision, SBA=Standby Assistance, CGA=Contact Guard Assistance, Min=Minimal Assistance, Mod=Moderate Assistance, Max=Maximal Assistance, Total=Total Assistance, NT=Not Tested    [address] [place], [place] [id number] AM-PAC 6 Clicks   Daily Activity Inpatient Short Form        How much help from another person does the patient currently need. .. Total A Lot A Little None   1. Putting on and taking off regular lower body clothing? [] 1   [] 2   [x] 3   [] 4   2. Bathing (including washing, rinsing, drying)? [] 1   [] 2   [x] 3   [] 4   3. Toileting, which includes using toilet, bedpan or urinal?   [] 1   [] 2   [x] 3   [] 4   4. Putting on and taking off regular upper body clothing? [] 1   [] 2   [x] 3   [] 4   5. Taking care of personal grooming such as brushing teeth? [] 1   [] 2   [x] 3   [] 4   6. Eating meals? [] 1   [] 2   [] 3   [x] 4   © 2007, Trustees of [address] [place] [address], under license to [employer]. All rights reserved     Score:  Initial: 19, completed, 12/14/2021 Most Recent: X (Date: -- )   Interpretation of Tool:  Represents activities that are increasingly more difficult (i.e. Bed mobility, Transfers, Gait). PLAN:   FREQUENCY/DURATION: OT Plan of Care: 3 times/week for duration of hospital stay or until stated goals are met, whichever comes first.    PROBLEM LIST:   (Skilled intervention is medically necessary to address:)  1. Decreased ADL/Functional Activities  2. Decreased Activity Tolerance  3. Decreased AROM/PROM  4. Decreased Balance  5. Decreased Gait Ability  6. Decreased Strength  7. Decreased Transfer Abilities   INTERVENTIONS PLANNED:   (Benefits and precautions of occupational therapy have been discussed with the patient.)  1. Self Care Training  2. Therapeutic Activity  3. Therapeutic Exercise/HEP  4. Neuromuscular Re-education  5.  Education     TREATMENT:     EVALUATION: Low Complexity : (Untimed Charge)    TREATMENT:   ( $$ Therapeutic Activity: 8-22 mins   )  Co-Treatment PT/OT necessary due to patient's decreased overall endurance/tolerance levels, as well as need for high level skilled assistance to complete functional transfers/mobility and functional tasks  Therapeutic Activity (15 Minutes): Therapeutic activity included Supine to Sit, Scooting, Transfer Training, Sitting balance  and Standing balance to improve functional Mobility, Strength and Activity tolerance. TREATMENT GRID:  N/A    AFTER TREATMENT POSITION/PRECAUTIONS:  Bed, Needs within reach, RN notified and head of bed elevated.     INTERDISCIPLINARY COLLABORATION:  RN/PCT, PT/PTA and OT/CANALES    TOTAL TREATMENT DURATION:  OT Patient Time In/Time Out  Time In: 1100  Time Out: 1124    Michelle Liu OTR/FLORA

## 2021-12-14 NOTE — RT PROTOCOL NOTE
Patient room air SAT 90%, RT walked patient from bed to the door in his room. Patient's SAT 84%, RT placed patient on 2 LPM NC for SATS of 84%, RT placed Patient on 3 LPMNC for SAT 74%, Patient walked back to the bed without complications. Patient's RR 22,  at this time. , patient's SAT 85% on 6 LPM, RT placed patient on 9 LPM NC for SAT of 90%. 2 min. Later, Patient SAT 97% while resting in bed on 9 LPM NC. RT placed patient on a 5 LPM high flow NC for SAT of 96%. RN notified, Case management notified.

## 2021-12-14 NOTE — ASSESSMENT & PLAN NOTE
-Sliding scale insulin  -Fingerstick blood glucose  -Titrate basal insulin as indicated    12/16/2021: [facility] [phone] last 24 hours, hold basal

## 2021-12-14 NOTE — ACP (ADVANCE CARE PLANNING)
Advance Care Planning   Advance Care Planning Inpatient Note  [address] [facility] Department    Today's Date: 12/14/2021  Unit: SFD 8 MED SURG    Received request from Health Care provider. Upon review of chart and communication with care team, patient's decision making abilities are not in question. Patient was/were present in the room during visit. (Pt is in [place] isolation and  did not enter pt's room.)     Goals of ACP Conversation:  Provide pt with a copy of the [address] [facility] [address] form. Health Care Decision Makers:      Primary Decision Maker: Vicky De Paz Child - [phone]    Secondary Decision Maker: Fredo Stanley Child - [phone]      Summary:  No Decision Maker named by patient at this time  Decision makers in this note were automatically pulled from the casemanager's note. Advance Care Planning Documents (Patient Wishes) on file:  None     Assessment:    Upon receipt of consult, reviewed pt's chart. Noted that he is , but pt has two adult children. Pt is in [place] isolation and family members are usually not allowed to visit, except for extreme circumstances such as end-of-life support. Interventions:  Provided a copy of the [address] [place] [address] St form with the assistance of pt's nurse. 's phone number was also provided for pt to contact with questions or to assist with completing document. Care Preferences Communicated:  No    Outcomes/Plan:  Chaplains to follow-up, as needed, to assist with HCPOA document.     Nusrat Manriquez [facility] on 12/14/2021 at 3:20 PM

## 2021-12-14 NOTE — PROGRESS NOTES
Advance Care Planning   Advance Care Planning Inpatient Note  [facility] Department    Today's Date: 12/14/2021  Unit: SFD 8 MED SURG    Received request from Health Care provider. Upon review of chart and communication with care team, patient's decision making abilities are not in question. Patient was/were present in the room during visit. (Pt is in [place] isolation and  did not enter pt's room.)     Goals of ACP Conversation:  Provide pt with a copy of the [address] [facility] [address] form. Health Care Decision Makers:      Primary Decision Maker: Ngozi Mercedes Child - [phone]    Secondary Decision Maker: Jessicabrennon León Child - [phone]      Summary:  No Decision Maker named by patient at this time  Decision makers in this note were automatically pulled from the casemanager's note. Advance Care Planning Documents (Patient Wishes) on file:  None     Assessment:    Upon receipt of consult, reviewed pt's chart. Noted that he is , but pt has two adult children. Pt is in [place] isolation and family members are usually not allowed to visit, except for extreme circumstances such as end-of-life support. Interventions:  Provided a copy of the [address] [place] [address] St form with the assistance of pt's nurse. 's phone number was also provided for pt to contact with questions or to assist with completing document. Care Preferences Communicated:  No    Outcomes/Plan:  Chaplains to follow-up, as needed, to assist with HCPOA document.     Torres Burnette [facility] on 12/14/2021 at 3:20 PM

## 2021-12-15 PROBLEM — E66.09 CLASS 1 OBESITY DUE TO EXCESS CALORIES WITH SERIOUS COMORBIDITY AND BODY MASS INDEX (BMI) OF 30.0 TO 30.9 IN ADULT: Status: ACTIVE | Noted: 2021-12-15

## 2021-12-15 PROBLEM — E66.09 CLASS 1 OBESITY DUE TO EXCESS CALORIES WITH SERIOUS COMORBIDITY AND BODY MASS INDEX (BMI) OF 30.0 TO 30.9 IN ADULT: Chronic | Status: ACTIVE | Noted: 2021-12-15

## 2021-12-15 PROBLEM — K59.00 CONSTIPATION: Status: ACTIVE | Noted: 2021-12-15

## 2021-12-15 LAB
ANION GAP SERPL CALC-SCNC: 4 MMOL/L (ref 7–16)
BUN SERPL-MCNC: 22 MG/DL (ref 8–23)
CALCIUM SERPL-MCNC: 9.2 MG/DL (ref 8.3–10.4)
CHLORIDE SERPL-SCNC: 108 MMOL/L (ref 98–107)
CO2 SERPL-SCNC: 31 MMOL/L (ref 21–32)
CREAT SERPL-MCNC: 0.94 MG/DL (ref 0.8–1.5)
ERYTHROCYTE [DISTWIDTH] IN BLOOD BY AUTOMATED COUNT: 13.4 % (ref 11.9–14.6)
GLUCOSE BLD STRIP.AUTO-MCNC: 147 MG/DL (ref 65–100)
GLUCOSE BLD STRIP.AUTO-MCNC: 240 MG/DL (ref 65–100)
GLUCOSE BLD STRIP.AUTO-MCNC: 286 MG/DL (ref 65–100)
GLUCOSE BLD STRIP.AUTO-MCNC: 89 MG/DL (ref 65–100)
GLUCOSE SERPL-MCNC: 72 MG/DL (ref 65–100)
HCT VFR BLD AUTO: 41.4 % (ref 41.1–50.3)
HGB BLD-MCNC: 13.1 G/DL (ref 13.6–17.2)
MAGNESIUM SERPL-MCNC: 2.6 MG/DL (ref 1.8–2.4)
MCH RBC QN AUTO: 25.6 PG (ref 26.1–32.9)
MCHC RBC AUTO-ENTMCNC: 31.6 G/DL (ref 31.4–35)
MCV RBC AUTO: 80.9 FL (ref 79.6–97.8)
NRBC # BLD: 0 K/UL (ref 0–0.2)
PLATELET # BLD AUTO: 208 K/UL (ref 150–450)
PMV BLD AUTO: 9.5 FL (ref 9.4–12.3)
POTASSIUM SERPL-SCNC: 3.4 MMOL/L (ref 3.5–5.1)
RBC # BLD AUTO: 5.12 M/UL (ref 4.23–5.6)
SERVICE CMNT-IMP: ABNORMAL
SERVICE CMNT-IMP: NORMAL
SODIUM SERPL-SCNC: 143 MMOL/L (ref 136–145)
WBC # BLD AUTO: 7.6 K/UL (ref 4.3–11.1)

## 2021-12-15 PROCEDURE — 80048 BASIC METABOLIC PNL TOTAL CA: CPT

## 2021-12-15 PROCEDURE — 74011250636 HC RX REV CODE- 250/636: Performed by: INTERNAL MEDICINE

## 2021-12-15 PROCEDURE — 83735 ASSAY OF MAGNESIUM: CPT

## 2021-12-15 PROCEDURE — 74011636637 HC RX REV CODE- 636/637: Performed by: EMERGENCY MEDICINE

## 2021-12-15 PROCEDURE — 94761 N-INVAS EAR/PLS OXIMETRY MLT: CPT

## 2021-12-15 PROCEDURE — 82962 GLUCOSE BLOOD TEST: CPT

## 2021-12-15 PROCEDURE — 74011250637 HC RX REV CODE- 250/637: Performed by: INTERNAL MEDICINE

## 2021-12-15 PROCEDURE — 85027 COMPLETE CBC AUTOMATED: CPT

## 2021-12-15 PROCEDURE — 74011636637 HC RX REV CODE- 636/637: Performed by: FAMILY MEDICINE

## 2021-12-15 PROCEDURE — 74011250637 HC RX REV CODE- 250/637: Performed by: FAMILY MEDICINE

## 2021-12-15 PROCEDURE — 36415 COLL VENOUS BLD VENIPUNCTURE: CPT

## 2021-12-15 PROCEDURE — 65270000029 HC RM PRIVATE

## 2021-12-15 RX ORDER — SAME BUTANEDISULFONATE/BETAINE 400-600 MG
500 POWDER IN PACKET (EA) ORAL 2 TIMES DAILY
Status: DISCONTINUED | OUTPATIENT
Start: 2021-12-15 | End: 2021-12-17 | Stop reason: HOSPADM

## 2021-12-15 RX ORDER — POLYETHYLENE GLYCOL 3350 17 G/17G
17 POWDER, FOR SOLUTION ORAL DAILY
Status: DISCONTINUED | OUTPATIENT
Start: 2021-12-16 | End: 2021-12-17 | Stop reason: HOSPADM

## 2021-12-15 RX ORDER — POTASSIUM CHLORIDE 20 MEQ/1
40 TABLET, EXTENDED RELEASE ORAL 2 TIMES DAILY
Status: COMPLETED | OUTPATIENT
Start: 2021-12-15 | End: 2021-12-16

## 2021-12-15 RX ADMIN — INSULIN LISPRO 6 UNITS: 100 INJECTION, SOLUTION INTRAVENOUS; SUBCUTANEOUS at 17:19

## 2021-12-15 RX ADMIN — INSULIN GLARGINE 20 UNITS: 100 INJECTION, SOLUTION SUBCUTANEOUS at 09:07

## 2021-12-15 RX ADMIN — Medication 500 MG: at 17:21

## 2021-12-15 RX ADMIN — Medication 5 ML: at 05:01

## 2021-12-15 RX ADMIN — HYDROCODONE BITARTRATE AND ACETAMINOPHEN 1 TABLET: 10; 325 TABLET ORAL at 15:16

## 2021-12-15 RX ADMIN — Medication 10 ML: at 13:59

## 2021-12-15 RX ADMIN — PRAVASTATIN SODIUM 80 MG: 20 TABLET ORAL at 21:29

## 2021-12-15 RX ADMIN — POTASSIUM CHLORIDE 40 MEQ: 20 TABLET, EXTENDED RELEASE ORAL at 17:21

## 2021-12-15 RX ADMIN — INSULIN GLARGINE 20 UNITS: 100 INJECTION, SOLUTION SUBCUTANEOUS at 21:29

## 2021-12-15 RX ADMIN — APIXABAN 5 MG: 5 TABLET, FILM COATED ORAL at 09:06

## 2021-12-15 RX ADMIN — INSULIN LISPRO 4 UNITS: 100 INJECTION, SOLUTION INTRAVENOUS; SUBCUTANEOUS at 21:29

## 2021-12-15 RX ADMIN — PANTOPRAZOLE SODIUM 40 MG: 40 TABLET, DELAYED RELEASE ORAL at 05:18

## 2021-12-15 RX ADMIN — HYDROCODONE BITARTRATE AND ACETAMINOPHEN 1 TABLET: 10; 325 TABLET ORAL at 05:22

## 2021-12-15 RX ADMIN — FINASTERIDE 5 MG: 5 TABLET, FILM COATED ORAL at 09:06

## 2021-12-15 RX ADMIN — LOSARTAN POTASSIUM 12.5 MG: 25 TABLET, FILM COATED ORAL at 09:05

## 2021-12-15 RX ADMIN — BARICITINIB 4 MG: 1 TABLET, FILM COATED ORAL at 21:29

## 2021-12-15 RX ADMIN — PREGABALIN 300 MG: 150 CAPSULE ORAL at 17:21

## 2021-12-15 RX ADMIN — FUROSEMIDE 20 MG: 10 INJECTION, SOLUTION INTRAMUSCULAR; INTRAVENOUS at 09:04

## 2021-12-15 RX ADMIN — APIXABAN 5 MG: 5 TABLET, FILM COATED ORAL at 21:29

## 2021-12-15 RX ADMIN — PREGABALIN 300 MG: 150 CAPSULE ORAL at 09:05

## 2021-12-15 RX ADMIN — PSYLLIUM HUSK 1 PACKET: 3.4 POWDER ORAL at 17:21

## 2021-12-15 RX ADMIN — Medication 10 ML: at 21:30

## 2021-12-15 RX ADMIN — DEXAMETHASONE 6 MG: 4 TABLET ORAL at 09:06

## 2021-12-15 RX ADMIN — TAMSULOSIN HYDROCHLORIDE 0.4 MG: 0.4 CAPSULE ORAL at 09:06

## 2021-12-15 NOTE — PROGRESS NOTES
SBAR from Kristin Reese, [facility]. Patient in stable condition with resps even/unlabored. NAD noted. Patient on oxygen to nasal cannula. Safety measures noted. Will continue to monitor per policy.

## 2021-12-15 NOTE — PROGRESS NOTES
PT note:  Treatment deferred as the patient is still eating lunch. Will return as time/schedule permits.   Chula Small, PTA

## 2021-12-15 NOTE — PROGRESS NOTES
Hospitalist Progress Note   Admit Date:  2021 12:58 PM   Name:  Partha Valero   Age:  70 y.o. Sex:  male  :  1950   MRN:  [id number]   Room:  [facility]/    Presenting Complaint: Fatigue    Reason(s) for Admission: Acute hypoxemic respiratory failure ([facility] [J96.01]  Pneumonia due to COVID-19 virus [U07.1, J12.82]     Hospital Course & Interval History:   71 y/o WM with a h/o HTN, HLD, CAD s/p CABG, AFib, SSS s/p PPM, IDDM, BPH, GERD, CVA and peripheral neuropathy who was admitted to our service on  with acute hypoxemic respiratory failure and bilateral pneumonia 2/2 COVID-19. He was started on supplemental O2, baricitinib and dexamethasone. Subjective (12/15/21):  Reports feeling better today. No recent BM. Unable to tolerate even brief ambulatory oxygen qualifier. No other new complaints today. Assessment & Plan:   * Acute hypoxemic respiratory failure ([facility]  Secondary to Covid, was vaccinated  -Dexamethasone, baricitinib    12/15: Up to  5L but feeling better. Very hard time with any ambulation    Electrolyte or fluid disorder  12/15: Hypokalemia, replete, recheck    Diabetes mellitus ([facility]  -Sliding scale insulin  -Fingerstick blood glucose  -Titrate basal insulin as indicated    12/15/2021: sGlc 295-89 10U last 24 hours, continue 20 basal    Constipation  -Adding bowel regimen     Class 1 obesity due to excess calories with serious comorbidity and body mass index (BMI) of 30.0 to 30.9 in adult  Increased risk of all cause mortality, complicating care  - healthy lifestyle at discharge    GERD (gastroesophageal reflux disease)  -Protonix    Paroxysmal atrial fibrillation (HCC)  -Apixaban    Dyslipidemia  -Pravastatin        Dispo/Discharge Planning:    With clinical improvement    Diet:  ADULT DIET Regular; 3 carb choices (45 gm/meal);  Low Sodium (2 gm)  DVT PPx: On apixaban  Code status: Full Code    Hospital Problems as of 12/15/2021 Date Reviewed: 2021          Codes Class Noted - Resolved POA    * (Principal) Acute hypoxemic respiratory failure (HCC) ICD-10-CM: J96.01  ICD-9-CM: 518.81  12/11/2021 - Present Yes        Electrolyte or fluid disorder ICD-10-CM: E87.8  ICD-9-CM: 276.9  12/14/2021 - Present Yes        Diabetes mellitus ([address] (Chronic) ICD-10-CM: E11.9  ICD-9-CM: 250.00  5/4/2009 - Present Yes        Constipation ICD-10-CM: K59.00  ICD-9-CM: 564.00  12/15/2021 - Present No        Class 1 obesity due to excess calories with serious comorbidity and body mass index (BMI) of 30.0 to 30.9 in adult (Chronic) ICD-10-CM: E66.09, Z68.30  ICD-9-CM: 278.00, V85.30  12/15/2021 - Present Yes        Hypokalemia ICD-10-CM: E87.6  ICD-9-CM: 276.8  12/14/2021 - Present Yes        Pneumonia due to COVID-19 virus ICD-10-CM: U07.1, J12.82  ICD-9-CM: 480.8, 079.89  12/11/2021 - Present Yes        GERD (gastroesophageal reflux disease) ICD-10-CM: K21.9  ICD-9-CM: 530.81  Unknown - Present Yes        Paroxysmal atrial fibrillation (HCC) (Chronic) ICD-10-CM: I48.0  ICD-9-CM: 427.31  11/30/2010 - Present Yes        CAD (coronary artery disease) (Chronic) ICD-10-CM: I25.10  ICD-9-CM: 414.00  5/4/2009 - Present Yes        Dyslipidemia (Chronic) ICD-10-CM: E78.5  ICD-9-CM: 272.4  5/4/2009 - Present Yes              Objective:     Patient Vitals for the past 24 hrs:   Temp Pulse Resp BP SpO2   12/15/21 1244 98.6 °F (37 °C)       12/15/21 1120  70 18 103/63 95 %   12/15/21 0837 98.3 °F (36.8 °C) 69 17 134/71 98 %   12/15/21 0816  69      12/15/21 0430 97.5 °F (36.4 °C) 72 17 (!) 144/75 99 %   12/15/21 0402  69      12/15/21 0019 97.4 °F (36.3 °C) 71 18 134/74 99 %   12/15/21 0001  69      12/14/21 2040  69      12/14/21 2031 97.9 °F (36.6 °C) 70 18 128/64 96 %   12/14/21 1531 97.6 °F (36.4 °C) 71 18 115/64 99 %     Oxygen Therapy  O2 Sat (%): 95 % (12/15/21 1120)  Pulse via Oximetry: 69 beats per minute (12/11/21 1745)  O2 Device: Hi flow nasal cannula (12/15/21 0908)  O2 Flow Rate (L/min): 5 l/min (12/15/21 0908)    Estimated body mass index is 30.45 kg/m² as calculated from the following:    Height as of this encounter: 5' 7\" (1.702 m). Weight as of this encounter: 88.2 kg (194 lb 6.4 oz). Intake/Output Summary (Last 24 hours) at 12/15/2021 1348  Last data filed at 12/15/2021 1120  Gross per 24 hour   Intake 476 ml   Output 1600 ml   Net -1124 ml       Blood pressure 103/63, pulse 70, temperature 98.6 °F (37 °C), resp. rate 18, height 5' 7\" (1.702 m), weight 88.2 kg (194 lb 6.4 oz), SpO2 95 %. Physical Exam  Vitals and nursing note reviewed. Constitutional:       General: He is not in acute distress. Appearance: Normal appearance. He is not ill-appearing. Comments: Appears tired   HENT:      Head: Normocephalic. Eyes:      Extraocular Movements: Extraocular movements intact. Cardiovascular:      Rate and Rhythm: Normal rate. Pulses:           Radial pulses are 2+ on the left side. Pulmonary:      Effort: No respiratory distress. Breath sounds: Wheezing present. No rhonchi. Musculoskeletal:         General: No deformity. Cervical back: No rigidity. Right lower leg: No edema. Left lower leg: No edema. Skin:     General: Skin is warm and dry. Neurological:      General: No focal deficit present. Mental Status: He is alert and oriented to person, place, and time.    Psychiatric:         Mood and Affect: Mood normal.         Behavior: Behavior normal.         I have reviewed ordered lab tests and independently visualized imaging below:    Recent Labs:  Recent Results (from the past 48 hour(s))   TROPONIN-HIGH SENSITIVITY    Collection Time: 12/13/21  3:59 PM   Result Value Ref Range    Troponin-High Sensitivity 54.1 (H) 0 - 14 pg/mL   GLUCOSE, POC    Collection Time: 12/13/21  4:32 PM   Result Value Ref Range    Glucose (POC) 279 (H) 65 - 100 mg/dL    Performed by Keri    GLUCOSE, POC    Collection Time: 12/13/21 8:47 PM   Result Value Ref Range    Glucose (POC) 252 (H) 65 - 100 mg/dL    Performed by Artur    GLUCOSE, POC    Collection Time: 12/14/21  7:36 AM   Result Value Ref Range    Glucose (POC) 52 (L) 65 - 100 mg/dL    Performed by [address] [place] [address], [facility]    Collection Time: 12/14/21  7:42 AM   Result Value Ref Range    Sodium 144 138 - 145 mmol/L    Potassium 3.3 (L) 3.5 - 5.1 mmol/L    Chloride 108 (H) 98 - 107 mmol/L    CO2 29 21 - 32 mmol/L    Anion gap 7 7 - 16 mmol/L    Glucose 49 (L) 65 - 100 mg/dL    BUN 25 (H) 8 - 23 MG/DL    Creatinine 0.91 0.8 - 1.5 MG/DL    GFR est AA >60 >60 ml/min/1.73m2    GFR est non-AA >60 >60 ml/min/1.73m2    Calcium 9.4 8.3 - 10.4 MG/DL   MAGNESIUM    Collection Time: 12/14/21  7:42 AM   Result Value Ref Range    Magnesium 2.6 (H) 1.8 - 2.4 mg/dL   GLUCOSE, POC    Collection Time: 12/14/21 11:26 AM   Result Value Ref Range    Glucose (POC) 144 (H) 65 - 100 mg/dL    Performed by CarolinaCT    GLUCOSE, POC    Collection Time: 12/14/21  4:42 PM   Result Value Ref Range    Glucose (POC) 295 (H) 65 - 100 mg/dL    Performed by CarisaaPCT    GLUCOSE, POC    Collection Time: 12/14/21  9:12 PM   Result Value Ref Range    Glucose (POC) 226 (H) 65 - 100 mg/dL    Performed by Lee (Mitchell)yPCT    GLUCOSE, POC    Collection Time: 12/15/21  7:57 AM   Result Value Ref Range    Glucose (POC) 89 65 - 100 mg/dL    Performed by Corby    CBC W/O DIFF    Collection Time: 12/15/21  8:51 AM   Result Value Ref Range    WBC 7.6 4.3 - 11.1 K/uL    RBC 5.12 4.23 - 5.6 M/uL    HGB 13.1 (L) 13.6 - 17.2 g/dL    HCT 41.4 41.1 - 50.3 %    MCV 80.9 79.6 - 97.8 FL    MCH 25.6 (L) 26.1 - 32.9 PG    MCHC 31.6 31.4 - 35.0 g/dL    RDW 13.4 11.9 - 14.6 %    PLATELET 376 785 - 734 K/uL    MPV 9.5 9.4 - 12.3 FL    ABSOLUTE NRBC 0.00 0.0 - 0.2 K/uL   METABOLIC PANEL, BASIC    Collection Time: 12/15/21  8:51 AM   Result Value Ref Range    Sodium 143 136 - 145 mmol/L    Potassium 3.4 (L) 3.5 - 5.1 mmol/L    Chloride 108 (H) 98 - 107 mmol/L    CO2 31 21 - 32 mmol/L    Anion gap 4 (L) 7 - 16 mmol/L    Glucose 72 65 - 100 mg/dL    BUN 22 8 - 23 MG/DL    Creatinine 0.94 0.8 - 1.5 MG/DL    GFR est AA >60 >60 ml/min/1.73m2    GFR est non-AA >60 >60 ml/min/1.73m2    Calcium 9.2 8.3 - 10.4 MG/DL   MAGNESIUM    Collection Time: 12/15/21  8:51 AM   Result Value Ref Range    Magnesium 2.6 (H) 1.8 - 2.4 mg/dL   GLUCOSE, POC    Collection Time: 12/15/21 12:23 PM   Result Value Ref Range    Glucose (POC) 147 (H) 65 - 100 mg/dL    Performed by Lawrence        All Micro Results     Procedure Component Value Units Date/Time    COVID-19 RAPID TEST [[id number]]  (Abnormal) Collected: 12/11/21 1319    Order Status: Completed Specimen: Nasopharyngeal Updated: 12/11/21 1340     Specimen source Nasopharyngeal        COVID-19 rapid test Detected        Comment:      The specimen is POSITIVE for SARS-CoV-2, the novel coronavirus associated with COVID-19. This test has been authorized by the FDA under an Emergency Use Authorization (EUA) for use by authorized laboratories. Fact sheet for Healthcare Providers: ConventionUpdate.co.nz  Fact sheet for Patients: ConventionUpdate.co.nz       Methodology: Isothermal Nucleic Acid Amplification               Other Studies:  No results found.     Current Meds:  Current Facility-Administered Medications   Medication Dose Route Frequency    potassium chloride (K-DUR, KLOR-CON M20) SR tablet 40 mEq  40 mEq Oral BID    Saccharomyces boulardii (FLORASTOR) capsule 500 mg  500 mg Oral BID    psyllium husk-aspartame (METAMUCIL FIBER) packet 1 Packet  1 Packet Oral BID    [START ON 12/16/2021] polyethylene glycol (MIRALAX) packet 17 g  17 g Oral DAILY    insulin glargine (LANTUS) injection 20 Units  20 Units SubCUTAneous BID    losartan (COZAAR) tablet 12.5 mg  12.5 mg Oral DAILY    HYDROcodone-acetaminophen (NORCO)  mg tablet 1 Tablet  1 Tablet Oral Q6H PRN    pravastatin (PRAVACHOL) tablet 80 mg  80 mg Oral QHS    traZODone (DESYREL) tablet 300 mg  300 mg Oral QHS PRN    sodium chloride (NS) flush 5-10 mL  5-10 mL IntraVENous Q8H    sodium chloride (NS) flush 5-10 mL  5-10 mL IntraVENous PRN    apixaban (ELIQUIS) tablet 5 mg  5 mg Oral Q12H    pantoprazole (PROTONIX) tablet 40 mg  40 mg Oral ACB    finasteride (PROSCAR) tablet 5 mg  5 mg Oral DAILY    [Held by provider] isosorbide mononitrate ER (IMDUR) tablet 60 mg  60 mg Oral DAILY    pregabalin (LYRICA) capsule 300 mg  300 mg Oral BID    tamsulosin (FLOMAX) capsule 0.4 mg  0.4 mg Oral DAILY    [Held by provider] carvediloL (COREG) tablet 6.25 mg  6.25 mg Oral BID WITH MEALS    sodium chloride (NS) flush 5-40 mL  5-40 mL IntraVENous PRN    acetaminophen (TYLENOL) tablet 650 mg  650 mg Oral Q6H PRN    Or    acetaminophen (TYLENOL) suppository 650 mg  650 mg Rectal Q6H PRN    polyethylene glycol (MIRALAX) packet 17 g  17 g Oral DAILY PRN    prochlorperazine (COMPAZINE) with saline injection 5 mg  5 mg IntraVENous Q6H PRN    dexAMETHasone (DECADRON) tablet 6 mg  6 mg Oral DAILY    furosemide (LASIX) injection 20 mg  20 mg IntraVENous DAILY    baricitinib (OLUMIANT) tablet 4 mg  4 mg Oral Q24H    albuterol (PROVENTIL HFA, VENTOLIN HFA, PROAIR HFA) inhaler 2 Puff  2 Puff Inhalation Q6H PRN    insulin lispro (HUMALOG) injection   SubCUTAneous AC&HS       Signed:  Brett Kemp MD

## 2021-12-15 NOTE — PROGRESS NOTES
MSN, cM:  Patient currently on 5L NC this AM.  Patient has received Baricitinib and Decadron this admission. PT/OT recommend [place] when medically stable. Case Management will continue to follow.

## 2021-12-16 LAB
GLUCOSE BLD STRIP.AUTO-MCNC: 187 MG/DL (ref 65–100)
GLUCOSE BLD STRIP.AUTO-MCNC: 86 MG/DL (ref 65–100)
SERVICE CMNT-IMP: ABNORMAL
SERVICE CMNT-IMP: NORMAL

## 2021-12-16 PROCEDURE — 74011250637 HC RX REV CODE- 250/637: Performed by: INTERNAL MEDICINE

## 2021-12-16 PROCEDURE — 74011636637 HC RX REV CODE- 636/637: Performed by: EMERGENCY MEDICINE

## 2021-12-16 PROCEDURE — 74011250636 HC RX REV CODE- 250/636: Performed by: INTERNAL MEDICINE

## 2021-12-16 PROCEDURE — 74011250637 HC RX REV CODE- 250/637: Performed by: FAMILY MEDICINE

## 2021-12-16 PROCEDURE — 65270000029 HC RM PRIVATE

## 2021-12-16 PROCEDURE — 82962 GLUCOSE BLOOD TEST: CPT

## 2021-12-16 PROCEDURE — 97530 THERAPEUTIC ACTIVITIES: CPT

## 2021-12-16 PROCEDURE — 97110 THERAPEUTIC EXERCISES: CPT

## 2021-12-16 PROCEDURE — 97535 SELF CARE MNGMENT TRAINING: CPT

## 2021-12-16 RX ADMIN — Medication 10 ML: at 06:08

## 2021-12-16 RX ADMIN — POLYETHYLENE GLYCOL 3350 17 G: 17 POWDER, FOR SOLUTION ORAL at 08:39

## 2021-12-16 RX ADMIN — INSULIN LISPRO 2 UNITS: 100 INJECTION, SOLUTION INTRAVENOUS; SUBCUTANEOUS at 11:44

## 2021-12-16 RX ADMIN — POTASSIUM CHLORIDE 40 MEQ: 20 TABLET, EXTENDED RELEASE ORAL at 08:40

## 2021-12-16 RX ADMIN — PANTOPRAZOLE SODIUM 40 MG: 40 TABLET, DELAYED RELEASE ORAL at 06:07

## 2021-12-16 RX ADMIN — Medication 500 MG: at 17:16

## 2021-12-16 RX ADMIN — FUROSEMIDE 20 MG: 10 INJECTION, SOLUTION INTRAMUSCULAR; INTRAVENOUS at 08:41

## 2021-12-16 RX ADMIN — PRAVASTATIN SODIUM 80 MG: 20 TABLET ORAL at 21:28

## 2021-12-16 RX ADMIN — LOSARTAN POTASSIUM 12.5 MG: 25 TABLET, FILM COATED ORAL at 08:40

## 2021-12-16 RX ADMIN — INSULIN LISPRO 4 UNITS: 100 INJECTION, SOLUTION INTRAVENOUS; SUBCUTANEOUS at 21:28

## 2021-12-16 RX ADMIN — APIXABAN 5 MG: 5 TABLET, FILM COATED ORAL at 21:28

## 2021-12-16 RX ADMIN — HYDROCODONE BITARTRATE AND ACETAMINOPHEN 1 TABLET: 10; 325 TABLET ORAL at 03:08

## 2021-12-16 RX ADMIN — INSULIN LISPRO 6 UNITS: 100 INJECTION, SOLUTION INTRAVENOUS; SUBCUTANEOUS at 16:49

## 2021-12-16 RX ADMIN — FINASTERIDE 5 MG: 5 TABLET, FILM COATED ORAL at 08:40

## 2021-12-16 RX ADMIN — BARICITINIB 4 MG: 1 TABLET, FILM COATED ORAL at 21:28

## 2021-12-16 RX ADMIN — PREGABALIN 300 MG: 150 CAPSULE ORAL at 08:40

## 2021-12-16 RX ADMIN — Medication 500 MG: at 08:40

## 2021-12-16 RX ADMIN — Medication 10 ML: at 21:28

## 2021-12-16 RX ADMIN — Medication 10 ML: at 14:09

## 2021-12-16 RX ADMIN — HYDROCODONE BITARTRATE AND ACETAMINOPHEN 1 TABLET: 10; 325 TABLET ORAL at 18:57

## 2021-12-16 RX ADMIN — DEXAMETHASONE 6 MG: 4 TABLET ORAL at 08:40

## 2021-12-16 RX ADMIN — TAMSULOSIN HYDROCHLORIDE 0.4 MG: 0.4 CAPSULE ORAL at 08:41

## 2021-12-16 RX ADMIN — APIXABAN 5 MG: 5 TABLET, FILM COATED ORAL at 08:40

## 2021-12-16 RX ADMIN — PREGABALIN 300 MG: 150 CAPSULE ORAL at 17:16

## 2021-12-16 NOTE — PROGRESS NOTES
SBAR from [address] [facility], [facility]. Patient in stable condition with resps even/unlabored. NAD noted. Patient on oxygen to HF NC. Safety measures noted. Will continue to monitor per policy.

## 2021-12-16 NOTE — PROGRESS NOTES
Patient remains in stable condition with respirations even/unlabored. No acute distress noted. No needs noted or voiced at this time. Safety measures in place. Patient on room air. Nasal cannula at bedside for use with ambulation. Call light remains within reach. Preparing to give report to oncoming shift.

## 2021-12-16 NOTE — PROGRESS NOTES
Pt is resting in bed at this time. Pt has alert and oriented times 4. Pt is on 9L HFNC. Pt has no s/sx of acute distress at this time. Pt has safety measure in place. Pt has call light within reach and is encouraged to call for assistance if needed. Will continue to monitor.

## 2021-12-16 NOTE — PROGRESS NOTES
Pt is resting in bed at this time. Pt is on 9L HF NC. Pt has no s/sx of acute distress at this time. Pt has no safety measures in place. Pt has call light within reach. Will prepare report for oncoming nurse.

## 2021-12-16 NOTE — PROGRESS NOTES
ACUTE OT GOALS:  (Developed with and agreed upon by patient and/or caregiver.)  1. Patient will complete lower body bathing and dressing with Mod I and rest breaks as needed. 2. Patient will complete toileting with Mod I and rest breaks as needed. 3. Patient will tolerate 23 minutes of OT treatment with 2-3 rest breaks to increase activity tolerance for ADLs. 4. Patient will complete functional transfers with Mod I and adaptive equipment as needed. 5. Patient will complete standing grooming ADL with Mod I and seated rest breaks as needed. 6. Patient will verbalize and demonstrate at least 3 energy conservation techniques to increase activity tolerance for ADLs.    Timeframe: 7 visits        OCCUPATIONAL THERAPY: Daily Note and PM OT Treatment Day # 2    Evelia Pinon is a 70 y.o. male   PRIMARY DIAGNOSIS: Acute hypoxemic respiratory failure ([facility]  Acute hypoxemic respiratory failure (HCC) [J96.01]  Pneumonia due to COVID-19 virus [U07.1, J12.82]       Payor: [address] [place] [address],9D / Plan: [address] / Product Type: [employer] /   ASSESSMENT:     REHAB RECOMMENDATIONS: CURRENT LEVEL OF FUNCTION:  (Most Recently Demonstrated)   Recommendation to date pending progress:  [address] [place] [address] Therapy  Equipment:    To Be Determined Bathing:   Not tested  Dressing:   Not tested  Feeding/Grooming:   Supervision in seated for monitoring of O2 sats. Toileting:   Not tested  Functional Mobility:   Supervision for bed mobility for monitoring of O2 sats only; Min A to complete brief, partial sit <> stand. ASSESSMENT:  Mr. Priya Carlson continues to present with deficits in overall strength, balance, and activity tolerance for performance of ADLs and functional mobility with greatest limiting factor being decreased activity tolerance and pt desaturating on exertion. Received on 3.5L O2 with pt O2 sats 95%.  Requires Supervision for monitoring of O2 sats to complete supine <> sit. Seated EOB, pt observed to begin to desaturate to mid 80s. Cued to use pursed-lip breathing but observed to continue to desaturate to low 80s. O2 increased 1L at a time and pt requires increase to 9L O2 to improve O2 sats to 90-91%. Worked on pt activity tolerance in preparation for functional ADL. Requires Supervision for monitoring of O2 sats to brush dentures and gums with O2 sats maintained at 90-91% with performance of grooming ADL. Requires Min A x HHA to complete partial sit <> stand for brief linen fix. Requires SBA to complete <> supine. Pt slowly weaned back down to 3.5L at end of session with O2 sats 94% at rest. Pt with minimal progress towards acute care OT treatment goals. Pt would benefit from continued skilled OT to increase independence and safety for performance of ADLs and functional mobility. SUBJECTIVE:   Mr. Jenelle Lucas states, \"It's fine when I just lay here. \" In reference to improved O2 sats once in supine. SOCIAL HISTORY/LIVING ENVIRONMENT: Lives alone in [facility] on daughter's property with 0 LEIGH ANN. Independent for ADLs. Mod I for functional mobility with use of cane as needed. Also has walker and rollator. Reports no falls in a long time. Drives.    Home Environment: Other (comment) ([facility])  # Steps to Enter: 0  One/Two Story Residence: One story  Living Alone: Yes  Support Systems: Child(gabe)    OBJECTIVE:     PAIN: VITAL SIGNS: LINES/DRAINS:   Pre Treatment: Pain Screen  Pain Scale 1: Numeric (0 - 10)  Pain Intensity 1: 0  Post Treatment: Unchanged Vital Signs  O2 Sat (%): 94 %  O2 Device: Hi flow nasal cannula  O2 Flow Rate (L/min): 3.5 l/min Continuous Pulse Oximetry  O2 Device: None (Room air)     ACTIVITIES OF DAILY LIVING: I Mod I S SBA CGA Min Mod Max Total NT Comments   BASIC ADLs:              Bathing/ Showering [] [] [] [] [] [] [] [] [] [x]    Toileting [] [] [] [] [] [] [] [] [] [x]    Dressing [] [] [] [] [] [] [] [] [] [x]    Feeding [] [] [] [] [] [] [] [] [] [x]    Grooming [] [] [x] [] [] [] [] [] [] [] Supervision for monitoring of O2 sats to brush dentures and gums. Personal Device Care [] [] [] [] [] [] [] [] [] [x]    Functional Mobility [] [] [x] [x] [] [] [] [] [] [] Bed mobility   I=Independent, Mod I=Modified Independent, S=Supervision, SBA=Standby Assistance, CGA=Contact Guard Assistance,   Min=Minimal Assistance, Mod=Moderate Assistance, Max=Maximal Assistance, Total=Total Assistance, NT=Not Tested    MOBILITY: I Mod I S SBA CGA Min Mod Max Total  NT x2 Comments:   Supine to sit [] [] [x] [] [] [] [] [] [] [] []    Sit to supine [] [] [] [x] [] [] [] [] [] [] []    Sit to stand [] [] [] [] [] [x] [] [] [] [] [] X HHA partial <> stand. Bed to chair [] [] [] [] [] [] [] [] [] [] []    I=Independent, Mod I=Modified Independent, S=Supervision, SBA=Standby Assistance, CGA=Contact Guard Assistance,   Min=Minimal Assistance, Mod=Moderate Assistance, Max=Maximal Assistance, Total=Total Assistance, NT=Not Tested    BALANCE: Good Fair+ Fair Fair- Poor NT Comments   Sitting Static [x] [] [] [] [] []    Sitting Dynamic [x] [] [] [] [] []              Standing Static [] [] [x] [] [] []    Standing Dynamic [] [] [] [] [] [x]      PLAN:   FREQUENCY/DURATION: OT Plan of Care: 3 times/week for duration of hospital stay or until stated goals are met, whichever comes first.    TREATMENT:   TREATMENT:   ($$ Self Care/Home Management: 8-22 mins$$ Therapeutic Activity: 8-22 mins   )  Therapeutic Activity (19 Minutes): Therapeutic activity included Supine to Sit, Sit to Supine, Scooting, Transfer Training, Sitting balance  and Standing balance to improve functional Mobility, Strength and Activity tolerance. Self Care (10 Minutes): Self care including Grooming and Energy Conservation Training to increase independence, decrease level of assistance required and increase activity tolerance. .    TREATMENT GRID:  N/A    AFTER TREATMENT POSITION/PRECAUTIONS:  Bed, Needs within reach, RN notified and head of bed elevated.      INTERDISCIPLINARY COLLABORATION:  RN/PCT and OT/CANALES    TOTAL TREATMENT DURATION:  OT Patient Time In/Time Out  Time In: 1439  Time Out: 1508    TRACY Johnston/FLORA

## 2021-12-16 NOTE — PROGRESS NOTES
Hospitalist Progress Note   Admit Date:  2021 12:58 PM   Name:  Luis Angel Celaya   Age:  70 y.o. Sex:  male  :  1950   MRN:  [id number]   Room:  [facility]    Presenting Complaint: Fatigue    Reason(s) for Admission: Acute hypoxemic respiratory failure ([facility] [address] [J96.01]  Pneumonia due to COVID-19 virus [U07.1, J12.82]     Hospital Course & Interval History:   69 y/o WM with a h/o HTN, HLD, CAD s/p CABG, AFib, SSS s/p PPM, IDDM, BPH, GERD, CVA and peripheral neuropathy who was admitted to our service on  with acute hypoxemic respiratory failure and bilateral pneumonia 2/2 COVID-19. He was started on supplemental O2, baricitinib and dexamethasone. Subjective (21):  Reports feeling well today. Still unable to tolerate even brief ambulatory oxygen qualifier. No other new complaints today. Assessment & Plan:   * Acute hypoxemic respiratory failure ([address]  Secondary to Covid, was vaccinated  -Dexamethasone, baricitinib    : down to 3L but feeling better. Still with very hard time with any ambulation    Electrolyte or fluid disorder  : Hypokalemia, replete, recheck    Diabetes mellitus ([address]  -Sliding scale insulin  -Fingerstick blood glucose  -Titrate basal insulin as indicated    2021: sGlc 147-286 10U last 24 hours, hold basal    Constipation  -Adding bowel regimen     Class 1 obesity due to excess calories with serious comorbidity and body mass index (BMI) of 30.0 to 30.9 in adult  Increased risk of all cause mortality, complicating care  - healthy lifestyle at discharge    GERD (gastroesophageal reflux disease)  -Protonix    Paroxysmal atrial fibrillation (HCC)  -Apixaban    Dyslipidemia  -Pravastatin        Dispo/Discharge Planning:    With clinical improvement    Diet:  ADULT DIET Regular; 3 carb choices (45 gm/meal);  Low Sodium (2 gm)  DVT PPx: On apixaban  Code status: Full Code    Hospital Problems as of 2021 Date Reviewed: 2021          Codes Class Noted - Resolved POA    * (Principal) Acute hypoxemic respiratory failure (HCC) ICD-10-CM: J96.01  ICD-9-CM: 518.81  12/11/2021 - Present Yes        Electrolyte or fluid disorder ICD-10-CM: E87.8  ICD-9-CM: 276.9  12/14/2021 - Present Yes        Diabetes mellitus ([address] (Chronic) ICD-10-CM: E11.9  ICD-9-CM: 250.00  5/4/2009 - Present Yes        Constipation ICD-10-CM: K59.00  ICD-9-CM: 564.00  12/15/2021 - Present No        Class 1 obesity due to excess calories with serious comorbidity and body mass index (BMI) of 30.0 to 30.9 in adult (Chronic) ICD-10-CM: E66.09, Z68.30  ICD-9-CM: 278.00, V85.30  12/15/2021 - Present Yes        Hypokalemia ICD-10-CM: E87.6  ICD-9-CM: 276.8  12/14/2021 - Present Yes        Pneumonia due to COVID-19 virus ICD-10-CM: U07.1, J12.82  ICD-9-CM: 480.8, 079.89  12/11/2021 - Present Yes        GERD (gastroesophageal reflux disease) ICD-10-CM: K21.9  ICD-9-CM: 530.81  Unknown - Present Yes        Paroxysmal atrial fibrillation (HCC) (Chronic) ICD-10-CM: I48.0  ICD-9-CM: 427.31  11/30/2010 - Present Yes        CAD (coronary artery disease) (Chronic) ICD-10-CM: I25.10  ICD-9-CM: 414.00  5/4/2009 - Present Yes        Dyslipidemia (Chronic) ICD-10-CM: E78.5  ICD-9-CM: 272.4  5/4/2009 - Present Yes              Objective:     Patient Vitals for the past 24 hrs:   Temp Pulse Resp BP SpO2   12/16/21 1415 98.1 °F (36.7 °C) 72 20 105/62 93 %   12/16/21 0910 97.6 °F (36.4 °C) (!) 56 20 115/66 95 %   12/16/21 0253 98.3 °F (36.8 °C) 70 18 (!) 147/75 97 %   12/15/21 2217 98.6 °F (37 °C) 70 18 134/71 100 %   12/15/21 2145     97 %   12/15/21 2000 98.5 °F (36.9 °C) 71 18 131/69 99 %     Oxygen Therapy  O2 Sat (%): 93 % (12/16/21 1415)  Pulse via Oximetry: 69 beats per minute (12/11/21 1745)  O2 Device: None (Room air) (12/16/21 1610)  O2 Flow Rate (L/min): 0 l/min (12/16/21 1610)  FIO2 (%): 97 % (12/15/21 2145)    Estimated body mass index is 31.06 kg/m² as calculated from the following: Height as of this encounter: 5' 7\" (1.702 m). Weight as of this encounter: 89.9 kg (198 lb 4.8 oz). Intake/Output Summary (Last 24 hours) at 12/16/2021 1615  Last data filed at 12/16/2021 1415  Gross per 24 hour   Intake 960 ml   Output 2100 ml   Net -1140 ml       Blood pressure 105/62, pulse 72, temperature 98.1 °F (36.7 °C), resp. rate 20, height 5' 7\" (1.702 m), weight 89.9 kg (198 lb 4.8 oz), SpO2 93 %. Physical Exam  Vitals and nursing note reviewed. Constitutional:       General: He is not in acute distress. Appearance: Normal appearance. He is not ill-appearing. Comments: Appears less tired   HENT:      Head: Normocephalic. Eyes:      Extraocular Movements: Extraocular movements intact. Cardiovascular:      Rate and Rhythm: Normal rate. Pulses:           Radial pulses are 2+ on the left side. Pulmonary:      Effort: No respiratory distress. Breath sounds: No wheezing or rhonchi. Musculoskeletal:         General: No deformity. Cervical back: No rigidity. Right lower leg: No edema. Left lower leg: No edema. Skin:     General: Skin is warm and dry. Neurological:      General: No focal deficit present. Mental Status: He is alert and oriented to person, place, and time.    Psychiatric:         Mood and Affect: Mood normal.         Behavior: Behavior normal.         I have reviewed ordered lab tests and independently visualized imaging below:    Recent Labs:  Recent Results (from the past 48 hour(s))   GLUCOSE, POC    Collection Time: 12/14/21  4:42 PM   Result Value Ref Range    Glucose (POC) 295 (H) 65 - 100 mg/dL    Performed by Leilani    GLUCOSE, POC    Collection Time: 12/14/21  9:12 PM   Result Value Ref Range    Glucose (POC) 226 (H) 65 - 100 mg/dL    Performed by Lee (Mitchell)yPCT    GLUCOSE, POC    Collection Time: 12/15/21  7:57 AM   Result Value Ref Range    Glucose (POC) 89 65 - 100 mg/dL    Performed by Corby    CBC W/O DIFF    Collection Time: 12/15/21  8:51 AM   Result Value Ref Range    WBC 7.6 4.3 - 11.1 K/uL    RBC 5.12 4.23 - 5.6 M/uL    HGB 13.1 (L) 13.6 - 17.2 g/dL    HCT 41.4 41.1 - 50.3 %    MCV 80.9 79.6 - 97.8 FL    MCH 25.6 (L) 26.1 - 32.9 PG    MCHC 31.6 31.4 - 35.0 g/dL    RDW 13.4 11.9 - 14.6 %    PLATELET 817 556 - 074 K/uL    MPV 9.5 9.4 - 12.3 FL    ABSOLUTE NRBC 0.00 0.0 - 0.2 K/uL   METABOLIC PANEL, BASIC    Collection Time: 12/15/21  8:51 AM   Result Value Ref Range    Sodium 143 136 - 145 mmol/L    Potassium 3.4 (L) 3.5 - 5.1 mmol/L    Chloride 108 (H) 98 - 107 mmol/L    CO2 31 21 - 32 mmol/L    Anion gap 4 (L) 7 - 16 mmol/L    Glucose 72 65 - 100 mg/dL    BUN 22 8 - 23 MG/DL    Creatinine 0.94 0.8 - 1.5 MG/DL    GFR est AA >60 >60 ml/min/1.73m2    GFR est non-AA >60 >60 ml/min/1.73m2    Calcium 9.2 8.3 - 10.4 MG/DL   MAGNESIUM    Collection Time: 12/15/21  8:51 AM   Result Value Ref Range    Magnesium 2.6 (H) 1.8 - 2.4 mg/dL   GLUCOSE, POC    Collection Time: 12/15/21 12:23 PM   Result Value Ref Range    Glucose (POC) 147 (H) 65 - 100 mg/dL    Performed by Lawrence    GLUCOSE, POC    Collection Time: 12/15/21  4:19 PM   Result Value Ref Range    Glucose (POC) 286 (H) 65 - 100 mg/dL    Performed by Corby    GLUCOSE, POC    Collection Time: 12/15/21  9:04 PM   Result Value Ref Range    Glucose (POC) 240 (H) 65 - 100 mg/dL    Performed by Kalyani    GLUCOSE, POC    Collection Time: 12/16/21  7:30 AM   Result Value Ref Range    Glucose (POC) 86 65 - 100 mg/dL    [facility]    GLUCOSE, POC    Collection Time: 12/16/21 11:11 AM   Result Value Ref Range    Glucose (POC) 187 (H) 65 - 100 mg/dL    Performed by [facility]        All Micro Results     Procedure Component Value Units Date/Time    COVID-19 RAPID TEST [[id number]]  (Abnormal) Collected: 12/11/21 1319    Order Status: Completed Specimen: Nasopharyngeal Updated: 12/11/21 1347 Specimen source Nasopharyngeal        COVID-19 rapid test Detected        Comment:      The specimen is POSITIVE for SARS-CoV-2, the novel coronavirus associated with COVID-19. This test has been authorized by the FDA under an Emergency Use Authorization (EUA) for use by authorized laboratories. Fact sheet for Healthcare Providers: ConventionUpdate.co.nz  Fact sheet for Patients: ConventionUpdate.co.nz       Methodology: Isothermal Nucleic Acid Amplification               Other Studies:  No results found.     Current Meds:  Current Facility-Administered Medications   Medication Dose Route Frequency    Saccharomyces boulardii (FLORASTOR) capsule 500 mg  500 mg Oral BID    psyllium husk-aspartame (METAMUCIL FIBER) packet 1 Packet  1 Packet Oral BID    polyethylene glycol (MIRALAX) packet 17 g  17 g Oral DAILY    [Held by provider] insulin glargine (LANTUS) injection 20 Units  20 Units SubCUTAneous BID    losartan (COZAAR) tablet 12.5 mg  12.5 mg Oral DAILY    HYDROcodone-acetaminophen (NORCO)  mg tablet 1 Tablet  1 Tablet Oral Q6H PRN    pravastatin (PRAVACHOL) tablet 80 mg  80 mg Oral QHS    traZODone (DESYREL) tablet 300 mg  300 mg Oral QHS PRN    sodium chloride (NS) flush 5-10 mL  5-10 mL IntraVENous Q8H    sodium chloride (NS) flush 5-10 mL  5-10 mL IntraVENous PRN    apixaban (ELIQUIS) tablet 5 mg  5 mg Oral Q12H    pantoprazole (PROTONIX) tablet 40 mg  40 mg Oral ACB    finasteride (PROSCAR) tablet 5 mg  5 mg Oral DAILY    [Held by provider] isosorbide mononitrate ER (IMDUR) tablet 60 mg  60 mg Oral DAILY    pregabalin (LYRICA) capsule 300 mg  300 mg Oral BID    tamsulosin (FLOMAX) capsule 0.4 mg  0.4 mg Oral DAILY    [Held by provider] carvediloL (COREG) tablet 6.25 mg  6.25 mg Oral BID WITH MEALS    sodium chloride (NS) flush 5-40 mL  5-40 mL IntraVENous PRN    acetaminophen (TYLENOL) tablet 650 mg  650 mg Oral Q6H PRN    Or    acetaminophen (TYLENOL) suppository 650 mg  650 mg Rectal Q6H PRN    polyethylene glycol (MIRALAX) packet 17 g  17 g Oral DAILY PRN    prochlorperazine (COMPAZINE) with saline injection 5 mg  5 mg IntraVENous Q6H PRN    dexAMETHasone (DECADRON) tablet 6 mg  6 mg Oral DAILY    furosemide (LASIX) injection 20 mg  20 mg IntraVENous DAILY    baricitinib (OLUMIANT) tablet 4 mg  4 mg Oral Q24H    albuterol (PROVENTIL HFA, VENTOLIN HFA, PROAIR HFA) inhaler 2 Puff  2 Puff Inhalation Q6H PRN    insulin lispro (HUMALOG) injection   SubCUTAneous AC&HS       Signed:  Sanford Helm MD

## 2021-12-16 NOTE — PROGRESS NOTES
Patient on room air at rest but continues to need 9 L HF NC with movement. Oxygen via HF NC available at bedside prn. Oxinet in use.

## 2021-12-17 VITALS
OXYGEN SATURATION: 91 % | SYSTOLIC BLOOD PRESSURE: 124 MMHG | RESPIRATION RATE: 20 BRPM | WEIGHT: 197.6 LBS | DIASTOLIC BLOOD PRESSURE: 72 MMHG | BODY MASS INDEX: 31.01 KG/M2 | HEIGHT: 67 IN | HEART RATE: 107 BPM | TEMPERATURE: 98.4 F

## 2021-12-17 PROBLEM — J12.82 PNEUMONIA DUE TO COVID-19 VIRUS: Status: RESOLVED | Noted: 2021-12-11 | Resolved: 2021-12-17

## 2021-12-17 PROBLEM — E87.8 ELECTROLYTE OR FLUID DISORDER: Status: RESOLVED | Noted: 2021-12-14 | Resolved: 2021-12-17

## 2021-12-17 PROBLEM — K59.00 CONSTIPATION: Status: RESOLVED | Noted: 2021-12-15 | Resolved: 2021-12-17

## 2021-12-17 PROBLEM — U07.1 PNEUMONIA DUE TO COVID-19 VIRUS: Status: RESOLVED | Noted: 2021-12-11 | Resolved: 2021-12-17

## 2021-12-17 PROBLEM — E87.6 HYPOKALEMIA: Status: RESOLVED | Noted: 2021-12-14 | Resolved: 2021-12-17

## 2021-12-17 LAB
ANION GAP SERPL CALC-SCNC: 7 MMOL/L (ref 7–16)
BUN SERPL-MCNC: 25 MG/DL (ref 8–23)
CALCIUM SERPL-MCNC: 9.5 MG/DL (ref 8.3–10.4)
CHLORIDE SERPL-SCNC: 104 MMOL/L (ref 98–107)
CO2 SERPL-SCNC: 26 MMOL/L (ref 21–32)
CREAT SERPL-MCNC: 1.08 MG/DL (ref 0.8–1.5)
ERYTHROCYTE [DISTWIDTH] IN BLOOD BY AUTOMATED COUNT: 13.4 % (ref 11.9–14.6)
GLUCOSE BLD STRIP.AUTO-MCNC: 207 MG/DL (ref 65–100)
GLUCOSE BLD STRIP.AUTO-MCNC: 239 MG/DL (ref 65–100)
GLUCOSE BLD STRIP.AUTO-MCNC: 242 MG/DL (ref 65–100)
GLUCOSE BLD STRIP.AUTO-MCNC: 279 MG/DL (ref 65–100)
GLUCOSE BLD STRIP.AUTO-MCNC: 95 MG/DL (ref 65–100)
GLUCOSE SERPL-MCNC: 150 MG/DL (ref 65–100)
HCT VFR BLD AUTO: 43.8 % (ref 41.1–50.3)
HGB BLD-MCNC: 14 G/DL (ref 13.6–17.2)
MAGNESIUM SERPL-MCNC: 2.5 MG/DL (ref 1.8–2.4)
MCH RBC QN AUTO: 25.3 PG (ref 26.1–32.9)
MCHC RBC AUTO-ENTMCNC: 32 G/DL (ref 31.4–35)
MCV RBC AUTO: 79.1 FL (ref 79.6–97.8)
NRBC # BLD: 0 K/UL (ref 0–0.2)
PLATELET # BLD AUTO: 214 K/UL (ref 150–450)
PMV BLD AUTO: 10.2 FL (ref 9.4–12.3)
POTASSIUM SERPL-SCNC: 3.7 MMOL/L (ref 3.5–5.1)
RBC # BLD AUTO: 5.54 M/UL (ref 4.23–5.6)
SERVICE CMNT-IMP: ABNORMAL
SERVICE CMNT-IMP: NORMAL
SODIUM SERPL-SCNC: 137 MMOL/L (ref 138–145)
WBC # BLD AUTO: 10.8 K/UL (ref 4.3–11.1)

## 2021-12-17 PROCEDURE — 80048 BASIC METABOLIC PNL TOTAL CA: CPT

## 2021-12-17 PROCEDURE — 74011250636 HC RX REV CODE- 250/636: Performed by: INTERNAL MEDICINE

## 2021-12-17 PROCEDURE — 97530 THERAPEUTIC ACTIVITIES: CPT

## 2021-12-17 PROCEDURE — 82962 GLUCOSE BLOOD TEST: CPT

## 2021-12-17 PROCEDURE — 83735 ASSAY OF MAGNESIUM: CPT

## 2021-12-17 PROCEDURE — 74011636637 HC RX REV CODE- 636/637: Performed by: EMERGENCY MEDICINE

## 2021-12-17 PROCEDURE — 74011250637 HC RX REV CODE- 250/637: Performed by: FAMILY MEDICINE

## 2021-12-17 PROCEDURE — 85027 COMPLETE CBC AUTOMATED: CPT

## 2021-12-17 PROCEDURE — 74011250637 HC RX REV CODE- 250/637: Performed by: INTERNAL MEDICINE

## 2021-12-17 PROCEDURE — 36415 COLL VENOUS BLD VENIPUNCTURE: CPT

## 2021-12-17 RX ORDER — CARVEDILOL 3.12 MG/1
3.12 TABLET ORAL 2 TIMES DAILY WITH MEALS
Status: DISCONTINUED | OUTPATIENT
Start: 2021-12-17 | End: 2021-12-17 | Stop reason: HOSPADM

## 2021-12-17 RX ORDER — LOSARTAN POTASSIUM 25 MG/1
12.5 TABLET ORAL DAILY
Qty: 15 TABLET | Refills: 0 | Status: SHIPPED | OUTPATIENT
Start: 2021-12-17 | End: 2022-01-16

## 2021-12-17 RX ADMIN — HYDROCODONE BITARTRATE AND ACETAMINOPHEN 1 TABLET: 10; 325 TABLET ORAL at 05:35

## 2021-12-17 RX ADMIN — APIXABAN 5 MG: 5 TABLET, FILM COATED ORAL at 08:20

## 2021-12-17 RX ADMIN — FUROSEMIDE 20 MG: 10 INJECTION, SOLUTION INTRAMUSCULAR; INTRAVENOUS at 08:20

## 2021-12-17 RX ADMIN — POLYETHYLENE GLYCOL 3350 17 G: 17 POWDER, FOR SOLUTION ORAL at 08:20

## 2021-12-17 RX ADMIN — LOSARTAN POTASSIUM 12.5 MG: 25 TABLET, FILM COATED ORAL at 08:20

## 2021-12-17 RX ADMIN — CARVEDILOL 3.12 MG: 3.12 TABLET, FILM COATED ORAL at 16:25

## 2021-12-17 RX ADMIN — FINASTERIDE 5 MG: 5 TABLET, FILM COATED ORAL at 08:20

## 2021-12-17 RX ADMIN — PSYLLIUM HUSK 1 PACKET: 3.4 POWDER ORAL at 16:28

## 2021-12-17 RX ADMIN — DEXAMETHASONE 6 MG: 4 TABLET ORAL at 08:20

## 2021-12-17 RX ADMIN — PANTOPRAZOLE SODIUM 40 MG: 40 TABLET, DELAYED RELEASE ORAL at 06:18

## 2021-12-17 RX ADMIN — PSYLLIUM HUSK 1 PACKET: 3.4 POWDER ORAL at 09:00

## 2021-12-17 RX ADMIN — Medication 10 ML: at 13:53

## 2021-12-17 RX ADMIN — Medication 10 ML: at 06:18

## 2021-12-17 RX ADMIN — PREGABALIN 300 MG: 150 CAPSULE ORAL at 08:20

## 2021-12-17 RX ADMIN — HYDROCODONE BITARTRATE AND ACETAMINOPHEN 1 TABLET: 10; 325 TABLET ORAL at 13:52

## 2021-12-17 RX ADMIN — PREGABALIN 300 MG: 150 CAPSULE ORAL at 16:27

## 2021-12-17 RX ADMIN — INSULIN LISPRO 4 UNITS: 100 INJECTION, SOLUTION INTRAVENOUS; SUBCUTANEOUS at 11:52

## 2021-12-17 RX ADMIN — TAMSULOSIN HYDROCHLORIDE 0.4 MG: 0.4 CAPSULE ORAL at 08:21

## 2021-12-17 RX ADMIN — Medication 500 MG: at 08:20

## 2021-12-17 NOTE — PROGRESS NOTES
Pt is resting in bed at this time. Pt is alert and oriented times 4. Pt is on RA at this time. Pt has no s/sx of acute distress at this time. Pt has no requests at this time. Pt has safety measures in place. Pt has call light within reach. Will continue to monitor.

## 2021-12-17 NOTE — PROGRESS NOTES
ACUTE PHYSICAL THERAPY GOALS:  (Developed with and agreed upon by patient and/or caregiver.)  ST. Patient will perform bed mobility with INDEPENDENT within 3 days. Goal met 21  2. Patient will transfer bed to chair with CONTACT GUARD ASSISTANCE within 3 days. goal met 21  3. Patient will demonstrate GOOD DYNAMIC STANDING balance within 3 day(s). 4. Patient will ambulate 50+ using least restrictive assistive device and MINIMAL ASSISTANCE within 3 days. 5. Patient will tolerate 15+ minutes of therapeutic activity/exercise and/or neuromuscular re-education while maintaining stable vitals to improve functional strength and activity tolerance within 3 days.     LT. Patient will perform bed mobility with INDEPENDENCE within 7 days. 2. Patient will transfer bed to chair with INDEPENDENCE within 7 days. 3. Patient will demonstrate GOOD DYNAMIC STANDING balance within 7 day(s). 4. Patient will ambulate 150+ using least restrictive assistive device and INDEPENDENCE within 7 days. 5. Patient will tolerate 25+ minutes of therapeutic activity/exercise and/or neuromuscular re-education while maintaining stable vitals to improve functional strength and activity tolerance within 7 days.           PHYSICAL THERAPY: Daily Note and PM Treatment Day # 2    Anabell River is a 70 y.o. male   PRIMARY DIAGNOSIS: Acute hypoxemic respiratory failure ([address]  Acute hypoxemic respiratory failure (HCC) [J96.01]  Pneumonia due to COVID-19 virus [U07.1, J12.82]         ASSESSMENT:     REHAB RECOMMENDATIONS: CURRENT LEVEL OF FUNCTION:  (Most Recently Demonstrated)   Recommendation to date pending progress:  [address] [place] [address]  Equipment:    To Be Determined Bed Mobility:   Independent  Sit to Stand:  [employer] Assistance  Transfers:   Contact Guard Assistance  Gait/Mobility:   Standby Assistance     ASSESSMENT:  Mr. Catalina Lara is supine in bed and agreeable to therapy. Bed mobility is independent. Sitting balance is good. Sit to stand and transfer to the recliner is with contact guard assist as the patient does c/o occasional dizziness . Therapeutic exercises performed  Sitting. Tolerated well. Patient is on RA and saturations remain above 93% during the entire treatment session. Making progress towards goals. Home with HHPT     SUBJECTIVE:   [facility]. Sissy Davison [facility]. \"    SOCIAL HISTORY/ LIVING ENVIRONMENT: see eval  Home Environment: Other (comment) (camper)  # Steps to Enter: 0  One/Two Story Residence: One story  Living Alone: Yes  Support Systems: Child(gabe)  OBJECTIVE:     PAIN: VITAL SIGNS: LINES/DRAINS:   Pre Treatment: Pain Screen  Pain Scale 1: Numeric (0 - 10)  Pain Intensity 1: 0/10  Post Treatment: 0/10   none  O2 Device: None (Room air)     MOBILITY: I Mod I S SBA CGA Min Mod Max Total  NT x2 Comments:   Bed Mobility    Rolling [x] [] [] [] [] [] [] [] [] [] []    Supine to Sit [x] [] [] [] [] [] [] [] [] [] []    Scooting [x] [] [] [] [] [] [] [] [] [] []    Sit to Supine [x] [] [] [] [] [] [] [] [] [] []    Transfers    Sit to Stand [] [] [] [] [x] [] [] [] [] [] []    Bed to Chair [] [] [] [] [x] [] [] [] [] [] []    Stand to Sit [] [] [] [] [x] [] [] [] [] [] []    I=Independent, Mod I=Modified Independent, S=Supervision, SBA=Standby Assistance, CGA=Contact Guard Assistance,   Min=Minimal Assistance, Mod=Moderate Assistance, Max=Maximal Assistance, Total=Total Assistance, NT=Not Tested    BALANCE: Good Fair+ Fair Fair- Poor NT Comments   Sitting Static [x] [] [] [] [] []    Sitting Dynamic [x] [] [] [] [] []              Standing Static [] [x] [] [] [] []    Standing Dynamic [] [x] [] [] [] []      GAIT: I Mod I S SBA CGA Min Mod Max Total  NT x2 Comments:   Level of Assistance [] [] [] [x] [] [] [] [] [] [] []    Distance 3-5 feet     DME None    Gait Quality Careful movement    Weightbearing  Status N/A     I=Independent, Mod I=Modified Independent, S=Supervision, SBA=Standby Assistance, CGA=Contact [employer],   Min=Minimal Assistance, Mod=Moderate Assistance, Max=Maximal Assistance, Total=Total Assistance, NT=Not Tested    PLAN:   FREQUENCY/DURATION: PT Plan of Care: 3 times/week for duration of hospital stay or until stated goals are met, whichever comes first.  TREATMENT:     TREATMENT:   ($$ Therapeutic Activity: 23-37 mins    )  Therapeutic Activity (23 Minutes): Therapeutic activity included Rolling, Supine to Sit, Scooting, Transfer Training, Ambulation on level ground, Sitting balance  and Standing balance to improve functional Mobility, Strength and Activity tolerance.     TREATMENT GRID:  N/A    AFTER TREATMENT POSITION/PRECAUTIONS:  Chair, Needs within reach and RN notified    INTERDISCIPLINARY COLLABORATION:  RN/PCT and PT/PTA    TOTAL TREATMENT DURATION:  PT Patient Time In/Time Out  Time In: 0525  Time Out: [address] Bowman, PTA

## 2021-12-17 NOTE — PROGRESS NOTES
12/17/21 1115   Vital Signs   Temp 98.7 °F (37.1 °C)   Temp Source Oral   Pulse (Heart Rate) 77   Resp Rate 20   O2 Sat (%) 92 %   Level of Consciousness Alert (0)   /72   MAP (Calculated) 88   MEWS Score 1

## 2021-12-17 NOTE — DISCHARGE SUMMARY
Hospitalist Discharge Summary   Admit Date:  2021 12:58 PM   DC Note date: 2021  Name:  Anabell River   Age:  70 y.o.   Sex:  male  :  1950   MRN:  [id number]   Room:  [facility]  PCP:  Hortencia Man MD    Presenting Complaint: Fatigue    Initial Admission Diagnosis: [facility] hypoxemic [facility] [J96.01]  Pneumonia due to COVID-19 virus [U07.1, J12.82]     Problem List for this Hospitalization:  Hospital Problems as of 2021 Date Reviewed: 2021          Codes Class Noted - Resolved POA    * (Principal) Acute hypoxemic respiratory failure ([email or web address] ICD-10-CM: J96.01  ICD-9-CM: 518.81  2021 - Present Yes        Diabetes mellitus ([email or web address] (Chronic) ICD-10-CM: E11.9  ICD-9-CM: 250.00  2009 - Present Yes        RESOLVED: Electrolyte or fluid disorder ICD-10-CM: E87.8  ICD-9-CM: 276.9  2021 - 2021 Yes        RESOLVED: Constipation ICD-10-CM: K59.00  ICD-9-CM: 564.00  12/15/2021 - 2021 No        Class 1 obesity due to excess calories with serious comorbidity and body mass index (BMI) of 30.0 to 30.9 in adult (Chronic) ICD-10-CM: E66.09, Z68.30  ICD-9-CM: 278.00, V85.30  12/15/2021 - Present Yes        GERD (gastroesophageal reflux disease) ICD-10-CM: K21.9  ICD-9-CM: 530.81  Unknown - Present Yes        Paroxysmal atrial fibrillation (HCC) (Chronic) ICD-10-CM: I48.0  ICD-9-CM: 427.31  2010 - Present Yes        CAD (coronary artery disease) (Chronic) ICD-10-CM: I25.10  ICD-9-CM: 414.00  2009 - Present Yes        Dyslipidemia (Chronic) ICD-10-CM: E78.5  ICD-9-CM: 272.4  2009 - Present Yes        RESOLVED: Hypokalemia ICD-10-CM: E87.6  ICD-9-CM: 276.8  2021 - 2021 Yes        RESOLVED: Pneumonia due to COVID-19 virus ICD-10-CM: U07.1, J12.82  ICD-9-CM: 480.8, 079.89  2021 - 2021 Yes            Did Patient have Sepsis (YES OR NO): no    Hospital Course:  69 y/o WM with a h/o HTN, HLD, CAD s/p CABG, AFib, SSS s/p PPM, IDDM, BPH, GERD, CVA and peripheral neuropathy who was admitted to our service on 12/11 with acute hypoxemic respiratory failure and bilateral pneumonia 2/2 COVID-19. He was started on supplemental O2, baricitinib and dexamethasone. He required minimal oxygen at rest but would desaturate quickly with minimal exertion. This continued until 12/17 when he was found to be safe for discharge home with oxygen. He should follow up with his primary care provider. Primary care provider may consider the following:  -medication changes as noted below  -ongoing need for O2  -physical rehabilitation  -vaccination plan    Disposition: [facility]  Diet: ADULT DIET Regular; 3 carb choices (45 gm/meal); Low Sodium (2 gm)  ADULT DIET Regular; 3 carb choices (45 gm/meal)  Code Status: Full Code    Follow Up Orders: Follow-up Appointments   Procedures    FOLLOW UP VISIT Appointment in: One Week Follow up with primary care provider within one week. Follow up with primary care provider within one week. Standing Status:   Standing     Number of Occurrences:   1     Order Specific Question:   Appointment in     Answer: One Week       Follow-up Information     Follow up With Specialties Details Why Contact Info    Drea Mccracken MD Family Medicine   [address] [address]  [phone]      Drea Mccracken MD Family Medicine           Time spent in patient discharge and coordination 46 minutes. Plan was discussed with patient, nurse, . All questions answered. Patient was stable at time of discharge. Instructions given to call a physician or return if any concerns. Discharge Info:   Current Discharge Medication List      CONTINUE these medications which have CHANGED    Details   losartan (COZAAR) 25 mg tablet Take 0.5 Tablets by mouth daily for 30 days.  TAKE 1 TABLET BY MOUTH DAILY  Qty: 15 Tablet, Refills: 0  Start date: 12/17/2021, End date: 1/16/2022    Associated Diagnoses: Dilated cardiomyopathy ([facility] Essential hypertension         CONTINUE these medications which have NOT CHANGED    Details   isosorbide mononitrate ER (Imdur) 60 mg CR tablet Take 1 Tablet by mouth two (2) times a day. Qty: 180 Tablet, Refills: 3    Associated Diagnoses: Coronary artery disease involving autologous artery coronary bypass graft; Chest pain, unspecified type; S/P CABG (coronary artery bypass graft); Post PTCA      HYDROcodone-acetaminophen (Norco)  mg tablet Take 1 Tablet by mouth. prn      insulin detemir U-100 (LEVEMIR) 100 unit/mL injection by SubCUTAneous route nightly. Eliquis 5 mg tablet TAKE 1 TABLET BY MOUTH TWICE DAILY  Qty: 180 Tablet, Refills: 3      pravastatin (PRAVACHOL) 80 mg tablet Take 1 Tab by mouth nightly. Qty: 90 Tab, Refills: 3    Comments: **Patient requests 90 days supply**  Associated Diagnoses: Coronary artery disease involving autologous artery coronary bypass graft      carvediloL (COREG) 12.5 mg tablet TAKE 1 TABLET BY MOUTH TWICE DAILY WITH MEALS  Qty: 180 Tab, Refills: 3    Associated Diagnoses: Coronary artery disease involving autologous artery coronary bypass graft, angina presence unspecified; Essential hypertension; Paroxysmal atrial fibrillation (HCC)      tamsulosin (FLOMAX) 0.4 mg capsule TAKE 1 CAPSULE BY MOUTH DAILY  Qty: 90 Cap, Refills: 11    Comments: **Patient requests 90 days supply**  Associated Diagnoses: Benign prostatic hyperplasia with urinary hesitancy      finasteride (PROSCAR) 5 mg tablet TAKE 1 TABLET BY MOUTH DAILY  Qty: 90 Tab, Refills: 11    Comments: **Patient requests 90 days supply**  Associated Diagnoses: Benign prostatic hyperplasia with urinary hesitancy      esomeprazole (NEXIUM) 40 mg capsule Take  by mouth daily. pregabalin (LYRICA) 300 mg capsule Take 300 mg by mouth two (2) times a day. trazodone (DESYREL) 100 mg tablet Take 150 mg by mouth nightly.       nitroglycerin (NITROSTAT) 0.4 mg SL tablet 1 Tablet by SubLINGual route every five (5) minutes as needed for Chest Pain. Up to 3 doses. Qty: 25 Tablet, Refills: 2    Associated Diagnoses: Coronary artery disease involving autologous artery coronary bypass graft, unspecified whether angina present; S/P CABG (coronary artery bypass graft); Chest pain, unspecified type         STOP taking these medications       fluticasone (FLONASE) 50 mcg/actuation nasal spray Comments:   Reason for Stopping:               Procedures done this admission:  * No surgery found *    Consults this admission:  None    Echocardiogram/EKG results:  No results found for this or any previous visit.        EKG Results     Procedure 720 Value Units Date/Time    EKG, 12 LEAD, SUBSEQUENT [[id number]] Collected: 12/13/21 1210    Order Status: Completed Updated: 12/13/21 1328     Ventricular Rate 70 BPM      Atrial Rate 70 BPM      P-R Interval 220 ms      QRS Duration 142 ms      Q-T Interval 428 ms      QTC Calculation (Bezet) 462 ms      Calculated P Axis 70 degrees      Calculated R Axis 134 degrees      Calculated T Axis -36 degrees      Diagnosis --     AV dual-paced rhythm with prolonged AV conduction  Abnormal ECG  When compared with ECG of 13-DEC-2021 12:09,  No significant change was found  Confirmed by [facility]  MD (UC)MARSHA ([id number]) on 12/13/2021 1:28:47 PM      EKG [[id number]] Collected: 12/11/21 1309    Order Status: Completed Updated: 12/12/21 1913     Ventricular Rate 103 BPM      Atrial Rate 103 BPM      P-R Interval 166 ms      QRS Duration 119 ms      Q-T Interval 367 ms      QTC Calculation (Bezet) 481 ms      Calculated P Axis -109 degrees      Calculated R Axis -38 degrees      Calculated T Axis 107 degrees      Diagnosis --     Sinus tachycardia  Nonspecific IVCD with LAD  LVH with secondary repolarization abnormality  Anterolateral infarct, age indeterminate  When compared with ECG of prior  Sinus tachycardia has replaced atrial paced rhythm  Confirmed by Carmen Quiroz MD, Herb Anguiano ([id number]) on 12/12/2021 7:12:50 PM            Diagnostic Imaging/Tests:   XR CHEST PORT    Result Date: 12/11/2021  PORTABLE CHEST X-RAY 12/11/2021 1:47 PM HISTORY: Shortness of Breath COMPARISON: May 17, 2021 FINDINGS: The lung volumes are diminished. Patchy peripheral bibasilar infiltrates are present. Pleural spaces are clear. Median sternotomy wires are present along with a cardiac pacemaker device. EKG leads are present. Low lung volumes with bilateral infiltrates. All Micro Results     Procedure Component Value Units Date/Time    COVID-19 RAPID TEST [[id number]]  (Abnormal) Collected: 12/11/21 1319    Order Status: Completed Specimen: Nasopharyngeal Updated: 12/11/21 1340     Specimen source Nasopharyngeal        COVID-19 rapid test Detected        Comment:      The specimen is POSITIVE for SARS-CoV-2, the novel coronavirus associated with COVID-19. This test has been authorized by the FDA under an Emergency Use Authorization (EUA) for use by authorized laboratories. Fact sheet for Healthcare Providers: ConventionUpdate.co.nz  Fact sheet for Patients: ConventionUpdate.co.nz       Methodology: Isothermal Nucleic Acid Amplification               Labs: Results:       BMP, Mg, Phos Recent Labs     12/17/21  0932 12/15/21  0851   * 143   K 3.7 3.4*    108*   CO2 26 31   AGAP 7 4*   BUN 25* 22   CREA 1.08 0.94   CA 9.5 9.2   * 72   MG 2.5* 2.6*      CBC Recent Labs     12/17/21  0932 12/15/21  0851   WBC 10.8 7.6   RBC 5.54 5.12   HGB 14.0 13.1*   HCT 43.8 41.4    208      LFT No results for input(s): ALT, TBIL, AP, TP, ALB, GLOB, AGRAT in the last 72 hours.     No lab exists for component: SGOT, GPT   Cardiac Testing Lab Results   Component Value Date/Time    BNP 37 04/30/2013 02:40 AM    BNP 40 09/13/2011 05:01 PM    BNP 91 09/19/2010 05:35 AM    CK 54 10/17/2015 12:30 AM    CK 49 11/09/2012 09:15 PM    CK 37 11/09/2012 04:40 PM    CK - MB <0.5 05/05/2009 07:30 AM    CK - MB <0.5 05/04/2009 10:50 PM    CK - MB 0.0 12/29/2008 05:20 PM    CK-MB Index CANNOT BE CALCULATED 05/05/2009 07:30 AM    CK-MB Index CANNOT BE CALCULATED 05/04/2009 10:50 PM    CK-MB Index 0.0 12/29/2008 05:20 PM    Troponin-I <0.05 09/13/2011 05:01 PM    Troponin-I <0.05 05/25/2011 01:25 PM    Troponin-I, Qt. <0.02 (L) 02/22/2016 01:00 PM    Troponin-I, Qt. <0.02 (L) 10/17/2015 09:21 AM    Troponin-I, Qt. 0.02 10/17/2015 12:30 AM      Coagulation Tests Lab Results   Component Value Date/Time    Prothrombin time 13.8 08/10/2020 06:49 AM    Prothrombin time 11.1 02/25/2016 11:21 AM    Prothrombin time 17.2 (H) 10/18/2015 06:02 AM    INR 1.0 08/10/2020 06:49 AM    INR 1.0 02/25/2016 11:21 AM    INR 1.6 (H) 10/18/2015 06:02 AM    aPTT 26.6 02/25/2016 11:21 AM    aPTT 42.7 (H) 06/23/2014 07:00 AM    aPTT 43.9 (H) 06/23/2014 12:15 AM      A1c Lab Results   Component Value Date/Time    Hemoglobin A1c 9.1 (H) 08/11/2020 04:32 AM    Hemoglobin A1c 7.7 (H) 06/22/2014 03:13 AM    Hemoglobin A1c 7.0 (H) 03/01/2010 12:30 PM      Lipid Panel Lab Results   Component Value Date/Time    Cholesterol, total 125 08/11/2020 04:32 AM    HDL Cholesterol 43 08/11/2020 04:32 AM    LDL, calculated 64.2 08/11/2020 04:32 AM    VLDL, calculated 17.8 08/11/2020 04:32 AM    Triglyceride 89 08/11/2020 04:32 AM    CHOL/HDL Ratio 2.9 08/11/2020 04:32 AM      Thyroid Panel No results found for: TSH, T4, FT4, TT3, T3U, TSHEXT     Most Recent UA Lab Results   Component Value Date/Time    Color JAMES 09/20/2020 02:46 PM    Appearance CLOUDY 09/20/2020 02:46 PM    Specific gravity 1.026 (H) 09/20/2020 02:46 PM    pH (UA) 5.5 09/20/2020 02:46 PM    Protein Negative 09/20/2020 02:46 PM    Glucose 250 09/20/2020 02:46 PM    Ketone Negative 09/20/2020 02:46 PM    Bilirubin SMALL (A) 09/20/2020 02:46 PM    Blood Negative 09/20/2020 02:46 PM    Urobilinogen 1.0 09/20/2020 02:46 PM    Nitrites Negative 09/20/2020 02:46 PM Leukocyte Esterase Negative 09/20/2020 02:46 PM    WBC 0 05/28/2008 11:13 AM    RBC 3-5 05/28/2008 11:13 AM    Epithelial cells 0-3 05/28/2008 11:13 AM    Bacteria 2+ (H) 05/28/2008 11:13 AM    Casts 0 05/28/2008 11:13 AM    Crystals, urine 0 05/28/2008 11:13 AM    Mucus 2+ (H) 05/28/2008 11:13 AM          All Labs from Last 24 Hrs:  Recent Results (from the past 24 hour(s))   GLUCOSE, POC    Collection Time: 12/16/21  4:07 PM   Result Value Ref Range    Glucose (POC) 279 (H) 65 - 100 mg/dL    Performed by Michael    GLUCOSE, POC    Collection Time: 12/16/21  4:37 PM   Result Value Ref Range    Glucose (POC) 242 (H) 65 - 100 mg/dL    Performed by Dora    GLUCOSE, POC    Collection Time: 12/16/21  8:38 PM   Result Value Ref Range    Glucose (POC) 239 (H) 65 - 100 mg/dL    Performed by Kalyani    GLUCOSE, POC    Collection Time: 12/17/21  8:25 AM   Result Value Ref Range    Glucose (POC) 95 65 - 100 mg/dL    Performed by Keri    CBC W/O DIFF    Collection Time: 12/17/21  9:32 AM   Result Value Ref Range    WBC 10.8 4.3 - 11.1 K/uL    RBC 5.54 4.23 - 5.6 M/uL    HGB 14.0 13.6 - 17.2 g/dL    HCT 43.8 41.1 - 50.3 %    MCV 79.1 (L) 79.6 - 97.8 FL    MCH 25.3 (L) 26.1 - 32.9 PG    MCHC 32.0 31.4 - 35.0 g/dL    RDW 13.4 11.9 - 14.6 %    PLATELET 407 083 - 841 K/uL    MPV 10.2 9.4 - 12.3 FL    ABSOLUTE NRBC 0.00 0.0 - 0.2 K/uL   METABOLIC PANEL, BASIC    Collection Time: 12/17/21  9:32 AM   Result Value Ref Range    Sodium 137 (L) 138 - 145 mmol/L    Potassium 3.7 3.5 - 5.1 mmol/L    Chloride 104 98 - 107 mmol/L    CO2 26 21 - 32 mmol/L    Anion gap 7 7 - 16 mmol/L    Glucose 150 (H) 65 - 100 mg/dL    BUN 25 (H) 8 - 23 MG/DL    Creatinine 1.08 0.8 - 1.5 MG/DL    GFR est AA >60 >60 ml/min/1.73m2    GFR est non-AA >60 >60 ml/min/1.73m2    Calcium 9.5 8.3 - 10.4 MG/DL   MAGNESIUM    Collection Time: 12/17/21  9:32 AM   Result Value Ref Range    Magnesium 2.5 (H) 1.8 - 2.4 mg/dL   GLUCOSE, POC    Collection Time: 12/17/21 11:48 AM   Result Value Ref Range    Glucose (POC) 207 (H) 65 - 100 mg/dL    Performed by Keri        Current Med List in Hospital:   Current Facility-Administered Medications   Medication Dose Route Frequency    carvediloL (COREG) tablet 3.125 mg  3.125 mg Oral BID WITH MEALS    Saccharomyces boulardii (FLORASTOR) capsule 500 mg  500 mg Oral BID    psyllium husk-aspartame (METAMUCIL FIBER) packet 1 Packet  1 Packet Oral BID    polyethylene glycol (MIRALAX) packet 17 g  17 g Oral DAILY    [Held by provider] insulin glargine (LANTUS) injection 20 Units  20 Units SubCUTAneous BID    losartan (COZAAR) tablet 12.5 mg  12.5 mg Oral DAILY    HYDROcodone-acetaminophen (NORCO)  mg tablet 1 Tablet  1 Tablet Oral Q6H PRN    pravastatin (PRAVACHOL) tablet 80 mg  80 mg Oral QHS    traZODone (DESYREL) tablet 300 mg  300 mg Oral QHS PRN    sodium chloride (NS) flush 5-10 mL  5-10 mL IntraVENous Q8H    sodium chloride (NS) flush 5-10 mL  5-10 mL IntraVENous PRN    apixaban (ELIQUIS) tablet 5 mg  5 mg Oral Q12H    pantoprazole (PROTONIX) tablet 40 mg  40 mg Oral ACB    finasteride (PROSCAR) tablet 5 mg  5 mg Oral DAILY    [Held by provider] isosorbide mononitrate ER (IMDUR) tablet 60 mg  60 mg Oral DAILY    pregabalin (LYRICA) capsule 300 mg  300 mg Oral BID    tamsulosin (FLOMAX) capsule 0.4 mg  0.4 mg Oral DAILY    sodium chloride (NS) flush 5-40 mL  5-40 mL IntraVENous PRN    acetaminophen (TYLENOL) tablet 650 mg  650 mg Oral Q6H PRN    Or    acetaminophen (TYLENOL) suppository 650 mg  650 mg Rectal Q6H PRN    polyethylene glycol (MIRALAX) packet 17 g  17 g Oral DAILY PRN    prochlorperazine (COMPAZINE) with saline injection 5 mg  5 mg IntraVENous Q6H PRN    dexAMETHasone (DECADRON) tablet 6 mg  6 mg Oral DAILY    furosemide (LASIX) injection 20 mg  20 mg IntraVENous DAILY    baricitinib (OLUMIANT) tablet 4 mg  4 mg Oral Q24H    albuterol (PROVENTIL HFA, VENTOLIN HFA, PROAIR HFA) inhaler 2 Puff  2 Puff Inhalation Q6H PRN    insulin lispro (HUMALOG) injection   SubCUTAneous AC&HS       Allergies   Allergen Reactions    Beta-Blockers (Beta-Adrenergic Blocking Agts) Other (comments)     \"Very low blood pressures with every one they tried\"    Shellfish Derived Angioedema     Only shrimp causes reaction. Can eat all other shellfish    Xanax [Alprazolam] Other (comments)     Totally changes his personality     Immunization History   Administered Date(s) Administered    (RETIRED) Pneumococcal Vaccine (Unspecified Type) 10/16/2008    Influenza Vaccine PF 10/02/2013    Influenza Vaccine Split 10/17/2008    Pneumococcal Polysaccharide (PPSV-23) 10/18/2015       Recent Vital Data:  Patient Vitals for the past 24 hrs:   Temp Pulse Resp BP SpO2   12/17/21 1115 98.7 °F (37.1 °C) 77 20 121/72 92 %   12/17/21 0751 98.4 °F (36.9 °C) 70 20 134/72 (!) 89 %   12/17/21 0549 98.3 °F (36.8 °C) 70 18 (!) 141/71 94 %   12/16/21 2203 97.6 °F (36.4 °C) 70 18 (!) 147/74 95 %   12/16/21 1938 98.9 °F (37.2 °C) 71 18 134/65 93 %   12/16/21 1715 98.9 °F (37.2 °C) 70 18 134/67 92 %     Oxygen Therapy  O2 Sat (%): 92 % (12/17/21 1115)  Pulse via Oximetry: 69 beats per minute (12/11/21 1745)  O2 Device: None (Room air) (12/16/21 2203)  O2 Flow Rate (L/min): 0 l/min (12/16/21 1610)  FIO2 (%): 97 % (12/15/21 2145)    Estimated body mass index is 30.95 kg/m² as calculated from the following:    Height as of this encounter: 5' 7\" (1.702 m). Weight as of this encounter: 89.6 kg (197 lb 9.6 oz). Intake/Output Summary (Last 24 hours) at 12/17/2021 1513  Last data filed at 12/17/2021 1438  Gross per 24 hour   Intake 480 ml   Output 2250 ml   Net -1770 ml       Physical Exam  Vitals and nursing note reviewed. Constitutional:       General: He is not in acute distress. Appearance: Normal appearance. He is not ill-appearing.       Comments: Appears less tired, ORA at rest   HENT:      Head: Normocephalic. Mouth/Throat:      Comments: hoarse  Eyes:      Extraocular Movements: Extraocular movements intact. Cardiovascular:      Rate and Rhythm: Normal rate. Pulses:           Radial pulses are 2+ on the left side. Pulmonary:      Effort: Pulmonary effort is normal. No respiratory distress. Breath sounds: No wheezing or rhonchi. Musculoskeletal:         General: No deformity. Cervical back: No rigidity. Right lower leg: No edema. Left lower leg: No edema. Skin:     General: Skin is warm and dry. Neurological:      General: No focal deficit present. Mental Status: He is alert and oriented to person, place, and time.    Psychiatric:         Mood and Affect: Mood normal.         Behavior: Behavior normal.           Signed:  Mack Collazo MD

## 2021-12-17 NOTE — PROGRESS NOTES
SBAR received from [facility] pt in bed resting rr are even and unlabored he is on room air at current time and O2 sat is 89 he states if he drops any lower he will put his O2 back on. He is alert and oriented lung sounds are coarse. He denies pain at current time. Safety measures in place will continue to monitor.

## 2021-12-17 NOTE — PROGRESS NOTES
Discharge instruction and medication list reviewed and given to patient. Verbalizes understanding. Prescription e-scribed to pt's choice of pharmacy by provider. Peripheral IV removed with cath tip intact post removal. Exit site pink. Personal belonging with patient (see DC papers). Patient to schedule follow up appointment. Verbalizes understanding. RT note noted for O2 needs. Primary nurse aware of pt needing O2 3 liters via NC with ambulation. Pt made aware that he can go as soon as portable O2 tank is delivered to the room. Aware to call nurse's station when ready for wheelchair after the O2 tank has been delivered. Primary nurse updated.

## 2021-12-17 NOTE — PROGRESS NOTES
Pt is resting in bed at this time. Pt is resting in bed at this time. Pt is on RA. Pt has no s/sx of acute distress at this time. Pt has safety measures in place. Pt has call light within reach.  Report given to Nichole MARTINEZ

## 2021-12-17 NOTE — DISCHARGE INSTRUCTIONS
DISCHARGE SUMMARY from Nurse    PATIENT INSTRUCTIONS:    After general anesthesia or intravenous sedation, for 24 hours or while taking prescription Narcotics:  · Limit your activities  · Do not drive and operate hazardous machinery  · Do not make important personal or business decisions  · Do  not drink alcoholic beverages  · If you have not urinated within 8 hours after discharge, please contact your surgeon on call. What to do at Home:  Recommended activity: Activity as tolerated. If you experience any of the following symptoms worsening cough or wheezing, shortness of breath or fatigue not relieved with rest, please follow up with MD.    *  Please give a list of your current medications to your Primary Care Provider. *  Please update this list whenever your medications are discontinued, doses are      changed, or new medications (including over-the-counter products) are added. *  Please carry medication information at all times in case of emergency situations. These are general instructions for a healthy lifestyle:    No smoking/ No tobacco products/ Avoid exposure to second hand smoke  Surgeon General's Warning:  Quitting smoking now greatly reduces serious risk to your health. Obesity, smoking, and sedentary lifestyle greatly increases your risk for illness    A healthy diet, regular physical exercise & weight monitoring are important for maintaining a healthy lifestyle    You may be retaining fluid if you have a history of heart failure or if you experience any of the following symptoms:  Weight gain of 3 pounds or more overnight or 5 pounds in a week, increased swelling in our hands or feet or shortness of breath while lying flat in bed. Please call your doctor as soon as you notice any of these symptoms; do not wait until your next office visit. The discharge information has been reviewed with the patient. The patient verbalized understanding.   Discharge medications reviewed with the patient and appropriate educational materials and side effects teaching were provided. Advance Care Planning  People with COVID-19 may have no symptoms, mild symptoms, such as fever, cough, and shortness of breath or they may have more severe illness, developing severe and fatal pneumonia. As a result, Advance Care Planning with attention to naming a health care decision maker (someone you trust to make healthcare decisions for you if you could not speak for yourself) and sharing other health care preferences is important BEFORE a possible health crisis. Please contact your Primary Care Provider to discuss Advance Care Planning. Preventing the Spread of Coronavirus Disease 2019 in Homes and Residential Communities  For the most recent information go to gopogo.    Prevention steps for People with confirmed or suspected COVID-19 (including persons under investigation) who do not need to be hospitalized  and   People with confirmed COVID-19 who were hospitalized and determined to be medically stable to go home    Your healthcare provider and public health staff will evaluate whether you can be cared for at home. If it is determined that you do not need to be hospitalized and can be isolated at home, you will be monitored by staff from your local or state health department. You should follow the prevention steps below until a healthcare provider or local or state health department says you can return to your normal activities. Stay home except to get medical care  People who are mildly ill with COVID-19 are able to isolate at home during their illness. You should restrict activities outside your home, except for getting medical care. Do not go to work, school, or public areas. Avoid using public transportation, ride-sharing, or taxis.   Separate yourself from other people and animals in your home  People: As much as possible, you should stay in a specific room and away from other people in your home. Also, you should use a separate bathroom, if available. Animals: You should restrict contact with pets and other animals while you are sick with COVID-19, just like you would around other people. Although there have not been reports of pets or other animals becoming sick with COVID-19, it is still recommended that people sick with COVID-19 limit contact with animals until more information is known about the virus. When possible, have another member of your household care for your animals while you are sick. If you are sick with COVID-19, avoid contact with your pet, including petting, snuggling, being kissed or licked, and sharing food. If you must care for your pet or be around animals while you are sick, wash your hands before and after you interact with pets and wear a facemask. Call ahead before visiting your doctor  If you have a medical appointment, call the healthcare provider and tell them that you have or may have COVID-19. This will help the healthcare providers office take steps to keep other people from getting infected or exposed. Wear a facemask  You should wear a facemask when you are around other people (e.g., sharing a room or vehicle) or pets and before you enter a healthcare providers office. If you are not able to wear a facemask (for example, because it causes trouble breathing), then people who live with you should not stay in the same room with you, or they should wear a facemask if they enter your room. Cover your coughs and sneezes  Cover your mouth and nose with a tissue when you cough or sneeze. Throw used tissues in a lined trash can. Immediately wash your hands with soap and water for at least 20 seconds or, if soap and water are not available, clean your hands with an alcohol-based hand  that contains at least 60% alcohol.   Clean your hands often  Wash your hands often with soap and water for at least 20 seconds, especially after blowing your nose, coughing, or sneezing; going to the bathroom; and before eating or preparing food. If soap and water are not readily available, use an alcohol-based hand  with at least 60% alcohol, covering all surfaces of your hands and rubbing them together until they feel dry. Soap and water are the best option if hands are visibly dirty. Avoid touching your eyes, nose, and mouth with unwashed hands. Avoid sharing personal household items  You should not share dishes, drinking glasses, cups, eating utensils, towels, or bedding with other people or pets in your home. After using these items, they should be washed thoroughly with soap and water. Clean all high-touch surfaces everyday  High touch surfaces include counters, tabletops, doorknobs, bathroom fixtures, toilets, phones, keyboards, tablets, and bedside tables. Also, clean any surfaces that may have blood, stool, or body fluids on them. Use a household cleaning spray or wipe, according to the label instructions. Labels contain instructions for safe and effective use of the cleaning product including precautions you should take when applying the product, such as wearing gloves and making sure you have good ventilation during use of the product. Monitor your symptoms  Seek prompt medical attention if your illness is worsening (e.g., difficulty breathing). Before seeking care, call your healthcare provider and tell them that you have, or are being evaluated for, COVID-19. Put on a facemask before you enter the facility. These steps will help the healthcare providers office to keep other people in the office or waiting room from getting infected or exposed. Ask your healthcare provider to call the local or [facility] health department.  Persons who are placed under active monitoring or facilitated self-monitoring should follow instructions provided by their local health department or occupational health professionals, as appropriate. When working with your local health department check their available hours. If you have a medical emergency and need to call 911, notify the dispatch personnel that you have, or are being evaluated for COVID-19. If possible, put on a facemask before emergency medical services arrive. Discontinuing home isolation  Patients with confirmed COVID-19 should remain under home isolation precautions until the risk of secondary transmission to others is thought to be low. The decision to discontinue home isolation precautions should be made on a case-by-case basis, in consultation with healthcare providers and state and local health departments. Patient Education        Pneumonia: Care Instructions  Overview     Pneumonia is an infection of the lungs. Most cases are caused by infections from bacteria or viruses. Pneumonia may be mild or very severe. If it is caused by bacteria, you will be treated with antibiotics. It may take a few weeks to a few months to recover fully from pneumonia, depending on how sick you were and whether your overall health is good. Follow-up care is a key part of your treatment and safety. Be sure to make and go to all appointments, and call your doctor if you are having problems. It's also a good idea to know your test results and keep a list of the medicines you take. How can you care for yourself at home? · Take your antibiotics exactly as directed. Do not stop taking the medicine just because you are feeling better. You need to take the full course of antibiotics. · Take your medicines exactly as prescribed. Call your doctor if you think you are having a problem with your medicine. · Get plenty of rest and sleep. You may feel weak and tired for a while, but your energy level will improve with time. · To prevent dehydration, drink plenty of fluids, enough so that your urine is light yellow or clear like water.  Choose water and other caffeine-free clear liquids until you feel better. If you have kidney, heart, or liver disease and have to limit fluids, talk with your doctor before you increase the amount of fluids you drink. · Take care of your cough so you can rest. A cough that brings up mucus from your lungs is common with pneumonia. It is one way your body gets rid of the infection. But if coughing keeps you from resting or causes severe fatigue and chest-wall pain, talk to your doctor. Your doctor may suggest that you take a medicine to reduce the cough. · Use a vaporizer or humidifier to add moisture to your bedroom. Follow the directions for cleaning the machine. · Do not smoke or allow others to smoke around you. Smoke will make your cough last longer. If you need help quitting, talk to your doctor about stop-smoking programs and medicines. These can increase your chances of quitting for good. · Take an over-the-counter pain medicine, such as acetaminophen (Tylenol), ibuprofen (Advil, Motrin), or naproxen (Aleve). Read and follow all instructions on the label. · Do not take two or more pain medicines at the same time unless the doctor told you to. Many pain medicines have acetaminophen, which is Tylenol. Too much acetaminophen (Tylenol) can be harmful. · If you were given a spirometer to measure how well your lungs are working, use it as instructed. This can help your doctor tell how your recovery is going. · To prevent pneumonia in the future, talk to your doctor about getting a flu vaccine (once a year) and a pneumococcal vaccine (one time only for most people). When should you call for help? Call 911 anytime you think you may need emergency care. For example, call if:    · You have severe trouble breathing. Call your doctor now or seek immediate medical care if:    · You cough up dark brown or bloody mucus (sputum). · You have new or worse trouble breathing. · You are dizzy or lightheaded, or you feel like you may faint.    Watch closely for changes in your health, and be sure to contact your doctor if:    · You have a new or higher fever. · You are coughing more deeply or more often. · You are not getting better after 2 days (48 hours). · You do not get better as expected. Where can you learn more? Go to http://www.maria.com/  Enter D336 in the search box to learn more about \"Pneumonia: Care Instructions. \"  Current as of: October 26, 2020               Content Version: 12.8  © 2006-2021 [employer]. Care instructions adapted under license by [employer] (which disclaims liability or warranty for this information). If you have questions about a medical condition or this instruction, always ask your healthcare professional. [address] any warranty or liability for your use of this information. Patient Education        Learning About Hypoxemia  What is hypoxemia? Hypoxemia means that you don't have enough oxygen in your blood. It's a result of diseases that affect your heart or lungs. These include heart failure, COPD, and pulmonary fibrosis (scarring of the lungs). Being at high altitudes can also lead to hypoxemia. What happens when you have hypoxemia? Oxygen gets into your blood through your lungs. Your blood carries the oxygen to all parts of your body. When you have too little oxygen in your blood, your body doesn't get enough of it. With too little oxygen, your heart and other parts of your body don't work very well. What are the symptoms? In addition to the symptoms of whatever is causing your hypoxemia, you may:  · Get tired quickly. · Be short of breath when you are active. · Feel like your heart is pounding or racing. · Feel weak or dizzy. · Become confused. How is hypoxemia treated? Your doctor will do tests to find out how much oxygen is in your blood. He or she will look for the cause of your hypoxemia and treat that problem.  For example, if you have heart failure, you may need medicines that help your heart pump better. · If your hypoxemia is not severe, your doctor may give you oxygen through a mask or nasal cannula (say \"Sonia\"). A cannula is a thin tube with two openings that fit just inside your nose. · If your hypoxemia is severe, you may have a breathing tube put into your windpipe. The breathing tube is attached to a machine that pushes air into your lungs. This machine is called a ventilator. · If you have a long-term problem with hypoxemia, your doctor may recommend that you use oxygen regularly. Some people need it all the time. Others need it from time to time throughout the day or overnight. Your doctor will tell you how much oxygen you need and how often to use it. Follow-up care is a key part of your treatment and safety. Be sure to make and go to all appointments, and call your doctor if you are having problems. It's also a good idea to know your test results and keep a list of the medicines you take. Where can you learn more? Go to http://www.gray.com/  Enter M375 in the search box to learn more about \"Learning About Hypoxemia. \"  Current as of: October 26, 2020               Content Version: 12.8  © 2006-2021 [employer]. Care instructions adapted under license by [employer] (which disclaims liability or warranty for this information). If you have questions about a medical condition or this instruction, always ask your healthcare professional. [place] [address] any warranty or liability for your use of this information.

## 2021-12-17 NOTE — PROGRESS NOTES
Oxygen Qualifier       Room air: SpO2 with O2 and liter flow   Resting SpO2  89%    Ambulating SpO2 82% 83% on 1L  84% on 2L  90% on 3L         Completed by:     Cesia Mittal, RT

## 2021-12-18 NOTE — PROGRESS NOTES
MSN, cM:  Patient to be discharged home with [facility] services ordered. Patient will also require home oxygen which will be provided by [place] [id number].  Patient agrees with this discharge plan and has met all milestones for this admission. Family to transport patient home. Care Management Interventions  PCP Verified by CM: Yes (Dr. Doris Ho)  Mode of Transport at Discharge:  Other (see comment) (family to transport)  Transition of Care Consult (CM Consult): [address]: Yes  Health Maintenance Reviewed: Yes  Physical Therapy Consult: Yes  Support Systems: Child(gabe)  Confirm Follow Up Transport: Self  The Plan for Transition of Care is Related to the Following Treatment Goals : Home health to regain strenght back to baseline  The Patient and/or Patient Representative was Provided with a Choice of Provider and Agrees with the Discharge Plan?: Yes  Name of the Patient Representative Who was Provided with a Choice of Provider and Agrees with the Discharge Plan: Mr. Sarita Stephens of Choice List was Provided with Basic Dialogue that Supports the Patient's Individualized Plan of Care/Goals, Treatment Preferences and Shares the Quality Data Associated with the Providers?: Yes  Discharge Location  Discharge Placement: Home with home health

## 2021-12-20 ENCOUNTER — HOME HEALTH ADMISSION (OUTPATIENT)
Dept: HOME HEALTH SERVICES | Facility: HOME HEALTH | Age: 71
End: 2021-12-20
Payer: MEDICARE

## 2021-12-22 ENCOUNTER — HOME CARE VISIT (OUTPATIENT)
Dept: SCHEDULING | Facility: HOME HEALTH | Age: 71
End: 2021-12-22
Payer: MEDICARE

## 2021-12-22 PROCEDURE — 400013 HH SOC

## 2021-12-22 PROCEDURE — 400018 HH-NO PAY CLAIM PROCEDURE

## 2021-12-22 PROCEDURE — G0299 HHS/HOSPICE OF RN EA 15 MIN: HCPCS

## 2021-12-23 ENCOUNTER — HOSPITAL ENCOUNTER (EMERGENCY)
Age: 71
Discharge: HOME | End: 2021-12-23
Attending: EMERGENCY MEDICINE
Payer: MEDICARE

## 2021-12-23 ENCOUNTER — APPOINTMENT (OUTPATIENT)
Dept: GENERAL RADIOLOGY | Age: 71
End: 2021-12-23
Attending: EMERGENCY MEDICINE
Payer: MEDICARE

## 2021-12-23 VITALS
OXYGEN SATURATION: 99 % | SYSTOLIC BLOOD PRESSURE: 122 MMHG | DIASTOLIC BLOOD PRESSURE: 63 MMHG | HEIGHT: 67 IN | RESPIRATION RATE: 18 BRPM | BODY MASS INDEX: 31.39 KG/M2 | TEMPERATURE: 97.4 F | WEIGHT: 200 LBS | HEART RATE: 69 BPM

## 2021-12-23 DIAGNOSIS — U07.1 PNEUMONIA DUE TO COVID-19 VIRUS: ICD-10-CM

## 2021-12-23 DIAGNOSIS — J12.82 PNEUMONIA DUE TO COVID-19 VIRUS: ICD-10-CM

## 2021-12-23 DIAGNOSIS — R05.9 COUGH: Primary | ICD-10-CM

## 2021-12-23 LAB
ALBUMIN SERPL-MCNC: 2.6 G/DL (ref 3.2–4.6)
ALBUMIN/GLOB SERPL: 0.7 {RATIO} (ref 1.2–3.5)
ALP SERPL-CCNC: 49 U/L (ref 50–136)
ALT SERPL-CCNC: 18 U/L (ref 12–65)
ANION GAP SERPL CALC-SCNC: 4 MMOL/L (ref 7–16)
AST SERPL-CCNC: 32 U/L (ref 15–37)
BASOPHILS # BLD: 0 K/UL (ref 0–0.2)
BASOPHILS NFR BLD: 0 % (ref 0–2)
BILIRUB SERPL-MCNC: 0.8 MG/DL (ref 0.2–1.1)
BUN SERPL-MCNC: 21 MG/DL (ref 8–23)
CALCIUM SERPL-MCNC: 8.6 MG/DL (ref 8.3–10.4)
CHLORIDE SERPL-SCNC: 109 MMOL/L (ref 98–107)
CO2 SERPL-SCNC: 27 MMOL/L (ref 21–32)
CREAT SERPL-MCNC: 1.05 MG/DL (ref 0.8–1.5)
DIFFERENTIAL METHOD BLD: ABNORMAL
EOSINOPHIL # BLD: 0.2 K/UL (ref 0–0.8)
EOSINOPHIL NFR BLD: 3 % (ref 0.5–7.8)
ERYTHROCYTE [DISTWIDTH] IN BLOOD BY AUTOMATED COUNT: 14 % (ref 11.9–14.6)
GLOBULIN SER CALC-MCNC: 4 G/DL (ref 2.3–3.5)
GLUCOSE SERPL-MCNC: 144 MG/DL (ref 65–100)
HCT VFR BLD AUTO: 31.1 % (ref 41.1–50.3)
HGB BLD-MCNC: 10 G/DL (ref 13.6–17.2)
IMM GRANULOCYTES # BLD AUTO: 0 K/UL (ref 0–0.5)
IMM GRANULOCYTES NFR BLD AUTO: 1 % (ref 0–5)
LYMPHOCYTES # BLD: 1.1 K/UL (ref 0.5–4.6)
LYMPHOCYTES NFR BLD: 22 % (ref 13–44)
MCH RBC QN AUTO: 25.8 PG (ref 26.1–32.9)
MCHC RBC AUTO-ENTMCNC: 32.2 G/DL (ref 31.4–35)
MCV RBC AUTO: 80.2 FL (ref 79.6–97.8)
MONOCYTES # BLD: 0.6 K/UL (ref 0.1–1.3)
MONOCYTES NFR BLD: 11 % (ref 4–12)
NEUTS SEG # BLD: 3.2 K/UL (ref 1.7–8.2)
NEUTS SEG NFR BLD: 63 % (ref 43–78)
NRBC # BLD: 0 K/UL (ref 0–0.2)
PLATELET # BLD AUTO: 149 K/UL (ref 150–450)
PMV BLD AUTO: 10.5 FL (ref 9.4–12.3)
POTASSIUM SERPL-SCNC: 4.1 MMOL/L (ref 3.5–5.1)
PROT SERPL-MCNC: 6.6 G/DL (ref 6.3–8.2)
RBC # BLD AUTO: 3.88 M/UL (ref 4.23–5.6)
SODIUM SERPL-SCNC: 140 MMOL/L (ref 136–145)
WBC # BLD AUTO: 5.1 K/UL (ref 4.3–11.1)

## 2021-12-23 PROCEDURE — 99284 EMERGENCY DEPT VISIT MOD MDM: CPT

## 2021-12-23 PROCEDURE — 74011000250 HC RX REV CODE- 250: Performed by: NURSE PRACTITIONER

## 2021-12-23 PROCEDURE — 80053 COMPREHEN METABOLIC PANEL: CPT

## 2021-12-23 PROCEDURE — 71045 X-RAY EXAM CHEST 1 VIEW: CPT

## 2021-12-23 PROCEDURE — 94664 DEMO&/EVAL PT USE INHALER: CPT

## 2021-12-23 PROCEDURE — 85025 COMPLETE CBC W/AUTO DIFF WBC: CPT

## 2021-12-23 PROCEDURE — 94640 AIRWAY INHALATION TREATMENT: CPT

## 2021-12-23 RX ORDER — IPRATROPIUM BROMIDE AND ALBUTEROL SULFATE 2.5; .5 MG/3ML; MG/3ML
3 SOLUTION RESPIRATORY (INHALATION)
Status: COMPLETED | OUTPATIENT
Start: 2021-12-23 | End: 2021-12-23

## 2021-12-23 RX ORDER — ALBUTEROL SULFATE 90 UG/1
2 AEROSOL, METERED RESPIRATORY (INHALATION)
Qty: 6.7 G | Refills: 0 | Status: SHIPPED | OUTPATIENT
Start: 2021-12-23

## 2021-12-23 RX ADMIN — IPRATROPIUM BROMIDE AND ALBUTEROL SULFATE 3 ML: .5; 3 SOLUTION RESPIRATORY (INHALATION) at 13:11

## 2021-12-23 NOTE — DISCHARGE INSTRUCTIONS
As I discussed, take Mucinex DM twice daily. Drink plenty of water with this medication. I have also provided you with a prescription for an albuterol inhaler to use if needed for any shortness of breath. You may take 2 puffs every 4-6 hours. Follow-up with pulmonology. Return to the emergency department for any new, worsening, or concerning symptoms.

## 2021-12-23 NOTE — ED PROVIDER NOTES
70-year-old male who presents emergency department today with chief complaint of cough. He reports that he was diagnosed with Covid on 12/11/2021. He says he was admitted to the hospital for about a week. He was discharged on 4 Lpm of oxygen for home use. He states he does not have a pulmonologist.  He denies any respiratory issues prior to césar Covid. He states that since césar Covid, he has had a persistent cough and does not feel like the cough is getting any better. The cough is nonproductive but he states he feels like he has a bunch of stuff in his chest that he just cannot get up. He denies any fever, chills, nausea, vomiting, diarrhea, chest pain, or shortness of breath. He denies taking any cough medicines at home for his symptoms. The history is provided by the patient. Cough  This is a new problem. The current episode started more than 1 week ago. The problem occurs constantly. The problem has not changed since onset. Pertinent negatives include no chest pain, no abdominal pain and no shortness of breath. He has tried nothing for the symptoms. Past Medical History:   Diagnosis Date    Acute kidney failure, unspecified ([address] 9/17/2010    Acute kidney injury ([address] 9/17/2010    Acute metabolic encephalopathy 8/68/5161    Altered mental state 9/20/2020    Anxiety state, unspecified 11/14/2013    Arteriosclerosis of bypass graft of coronary artery 8/27/2015    CAD (coronary artery disease)     Cath on 3- showed 5/5 patent bypass grafts with LV EF=60% and a 90% stenosis in native LAD distal to LIMA graft anastomosis. He received a Xience ZAK (2.25 x 18) to LAD.  Carotid artery disease ([address] 03/28/2019    Bilateral ICA:50-69% on carotid ultrasound.     Carotid artery stenosis without cerebral infarction 07/07/2008    Constipation 12/15/2021    CVA (cerebral infarction) 5/25/2011    Diabetes ([address]     Diabetes mellitus ([address] 5/4/2009    Dilated Cardiomyopathy,Ischemic 2/26/2010    Dyslipidemia 5/4/2009    Electrolyte or fluid disorder 12/14/2021    Elevated prostate specific antigen (PSA) 11/14/2013    Essential hypertension 11/14/2013    GERD (gastroesophageal reflux disease)     Hypertension     Hypokalemia 12/14/2021    Nocturia     Other and unspecified hyperlipidemia     Pacemaker 6/21/2014    Palpitations 2006    Palpitations     Paroxysmal atrial fibrillation ([address] 11/30/2010    Paroxysmal ventricular tachycardia ([address] 11/30/2010    Pneumonia 5/17/2021    Pneumonia due to COVID-19 virus 12/11/2021    Post PTCA 5/4/2009    stent to left main     Psychiatric disorder     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    S/P CABG (coronary artery bypass graft) 5/4/2009    LIMA TO LAD, SV TO OM, SV TO RCA, SV TO DIAG     S/P PTCA (percutaneous transluminal coronary angioplasty) 3/11/2016    Sick sinus syndrome ([address] 09/06/2007    Snoring, Possible sleep apnea 2/26/2010    SSS (sick sinus syndrome) ([address] 6/21/2014    Stroke [facility]cerebrum) ([facility])     TIA 2/2011    Stroke, 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    Stroke, embolic ([address] 4/33/2337    Syncope and collapse 8/19/2011    Unspecified malignant neoplasm of skin, site unspecified        Past Surgical History:   Procedure Laterality Date    COLONOSCOPY  1990    EGD  2010    esophageal ulcer    HX CHOLECYSTECTOMY      biliary dyskinesia    HX CORONARY ARTERY BYPASS GRAFT      HX CORONARY STENT PLACEMENT      HX HEART CATHETERIZATION  4/30/2013    no intervention    HX HEART CATHETERIZATION  6/23/2014    no intervention    HX HEART CATHETERIZATION      MULTIPLE ([address])    HX ORTHOPAEDIC      knee surgery x 2 left    HX OTHER SURGICAL      nerve in back    HX PACEMAKER      OK CARDIAC SURG PROCEDURE UNLIST      bypass x 5; 2 stents    OK LEFT HEART CATH,PERCUTANEOUS  11/30/2010    native circ x 1         Family History:   Problem Relation Age of Onset  Diabetes Mother     Heart Disease Mother     Heart Disease Sister     Cancer Brother         Eye    Heart Disease Brother     Migraines Brother        Social History     Socioeconomic History    Marital status:      Spouse name: Not on file    Number of children: Not on file    Years of education: Not on file    Highest education level: Not on file   Occupational History    Not on file   Tobacco Use    Smoking status: Former Smoker     Packs/day: 3.00     Years: 40.00     Pack years: 120.00     Quit date: 1999     Years since quittin.9    Smokeless tobacco: Never Used   Substance and Sexual Activity    Alcohol use: No    Drug use: No    Sexual activity: Not on file   Other Topics Concern    Caffeine Concern Not Asked    Back Care Not Asked    Exercise Not Asked    Occupational Exposure Not Asked    Sleep Concern Not Asked    Stress Concern Not Asked    Weight Concern Not Asked   Social History Narrative    Not on file     Social Determinants of Health     Financial Resource Strain:     Difficulty of Paying Living Expenses: Not on file   Food Insecurity:     Worried About Running Out of Food in the Last Year: Not on file    Debra of Food in the Last Year: Not on file   Transportation Needs:     Lack of Transportation (Medical): Not on file    Lack of Transportation (Non-Medical):  Not on file   Physical Activity:     Days of Exercise per Week: Not on file    Minutes of Exercise per Session: Not on file   Stress:     Feeling of Stress : Not on file   Social Connections:     Frequency of Communication with Friends and Family: Not on file    Frequency of Social Gatherings with Friends and Family: Not on file    Attends Orthodox Services: Not on file    Active Member of Clubs or Organizations: Not on file    Attends Club or Organization Meetings: Not on file    Marital Status: Not on file   Intimate Partner Violence:     Fear of Current or Ex-Partner: Not on file    Emotionally Abused: Not on file    Physically Abused: Not on file    Sexually Abused: Not on file   Housing Stability:     Unable to Pay for Housing in the Last Year: Not on file    Number of Places Lived in the Last Year: Not on file    Unstable Housing in the Last Year: Not on file         ALLERGIES: Beta-blockers (beta-adrenergic blocking agts), Shellfish derived, and Xanax [alprazolam]    Review of Systems   Constitutional: Negative for chills and fever. Respiratory: Positive for cough. Negative for shortness of breath. Cardiovascular: Negative for chest pain. Gastrointestinal: Negative for abdominal pain, diarrhea, nausea and vomiting. All other systems reviewed and are negative. Vitals:    12/23/21 1223 12/23/21 1240 12/23/21 1312 12/23/21 1415   BP: 105/65   122/63   Pulse: 69      Resp: 18      Temp: 97.4 °F (36.3 °C)      SpO2: 100%  99% 99%   Weight: 90.7 kg (200 lb) 90.7 kg (200 lb)     Height:  5' 7\" (1.702 m)              Physical Exam  Vitals and nursing note reviewed. Constitutional:       General: He is not in acute distress. Appearance: Normal appearance. He is not ill-appearing, toxic-appearing or diaphoretic. HENT:      Head: Normocephalic and atraumatic. Right Ear: External ear normal.      Left Ear: External ear normal.      Nose: Congestion present. Mouth/Throat:      Mouth: Mucous membranes are moist.      Pharynx: Oropharynx is clear. Eyes:      General: No scleral icterus. Extraocular Movements: Extraocular movements intact. Conjunctiva/sclera: Conjunctivae normal.   Cardiovascular:      Rate and Rhythm: Normal rate. Pulmonary:      Effort: Pulmonary effort is normal. No respiratory distress. Breath sounds: Examination of the right-lower field reveals wheezing and rales. Examination of the left-lower field reveals wheezing and rales. Wheezing and rales present. No decreased breath sounds or rhonchi.    Abdominal:      General: Abdomen is flat. There is no distension. Musculoskeletal:         General: Normal range of motion. Cervical back: Normal range of motion and neck supple. No rigidity. Skin:     General: Skin is warm and dry. Capillary Refill: Capillary refill takes less than 2 seconds. Neurological:      General: No focal deficit present. Mental Status: He is alert and oriented to person, place, and time. Psychiatric:         Mood and Affect: Mood normal.         Behavior: Behavior normal.         Thought Content: Thought content normal.         Judgment: Judgment normal.          MDM  Number of Diagnoses or Management Options  Cough  Pneumonia due to COVID-19 virus  Diagnosis management comments: 80-year-old male presents emergency department today with complaint of continued cough after césar Covid. Patient initially had some mild wheezing on expiration. DuoNeb breathing treatment given in the emergency department with significant improvement in wheezing. Rales noted in lung bases. X-ray suggestive of Covid pneumonia but the x-ray does appear is improved from his previous x-ray on 12/11/2021. Patient is speaking in full sentences without difficulty. Respirations are even and unlabored. SPO2 is 99 to 100% on 4 L which he states he is on all the time now since being discharged from the hospital on oxygen. He does not appear acutely ill or toxic today. Labs are unremarkable. No leukocytosis. I discussed all of the results with the patient. He does state that he is ready to go home and is agreeable to take Mucinex at home. We will also provide him with pulmonology's information for follow-up. Encouraged rest and oral hydration.  I have discussed the results of all labs, procedures, radiographs, and/or treatments with the patient and available family members. Berto Hunter is agreed upon by the patient and the patient is ready for discharge.  Questions about treatment in the ED and differential diagnosis of presenting condition were answered.  Patient was given verbal discharge instructions including, but not limited to, importance of returning to the emergency department for any concern of worsening or continued symptoms.  Instructions were given to follow up with a primary care provider or specialist within 1-2 days. Vanice Tatum effects of medications, if prescribed, were discussed and patient was advised to refrain from significant physical activity until followed up by primary care physician and to not drive or operate heavy machinery after taking any sedating substances.      Levar Collins NP; 12/23/2021 @4:51 PM Voice dictation software was used during the making of  this note. This software is not perfect and grammatical and other typographical errors  may be present. This note has not been proofread for errors. Amount and/or Complexity of Data Reviewed  Clinical lab tests: reviewed  Tests in the radiology section of CPT®: reviewed    Risk of Complications, Morbidity, and/or Mortality  Presenting problems: moderate  Diagnostic procedures: moderate  Management options: moderate    Patient Progress  Patient progress: improved    ED Course as of 12/23/21 1651   Thu Dec 23, 2021   1332 XR CHEST PORT  Findings:   A stable left-sided intracardiac device is seen. The patient is status post  median sternotomy. The cardiac silhouette is mild to moderately enlarged  although stable. The lungs are expanded without evidence for pneumothorax. Multifocal infiltrates in the mid and lower lung fields persist although do  appear improved. No evolving pleural effusion is seen.     IMPRESSION  1. Improving although persistent multifocal infiltrates in the mid and lower  lung fields likely related to the patient's known Covid pneumonia.  [BC]      ED Course User Index  [BC] Gayland Pacific, NP     Recent Results (from the past 8 hour(s))   CBC WITH AUTOMATED DIFF    Collection Time: 12/23/21  1:05 PM   Result Value Ref Range    WBC 5.1 4.3 - 11.1 K/uL    RBC 3.88 (L) 4.23 - 5.6 M/uL    HGB 10.0 (L) 13.6 - 17.2 g/dL    HCT 31.1 (L) 41.1 - 50.3 %    MCV 80.2 79.6 - 97.8 FL    MCH 25.8 (L) 26.1 - 32.9 PG    MCHC 32.2 31.4 - 35.0 g/dL    RDW 14.0 11.9 - 14.6 %    PLATELET 444 (L) 855 - 450 K/uL    MPV 10.5 9.4 - 12.3 FL    ABSOLUTE NRBC 0.00 0.0 - 0.2 K/uL    DF AUTOMATED      NEUTROPHILS 63 43 - 78 %    LYMPHOCYTES 22 13 - 44 %    MONOCYTES 11 4.0 - 12.0 %    EOSINOPHILS 3 0.5 - 7.8 %    BASOPHILS 0 0.0 - 2.0 %    IMMATURE GRANULOCYTES 1 0.0 - 5.0 %    ABS. NEUTROPHILS 3.2 1.7 - 8.2 K/UL    ABS. LYMPHOCYTES 1.1 0.5 - 4.6 K/UL    ABS. MONOCYTES 0.6 0.1 - 1.3 K/UL    ABS. EOSINOPHILS 0.2 0.0 - 0.8 K/UL    ABS. BASOPHILS 0.0 0.0 - 0.2 K/UL    ABS. IMM. GRANS. 0.0 0.0 - 0.5 K/UL   METABOLIC PANEL, COMPREHENSIVE    Collection Time: 12/23/21  1:05 PM   Result Value Ref Range    Sodium 140 136 - 145 mmol/L    Potassium 4.1 3.5 - 5.1 mmol/L    Chloride 109 (H) 98 - 107 mmol/L    CO2 27 21 - 32 mmol/L    Anion gap 4 (L) 7 - 16 mmol/L    Glucose 144 (H) 65 - 100 mg/dL    BUN 21 8 - 23 MG/DL    Creatinine 1.05 0.8 - 1.5 MG/DL    GFR est AA >60 >60 ml/min/1.73m2    GFR est non-AA >60 >60 ml/min/1.73m2    Calcium 8.6 8.3 - 10.4 MG/DL    Bilirubin, total 0.8 0.2 - 1.1 MG/DL    ALT (SGPT) 18 12 - 65 U/L    AST (SGOT) 32 15 - 37 U/L    Alk.  phosphatase 49 (L) 50 - 136 U/L    Protein, total 6.6 6.3 - 8.2 g/dL    Albumin 2.6 (L) 3.2 - 4.6 g/dL    Globulin 4.0 (H) 2.3 - 3.5 g/dL    A-G Ratio 0.7 (L) 1.2 - 3.5           Procedures

## 2021-12-26 VITALS
OXYGEN SATURATION: 95 % | RESPIRATION RATE: 17 BRPM | SYSTOLIC BLOOD PRESSURE: 120 MMHG | HEART RATE: 81 BPM | TEMPERATURE: 97.9 F | DIASTOLIC BLOOD PRESSURE: 68 MMHG

## 2021-12-27 ENCOUNTER — HOME CARE VISIT (OUTPATIENT)
Dept: HOME HEALTH SERVICES | Facility: HOME HEALTH | Age: 71
End: 2021-12-27
Payer: MEDICARE

## 2021-12-28 ENCOUNTER — HOME CARE VISIT (OUTPATIENT)
Dept: SCHEDULING | Facility: HOME HEALTH | Age: 71
End: 2021-12-28
Payer: MEDICARE

## 2021-12-28 VITALS
TEMPERATURE: 97.4 F | HEART RATE: 70 BPM | DIASTOLIC BLOOD PRESSURE: 80 MMHG | RESPIRATION RATE: 16 BRPM | SYSTOLIC BLOOD PRESSURE: 180 MMHG | OXYGEN SATURATION: 97 %

## 2021-12-28 PROCEDURE — G0299 HHS/HOSPICE OF RN EA 15 MIN: HCPCS

## 2021-12-28 NOTE — HOME HEALTH
Next Visit: SN will continue to monitor patient's progress towards achieving POC goals. SN will continue to assess and instruct until patient/caregiver able to verbalize understanding.

## 2021-12-30 ENCOUNTER — HOME CARE VISIT (OUTPATIENT)
Dept: SCHEDULING | Facility: HOME HEALTH | Age: 71
End: 2021-12-30
Payer: MEDICARE

## 2021-12-30 VITALS
DIASTOLIC BLOOD PRESSURE: 66 MMHG | SYSTOLIC BLOOD PRESSURE: 118 MMHG | HEART RATE: 66 BPM | TEMPERATURE: 98.4 F | OXYGEN SATURATION: 94 % | RESPIRATION RATE: 18 BRPM

## 2021-12-30 PROCEDURE — G0299 HHS/HOSPICE OF RN EA 15 MIN: HCPCS

## 2022-01-03 ENCOUNTER — HOME CARE VISIT (OUTPATIENT)
Dept: HOME HEALTH SERVICES | Facility: HOME HEALTH | Age: 72
End: 2022-01-03
Payer: MEDICARE

## 2022-01-04 ENCOUNTER — HOME CARE VISIT (OUTPATIENT)
Dept: SCHEDULING | Facility: HOME HEALTH | Age: 72
End: 2022-01-04
Payer: MEDICARE

## 2022-01-04 VITALS
TEMPERATURE: 97.8 F | OXYGEN SATURATION: 95 % | DIASTOLIC BLOOD PRESSURE: 70 MMHG | HEART RATE: 82 BPM | RESPIRATION RATE: 12 BRPM | SYSTOLIC BLOOD PRESSURE: 100 MMHG

## 2022-01-04 PROCEDURE — G0299 HHS/HOSPICE OF RN EA 15 MIN: HCPCS

## 2022-01-07 ENCOUNTER — HOME CARE VISIT (OUTPATIENT)
Dept: SCHEDULING | Facility: HOME HEALTH | Age: 72
End: 2022-01-07
Payer: MEDICARE

## 2022-01-07 VITALS
DIASTOLIC BLOOD PRESSURE: 70 MMHG | SYSTOLIC BLOOD PRESSURE: 140 MMHG | TEMPERATURE: 96.3 F | HEART RATE: 74 BPM | OXYGEN SATURATION: 97 % | HEART RATE: 74 BPM | RESPIRATION RATE: 12 BRPM | RESPIRATION RATE: 12 BRPM | SYSTOLIC BLOOD PRESSURE: 140 MMHG | DIASTOLIC BLOOD PRESSURE: 70 MMHG | OXYGEN SATURATION: 97 % | TEMPERATURE: 96.3 F

## 2022-01-07 PROCEDURE — 400018 HH-NO PAY CLAIM PROCEDURE

## 2022-01-07 PROCEDURE — 400013 HH SOC

## 2022-01-07 PROCEDURE — 3331090001 HH PPS REVENUE CREDIT

## 2022-01-07 PROCEDURE — G0299 HHS/HOSPICE OF RN EA 15 MIN: HCPCS

## 2022-01-07 PROCEDURE — 3331090002 HH PPS REVENUE DEBIT

## 2022-01-08 PROCEDURE — 3331090001 HH PPS REVENUE CREDIT

## 2022-01-08 PROCEDURE — 3331090002 HH PPS REVENUE DEBIT

## 2022-01-09 PROCEDURE — 3331090002 HH PPS REVENUE DEBIT

## 2022-01-09 PROCEDURE — 3331090001 HH PPS REVENUE CREDIT

## 2022-01-10 PROCEDURE — 3331090001 HH PPS REVENUE CREDIT

## 2022-01-10 PROCEDURE — 3331090002 HH PPS REVENUE DEBIT

## 2022-01-11 PROCEDURE — 3331090001 HH PPS REVENUE CREDIT

## 2022-01-11 PROCEDURE — 3331090002 HH PPS REVENUE DEBIT

## 2022-01-12 PROCEDURE — 3331090002 HH PPS REVENUE DEBIT

## 2022-01-12 PROCEDURE — 3331090001 HH PPS REVENUE CREDIT

## 2022-01-13 PROCEDURE — 3331090001 HH PPS REVENUE CREDIT

## 2022-01-13 PROCEDURE — 3331090002 HH PPS REVENUE DEBIT

## 2022-01-14 ENCOUNTER — HOME CARE VISIT (OUTPATIENT)
Dept: HOME HEALTH SERVICES | Facility: HOME HEALTH | Age: 72
End: 2022-01-14
Payer: MEDICARE

## 2022-01-14 ENCOUNTER — HOME CARE VISIT (OUTPATIENT)
Dept: SCHEDULING | Facility: HOME HEALTH | Age: 72
End: 2022-01-14
Payer: MEDICARE

## 2022-01-14 VITALS
SYSTOLIC BLOOD PRESSURE: 100 MMHG | RESPIRATION RATE: 12 BRPM | OXYGEN SATURATION: 95 % | DIASTOLIC BLOOD PRESSURE: 50 MMHG | HEART RATE: 69 BPM | TEMPERATURE: 97.8 F

## 2022-01-14 PROCEDURE — 3331090002 HH PPS REVENUE DEBIT

## 2022-01-14 PROCEDURE — G0299 HHS/HOSPICE OF RN EA 15 MIN: HCPCS

## 2022-01-14 PROCEDURE — 3331090001 HH PPS REVENUE CREDIT

## 2022-01-15 PROCEDURE — 3331090001 HH PPS REVENUE CREDIT

## 2022-01-15 PROCEDURE — 3331090002 HH PPS REVENUE DEBIT

## 2022-01-16 PROCEDURE — 3331090001 HH PPS REVENUE CREDIT

## 2022-01-16 PROCEDURE — 3331090002 HH PPS REVENUE DEBIT

## 2022-01-17 ENCOUNTER — HOME CARE VISIT (OUTPATIENT)
Dept: HOME HEALTH SERVICES | Facility: HOME HEALTH | Age: 72
End: 2022-01-17
Payer: MEDICARE

## 2022-01-17 PROCEDURE — 3331090002 HH PPS REVENUE DEBIT

## 2022-01-17 PROCEDURE — 3331090001 HH PPS REVENUE CREDIT

## 2022-01-18 PROCEDURE — 3331090002 HH PPS REVENUE DEBIT

## 2022-01-18 PROCEDURE — 3331090001 HH PPS REVENUE CREDIT

## 2022-01-19 PROCEDURE — 3331090002 HH PPS REVENUE DEBIT

## 2022-01-19 PROCEDURE — 3331090001 HH PPS REVENUE CREDIT

## 2022-01-20 PROCEDURE — 3331090002 HH PPS REVENUE DEBIT

## 2022-01-20 PROCEDURE — 3331090001 HH PPS REVENUE CREDIT

## 2022-01-21 ENCOUNTER — HOME CARE VISIT (OUTPATIENT)
Dept: SCHEDULING | Facility: HOME HEALTH | Age: 72
End: 2022-01-21
Payer: MEDICARE

## 2022-01-21 VITALS
OXYGEN SATURATION: 98 % | RESPIRATION RATE: 17 BRPM | DIASTOLIC BLOOD PRESSURE: 66 MMHG | HEART RATE: 70 BPM | SYSTOLIC BLOOD PRESSURE: 114 MMHG

## 2022-01-21 PROCEDURE — 3331090002 HH PPS REVENUE DEBIT

## 2022-01-21 PROCEDURE — 3331090003 HH PPS REVENUE ADJ

## 2022-01-21 PROCEDURE — 3331090001 HH PPS REVENUE CREDIT

## 2022-01-21 PROCEDURE — G0299 HHS/HOSPICE OF RN EA 15 MIN: HCPCS

## 2022-01-22 PROCEDURE — 3331090002 HH PPS REVENUE DEBIT

## 2022-01-22 PROCEDURE — 3331090001 HH PPS REVENUE CREDIT

## 2022-01-23 PROCEDURE — 3331090001 HH PPS REVENUE CREDIT

## 2022-01-23 PROCEDURE — 3331090002 HH PPS REVENUE DEBIT

## 2022-03-18 PROBLEM — R06.09 DOE (DYSPNEA ON EXERTION): Status: ACTIVE | Noted: 2020-04-10

## 2022-03-18 PROBLEM — E66.811 CLASS 1 OBESITY DUE TO EXCESS CALORIES WITH SERIOUS COMORBIDITY AND BODY MASS INDEX (BMI) OF 30.0 TO 30.9 IN ADULT: Status: ACTIVE | Noted: 2021-12-15

## 2022-03-18 PROBLEM — R00.1 BRADYCARDIA: Status: ACTIVE | Noted: 2020-01-23

## 2022-03-18 PROBLEM — R42 DIZZINESS: Status: ACTIVE | Noted: 2017-08-17

## 2022-03-18 PROBLEM — E66.09 CLASS 1 OBESITY DUE TO EXCESS CALORIES WITH SERIOUS COMORBIDITY AND BODY MASS INDEX (BMI) OF 30.0 TO 30.9 IN ADULT: Status: ACTIVE | Noted: 2021-12-15

## 2022-03-19 PROBLEM — R53.82 CHRONIC FATIGUE: Status: ACTIVE | Noted: 2020-04-10

## 2022-03-19 PROBLEM — R07.9 CHEST PAIN: Status: ACTIVE | Noted: 2020-08-03

## 2022-03-19 PROBLEM — R29.90 STROKE-LIKE SYMPTOMS: Status: ACTIVE | Noted: 2020-08-10

## 2022-03-19 PROBLEM — N18.30 CHRONIC RENAL FAILURE, STAGE 3 (MODERATE) (HCC): Status: ACTIVE | Noted: 2020-04-21

## 2022-03-20 PROBLEM — J96.01 ACUTE HYPOXEMIC RESPIRATORY FAILURE (HCC): Status: ACTIVE | Noted: 2021-12-11

## 2022-07-14 RX ORDER — ISOSORBIDE MONONITRATE 60 MG/1
TABLET, EXTENDED RELEASE ORAL
Qty: 180 TABLET | Refills: 3 | Status: SHIPPED | OUTPATIENT
Start: 2022-07-14

## 2022-07-18 RX ORDER — PRAVASTATIN SODIUM 80 MG/1
80 TABLET ORAL DAILY
Qty: 90 TABLET | Refills: 3 | Status: SHIPPED | OUTPATIENT
Start: 2022-07-18

## 2022-07-18 NOTE — TELEPHONE ENCOUNTER
Patient calling back to get his Cholesterol called in to [address]. Patient did not leave name of medication.

## 2022-10-04 ENCOUNTER — TELEPHONE (OUTPATIENT)
Dept: CARDIOLOGY CLINIC | Age: 72
End: 2022-10-04

## 2022-10-04 NOTE — TELEPHONE ENCOUNTER
Pt daughter Felix Connolly) called and states that pt is feeling dizzy and having blurred vision. His last bp check was 70/35. Please advise.

## 2022-10-04 NOTE — TELEPHONE ENCOUNTER
Flavia states pt's BP while sitting was 70/35 20 minutes ago. HR 70.  Pt c/o blurred vision. Has not taken medications this morning. Flavia will recheck pt's BP. BP is 74/43 and pt still c/o weakness, dizziness, feeling poorly. Triage advised Flavia to call EMS and have pt transported to the closest ER. She verbalizes understanding and agrees to plan.

## 2022-10-11 ENCOUNTER — OFFICE VISIT (OUTPATIENT)
Dept: CARDIOLOGY CLINIC | Age: 72
End: 2022-10-11
Payer: COMMERCIAL

## 2022-10-11 VITALS
HEIGHT: 67 IN | BODY MASS INDEX: 33.27 KG/M2 | WEIGHT: 212 LBS | HEART RATE: 96 BPM | DIASTOLIC BLOOD PRESSURE: 72 MMHG | SYSTOLIC BLOOD PRESSURE: 128 MMHG

## 2022-10-11 DIAGNOSIS — I25.810 ATHEROSCLEROSIS OF CORONARY ARTERY BYPASS GRAFT OF NATIVE HEART WITHOUT ANGINA PECTORIS: ICD-10-CM

## 2022-10-11 DIAGNOSIS — I10 ACCELERATED HYPERTENSION: ICD-10-CM

## 2022-10-11 DIAGNOSIS — I48.0 PAROXYSMAL ATRIAL FIBRILLATION (HCC): Primary | ICD-10-CM

## 2022-10-11 PROCEDURE — 99214 OFFICE O/P EST MOD 30 MIN: CPT | Performed by: INTERNAL MEDICINE

## 2022-10-11 PROCEDURE — 1123F ACP DISCUSS/DSCN MKR DOCD: CPT | Performed by: INTERNAL MEDICINE

## 2022-10-11 PROCEDURE — 93000 ELECTROCARDIOGRAM COMPLETE: CPT | Performed by: INTERNAL MEDICINE

## 2022-10-11 ASSESSMENT — ENCOUNTER SYMPTOMS
RHINORRHEA: 0
SPUTUM PRODUCTION: 0
COUGH: 1
COLOR CHANGE: 0
ORTHOPNEA: 0
WHEEZING: 0
SWOLLEN GLANDS: 0
SORE THROAT: 0
BLURRED VISION: 0
BOWEL INCONTINENCE: 0
VOMITING: 0
DIARRHEA: 0
HEMOPTYSIS: 0
ABDOMINAL PAIN: 0
HOARSE VOICE: 0
HEMATEMESIS: 0
SHORTNESS OF BREATH: 1
HEMATOCHEZIA: 0

## 2022-10-11 NOTE — PROGRESS NOTES
[facility] CARDIOLOGY  [address], [address]  PHONE: [phone]        10/11/22        NAME:  Rawland Cranker  : 1950  MRN: [id number]       SUBJECTIVE:   Rawland Cranker is a 67 y.o. male seen for a follow up visit regarding the following: CAD,SSS with PPM,primary hypertension,and PAF. He returns for follow up. He reports a 3 month hx of SOB and generally not feeling well. He describes a recent chest CT scan ordered by his PCP. The scan reported fibrosis,atelectasis,and ground glass appearance. Shortness of Breath  This is a chronic problem. The problem occurs daily. The problem has been unchanged. Pertinent negatives include no abdominal pain, chest pain, claudication, coryza, ear pain, fever, headaches, hemoptysis, leg pain, leg swelling, neck pain, orthopnea, PND, rash, rhinorrhea, sore throat, sputum production, swollen glands, syncope, vomiting or wheezing. Past Medical History, Past Surgical History, Family history, Social History, and Medications were all reviewed with the patient today and updated as necessary.          Current Outpatient Medications:     pravastatin (PRAVACHOL) 80 MG tablet, Take 1 tablet by mouth in the morning., Disp: 90 tablet, Rfl: 3    isosorbide mononitrate (IMDUR) 60 MG extended release tablet, TAKE 1 TABLET BY MOUTH TWICE DAILY, Disp: 180 tablet, Rfl: 3    albuterol sulfate  (90 Base) MCG/ACT inhaler, Inhale 2 puffs into the lungs every 4 hours as needed, Disp: , Rfl:     apixaban (ELIQUIS) 5 MG TABS tablet, TAKE 1 TABLET BY MOUTH TWICE DAILY, Disp: , Rfl:     carvedilol (COREG) 12.5 MG tablet, The details of the medication are not available because there are pending changes by a home health clinician., Disp: , Rfl:     carvedilol (COREG) 3.125 MG tablet, Take 3.125 mg by mouth 2 times daily, Disp: , Rfl:     dextromethorphan-guaiFENesin (MUCINEX DM)  MG per extended release tablet, Take 1 tablet by mouth 2 times daily, Disp: , Rfl:     esomeprazole (NEXIUM) 40 MG delayed release capsule, Take 40 mg by mouth daily, Disp: , Rfl:     finasteride (PROSCAR) 5 MG tablet, TAKE 1 TABLET BY MOUTH DAILY, Disp: , Rfl:     HYDROcodone-acetaminophen (NORCO)  MG per tablet, Take 1 tablet by mouth every 6 hours as needed. , Disp: , Rfl:     insulin detemir (LEVEMIR) 100 UNIT/ML injection vial, Inject 70 Units into the skin 2 times daily, Disp: , Rfl:     insulin lispro (HUMALOG KWIKPEN) 200 UNIT/ML SOPN pen, Inject into the skin 3 times daily, Disp: , Rfl:     losartan (COZAAR) 25 MG tablet, Take 25 mg by mouth daily, Disp: , Rfl:     nitroGLYCERIN (NITROSTAT) 0.4 MG SL tablet, Place 0.4 mg under the tongue, Disp: , Rfl:     pregabalin (LYRICA) 300 MG capsule, Take 300 mg by mouth 2 times daily. , Disp: , Rfl:     tamsulosin (FLOMAX) 0.4 MG capsule, TAKE 1 CAPSULE BY MOUTH DAILY, Disp: , Rfl:     traZODone (DESYREL) 100 MG tablet, Take 150 mg by mouth, Disp: , Rfl:   Allergies   Allergen Reactions    Alprazolam Other (See Comments)     Totally changes his personality     Past Medical History:   Diagnosis Date    Acute kidney failure, unspecified ([address] 9/17/2010    Acute kidney injury ([address] 5/55/0186    Acute metabolic encephalopathy 0/90/4193    Altered mental state 9/20/2020    Anxiety state, unspecified 11/14/2013    Arteriosclerosis of bypass graft of coronary artery 8/27/2015    CAD (coronary artery disease)     Cath on 3- showed 5/5 patent bypass grafts with LV EF=60% and a 90% stenosis in native LAD distal to LIMA graft anastomosis. He received a Xience NIMESH (2.25 x 18) to LAD. Carotid artery disease ([address] 03/28/2019    Bilateral ICA:50-69% on carotid ultrasound.     Carotid artery stenosis without cerebral infarction 07/07/2008    Constipation 12/15/2021    CVA (cerebral infarction) 5/25/2011    Diabetes ([address]     Diabetes mellitus ([address] 5/4/2009    Dilated cardiomyopathy ([facility] 2/26/2010    Dyslipidemia 5/4/2009 Electrolyte or fluid disorder 12/14/2021    Elevated prostate specific antigen (PSA) 11/14/2013    Essential hypertension 11/14/2013    GERD (gastroesophageal reflux disease)     Hypertension     Hypokalemia 12/14/2021    Nocturia     Other and unspecified hyperlipidemia     Pacemaker 6/21/2014    Palpitations     Palpitations 2006    Paroxysmal atrial fibrillation ([address] 11/30/2010    Paroxysmal ventricular tachycardia 11/30/2010    Pneumonia 5/17/2021    Pneumonia due to COVID-19 virus 12/11/2021    Post PTCA 5/4/2009    stent to left main     Psychiatric disorder     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    S/P CABG (coronary artery bypass graft) 5/4/2009    LIMA TO LAD, SV TO OM, SV TO RCA, SV TO DIAG     S/P PTCA (percutaneous transluminal coronary angioplasty) 3/11/2016    Sick sinus syndrome ([address] 09/06/2007    Snoring 2/26/2010    SSS (sick sinus syndrome) ([address] 6/21/2014    Stroke (cerebrum) ([address]     TIA 7/1023    Stroke, embolic ([address] 8/50/0611    Stroke, embolic ([address] 6/52/9251    Syncope and collapse 8/19/2011    Unspecified malignant neoplasm of skin, site unspecified      Past Surgical History:   Procedure Laterality Date    CARDIAC CATHETERIZATION      MULTIPLE ([place])    CARDIAC CATHETERIZATION  6/23/2014    no intervention    CARDIAC CATHETERIZATION  4/30/2013    no intervention    CHOLECYSTECTOMY      biliary dyskinesia    COLONOSCOPY  1990    CORONARY ANGIOPLASTY WITH STENT PLACEMENT      CORONARY ARTERY BYPASS GRAFT      LEFT HEART CATH,PERCUTANEOUS  11/30/2010    native circ x 1    ORTHOPEDIC SURGERY      knee surgery x 2 left    OTHER SURGICAL HISTORY      nerve in back    PACEMAKER      HI CARDIAC SURG PROCEDURE UNLIST      bypass x 5; 2 stents    UPPER GASTROINTESTINAL ENDOSCOPY  2010    esophageal ulcer     Family History   Problem Relation Age of Onset    Heart Disease Mother     Diabetes Mother     Heart Disease Sister     Cancer Brother         [place] Heart Disease Brother     Migraines Brother       Social History     Tobacco Use    Smoking status: Former     Packs/day: 3.00     Types: Cigarettes     Quit date: 1999     Years since quittin.7    Smokeless tobacco: Never   Substance Use Topics    Alcohol use: No       ROS:    Review of Systems   Constitutional: Positive for malaise/fatigue. Negative for chills, decreased appetite, diaphoresis and fever. HENT:  Negative for congestion, ear pain, hearing loss, hoarse voice, nosebleeds, rhinorrhea and sore throat. Eyes:  Negative for blurred vision. Cardiovascular:  Positive for dyspnea on exertion. Negative for chest pain, claudication, cyanosis, irregular heartbeat, leg swelling, near-syncope, orthopnea, palpitations, paroxysmal nocturnal dyspnea and syncope. Respiratory:  Positive for cough and shortness of breath. Negative for hemoptysis, sputum production and wheezing. Endocrine: Negative for polydipsia, polyphagia and polyuria. Skin:  Negative for color change and rash. Musculoskeletal:  Negative for neck pain. Gastrointestinal:  Negative for abdominal pain, bowel incontinence, diarrhea, hematemesis, hematochezia and vomiting. Genitourinary:  Negative for dysuria, frequency and hematuria. Neurological:  Negative for focal weakness, headaches, light-headedness, loss of balance, numbness, sensory change and weakness. Psychiatric/Behavioral:  Negative for altered mental status and memory loss. PHYSICAL EXAM:   /72   Pulse 96   Ht 5' 7\" (1.702 m)   Wt 212 lb (96.2 kg)   BMI 33.20 kg/m²      Physical Exam  Constitutional:       Appearance: Normal appearance. HENT:      Head: Normocephalic and atraumatic. Nose: Nose normal.   Eyes:      Extraocular Movements: Extraocular movements intact. Pupils: Pupils are equal, round, and reactive to light. Neck:      Vascular: No carotid bruit. Cardiovascular:      Rate and Rhythm: Regular rhythm.       Pulses: Normal pulses. Heart sounds: No murmur heard. Pulmonary:      Effort: Pulmonary effort is normal.      Breath sounds: Normal breath sounds. Comments: Coarse bilateral BS  Abdominal:      General: Abdomen is flat. Bowel sounds are normal.      Palpations: Abdomen is soft. Musculoskeletal:         General: Normal range of motion. Cervical back: Normal range of motion and neck supple. Skin:     General: Skin is warm and dry. Neurological:      General: No focal deficit present. Mental Status: He is alert and oriented to person, place, and time. Psychiatric:         Mood and Affect: Mood normal.       Medical problems and test results were reviewed with the patient today. No results found for this or any previous visit (from the past 672 hour(s)). Lab Results   Component Value Date/Time    CHOL 125 08/11/2020 04:32 AM    HDL 43 08/11/2020 04:32 AM     Results for orders placed or performed in visit on 10/11/22   EKG 12 lead    Impression    Electronic dual-chamber pacemaker  -possibly demand type   Pacemaker ECG, No further analysis   INSUFFICIENT DATA       ASSESSMENT and Jeremy Brizuela was seen today for atrial fibrillation. Diagnoses and all orders for this visit:    Paroxysmal atrial fibrillation (HCC):Remains in NSR. Continue Eliquis. -     EKG 12 lead    Atherosclerosis of coronary artery bypass graft of native heart without angina pectoris:Stable. Continue Imdur,Pravastatin, and Coreg. Accelerated hypertension:Stable and at [employer]. Uses Cozaar prn. Disposition:    Return in about 3 months (around 1/11/2023).                 Cecilia Jordan MD  10/11/2022  1:01 PM

## 2022-10-13 PROCEDURE — 93294 REM INTERROG EVL PM/LDLS PM: CPT | Performed by: INTERNAL MEDICINE

## 2022-10-13 PROCEDURE — 93296 REM INTERROG EVL PM/IDS: CPT | Performed by: INTERNAL MEDICINE

## 2022-11-09 DIAGNOSIS — R00.1 BRADYCARDIA: Primary | ICD-10-CM

## 2022-11-09 DIAGNOSIS — R00.1 BRADYCARDIA: ICD-10-CM

## 2022-11-09 DIAGNOSIS — Z95.0 PACEMAKER: ICD-10-CM

## 2022-12-05 ENCOUNTER — TELEPHONE (OUTPATIENT)
Dept: CARDIOLOGY CLINIC | Age: 72
End: 2022-12-05

## 2022-12-05 NOTE — TELEPHONE ENCOUNTER
Pt's daughter, Samara Morel, states pt has not been feeling well for several days. Triage spoke with pt. Pt c/o intermittent left sided CP x several days and 7/10 left sided CP now x 30-40 min with associated SOB. Triage advised pt to proceed to [facility] for evaluation. Recommended pt have someone drive him or call EMS. Pt states he does not want to go and does not have anyone to drive him. If he still feels badly later; he states he will go to ER. Triage informed Flavia of above. She verbalizes understanding and states she will talk with pt.

## 2022-12-05 NOTE — TELEPHONE ENCOUNTER
Pt daughter Felix Connolly) called and states that pt has been saying for the past three days that he is just not feeling right. He is having some pain on his left side. Wants to know what he should do. Please advise.

## 2023-01-09 RX ORDER — CARVEDILOL 3.12 MG/1
3.12 TABLET ORAL 2 TIMES DAILY
Qty: 180 TABLET | Refills: 3 | Status: SHIPPED | OUTPATIENT
Start: 2023-01-09

## 2023-01-11 ENCOUNTER — OFFICE VISIT (OUTPATIENT)
Dept: CARDIOLOGY CLINIC | Age: 73
End: 2023-01-11
Payer: MEDICARE

## 2023-01-11 VITALS
HEIGHT: 67 IN | BODY MASS INDEX: 32.96 KG/M2 | WEIGHT: 210 LBS | HEART RATE: 72 BPM | DIASTOLIC BLOOD PRESSURE: 62 MMHG | SYSTOLIC BLOOD PRESSURE: 138 MMHG

## 2023-01-11 DIAGNOSIS — I65.29 CAROTID ARTERY STENOSIS WITHOUT CEREBRAL INFARCTION, UNSPECIFIED LATERALITY: ICD-10-CM

## 2023-01-11 DIAGNOSIS — I25.10 CORONARY ARTERY DISEASE INVOLVING NATIVE CORONARY ARTERY OF NATIVE HEART WITHOUT ANGINA PECTORIS: ICD-10-CM

## 2023-01-11 DIAGNOSIS — Z95.0 PACEMAKER: Primary | ICD-10-CM

## 2023-01-11 DIAGNOSIS — I48.0 PAROXYSMAL ATRIAL FIBRILLATION (HCC): ICD-10-CM

## 2023-01-11 PROCEDURE — G8427 DOCREV CUR MEDS BY ELIG CLIN: HCPCS | Performed by: INTERNAL MEDICINE

## 2023-01-11 PROCEDURE — 99213 OFFICE O/P EST LOW 20 MIN: CPT | Performed by: INTERNAL MEDICINE

## 2023-01-11 PROCEDURE — 3078F DIAST BP <80 MM HG: CPT | Performed by: INTERNAL MEDICINE

## 2023-01-11 PROCEDURE — G8417 CALC BMI ABV UP PARAM F/U: HCPCS | Performed by: INTERNAL MEDICINE

## 2023-01-11 PROCEDURE — 3075F SYST BP GE 130 - 139MM HG: CPT | Performed by: INTERNAL MEDICINE

## 2023-01-11 PROCEDURE — G8484 FLU IMMUNIZE NO ADMIN: HCPCS | Performed by: INTERNAL MEDICINE

## 2023-01-11 PROCEDURE — 1123F ACP DISCUSS/DSCN MKR DOCD: CPT | Performed by: INTERNAL MEDICINE

## 2023-01-11 PROCEDURE — 3017F COLORECTAL CA SCREEN DOC REV: CPT | Performed by: INTERNAL MEDICINE

## 2023-01-11 PROCEDURE — 1036F TOBACCO NON-USER: CPT | Performed by: INTERNAL MEDICINE

## 2023-01-11 RX ORDER — ZOLPIDEM TARTRATE 10 MG/1
TABLET ORAL NIGHTLY PRN
COMMUNITY

## 2023-01-11 ASSESSMENT — ENCOUNTER SYMPTOMS
ABDOMINAL PAIN: 0
SPUTUM PRODUCTION: 0
HOARSE VOICE: 0
HEMATEMESIS: 0
DIARRHEA: 0
WHEEZING: 0
BLURRED VISION: 0
HEMATOCHEZIA: 0
SHORTNESS OF BREATH: 0
BOWEL INCONTINENCE: 0
ORTHOPNEA: 0
COLOR CHANGE: 0

## 2023-01-11 NOTE — PROGRESS NOTES
[facility] CARDIOLOGY  [address], [address]  PHONE: [phone]        23        NAME:  Alesia Alfaro  : 1950  MRN: [id number]       SUBJECTIVE:   Alesia Alfaro is a 67 y.o. male seen for a follow up visit regarding the following: The patient has  a hx of PAF,SSS with pacemaker,primary hypertension,and CAD. He returns for follow up. He reports recurrent falls due to associated hypotension due to Losartan. He states that he stopped Losartan and his falls stopped. Chief Complaint   Patient presents with    Atrial Fibrillation     3 month follow up       HPI:    Atrial Fibrillation  Presents for follow-up visit. Symptoms are negative for an AICD problem, bradycardia, chest pain, dizziness, hemodynamic instability, hypertension, hypotension, pacemaker problem, palpitations, shortness of breath, syncope and weakness. The symptoms have been stable. Past Medical History, Past Surgical History, Family history, Social History, and Medications were all reviewed with the patient today and updated as necessary.          Current Outpatient Medications:     zolpidem (AMBIEN) 10 MG tablet, Take by mouth nightly as needed for Sleep., Disp: , Rfl:     carvedilol (COREG) 3.125 MG tablet, Take 1 tablet by mouth 2 times daily, Disp: 180 tablet, Rfl: 3    pravastatin (PRAVACHOL) 80 MG tablet, Take 1 tablet by mouth in the morning., Disp: 90 tablet, Rfl: 3    isosorbide mononitrate (IMDUR) 60 MG extended release tablet, TAKE 1 TABLET BY MOUTH TWICE DAILY, Disp: 180 tablet, Rfl: 3    albuterol sulfate  (90 Base) MCG/ACT inhaler, Inhale 2 puffs into the lungs every 4 hours as needed, Disp: , Rfl:     apixaban (ELIQUIS) 5 MG TABS tablet, TAKE 1 TABLET BY MOUTH TWICE DAILY, Disp: , Rfl:     dextromethorphan-guaiFENesin (MUCINEX DM)  MG per extended release tablet, Take 1 tablet by mouth 2 times daily, Disp: , Rfl:     esomeprazole (NEXIUM) 40 MG delayed release capsule, Take 40 mg by mouth daily, Disp: , Rfl:     finasteride (PROSCAR) 5 MG tablet, TAKE 1 TABLET BY MOUTH DAILY, Disp: , Rfl:     HYDROcodone-acetaminophen (NORCO)  MG per tablet, Take 1 tablet by mouth every 6 hours as needed. , Disp: , Rfl:     insulin detemir (LEVEMIR) 100 UNIT/ML injection vial, Inject 70 Units into the skin 2 times daily, Disp: , Rfl:     insulin lispro (HUMALOG KWIKPEN) 200 UNIT/ML SOPN pen, Inject into the skin 3 times daily, Disp: , Rfl:     losartan (COZAAR) 25 MG tablet, Take 25 mg by mouth daily, Disp: , Rfl:     nitroGLYCERIN (NITROSTAT) 0.4 MG SL tablet, Place 0.4 mg under the tongue, Disp: , Rfl:     pregabalin (LYRICA) 300 MG capsule, Take 300 mg by mouth 2 times daily. , Disp: , Rfl:     tamsulosin (FLOMAX) 0.4 MG capsule, TAKE 1 CAPSULE BY MOUTH DAILY, Disp: , Rfl:   Allergies   Allergen Reactions    Alprazolam Other (See Comments)     Totally changes his personality     Past Medical History:   Diagnosis Date    Acute kidney failure, unspecified ([address] 9/17/2010    Acute kidney injury ([address] 3/93/0839    Acute metabolic encephalopathy 6/42/2674    Altered mental state 9/20/2020    Anxiety state, unspecified 11/14/2013    Arteriosclerosis of bypass graft of coronary artery 8/27/2015    CAD (coronary artery disease)     Cath on 3- showed 5/5 patent bypass grafts with LV EF=60% and a 90% stenosis in native LAD distal to LIMA graft anastomosis. He received a Xience NIMESH (2.25 x 18) to LAD. Carotid artery disease ([address] 03/28/2019    Bilateral ICA:50-69% on carotid ultrasound.     Carotid artery stenosis without cerebral infarction 07/07/2008    Constipation 12/15/2021    CVA (cerebral infarction) 5/25/2011    Diabetes ([address]     Diabetes mellitus ([address] 5/4/2009    Dilated cardiomyopathy ([address] 2/26/2010    Dyslipidemia 5/4/2009    Electrolyte or fluid disorder 12/14/2021    Elevated prostate specific antigen (PSA) 11/14/2013    Essential hypertension 11/14/2013    GERD (gastroesophageal reflux disease)     Hypertension     Hypokalemia 12/14/2021    Nocturia     Other and unspecified hyperlipidemia     Pacemaker 6/21/2014    Palpitations     Palpitations 2006    Paroxysmal atrial fibrillation ([address] 11/30/2010    Paroxysmal ventricular tachycardia 11/30/2010    Pneumonia 5/17/2021    Pneumonia due to COVID-19 virus 12/11/2021    Post PTCA 5/4/2009    stent to left main     Psychiatric disorder     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    S/P CABG (coronary artery bypass graft) 5/4/2009    LIMA TO LAD, SV TO OM, SV TO RCA, SV TO DIAG     S/P PTCA (percutaneous transluminal coronary angioplasty) 3/11/2016    Sick sinus syndrome ([address] 09/06/2007    Snoring 2/26/2010    SSS (sick sinus syndrome) ([address] 6/21/2014    Stroke (cerebrum) ([address]     TIA 6/4426    Stroke, 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    Stroke, embolic ([address] 6/11/7171    Syncope and collapse 8/19/2011    Unspecified malignant neoplasm of skin, site unspecified      Past Surgical History:   Procedure Laterality Date    CARDIAC CATHETERIZATION      MULTIPLE ([place])    CARDIAC CATHETERIZATION  6/23/2014    no intervention    CARDIAC CATHETERIZATION  4/30/2013    no intervention    CHOLECYSTECTOMY      biliary dyskinesia    COLONOSCOPY  1990    CORONARY ANGIOPLASTY WITH STENT PLACEMENT      CORONARY ARTERY BYPASS GRAFT      LEFT HEART CATH,PERCUTANEOUS  11/30/2010    native circ x 1    ORTHOPEDIC SURGERY      knee surgery x 2 left    OTHER SURGICAL HISTORY      nerve in back    PACEMAKER      AR UNLISTED PROCEDURE CARDIAC SURGERY      bypass x 5; 2 stents    UPPER GASTROINTESTINAL ENDOSCOPY  2010    esophageal ulcer     Family History   Problem Relation Age of Onset    Heart Disease Mother     Diabetes Mother     Heart Disease Sister     Cancer Brother         Eye    Heart Disease Brother     Migraines Brother       Social History     Tobacco Use    Smoking status: Former     Packs/day: 3.00     Types: Cigarettes     Quit date: 1999     Years since quittin.0    Smokeless tobacco: Never   Substance Use Topics    Alcohol use: No       ROS:    Review of Systems   Constitutional: Negative for chills, decreased appetite, diaphoresis, fever and malaise/fatigue. HENT:  Negative for congestion, hearing loss, hoarse voice and nosebleeds. Eyes:  Negative for blurred vision. Cardiovascular:  Negative for chest pain, claudication, cyanosis, dyspnea on exertion, irregular heartbeat, leg swelling, near-syncope, orthopnea, palpitations, paroxysmal nocturnal dyspnea and syncope. Respiratory:  Negative for shortness of breath, sputum production and wheezing. Endocrine: Negative for polydipsia, polyphagia and polyuria. Skin:  Negative for color change. Gastrointestinal:  Negative for abdominal pain, bowel incontinence, diarrhea, hematemesis and hematochezia. Genitourinary:  Negative for dysuria, frequency and hematuria. Neurological:  Negative for dizziness, focal weakness, light-headedness, loss of balance, numbness, sensory change and weakness. Psychiatric/Behavioral:  Negative for altered mental status and memory loss. PHYSICAL EXAM:   /62   Pulse 72   Ht 5' 7\" (1.702 m)   Wt 210 lb (95.3 kg)   BMI 32.89 kg/m²      Physical Exam  Constitutional:       Appearance: Normal appearance. HENT:      Head: Normocephalic and atraumatic. Nose: Nose normal.   Eyes:      Extraocular Movements: Extraocular movements intact. Pupils: Pupils are equal, round, and reactive to light. Neck:      Vascular: No carotid bruit. Cardiovascular:      Rate and Rhythm: Regular rhythm. Pulses: Normal pulses. Heart sounds: No murmur heard. Pulmonary:      Effort: Pulmonary effort is normal.      Breath sounds: Normal breath sounds. Abdominal:      General: Abdomen is flat. Bowel sounds are normal.      Palpations: Abdomen is soft.    Musculoskeletal:         General: Normal range of motion. Cervical back: Normal range of motion and neck supple. Skin:     General: Skin is warm and dry. Neurological:      General: No focal deficit present. Mental Status: He is alert and oriented to person, place, and time. Psychiatric:         Mood and Affect: Mood normal.       Medical problems and test results were reviewed with the patient today. No results found for this or any previous visit (from the past 672 hour(s)). Lab Results   Component Value Date/Time    CHOL 125 08/11/2020 04:32 AM    HDL 43 08/11/2020 04:32 AM     No results found for any visits on 01/11/23. ASSESSMENT and PLAN    Nicolas Garzon was seen today for atrial fibrillation. Diagnoses and all orders for this visit:    Pacemaker    Coronary artery disease involving native coronary artery of native heart without angina pectoris:Stable. Continue Pravachol and Coreg,    Carotid artery stenosis without cerebral infarction, unspecified laterality:Stable. Continue Statin. Repeat carotid ultrasound in 6-12 months. Paroxysmal atrial fibrillation (HCC):Stable. Continue Eliquis and Coreg        Disposition:    Return in about 6 months (around 7/11/2023).                 Elicia Powers MD  1/11/2023  10:47 AM

## 2023-02-28 DIAGNOSIS — R00.1 BRADYCARDIA: ICD-10-CM

## 2023-02-28 DIAGNOSIS — Z95.0 PACEMAKER: ICD-10-CM

## 2023-05-30 RX ORDER — PRAVASTATIN SODIUM 80 MG/1
80 TABLET ORAL DAILY
Qty: 90 TABLET | Refills: 3 | Status: SHIPPED | OUTPATIENT
Start: 2023-05-30

## 2023-05-30 NOTE — TELEPHONE ENCOUNTER
MEDICATION REFILL REQUEST      Name of Medication:  Pravastatin  Dose:  80 mg  Frequency:  take 1 tablet by mouth in the evening  Quantity:  90  Days' supply:  90      Pharmacy Name/Location:  [facility]

## 2023-06-06 DIAGNOSIS — R00.1 BRADYCARDIA: ICD-10-CM

## 2023-06-06 DIAGNOSIS — Z95.0 PACEMAKER: ICD-10-CM

## 2023-06-12 NOTE — PROGRESS NOTES
Patient brought to the unit with ACLS RN. Vitals stable on 3L. Post op vitals continued. C/o pain to RLE. CMS intact, groin site CDI. Tele monitor placed, patient remains in SR. Tolerating PO intake. Oriented to the unit, call light within reach.   Pt hasn't voided during shift. Bladder scan completed; scanned 441 mL. Encouraged to increase fluid intake.

## 2023-07-11 NOTE — TELEPHONE ENCOUNTER
MEDICATION REFILL REQUEST      Name of Medication:  eliquis   Dose:  5 mg  Frequency:    Quantity:    Days' supply:  90      Pharmacy Name/Location:  [facility]

## 2023-07-18 PROCEDURE — 93296 REM INTERROG EVL PM/IDS: CPT | Performed by: INTERNAL MEDICINE

## 2023-07-18 PROCEDURE — 93294 REM INTERROG EVL PM/LDLS PM: CPT | Performed by: INTERNAL MEDICINE

## 2023-09-06 RX ORDER — ISOSORBIDE MONONITRATE 60 MG/1
60 TABLET, EXTENDED RELEASE ORAL 2 TIMES DAILY
Qty: 180 TABLET | Refills: 3 | Status: SHIPPED | OUTPATIENT
Start: 2023-09-06

## 2023-09-06 NOTE — TELEPHONE ENCOUNTER
Patient needs refill on Isosorbide mononitrate 60 mg sent to [facility] in [facility]. Please call patient with any questions. Thank you.

## 2023-09-13 ENCOUNTER — OFFICE VISIT (OUTPATIENT)
Age: 73
End: 2023-09-13
Payer: MEDICARE

## 2023-09-13 VITALS
BODY MASS INDEX: 32.33 KG/M2 | HEIGHT: 67 IN | DIASTOLIC BLOOD PRESSURE: 70 MMHG | WEIGHT: 206 LBS | SYSTOLIC BLOOD PRESSURE: 115 MMHG | HEART RATE: 80 BPM

## 2023-09-13 DIAGNOSIS — Z95.0 PACEMAKER: ICD-10-CM

## 2023-09-13 DIAGNOSIS — R07.9 CHEST PAIN, UNSPECIFIED TYPE: Primary | ICD-10-CM

## 2023-09-13 DIAGNOSIS — R06.09 DOE (DYSPNEA ON EXERTION): ICD-10-CM

## 2023-09-13 DIAGNOSIS — R53.82 CHRONIC FATIGUE: ICD-10-CM

## 2023-09-13 DIAGNOSIS — I25.810 ATHEROSCLEROSIS OF CORONARY ARTERY BYPASS GRAFT OF NATIVE HEART WITHOUT ANGINA PECTORIS: ICD-10-CM

## 2023-09-13 DIAGNOSIS — I65.29 CAROTID ARTERY STENOSIS WITHOUT CEREBRAL INFARCTION, UNSPECIFIED LATERALITY: ICD-10-CM

## 2023-09-13 DIAGNOSIS — I48.0 PAROXYSMAL ATRIAL FIBRILLATION (HCC): ICD-10-CM

## 2023-09-13 PROCEDURE — 3078F DIAST BP <80 MM HG: CPT | Performed by: INTERNAL MEDICINE

## 2023-09-13 PROCEDURE — G8417 CALC BMI ABV UP PARAM F/U: HCPCS | Performed by: INTERNAL MEDICINE

## 2023-09-13 PROCEDURE — 3017F COLORECTAL CA SCREEN DOC REV: CPT | Performed by: INTERNAL MEDICINE

## 2023-09-13 PROCEDURE — 93000 ELECTROCARDIOGRAM COMPLETE: CPT | Performed by: INTERNAL MEDICINE

## 2023-09-13 PROCEDURE — 3074F SYST BP LT 130 MM HG: CPT | Performed by: INTERNAL MEDICINE

## 2023-09-13 PROCEDURE — 1036F TOBACCO NON-USER: CPT | Performed by: INTERNAL MEDICINE

## 2023-09-13 PROCEDURE — 1123F ACP DISCUSS/DSCN MKR DOCD: CPT | Performed by: INTERNAL MEDICINE

## 2023-09-13 PROCEDURE — 99214 OFFICE O/P EST MOD 30 MIN: CPT | Performed by: INTERNAL MEDICINE

## 2023-09-13 PROCEDURE — G8427 DOCREV CUR MEDS BY ELIG CLIN: HCPCS | Performed by: INTERNAL MEDICINE

## 2023-09-13 RX ORDER — MONTELUKAST SODIUM 10 MG/1
TABLET ORAL
COMMUNITY
Start: 2023-07-16

## 2023-09-13 RX ORDER — TRAZODONE HYDROCHLORIDE 150 MG/1
1 TABLET ORAL
COMMUNITY

## 2023-09-13 RX ORDER — FLUTICASONE PROPIONATE 50 MCG
SPRAY, SUSPENSION (ML) NASAL
COMMUNITY
Start: 2023-06-19

## 2023-09-13 RX ORDER — BUDESONIDE, GLYCOPYRROLATE, AND FORMOTEROL FUMARATE 160; 9; 4.8 UG/1; UG/1; UG/1
2 AEROSOL, METERED RESPIRATORY (INHALATION)
COMMUNITY
Start: 2023-06-26

## 2023-09-13 ASSESSMENT — ENCOUNTER SYMPTOMS
BOWEL INCONTINENCE: 0
SHORTNESS OF BREATH: 0
COLOR CHANGE: 0
SPUTUM PRODUCTION: 0
DIARRHEA: 0
HOARSE VOICE: 0
BLURRED VISION: 0
ORTHOPNEA: 0
ABDOMINAL PAIN: 0
HEMATEMESIS: 0
WHEEZING: 0
HEMATOCHEZIA: 0

## 2023-09-13 NOTE — PROGRESS NOTES
5/25/2011    Diabetes ([address])     Diabetes mellitus ([address]) 5/4/2009    Dilated cardiomyopathy ([address]) 2/26/2010    Dyslipidemia 5/4/2009    Electrolyte or fluid disorder 12/14/2021    Elevated prostate specific antigen (PSA) 11/14/2013    Essential hypertension 11/14/2013    GERD (gastroesophageal reflux disease)     Hypertension     Hypokalemia 12/14/2021    Nocturia     Other and unspecified hyperlipidemia     Pacemaker 6/21/2014    Palpitations     Palpitations 2006    Paroxysmal atrial fibrillation ([address]) 11/30/2010    Paroxysmal ventricular tachycardia ([address]) 11/30/2010    Pneumonia 5/17/2021    Pneumonia due to COVID-19 virus 12/11/2021    Post PTCA 5/4/2009    stent to left main     Psychiatric disorder     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    S/P CABG (coronary artery bypass graft) 5/4/2009    LIMA TO LAD, SV TO OM, SV TO RCA, SV TO DIAG     S/P PTCA (percutaneous transluminal coronary angioplasty) 3/11/2016    Sick sinus syndrome ([address]) 09/06/2007    Snoring 2/26/2010    SSS (sick sinus syndrome) ([address]) 6/21/2014    Stroke (cerebrum) ([address])     TIA 6/3288    Stroke, embolic ([address]) 0/12/8561    Stroke, embolic ([address]) 5/52/2175    Syncope and collapse 8/19/2011    Unspecified malignant neoplasm of skin, site unspecified      Past Surgical History:   Procedure Laterality Date    CARDIAC CATHETERIZATION      MULTIPLE ([address])    CARDIAC CATHETERIZATION  6/23/2014    no intervention    CARDIAC CATHETERIZATION  4/30/2013    no intervention    CHOLECYSTECTOMY      biliary dyskinesia    COLONOSCOPY  1990    CORONARY ANGIOPLASTY WITH STENT PLACEMENT      CORONARY ARTERY BYPASS GRAFT      LEFT HEART CATH,PERCUTANEOUS  11/30/2010    native circ x 1    ORTHOPEDIC SURGERY      knee surgery x 2 left    OTHER SURGICAL HISTORY      nerve in back    PACEMAKER      UT UNLISTED PROCEDURE CARDIAC SURGERY      bypass x 5; 2 stents    UPPER GASTROINTESTINAL ENDOSCOPY  2010    esophageal ulcer

## 2023-10-20 ENCOUNTER — NURSE ONLY (OUTPATIENT)
Age: 73
End: 2023-10-20

## 2023-10-20 DIAGNOSIS — I49.5 SSS (SICK SINUS SYNDROME) (HCC): Primary | ICD-10-CM

## 2023-11-01 ENCOUNTER — OFFICE VISIT (OUTPATIENT)
Age: 73
End: 2023-11-01
Payer: MEDICARE

## 2023-11-01 VITALS
HEIGHT: 67 IN | DIASTOLIC BLOOD PRESSURE: 74 MMHG | WEIGHT: 203 LBS | SYSTOLIC BLOOD PRESSURE: 126 MMHG | BODY MASS INDEX: 31.86 KG/M2 | HEART RATE: 72 BPM

## 2023-11-01 DIAGNOSIS — I25.10 CORONARY ARTERY DISEASE INVOLVING NATIVE CORONARY ARTERY OF NATIVE HEART WITHOUT ANGINA PECTORIS: ICD-10-CM

## 2023-11-01 DIAGNOSIS — R93.1 ABNORMAL NUCLEAR CARDIAC IMAGING TEST: ICD-10-CM

## 2023-11-01 DIAGNOSIS — I25.810 ATHEROSCLEROSIS OF CORONARY ARTERY BYPASS GRAFT OF NATIVE HEART WITHOUT ANGINA PECTORIS: Primary | ICD-10-CM

## 2023-11-01 DIAGNOSIS — Z95.0 PACEMAKER: ICD-10-CM

## 2023-11-01 DIAGNOSIS — I65.29 CAROTID ARTERY STENOSIS WITHOUT CEREBRAL INFARCTION, UNSPECIFIED LATERALITY: ICD-10-CM

## 2023-11-01 PROCEDURE — 3074F SYST BP LT 130 MM HG: CPT | Performed by: INTERNAL MEDICINE

## 2023-11-01 PROCEDURE — 3017F COLORECTAL CA SCREEN DOC REV: CPT | Performed by: INTERNAL MEDICINE

## 2023-11-01 PROCEDURE — G8417 CALC BMI ABV UP PARAM F/U: HCPCS | Performed by: INTERNAL MEDICINE

## 2023-11-01 PROCEDURE — 1123F ACP DISCUSS/DSCN MKR DOCD: CPT | Performed by: INTERNAL MEDICINE

## 2023-11-01 PROCEDURE — 99214 OFFICE O/P EST MOD 30 MIN: CPT | Performed by: INTERNAL MEDICINE

## 2023-11-01 PROCEDURE — G8484 FLU IMMUNIZE NO ADMIN: HCPCS | Performed by: INTERNAL MEDICINE

## 2023-11-01 PROCEDURE — 3078F DIAST BP <80 MM HG: CPT | Performed by: INTERNAL MEDICINE

## 2023-11-01 PROCEDURE — 1036F TOBACCO NON-USER: CPT | Performed by: INTERNAL MEDICINE

## 2023-11-01 PROCEDURE — G8427 DOCREV CUR MEDS BY ELIG CLIN: HCPCS | Performed by: INTERNAL MEDICINE

## 2023-11-01 ASSESSMENT — ENCOUNTER SYMPTOMS
BLURRED VISION: 0
BOWEL INCONTINENCE: 0
HOARSE VOICE: 0
DIARRHEA: 0
COLOR CHANGE: 0
HEMATOCHEZIA: 0
SPUTUM PRODUCTION: 0
HEMATEMESIS: 0
ORTHOPNEA: 0
WHEEZING: 0
SHORTNESS OF BREATH: 0
ABDOMINAL PAIN: 0

## 2023-11-01 NOTE — PROGRESS NOTES
[facility] CARDIOLOGY  [address] [address] [address] [facility]  PHONE: [phone]        23        NAME:  Dennis Baldwin  : 1950  MRN: [id number]       SUBJECTIVE:   Dennis Baldwin is a 68 y.o. male seen for a follow up visit regarding the following: The patient has CAD,SSS with PPM,PAF,and primary hypertension. On his last OV,He reported worsening chronic fatigue,reduced exercise tolerance ,and chronic GUZMAN. Repeat follow up Echo reported LV EF=60-65 %,moderate TR,mild MR,mild to moderate AR,and RVSP=32. Follow up Chantel Olivares reported a small area of apical ischemia with EF=62% c/w low CV risk. Recent carotid ultrasound revealed bilateral moderate (50-69%) ICA stenosis. He returns for follow up. I discussed test results with the patient. He reports no chest pain. Chief Complaint   Patient presents with    Atrial Fibrillation       HPI:    Atrial Fibrillation  Presents for follow-up visit. Symptoms are negative for an AICD problem, bradycardia, chest pain, dizziness, hemodynamic instability, hypertension, hypotension, pacemaker problem, palpitations, shortness of breath, syncope, tachycardia and weakness. The symptoms have been stable. Past Medical History, Past Surgical History, Family history, Social History, and Medications were all reviewed with the patient today and updated as necessary.          Current Outpatient Medications:     Budeson-Glycopyrrol-Formoterol (BREZTRI AEROSPHERE) 160-9-4.8 MCG/ACT AERO, Inhale 2 puffs into the lungs, Disp: , Rfl:     fluticasone (FLONASE) 50 MCG/ACT nasal spray, SHAKE LIQUID AND USE 1 SPRAY IN EACH NOSTRIL TWICE DAILY, Disp: , Rfl:     montelukast (SINGULAIR) 10 MG tablet, , Disp: , Rfl:     traZODone (DESYREL) 150 MG tablet, Take 1 tablet by mouth, Disp: , Rfl:     isosorbide mononitrate (IMDUR) 60 MG extended release tablet, Take 1 tablet by mouth 2 times daily, Disp: 180 tablet, Rfl: 3    apixaban (ELIQUIS) 5 MG TABS tablet, Take 1 tablet by

## 2023-11-06 NOTE — Clinical Note
Called patient PCP [place] office and left message and return phone number for SN regarding todays visit.    Reportable incidents included:   /80   1700 bsg 45 - PB and apple juice given (Patient and family refused 911)  1715 bsg 43 - Eugene cake and apple juice given   1730 bsg 81 Signed.

## 2023-12-14 RX ORDER — CARVEDILOL 3.12 MG/1
3.12 TABLET ORAL 2 TIMES DAILY
Qty: 180 TABLET | Refills: 3 | Status: SHIPPED | OUTPATIENT
Start: 2023-12-14

## 2024-01-24 ENCOUNTER — TELEPHONE (OUTPATIENT)
Age: 74
End: 2024-01-24

## 2024-01-24 DIAGNOSIS — Z45.010 ENCOUNTER FOR PACEMAKER AT END OF BATTERY LIFE: ICD-10-CM

## 2024-01-24 DIAGNOSIS — I49.5 SICK SINUS SYNDROME (HCC): Primary | ICD-10-CM

## 2024-01-24 NOTE — TELEPHONE ENCOUNTER
Dual chamber st delphine pacer gen change w/ Dr. Joya. Labs one week prior.     Hold AC 2 days. Cath lab notified.     Last EF 10/23 stable at 60%, RV pacing 99%    Cj was seen today for atrial fibrillation.     Diagnoses and all orders for this visit:     Atherosclerosis of coronary artery bypass graft of native heart without angina pectoris:Recent Lexiscan showed a small area of apical ischemia c/w low CV risk.I discussed optional LHC to investigate.Last attempted cath resulted in a stroke before procedure and it was aborted.He is reluctant to undergo since he is not experiencing angina.Will continue to attempt conservative medical therapy with Coreg,Pravastatin,Imdur,and prn NTG.LHC+-PCI in future for worsening symptoms.     Carotid artery stenosis without cerebral infarction, unspecified laterality:Moderate bilateral ICA stenosis.Repeat carotid ultrasound in 1 year.Continue statin     Coronary artery disease involving native coronary artery of native heart without angina pectoris:Stable.Continue medical therapy with NTG,statin,and BB.     Pacemaker:Recent interrogation reported approaching EOL with 4 months remaining.Pacer clinic as scheduled.     Abnormal nuclear cardiac imaging test:Low risk scan..Continue current medications.    AF:permanent.Continue Coreg and Eliquis.

## 2024-01-26 PROCEDURE — 93296 REM INTERROG EVL PM/IDS: CPT | Performed by: INTERNAL MEDICINE

## 2024-01-26 PROCEDURE — 93294 REM INTERROG EVL PM/LDLS PM: CPT | Performed by: INTERNAL MEDICINE

## 2024-02-01 ENCOUNTER — OFFICE VISIT (OUTPATIENT)
Age: 74
End: 2024-02-01

## 2024-02-01 VITALS
BODY MASS INDEX: 33.27 KG/M2 | SYSTOLIC BLOOD PRESSURE: 139 MMHG | HEIGHT: 67 IN | WEIGHT: 212 LBS | DIASTOLIC BLOOD PRESSURE: 62 MMHG | HEART RATE: 76 BPM

## 2024-02-01 DIAGNOSIS — Z95.0 PACEMAKER: Primary | ICD-10-CM

## 2024-02-01 DIAGNOSIS — I10 ACCELERATED HYPERTENSION: ICD-10-CM

## 2024-02-01 DIAGNOSIS — I25.700 ATHEROSCLEROSIS OF CORONARY ARTERY BYPASS GRAFT OF NATIVE HEART WITH UNSTABLE ANGINA PECTORIS (HCC): ICD-10-CM

## 2024-02-01 DIAGNOSIS — I48.0 PAROXYSMAL ATRIAL FIBRILLATION (HCC): ICD-10-CM

## 2024-02-01 DIAGNOSIS — E11.9 TYPE 2 DIABETES MELLITUS WITHOUT COMPLICATION, WITH LONG-TERM CURRENT USE OF INSULIN (HCC): ICD-10-CM

## 2024-02-01 DIAGNOSIS — Z79.4 TYPE 2 DIABETES MELLITUS WITHOUT COMPLICATION, WITH LONG-TERM CURRENT USE OF INSULIN (HCC): ICD-10-CM

## 2024-02-01 DIAGNOSIS — R93.1 ABNORMAL NUCLEAR CARDIAC IMAGING TEST: ICD-10-CM

## 2024-02-01 RX ORDER — SODIUM CHLORIDE 9 MG/ML
INJECTION, SOLUTION INTRAVENOUS PRN
OUTPATIENT
Start: 2024-02-01

## 2024-02-01 RX ORDER — SODIUM CHLORIDE 0.9 % (FLUSH) 0.9 %
5-40 SYRINGE (ML) INJECTION PRN
OUTPATIENT
Start: 2024-02-01

## 2024-02-01 RX ORDER — SODIUM CHLORIDE 0.9 % (FLUSH) 0.9 %
5-40 SYRINGE (ML) INJECTION EVERY 12 HOURS SCHEDULED
OUTPATIENT
Start: 2024-02-01

## 2024-02-01 ASSESSMENT — ENCOUNTER SYMPTOMS
NAUSEA: 0
ABDOMINAL PAIN: 0
BLURRED VISION: 0
HEMATOCHEZIA: 0
WHEEZING: 0
HEMOPTYSIS: 0
CHEST TIGHTNESS: 0
SHORTNESS OF BREATH: 1
BOWEL INCONTINENCE: 0
DIARRHEA: 0
BACK PAIN: 0
ORTHOPNEA: 0
HEMATEMESIS: 0
SPUTUM PRODUCTION: 0
COLOR CHANGE: 0
VOMITING: 0
COUGH: 0
HOARSE VOICE: 0

## 2024-02-01 NOTE — PROGRESS NOTES
[facility] CARDIOLOGY  [address] [place] [address], [address]  [place] [address]  PHONE: [phone]        24        NAME:  Cj Kan  : 1950  MRN: [id number]       SUBJECTIVE:   Cj Kan is a 73 y.o. male seen for a follow up visit regarding the following: The patient has CAD,SSS with PPM,PAF,and primary hypertension.  Several months ago,he reported worsening fatigue and worsening chronic GUZMAN.He reported no chest pain.Follow up echo showed normal LV EF with mild to moderate AR &TR and mild MR.Follow up Lexiscan showed a small area of reversible apical ischemia with EF=62%.Carotid ultrasound revealed bilateral moderate ICA stenosis.At that time,he elected for continued conservative medical therapy because on his last attempted cardiac cath,he had a stroke before to procedure was initiated.His pacemaker has reached EOL and he is scheduled for pulse generator exchange on 24.He returns for follow up and complains of worsening chest pain over the past 3 months.He states that he has used 2 bottles of sl NTG since his last visit.  Chief Complaint   Patient presents with    Coronary Artery Disease       HPI:    Coronary Artery Disease  Presents for follow-up visit. Symptoms include chest pain and shortness of breath. Pertinent negatives include no chest pressure, chest tightness, dizziness, leg swelling, muscle weakness, palpitations or weight gain. The symptoms have been worsening.   Chest Pain   This is a new problem. Episode onset: 3 months ago. The problem occurs every several days. The problem has been gradually worsening. The pain is present in the substernal region. The pain is at a severity of 4/10. The pain is moderate. The quality of the pain is described as heavy and tightness. The pain does not radiate. Associated symptoms include exertional chest pressure, malaise/fatigue and shortness of breath. Pertinent negatives include no abdominal pain, back pain, claudication, cough,

## 2024-02-05 DIAGNOSIS — I49.5 SICK SINUS SYNDROME (HCC): ICD-10-CM

## 2024-02-05 LAB
ANION GAP SERPL CALC-SCNC: 3 MMOL/L (ref 2–11)
BASOPHILS # BLD: 0 K/UL (ref 0–0.2)
BASOPHILS NFR BLD: 0 % (ref 0–2)
BUN SERPL-MCNC: 11 MG/DL (ref 8–23)
CALCIUM SERPL-MCNC: 9.4 MG/DL (ref 8.3–10.4)
CHLORIDE SERPL-SCNC: 113 MMOL/L (ref 103–113)
CO2 SERPL-SCNC: 26 MMOL/L (ref 21–32)
CREAT SERPL-MCNC: 0.8 MG/DL (ref 0.8–1.5)
DIFFERENTIAL METHOD BLD: ABNORMAL
EOSINOPHIL # BLD: 0.1 K/UL (ref 0–0.8)
EOSINOPHIL NFR BLD: 1 % (ref 0.5–7.8)
ERYTHROCYTE [DISTWIDTH] IN BLOOD BY AUTOMATED COUNT: 14.2 % (ref 11.9–14.6)
GLUCOSE SERPL-MCNC: 105 MG/DL (ref 65–100)
HCT VFR BLD AUTO: 36.9 % (ref 41.1–50.3)
HGB BLD-MCNC: 11.7 G/DL (ref 13.6–17.2)
IMM GRANULOCYTES # BLD AUTO: 0 K/UL (ref 0–0.5)
IMM GRANULOCYTES NFR BLD AUTO: 0 % (ref 0–5)
INR PPP: 1.2
LYMPHOCYTES # BLD: 1.6 K/UL (ref 0.5–4.6)
LYMPHOCYTES NFR BLD: 29 % (ref 13–44)
MCH RBC QN AUTO: 26.7 PG (ref 26.1–32.9)
MCHC RBC AUTO-ENTMCNC: 31.7 G/DL (ref 31.4–35)
MCV RBC AUTO: 84.1 FL (ref 82–102)
MONOCYTES # BLD: 0.5 K/UL (ref 0.1–1.3)
MONOCYTES NFR BLD: 8 % (ref 4–12)
NEUTS SEG # BLD: 3.4 K/UL (ref 1.7–8.2)
NEUTS SEG NFR BLD: 62 % (ref 43–78)
NRBC # BLD: 0 K/UL (ref 0–0.2)
PLATELET # BLD AUTO: 149 K/UL (ref 150–450)
PMV BLD AUTO: 10.8 FL (ref 9.4–12.3)
POTASSIUM SERPL-SCNC: 3.8 MMOL/L (ref 3.5–5.1)
PROTHROMBIN TIME: 16.1 SEC (ref 11.3–14.9)
RBC # BLD AUTO: 4.39 M/UL (ref 4.23–5.6)
SODIUM SERPL-SCNC: 142 MMOL/L (ref 136–146)
WBC # BLD AUTO: 5.5 K/UL (ref 4.3–11.1)

## 2024-02-06 ENCOUNTER — TELEPHONE (OUTPATIENT)
Age: 74
End: 2024-02-06

## 2024-02-06 NOTE — TELEPHONE ENCOUNTER
----- Message from Cha Byrne MA sent at 2/6/2024  7:40 AM EST -----    ----- Message -----  From: Jh Joya MD  Sent: 2/5/2024   5:11 PM EST  To: Reagan Oliveira MA    No major abnormalities.  Continue current meds without change.

## 2024-02-09 NOTE — PROGRESS NOTES
Patient pre-assessment complete for Generator change scheduled for , arrival time 0900. Patient verified using . Patient instructed to bring a list of all home medications on the day of procedure. NPO status reinforced. Patient instructed to HOLD Eliquis for three days, no insulin Monday AM. Instructed they can take all other medications excluding vitamins & supplements. Patient verbalizes understanding of all instructions & denies any questions at this time.

## 2024-02-12 ENCOUNTER — HOSPITAL ENCOUNTER (OUTPATIENT)
Age: 74
Setting detail: OUTPATIENT SURGERY
Discharge: HOME | End: 2024-02-12
Attending: INTERNAL MEDICINE | Admitting: INTERNAL MEDICINE
Payer: MEDICARE

## 2024-02-12 VITALS
HEIGHT: 67 IN | HEART RATE: 70 BPM | DIASTOLIC BLOOD PRESSURE: 69 MMHG | BODY MASS INDEX: 33.27 KG/M2 | SYSTOLIC BLOOD PRESSURE: 149 MMHG | WEIGHT: 212 LBS | RESPIRATION RATE: 16 BRPM | OXYGEN SATURATION: 96 %

## 2024-02-12 DIAGNOSIS — Z45.010 ENCOUNTER FOR PACEMAKER AT END OF BATTERY LIFE: ICD-10-CM

## 2024-02-12 LAB — ECHO BSA: 2.13 M2

## 2024-02-12 PROCEDURE — 99152 MOD SED SAME PHYS/QHP 5/>YRS: CPT | Performed by: INTERNAL MEDICINE

## 2024-02-12 PROCEDURE — 2720000010 HC SURG SUPPLY STERILE: Performed by: INTERNAL MEDICINE

## 2024-02-12 PROCEDURE — A4217 STERILE WATER/SALINE, 500 ML: HCPCS | Performed by: INTERNAL MEDICINE

## 2024-02-12 PROCEDURE — 6360000002 HC RX W HCPCS: Performed by: INTERNAL MEDICINE

## 2024-02-12 PROCEDURE — 33228 REMV&REPLC PM GEN DUAL LEAD: CPT | Performed by: INTERNAL MEDICINE

## 2024-02-12 PROCEDURE — 2500000003 HC RX 250 WO HCPCS: Performed by: INTERNAL MEDICINE

## 2024-02-12 PROCEDURE — 2580000003 HC RX 258: Performed by: INTERNAL MEDICINE

## 2024-02-12 PROCEDURE — 6370000000 HC RX 637 (ALT 250 FOR IP): Performed by: INTERNAL MEDICINE

## 2024-02-12 PROCEDURE — 2709999900 HC NON-CHARGEABLE SUPPLY: Performed by: INTERNAL MEDICINE

## 2024-02-12 PROCEDURE — C1785 PMKR, DUAL, RATE-RESP: HCPCS | Performed by: INTERNAL MEDICINE

## 2024-02-12 PROCEDURE — 99153 MOD SED SAME PHYS/QHP EA: CPT | Performed by: INTERNAL MEDICINE

## 2024-02-12 DEVICE — DR PACEMAKER DDDR
Type: IMPLANTABLE DEVICE | Status: FUNCTIONAL
Brand: ASSURITY MRI™

## 2024-02-12 RX ORDER — CEPHALEXIN 500 MG/1
500 CAPSULE ORAL 4 TIMES DAILY
Qty: 20 CAPSULE | Refills: 0 | Status: SHIPPED | OUTPATIENT
Start: 2024-02-12

## 2024-02-12 RX ORDER — MIDAZOLAM HYDROCHLORIDE 1 MG/ML
INJECTION INTRAMUSCULAR; INTRAVENOUS PRN
Status: DISCONTINUED | OUTPATIENT
Start: 2024-02-12 | End: 2024-02-12 | Stop reason: HOSPADM

## 2024-02-12 RX ORDER — SODIUM CHLORIDE 0.9 % (FLUSH) 0.9 %
5-40 SYRINGE (ML) INJECTION PRN
OUTPATIENT
Start: 2024-02-12

## 2024-02-12 RX ORDER — LIDOCAINE HYDROCHLORIDE 10 MG/ML
INJECTION, SOLUTION INFILTRATION; PERINEURAL PRN
Status: DISCONTINUED | OUTPATIENT
Start: 2024-02-12 | End: 2024-02-12 | Stop reason: HOSPADM

## 2024-02-12 RX ORDER — CEFAZOLIN SODIUM 1 G/3ML
INJECTION, POWDER, FOR SOLUTION INTRAMUSCULAR; INTRAVENOUS PRN
Status: DISCONTINUED | OUTPATIENT
Start: 2024-02-12 | End: 2024-02-12 | Stop reason: HOSPADM

## 2024-02-12 RX ORDER — HYDROCODONE BITARTRATE AND ACETAMINOPHEN 5; 325 MG/1; MG/1
2 TABLET ORAL EVERY 6 HOURS PRN
Status: DISCONTINUED | OUTPATIENT
Start: 2024-02-12 | End: 2024-02-12 | Stop reason: HOSPADM

## 2024-02-12 RX ORDER — FENTANYL CITRATE 50 UG/ML
INJECTION, SOLUTION INTRAMUSCULAR; INTRAVENOUS PRN
Status: DISCONTINUED | OUTPATIENT
Start: 2024-02-12 | End: 2024-02-12 | Stop reason: HOSPADM

## 2024-02-12 RX ORDER — SODIUM CHLORIDE 0.9 % (FLUSH) 0.9 %
5-40 SYRINGE (ML) INJECTION EVERY 12 HOURS SCHEDULED
OUTPATIENT
Start: 2024-02-12

## 2024-02-12 RX ORDER — SODIUM CHLORIDE 9 MG/ML
INJECTION, SOLUTION INTRAVENOUS PRN
OUTPATIENT
Start: 2024-02-12

## 2024-02-12 RX ADMIN — HYDROCODONE BITARTRATE AND ACETAMINOPHEN 2 TABLET: 5; 325 TABLET ORAL at 12:59

## 2024-02-12 NOTE — PROGRESS NOTES
Discharge instructions given. Pressure dsg removed before discharge. No bleeding or hematoma noted to site. No complaints. Daughter at bedside.

## 2024-02-12 NOTE — PROGRESS NOTES
PPM Gen Change w/ Dr. Mahnaz Quesada  DDDR     L chest incision, closed with surgiflo/sutures/dermabond, covered w/ primaseal  No s/sxs of bleeding or hematoma to L chest incision site    Ancef 2g IV  Versed 2mg IV  Fentanyl 25mcg IV    TRANSFER - OUT REPORT:    Verbal report given to EDD Little on Cj Kan  being transferred to CPRU for routine progression of patient care       Report consisted of patient's Situation, Background, Assessment and   Recommendations(SBAR).     Information from the following report(s) Nurse Handoff Report and MAR was reviewed with the receiving nurse.           Lines:   Peripheral IV 02/12/24 Left Antecubital (Active)       Peripheral IV 02/12/24 Distal;Posterior;Right Forearm (Active)        Opportunity for questions and clarification was provided.      Patient transported with:  Tech

## 2024-02-12 NOTE — DISCHARGE INSTRUCTIONS
feeling poorly, please notify your doctor. You may need to be seen.   If you receive more than one shock in a 24 hour period, call your physician to schedule a visit or report to the emergency room.       Please call the Device Clinic at  [phone] if you have questions or concerns about your device. Please call the hospital if it is after 5 pm or the weekend please page the on call cardiologist at [facility] at [phone]         Follow Up Appointments  You will need to have your device checked 1- 2 weeks after the procedure and should have an appointment with the device clinic. If you do not hear from them call the device clinic.      Sedation for a Medical Procedure: Care Instructions     You were given a sedative medication during your visit. While many of the effects will have worn   off before you leave; you may continue to feel some effects for several hours.      Common side effects from sedation include:  Feeling sleepy. (Your doctors and nurses will make sure you are not too sleepy to go home.)  Nausea and vomiting. This usually does not last long.  Feeling tired.     How can you care for yourself at home?  Activity    Don't do anything for 24 hours that requires attention to detail. It takes time for the medicine effects to completely wear off.     Do not make important legal decisions for 24 hours.     Do not sign any legal documents for 24 hours.     Do not drink alcohol today     For your safety, you should not drive or operate heavy machinery for the remainder of the day     Rest when you feel tired. Getting enough sleep will help you recover.      Diet    You can eat your normal diet, unless your doctor gives you other instructions. If your stomach is upset, try clear liquids and bland, low-fat foods like plain toast or rice.     Drink plenty of fluids (unless your doctor tells you not to).     Don't drink alcohol for 24 hours.      Medicines    Be safe with medicines. Read and follow

## 2024-02-12 NOTE — PROGRESS NOTES
Report received from BlaireRN Cath Lab RN. Procedural findings communicated. Intra procedural  medication administration reviewed. Progression of care discussed.     Patient received into CPRU [place] 1 post sheath PPM generator change.    Access site without bleeding or swelling yes    Dressing dry and intact yes    Patient instructed to limit movement to left {upper extremity    Routine post procedural vital signs and site assessment initiated yes

## 2024-02-16 ENCOUNTER — NURSE ONLY (OUTPATIENT)
Age: 74
End: 2024-02-16

## 2024-02-16 NOTE — PROGRESS NOTES
Patient came in for scheduled nurse visit to have bandage around pacemaker site changed. Original bandage was removed. Changed bandage using Tegaderm and gauze. Bandage was in place. Pt was given some gauze and Tegaderm in case it started to peel off. Pt instructed how to replace the bandage. He thanked and voiced understanding.

## 2024-02-20 RX ORDER — NITROGLYCERIN 0.4 MG/1
0.4 TABLET SUBLINGUAL EVERY 5 MIN PRN
Qty: 25 TABLET | Refills: 3 | Status: SHIPPED | OUTPATIENT
Start: 2024-02-20

## 2024-02-20 NOTE — TELEPHONE ENCOUNTER
MEDICATION REFILL REQUEST      Name of Medication:  nitroglycerin   Dose:  0.4 mg  Frequency:    Quantity:    Days' supply:        Pharmacy Name/Location:  [facility]

## 2024-02-22 ENCOUNTER — TELEPHONE (OUTPATIENT)
Age: 74
End: 2024-02-22

## 2024-02-22 NOTE — TELEPHONE ENCOUNTER
Called [facility], they stated that their system was down on Tuesday and it was sent to a nearby pharmacy. They are transferring the rx to the [facility] on [facility] and are getting in ready now. Called pt daughter and informed her

## 2024-02-22 NOTE — TELEPHONE ENCOUNTER
Pt is requesting a refill on Nitroglycerin, Pt stated pharmacy does not have a refill or new order. Patient is out.

## 2024-02-26 ENCOUNTER — NURSE ONLY (OUTPATIENT)
Age: 74
End: 2024-02-26
Payer: MEDICARE

## 2024-02-26 DIAGNOSIS — I48.0 PAROXYSMAL ATRIAL FIBRILLATION (HCC): ICD-10-CM

## 2024-02-26 DIAGNOSIS — I25.700: ICD-10-CM

## 2024-02-26 DIAGNOSIS — I49.5 SICK SINUS SYNDROME (HCC): Primary | ICD-10-CM

## 2024-02-26 DIAGNOSIS — R00.1 BRADYCARDIA: ICD-10-CM

## 2024-02-26 DIAGNOSIS — R00.1 BRADYCARDIA: Primary | ICD-10-CM

## 2024-02-26 DIAGNOSIS — I42.0 DILATED CARDIOMYOPATHY (HCC): ICD-10-CM

## 2024-02-27 PROCEDURE — 93280 PM DEVICE PROGR EVAL DUAL: CPT | Performed by: INTERNAL MEDICINE

## 2024-03-01 NOTE — PROGRESS NOTES
Patient pre-assessment complete for [facility] scheduled for 3/4/24, arrival time 0700. Patient verified using . Patient instructed to bring a list of all home medications on the day of procedure. NPO status reinforced. Patient informed to take a full dose aspirin 325mg  or 81 mg x 4 on the day of procedure. Patient instructed to HOLD insulin Monday AM, NO Eliquis  or Monday, only half dose of insulin  PM. Instructed they can take all other medications excluding vitamins & supplements. Patient verbalizes understanding of all instructions & denies any questions at this time.    Pt has started holding Eliquis today per MD at the office.

## 2024-03-04 ENCOUNTER — HOSPITAL ENCOUNTER (OUTPATIENT)
Age: 74
Setting detail: OUTPATIENT SURGERY
Discharge: HOME | End: 2024-03-04
Attending: INTERNAL MEDICINE | Admitting: INTERNAL MEDICINE
Payer: MEDICARE

## 2024-03-04 VITALS
SYSTOLIC BLOOD PRESSURE: 125 MMHG | DIASTOLIC BLOOD PRESSURE: 63 MMHG | RESPIRATION RATE: 12 BRPM | HEART RATE: 70 BPM | OXYGEN SATURATION: 96 % | WEIGHT: 212 LBS | HEIGHT: 67 IN | TEMPERATURE: 97.8 F | BODY MASS INDEX: 33.27 KG/M2

## 2024-03-04 DIAGNOSIS — I25.700: ICD-10-CM

## 2024-03-04 LAB
ANION GAP SERPL CALC-SCNC: 7 MMOL/L (ref 2–11)
BUN SERPL-MCNC: 10 MG/DL (ref 8–23)
CALCIUM SERPL-MCNC: 9.5 MG/DL (ref 8.3–10.4)
CHLORIDE SERPL-SCNC: 111 MMOL/L (ref 103–113)
CO2 SERPL-SCNC: 25 MMOL/L (ref 21–32)
CREAT SERPL-MCNC: 1 MG/DL (ref 0.8–1.5)
ECHO BSA: 2.13 M2
EKG ATRIAL RATE: 73 BPM
EKG DIAGNOSIS: NORMAL
EKG P AXIS: 50 DEGREES
EKG P-R INTERVAL: 228 MS
EKG Q-T INTERVAL: 398 MS
EKG QRS DURATION: 140 MS
EKG QTC CALCULATION (BAZETT): 438 MS
EKG R AXIS: 15 DEGREES
EKG T AXIS: 139 DEGREES
EKG VENTRICULAR RATE: 73 BPM
ERYTHROCYTE [DISTWIDTH] IN BLOOD BY AUTOMATED COUNT: 14 % (ref 11.9–14.6)
GLUCOSE SERPL-MCNC: 268 MG/DL (ref 65–100)
HCT VFR BLD AUTO: 38.3 % (ref 41.1–50.3)
HGB BLD-MCNC: 12.5 G/DL (ref 13.6–17.2)
MAGNESIUM SERPL-MCNC: 2.1 MG/DL (ref 1.8–2.4)
MCH RBC QN AUTO: 26.6 PG (ref 26.1–32.9)
MCHC RBC AUTO-ENTMCNC: 32.6 G/DL (ref 31.4–35)
MCV RBC AUTO: 81.5 FL (ref 82–102)
NRBC # BLD: 0 K/UL (ref 0–0.2)
PLATELET # BLD AUTO: 145 K/UL (ref 150–450)
PMV BLD AUTO: 9.9 FL (ref 9.4–12.3)
POTASSIUM SERPL-SCNC: 3.6 MMOL/L (ref 3.5–5.1)
RBC # BLD AUTO: 4.7 M/UL (ref 4.23–5.6)
SODIUM SERPL-SCNC: 143 MMOL/L (ref 136–146)
WBC # BLD AUTO: 6.3 K/UL (ref 4.3–11.1)

## 2024-03-04 PROCEDURE — 6370000000 HC RX 637 (ALT 250 FOR IP): Performed by: INTERNAL MEDICINE

## 2024-03-04 PROCEDURE — 2709999900 HC NON-CHARGEABLE SUPPLY: Performed by: INTERNAL MEDICINE

## 2024-03-04 PROCEDURE — 2500000003 HC RX 250 WO HCPCS: Performed by: INTERNAL MEDICINE

## 2024-03-04 PROCEDURE — 99152 MOD SED SAME PHYS/QHP 5/>YRS: CPT | Performed by: INTERNAL MEDICINE

## 2024-03-04 PROCEDURE — 6360000004 HC RX CONTRAST MEDICATION: Performed by: INTERNAL MEDICINE

## 2024-03-04 PROCEDURE — 93005 ELECTROCARDIOGRAM TRACING: CPT | Performed by: INTERNAL MEDICINE

## 2024-03-04 PROCEDURE — C1894 INTRO/SHEATH, NON-LASER: HCPCS | Performed by: INTERNAL MEDICINE

## 2024-03-04 PROCEDURE — 85027 COMPLETE CBC AUTOMATED: CPT

## 2024-03-04 PROCEDURE — 93010 ELECTROCARDIOGRAM REPORT: CPT | Performed by: INTERNAL MEDICINE

## 2024-03-04 PROCEDURE — 93459 L HRT ART/GRFT ANGIO: CPT | Performed by: INTERNAL MEDICINE

## 2024-03-04 PROCEDURE — 2580000003 HC RX 258: Performed by: INTERNAL MEDICINE

## 2024-03-04 PROCEDURE — 83735 ASSAY OF MAGNESIUM: CPT

## 2024-03-04 PROCEDURE — 80048 BASIC METABOLIC PNL TOTAL CA: CPT

## 2024-03-04 PROCEDURE — C1769 GUIDE WIRE: HCPCS | Performed by: INTERNAL MEDICINE

## 2024-03-04 PROCEDURE — C1760 CLOSURE DEV, VASC: HCPCS | Performed by: INTERNAL MEDICINE

## 2024-03-04 PROCEDURE — 99153 MOD SED SAME PHYS/QHP EA: CPT | Performed by: INTERNAL MEDICINE

## 2024-03-04 PROCEDURE — 6360000002 HC RX W HCPCS: Performed by: INTERNAL MEDICINE

## 2024-03-04 RX ORDER — ASPIRIN 81 MG/1
324 TABLET, CHEWABLE ORAL ONCE
Status: DISCONTINUED | OUTPATIENT
Start: 2024-03-04 | End: 2024-03-04 | Stop reason: HOSPADM

## 2024-03-04 RX ORDER — HEPARIN SODIUM 200 [USP'U]/100ML
INJECTION, SOLUTION INTRAVENOUS CONTINUOUS PRN
Status: DISCONTINUED | OUTPATIENT
Start: 2024-03-04 | End: 2024-03-04 | Stop reason: HOSPADM

## 2024-03-04 RX ORDER — MIDAZOLAM HYDROCHLORIDE 1 MG/ML
INJECTION INTRAMUSCULAR; INTRAVENOUS PRN
Status: DISCONTINUED | OUTPATIENT
Start: 2024-03-04 | End: 2024-03-04 | Stop reason: HOSPADM

## 2024-03-04 RX ORDER — SODIUM CHLORIDE 9 MG/ML
INJECTION, SOLUTION INTRAVENOUS CONTINUOUS
Status: DISCONTINUED | OUTPATIENT
Start: 2024-03-04 | End: 2024-03-04 | Stop reason: HOSPADM

## 2024-03-04 RX ORDER — LIDOCAINE HYDROCHLORIDE 10 MG/ML
INJECTION, SOLUTION INFILTRATION; PERINEURAL PRN
Status: DISCONTINUED | OUTPATIENT
Start: 2024-03-04 | End: 2024-03-04 | Stop reason: HOSPADM

## 2024-03-04 RX ORDER — HYDROCODONE BITARTRATE AND ACETAMINOPHEN 10; 325 MG/1; MG/1
1 TABLET ORAL
Status: DISCONTINUED | OUTPATIENT
Start: 2024-03-04 | End: 2024-03-04

## 2024-03-04 RX ORDER — HYDROCODONE BITARTRATE AND ACETAMINOPHEN 5; 325 MG/1; MG/1
2 TABLET ORAL
Status: COMPLETED | OUTPATIENT
Start: 2024-03-04 | End: 2024-03-04

## 2024-03-04 RX ADMIN — HYDROCODONE BITARTRATE AND ACETAMINOPHEN 2 TABLET: 5; 325 TABLET ORAL at 12:24

## 2024-03-04 RX ADMIN — SODIUM CHLORIDE: 9 INJECTION, SOLUTION INTRAVENOUS at 07:08

## 2024-03-04 ASSESSMENT — PAIN DESCRIPTION - LOCATION: LOCATION: BACK

## 2024-03-04 ASSESSMENT — PAIN SCALES - GENERAL: PAINLEVEL_OUTOF10: 7

## 2024-03-04 NOTE — H&P
Patient presents to the cardiology department for their scheduled procedure.  Patient has a scheduled left heart cath with grafts he has a complex past cardiac history inclusive of CABG pacemaker generator change and apparently multiple heart caths in the past with then at least 1 CVA prior to a previous heart cath.  He has an abnormal nuclear stress test with I believe some anterior or apical ischemia and is now scheduled for heart cath today    Graft anatomy from 2016 appears to show a sequential saphenous vein to the diagonal 1 diagonal 2  Saphenous vein to the RCA  Saphenous vein to the OM  LIMA to the LAD    Planned and scheduled informed consent has been obtained risk benefits and alternatives have been discussed with the patient.  Potential benefits and complications of been discussed with patient.  Patient agrees to proceed with above named procedure.    Pt set up for procedure. Risks benefits and alternatives discussed.  Patient specific risks as delineated below. pt agrees to proceed. Risks of bleeding infection emergent surgical procedure loss of life or limb renal failure and other known risks discussed. Pt agrees to proceed and agrees to sign consent form.  Current consent form has been signed and visualized and is completed in its entirety by all involved parties.  Actual scanning of the paper consent into the chart takes place at a later date and is a process that I am not involved in nor can be held responsible for.

## 2024-03-04 NOTE — PROGRESS NOTES
Patient ambulated after bedrest completed. Right groin no bleeding or hematoma noted. Gauze and tegaderm redressed to assess puncture area. Patient's monpubis area previously large and firm in prep and this confirmed with Maribell Palmer RN. Left radial TR band removed, no bleeding or hematoma noted. Gauze and tegaderm applied. Follow up appointment confirmed. INT removed with cath intact.

## 2024-03-04 NOTE — PROGRESS NOTES
Patient received to CPRU room # 12  Ambulatory from [facility]. Patient scheduled for C today with Dr Rosa. Procedure reviewed & questions answered, voiced good understanding consent obtained & placed on chart. All medications and medical history reviewed. Will prep patient per orders. Patient & family updated on plan of care.      The patient has a fraility score of 4-VULNERABLE, based on ambulation.    324mg Aspirin taken at 0600

## 2024-03-04 NOTE — PROGRESS NOTES
TRANSFER - OUT REPORT:    [facility] with Dr. Rosa  No intervention    L radial approach  R band with 10mL @ 0945  R groin approach  6fr closed with vascade    Versed 2mg IV  Heparin 5000units total    Verbal report given to EDD Leong on Cj Kan  being transferred to CPRU for routine progression of patient care       Report consisted of patient's Situation, Background, Assessment and   Recommendations(SBAR).     Information from the following report(s) Nurse Handoff Report, Surgery Report, and MAR was reviewed with the receiving nurse.

## 2024-03-04 NOTE — PROGRESS NOTES
TRANSFER - IN REPORT:    Verbal report received from Sumit RN(name) on Cj Kan  being received from CCL(unit) for routine post-op      Report consisted of patient’s Situation, Background, Assessment and   Recommendations(SBAR).     Information from the following report(s) Surgery Report was reviewed with the receiving nurse.    Opportunity for questions and clarification was provided.      Assessment completed upon patient’s arrival to unit and care assume

## 2024-03-04 NOTE — DISCHARGE INSTRUCTIONS
HEART CATHETERIZATION/ANGIOGRAPHY DISCHARGE INSTRUCTIONS    Check puncture site frequently for swelling or bleeding. If there is any bleeding, apply pressure over the area with a clean towel or washcloth and call 911. Notify your doctor for any redness, swelling, drainage, or oozing from the puncture site. Notify your doctor for any fever or chills.  If the extremity becomes cold, numb, or painful call Dr. Rosa at [phone].  Activity should be limited for the next 48 hours. No heavy lifting, pushing, pulling  or strenuous activity for 48 hours. No heavy lifting (anything over 10 pounds) for 3 days.  You may resume your usual diet. Drink more fluids than usual.  Have a responsible person drive you home and stay with you for at least 24 hours after your heart catheterization/angiography.  You may remove bandage from your left arm and right groin in 24 hours. You may shower in 24 hours. No tub baths, hot tubs, or swimming for 1 week. Do not place any lotions, creams, powders, or ointments over puncture site for 1 week. You may place a clean band-aid over the puncture site each day for 5 days. Change daily.        Sedation for a Medical Procedure: Care Instructions     You were given a sedative medication during your visit. While many of the effects will have worn   off before you leave; you may continue to feel some effects for several hours.      Common side effects from sedation include:  Feeling sleepy. (Your doctors and nurses will make sure you are not too sleepy to go home.)  Nausea and vomiting. This usually does not last long.  Feeling tired.     How can you care for yourself at home?  Activity    Don't do anything for 24 hours that requires attention to detail. It takes time for the medicine effects to completely wear off.     Do not make important legal decisions for 24 hours.     Do not sign any legal documents for 24 hours.     Do not drink alcohol today     For your safety, you should not drive or

## 2024-04-01 ENCOUNTER — OFFICE VISIT (OUTPATIENT)
Age: 74
End: 2024-04-01
Payer: MEDICARE

## 2024-04-01 VITALS
WEIGHT: 212 LBS | BODY MASS INDEX: 33.27 KG/M2 | HEART RATE: 60 BPM | SYSTOLIC BLOOD PRESSURE: 148 MMHG | DIASTOLIC BLOOD PRESSURE: 80 MMHG | HEIGHT: 67 IN

## 2024-04-01 DIAGNOSIS — Z95.0 PACEMAKER: ICD-10-CM

## 2024-04-01 DIAGNOSIS — I48.0 PAROXYSMAL ATRIAL FIBRILLATION (HCC): ICD-10-CM

## 2024-04-01 DIAGNOSIS — I25.10 CORONARY ARTERY DISEASE INVOLVING NATIVE CORONARY ARTERY OF NATIVE HEART WITHOUT ANGINA PECTORIS: Primary | ICD-10-CM

## 2024-04-01 DIAGNOSIS — I10 ACCELERATED HYPERTENSION: ICD-10-CM

## 2024-04-01 PROCEDURE — 3077F SYST BP >= 140 MM HG: CPT | Performed by: INTERNAL MEDICINE

## 2024-04-01 PROCEDURE — G8417 CALC BMI ABV UP PARAM F/U: HCPCS | Performed by: INTERNAL MEDICINE

## 2024-04-01 PROCEDURE — 1036F TOBACCO NON-USER: CPT | Performed by: INTERNAL MEDICINE

## 2024-04-01 PROCEDURE — G8427 DOCREV CUR MEDS BY ELIG CLIN: HCPCS | Performed by: INTERNAL MEDICINE

## 2024-04-01 PROCEDURE — 3079F DIAST BP 80-89 MM HG: CPT | Performed by: INTERNAL MEDICINE

## 2024-04-01 PROCEDURE — 99214 OFFICE O/P EST MOD 30 MIN: CPT | Performed by: INTERNAL MEDICINE

## 2024-04-01 PROCEDURE — 1123F ACP DISCUSS/DSCN MKR DOCD: CPT | Performed by: INTERNAL MEDICINE

## 2024-04-01 PROCEDURE — 3017F COLORECTAL CA SCREEN DOC REV: CPT | Performed by: INTERNAL MEDICINE

## 2024-04-01 ASSESSMENT — ENCOUNTER SYMPTOMS
WHEEZING: 0
ORTHOPNEA: 0
DIARRHEA: 0
SPUTUM PRODUCTION: 0
HOARSE VOICE: 0
BOWEL INCONTINENCE: 0
BLURRED VISION: 0
ABDOMINAL PAIN: 0
SHORTNESS OF BREATH: 0
HEMATOCHEZIA: 0
HEMATEMESIS: 0

## 2024-04-01 NOTE — PROGRESS NOTES
[facility] CARDIOLOGY  [address] [place] [address], [address]  [place] [address]  PHONE: [phone]        24        NAME:  Cj Kan  : 1950  MRN: [id number]       SUBJECTIVE:   Cj Kan is a 73 y.o. male seen for a follow up visit regarding the following: The patient has CAD with CABG,SSS with PPM,PAF,and primary hypertension.Previously,he has reported worsening chronic fatigue,GUZMAN,and intermittent chest pain.Repeat heart cath showed stable anatomy with a Dx 2 sequential vein occlusion and continued medical therapy was recommended.He returns for follow up after the procedure.  Also,he had his pulse generator exchanged due to EOL.He reports feeling 100 % better following pulse generator exchange.He reports being able to cut his grass without symptoms.  Chief Complaint   Patient presents with    Coronary Artery Disease    Follow-Up from Hospital     Post CABG       HPI:    Coronary Artery Disease  Presents for follow-up visit. Pertinent negatives include no chest pain, chest pressure, chest tightness, dizziness, leg swelling, muscle weakness, palpitations, shortness of breath or weight gain. The symptoms have been stable.       Past Medical History, Past Surgical History, Family history, Social History, and Medications were all reviewed with the patient today and updated as necessary.         Current Outpatient Medications:     nitroGLYCERIN (NITROSTAT) 0.4 MG SL tablet, Place 1 tablet under the tongue every 5 minutes as needed for Chest pain, Disp: 25 tablet, Rfl: 3    carvedilol (COREG) 3.125 MG tablet, TAKE 1 TABLET BY MOUTH TWICE DAILY, Disp: 180 tablet, Rfl: 3    Budeson-Glycopyrrol-Formoterol (BREZTRI AEROSPHERE) 160-9-4.8 MCG/ACT AERO, Inhale 2 puffs into the lungs, Disp: , Rfl:     fluticasone (FLONASE) 50 MCG/ACT nasal spray, SHAKE LIQUID AND USE 1 SPRAY IN EACH NOSTRIL TWICE DAILY, Disp: , Rfl:     montelukast (SINGULAIR) 10 MG tablet, , Disp: , Rfl:     traZODone (DESYREL) 150 MG

## 2024-05-30 PROCEDURE — 93294 REM INTERROG EVL PM/LDLS PM: CPT | Performed by: INTERNAL MEDICINE

## 2024-05-30 PROCEDURE — 93296 REM INTERROG EVL PM/IDS: CPT | Performed by: INTERNAL MEDICINE

## 2024-06-07 RX ORDER — PRAVASTATIN SODIUM 80 MG/1
80 TABLET ORAL DAILY
Qty: 90 TABLET | Refills: 3 | Status: SHIPPED | OUTPATIENT
Start: 2024-06-07

## 2024-07-09 RX ORDER — APIXABAN 5 MG/1
5 TABLET, FILM COATED ORAL 2 TIMES DAILY
Qty: 180 TABLET | Refills: 3 | Status: SHIPPED | OUTPATIENT
Start: 2024-07-09

## 2024-07-09 NOTE — TELEPHONE ENCOUNTER
Verified rx in last OV date 10/24. Pharmacy Can not be confirmed as his VM has not been set up. Erx as requested

## 2024-09-04 RX ORDER — ISOSORBIDE MONONITRATE 60 MG/1
60 TABLET, EXTENDED RELEASE ORAL 2 TIMES DAILY
Qty: 180 TABLET | Refills: 3 | Status: SHIPPED | OUTPATIENT
Start: 2024-09-04

## 2024-11-21 ENCOUNTER — OFFICE VISIT (OUTPATIENT)
Age: 74
End: 2024-11-21
Payer: MEDICARE

## 2024-11-21 VITALS
HEART RATE: 70 BPM | BODY MASS INDEX: 31.39 KG/M2 | WEIGHT: 200 LBS | HEIGHT: 67 IN | DIASTOLIC BLOOD PRESSURE: 66 MMHG | SYSTOLIC BLOOD PRESSURE: 135 MMHG

## 2024-11-21 DIAGNOSIS — M79.602 LEFT ARM PAIN: ICD-10-CM

## 2024-11-21 DIAGNOSIS — I48.0 PAROXYSMAL ATRIAL FIBRILLATION (HCC): Primary | ICD-10-CM

## 2024-11-21 DIAGNOSIS — Z95.0 PACEMAKER: ICD-10-CM

## 2024-11-21 DIAGNOSIS — R53.82 CHRONIC FATIGUE: ICD-10-CM

## 2024-11-21 DIAGNOSIS — I25.10 CORONARY ARTERY DISEASE INVOLVING NATIVE CORONARY ARTERY OF NATIVE HEART WITHOUT ANGINA PECTORIS: ICD-10-CM

## 2024-11-21 PROCEDURE — G8417 CALC BMI ABV UP PARAM F/U: HCPCS | Performed by: INTERNAL MEDICINE

## 2024-11-21 PROCEDURE — 3075F SYST BP GE 130 - 139MM HG: CPT | Performed by: INTERNAL MEDICINE

## 2024-11-21 PROCEDURE — 1159F MED LIST DOCD IN RCRD: CPT | Performed by: INTERNAL MEDICINE

## 2024-11-21 PROCEDURE — 99214 OFFICE O/P EST MOD 30 MIN: CPT | Performed by: INTERNAL MEDICINE

## 2024-11-21 PROCEDURE — 1160F RVW MEDS BY RX/DR IN RCRD: CPT | Performed by: INTERNAL MEDICINE

## 2024-11-21 PROCEDURE — 1126F AMNT PAIN NOTED NONE PRSNT: CPT | Performed by: INTERNAL MEDICINE

## 2024-11-21 PROCEDURE — 1036F TOBACCO NON-USER: CPT | Performed by: INTERNAL MEDICINE

## 2024-11-21 PROCEDURE — 3017F COLORECTAL CA SCREEN DOC REV: CPT | Performed by: INTERNAL MEDICINE

## 2024-11-21 PROCEDURE — G8427 DOCREV CUR MEDS BY ELIG CLIN: HCPCS | Performed by: INTERNAL MEDICINE

## 2024-11-21 PROCEDURE — G8484 FLU IMMUNIZE NO ADMIN: HCPCS | Performed by: INTERNAL MEDICINE

## 2024-11-21 PROCEDURE — 1123F ACP DISCUSS/DSCN MKR DOCD: CPT | Performed by: INTERNAL MEDICINE

## 2024-11-21 PROCEDURE — 3078F DIAST BP <80 MM HG: CPT | Performed by: INTERNAL MEDICINE

## 2024-11-21 RX ORDER — PRAVASTATIN SODIUM 80 MG/1
80 TABLET ORAL DAILY
Qty: 90 TABLET | Refills: 3 | Status: SHIPPED | OUTPATIENT
Start: 2024-11-21

## 2024-11-21 RX ORDER — CARVEDILOL 3.12 MG/1
3.12 TABLET ORAL 2 TIMES DAILY
Qty: 180 TABLET | Refills: 3 | Status: SHIPPED | OUTPATIENT
Start: 2024-11-21

## 2024-11-21 RX ORDER — RANOLAZINE 500 MG/1
500 TABLET, EXTENDED RELEASE ORAL 2 TIMES DAILY
Qty: 60 TABLET | Refills: 5 | Status: SHIPPED | OUTPATIENT
Start: 2024-11-21

## 2024-11-21 RX ORDER — ISOSORBIDE MONONITRATE 60 MG/1
60 TABLET, EXTENDED RELEASE ORAL 2 TIMES DAILY
Qty: 180 TABLET | Refills: 3 | Status: SHIPPED | OUTPATIENT
Start: 2024-11-21

## 2024-11-21 RX ORDER — NITROGLYCERIN 0.4 MG/1
0.4 TABLET SUBLINGUAL EVERY 5 MIN PRN
Qty: 25 TABLET | Refills: 3 | Status: SHIPPED | OUTPATIENT
Start: 2024-11-21

## 2024-11-21 ASSESSMENT — ENCOUNTER SYMPTOMS
DIARRHEA: 0
ORTHOPNEA: 0
WHEEZING: 0
HEMATEMESIS: 0
SPUTUM PRODUCTION: 0
ABDOMINAL PAIN: 0
CHEST TIGHTNESS: 0
SHORTNESS OF BREATH: 0
BLURRED VISION: 0
BOWEL INCONTINENCE: 0
HOARSE VOICE: 0
HEMATOCHEZIA: 0
COLOR CHANGE: 0

## 2024-11-21 NOTE — PROGRESS NOTES
coronary angiography w grafts performed by Morales Rosa MD at [facility] CARDIAC CATH LAB    CHOLECYSTECTOMY      biliary dyskinesia    COLONOSCOPY      CORONARY ANGIOPLASTY WITH STENT PLACEMENT      CORONARY ARTERY BYPASS GRAFT      EP DEVICE PROCEDURE N/A 2024    Remove & replace PPM gen dual lead performed by Jh Joya MD at [facility] CARDIAC CATH LAB    LEFT HEART CATH,PERCUTANEOUS  2010    native circ x 1    ORTHOPEDIC SURGERY      knee surgery x 2 left    OTHER SURGICAL HISTORY      nerve in back    PACEMAKER      RI UNLISTED PROCEDURE CARDIAC SURGERY      bypass x 5; 2 stents    UPPER GASTROINTESTINAL ENDOSCOPY      esophageal ulcer     Family History   Problem Relation Age of Onset    Heart Disease Mother     Diabetes Mother     Heart Disease Sister     Cancer Brother         Eye    Heart Disease Brother     Migraines Brother       Social History     Tobacco Use    Smoking status: Former     Current packs/day: 0.00     Types: Cigarettes     Quit date: 1999     Years since quittin.9    Smokeless tobacco: Never   Substance Use Topics    Alcohol use: No       ROS:    Review of Systems   Constitutional: Positive for malaise/fatigue. Negative for chills, decreased appetite, diaphoresis, fever and weight gain.   HENT:  Negative for congestion, hearing loss, hoarse voice and nosebleeds.    Eyes:  Negative for blurred vision.   Cardiovascular:  Negative for chest pain, claudication, cyanosis, dyspnea on exertion, irregular heartbeat, leg swelling, near-syncope, orthopnea, palpitations, paroxysmal nocturnal dyspnea and syncope.   Respiratory:  Negative for chest tightness, shortness of breath, sputum production and wheezing.    Endocrine: Negative for polydipsia, polyphagia and polyuria.   Skin:  Negative for color change.   Musculoskeletal:  Negative for muscle weakness.        Intermittent brief left arm pain.   Gastrointestinal:  Negative for abdominal pain, bowel incontinence,

## 2024-12-03 PROCEDURE — 93296 REM INTERROG EVL PM/IDS: CPT | Performed by: INTERNAL MEDICINE

## 2024-12-03 PROCEDURE — 93294 REM INTERROG EVL PM/LDLS PM: CPT | Performed by: INTERNAL MEDICINE

## 2024-12-28 DIAGNOSIS — I48.0 PAROXYSMAL ATRIAL FIBRILLATION (HCC): ICD-10-CM

## 2024-12-28 DIAGNOSIS — I25.10 CORONARY ARTERY DISEASE INVOLVING NATIVE CORONARY ARTERY OF NATIVE HEART WITHOUT ANGINA PECTORIS: ICD-10-CM

## 2025-01-05 RX ORDER — CARVEDILOL 3.12 MG/1
3.12 TABLET ORAL 2 TIMES DAILY
Qty: 180 TABLET | Refills: 3 | Status: SHIPPED | OUTPATIENT
Start: 2025-01-05

## 2025-05-22 ENCOUNTER — OFFICE VISIT (OUTPATIENT)
Age: 75
End: 2025-05-22
Payer: MEDICARE

## 2025-05-22 VITALS
HEIGHT: 67 IN | SYSTOLIC BLOOD PRESSURE: 132 MMHG | DIASTOLIC BLOOD PRESSURE: 76 MMHG | WEIGHT: 180 LBS | HEART RATE: 70 BPM | BODY MASS INDEX: 28.25 KG/M2

## 2025-05-22 DIAGNOSIS — M79.602 LEFT ARM PAIN: ICD-10-CM

## 2025-05-22 DIAGNOSIS — I48.0 PAROXYSMAL ATRIAL FIBRILLATION (HCC): Primary | ICD-10-CM

## 2025-05-22 DIAGNOSIS — I25.10 CORONARY ARTERY DISEASE INVOLVING NATIVE CORONARY ARTERY OF NATIVE HEART WITHOUT ANGINA PECTORIS: ICD-10-CM

## 2025-05-22 DIAGNOSIS — E78.5 DYSLIPIDEMIA: ICD-10-CM

## 2025-05-22 DIAGNOSIS — Z95.0 PACEMAKER: ICD-10-CM

## 2025-05-22 DIAGNOSIS — I10 ACCELERATED HYPERTENSION: ICD-10-CM

## 2025-05-22 PROCEDURE — 1036F TOBACCO NON-USER: CPT | Performed by: INTERNAL MEDICINE

## 2025-05-22 PROCEDURE — 93000 ELECTROCARDIOGRAM COMPLETE: CPT | Performed by: INTERNAL MEDICINE

## 2025-05-22 PROCEDURE — 1160F RVW MEDS BY RX/DR IN RCRD: CPT | Performed by: INTERNAL MEDICINE

## 2025-05-22 PROCEDURE — 3075F SYST BP GE 130 - 139MM HG: CPT | Performed by: INTERNAL MEDICINE

## 2025-05-22 PROCEDURE — 1123F ACP DISCUSS/DSCN MKR DOCD: CPT | Performed by: INTERNAL MEDICINE

## 2025-05-22 PROCEDURE — G8427 DOCREV CUR MEDS BY ELIG CLIN: HCPCS | Performed by: INTERNAL MEDICINE

## 2025-05-22 PROCEDURE — 3078F DIAST BP <80 MM HG: CPT | Performed by: INTERNAL MEDICINE

## 2025-05-22 PROCEDURE — 1159F MED LIST DOCD IN RCRD: CPT | Performed by: INTERNAL MEDICINE

## 2025-05-22 PROCEDURE — G8417 CALC BMI ABV UP PARAM F/U: HCPCS | Performed by: INTERNAL MEDICINE

## 2025-05-22 PROCEDURE — 1125F AMNT PAIN NOTED PAIN PRSNT: CPT | Performed by: INTERNAL MEDICINE

## 2025-05-22 PROCEDURE — 3017F COLORECTAL CA SCREEN DOC REV: CPT | Performed by: INTERNAL MEDICINE

## 2025-05-22 PROCEDURE — 99214 OFFICE O/P EST MOD 30 MIN: CPT | Performed by: INTERNAL MEDICINE

## 2025-05-22 RX ORDER — RANOLAZINE 500 MG/1
500 TABLET, EXTENDED RELEASE ORAL 2 TIMES DAILY
Qty: 60 TABLET | Refills: 5 | Status: SHIPPED | OUTPATIENT
Start: 2025-05-22

## 2025-05-22 RX ORDER — PRAVASTATIN SODIUM 80 MG/1
80 TABLET ORAL DAILY
Qty: 90 TABLET | Refills: 3 | Status: SHIPPED | OUTPATIENT
Start: 2025-05-22

## 2025-05-22 RX ORDER — CARVEDILOL 3.12 MG/1
3.12 TABLET ORAL 2 TIMES DAILY
Qty: 180 TABLET | Refills: 3 | Status: SHIPPED | OUTPATIENT
Start: 2025-05-22

## 2025-05-22 RX ORDER — NITROGLYCERIN 0.4 MG/1
0.4 TABLET SUBLINGUAL EVERY 5 MIN PRN
Qty: 25 TABLET | Refills: 3 | Status: SHIPPED | OUTPATIENT
Start: 2025-05-22

## 2025-05-22 RX ORDER — ISOSORBIDE MONONITRATE 60 MG/1
60 TABLET, EXTENDED RELEASE ORAL 2 TIMES DAILY
Qty: 180 TABLET | Refills: 3 | Status: SHIPPED | OUTPATIENT
Start: 2025-05-22

## 2025-05-22 ASSESSMENT — ENCOUNTER SYMPTOMS
WHEEZING: 0
HEMATEMESIS: 0
ABDOMINAL PAIN: 0
SHORTNESS OF BREATH: 1
CHEST TIGHTNESS: 0
COLOR CHANGE: 0
DIARRHEA: 0
ORTHOPNEA: 0
HOARSE VOICE: 0
BOWEL INCONTINENCE: 0
HEMATOCHEZIA: 0
BLURRED VISION: 0
SPUTUM PRODUCTION: 0

## 2025-05-22 NOTE — PROGRESS NOTES
ARSENIO Corona was seen today for atrial fibrillation and coronary artery disease.    Diagnoses and all orders for this visit:    Paroxysmal atrial fibrillation (HCC): Stable.  Continue Coreg for heart rate and rhythm control.  Continue Eliquis for stroke risk reduction.  EKG shows AV pacing.  -     EKG 12 lead  -     carvedilol (COREG) 3.125 MG tablet; Take 1 tablet by mouth 2 times daily  -     apixaban (ELIQUIS) 5 MG TABS tablet; Take 1 tablet by mouth 2 times daily    Coronary artery disease involving native coronary artery of native heart without angina pectoris: Stable.  He continues to complain of chronic dyspnea on exertion he denies recent chest pain.  Continue conservative medical therapy with Coreg, Imdur, Ranexa, and as needed nitroglycerin.  Continue chronic statin use.  -     ranolazine (RANEXA) 500 MG extended release tablet; Take 1 tablet by mouth 2 times daily  -     nitroGLYCERIN (NITROSTAT) 0.4 MG SL tablet; Place 1 tablet under the tongue every 5 minutes as needed for Chest pain  -     pravastatin (PRAVACHOL) 80 MG tablet; Take 1 tablet by mouth daily  -     isosorbide mononitrate (IMDUR) 60 MG extended release tablet; Take 1 tablet by mouth 2 times daily  -     carvedilol (COREG) 3.125 MG tablet; Take 1 tablet by mouth 2 times daily    Pacemaker: Stable.  Follow-up in device clinic as scheduled.    Accelerated hypertension: Stable and near goal.  Continue Coreg with ambulatory BP and heart rate monitoring.  -     carvedilol (COREG) 3.125 MG tablet; Take 1 tablet by mouth 2 times daily    Dyslipidemia: Status unknown.  Lipid panel is followed by PCP.  Continue pravastatin with target LDL less than 70.  -     pravastatin (PRAVACHOL) 80 MG tablet; Take 1 tablet by mouth daily          Disposition:    Return in about 6 months (around 11/22/2025).                KATHARINE BRIZUELA MD  5/22/2025  9:14 AM

## 2025-06-03 PROCEDURE — 93296 REM INTERROG EVL PM/IDS: CPT | Performed by: INTERNAL MEDICINE

## 2025-06-03 PROCEDURE — 93294 REM INTERROG EVL PM/LDLS PM: CPT | Performed by: INTERNAL MEDICINE

## 2025-09-02 PROCEDURE — 93296 REM INTERROG EVL PM/IDS: CPT | Performed by: INTERNAL MEDICINE

## 2025-09-02 PROCEDURE — 93294 REM INTERROG EVL PM/LDLS PM: CPT | Performed by: INTERNAL MEDICINE

## (undated) DEVICE — DEVICE CLSR 5FR BIOABSRB FULL INTEGR RAP HEMSTAS FOR FEM

## (undated) DEVICE — SUTURE 3-0 VCRL CTD SH-1 J219H

## (undated) DEVICE — CATHETER COR DIAG SPEC LCB CRV 5FR 100CM 0 SIDE H DXTERITY

## (undated) DEVICE — PLASMABLADE X PS210-030S-LIGHT 3.0SL: Brand: PLASMABLADE™ X

## (undated) DEVICE — AGENT HEMOSTATIC SURGIFLOW MATRIX KIT W/THROMBIN

## (undated) DEVICE — SUTURE VCRL + SZ 4-0 L27IN ABSRB UD PS-2 3/8 CIR REV CUT VCP426H

## (undated) DEVICE — GUIDEWIRE VASC L260CM DIA0.035IN RAD 3MM J TIP L7CM PTFE

## (undated) DEVICE — PINNACLE TIF INTRODUCER SHEATH: Brand: PINNACLE

## (undated) DEVICE — CATHETER DIAG 5FR L100CM LUMN ID0.047IN JL3.5 CRV 0 SIDE H

## (undated) DEVICE — SUTURE ABSORBABLE BRAIDED 2-0 CT-1 27 IN UD VICRYL J259H

## (undated) DEVICE — BAND COMPR L24CM REG CLR PLAS HEMSTAT EXT HK AND LOOP RETEN

## (undated) DEVICE — DRESSING POSTOP AG PRISMASEAL 3.5X6IN

## (undated) DEVICE — LIQUIBAND RAPID ADHESIVE 36/CS 0.8ML: Brand: MEDLINE

## (undated) DEVICE — GLIDESHEATH SLENDER STAINLESS STEEL KIT: Brand: GLIDESHEATH SLENDER

## (undated) DEVICE — MICROPUNCTURE INTRODUCER SET SILHOUETTE TRANSITIONLESS PUSH-PLUS DESIGN - STIFFENED CANNULA WITH STAINLESS STEEL WIRE GUIDE: Brand: MICROPUNCTURE

## (undated) DEVICE — CATHETER COR DIAG JUDKINS R 5.0 CRV 5FR 100CM 0 SIDE H

## (undated) DEVICE — CATHETER DIAG AD 5FR L110CM 145DEG COR GRY HYDRPHLC NYL